# Patient Record
Sex: FEMALE | Race: WHITE | Employment: OTHER | URBAN - METROPOLITAN AREA
[De-identification: names, ages, dates, MRNs, and addresses within clinical notes are randomized per-mention and may not be internally consistent; named-entity substitution may affect disease eponyms.]

---

## 2017-04-25 PROBLEM — K43.6 INCARCERATED VENTRAL HERNIA: Status: ACTIVE | Noted: 2017-04-25

## 2018-01-06 PROBLEM — D63.1 ANEMIA OF CHRONIC RENAL FAILURE: Status: ACTIVE | Noted: 2018-01-06

## 2018-01-06 PROBLEM — N18.9 ANEMIA OF CHRONIC RENAL FAILURE: Status: ACTIVE | Noted: 2018-01-06

## 2018-01-06 PROBLEM — N18.30 CHRONIC KIDNEY DISEASE, STAGE III (MODERATE) (HCC): Status: ACTIVE | Noted: 2018-01-06

## 2018-03-13 ENCOUNTER — TELEPHONE (OUTPATIENT)
Dept: NON INVASIVE DIAGNOSTICS | Age: 73
End: 2018-03-13

## 2018-03-13 DIAGNOSIS — Z95.810 BIVENTRICULAR IMPLANTABLE CARDIOVERTER-DEFIBRILLATOR IN SITU: ICD-10-CM

## 2018-03-13 DIAGNOSIS — I44.7 BUNDLE BRANCH BLOCK, LEFT: ICD-10-CM

## 2018-03-13 DIAGNOSIS — I42.8 CARDIOMYOPATHY, NONISCHEMIC (HCC): ICD-10-CM

## 2018-03-13 NOTE — TELEPHONE ENCOUNTER
Spoke with pt on 03/09 and let her know we checked the cost of Xarelto and for a 30-day supply it would be $83.60 and for a 90-day supply it would be $250.42. Pt decided to switch to Xarelto, Rx was sent to pharmacy.

## 2018-03-14 ENCOUNTER — TELEPHONE (OUTPATIENT)
Dept: NON INVASIVE DIAGNOSTICS | Age: 73
End: 2018-03-14

## 2018-03-14 NOTE — TELEPHONE ENCOUNTER
Patient called in stating she wants to stay on Eliquis because there is no difference in the price of the [place]. Sending a new script for Eliquis to pharmacy.

## 2018-03-15 ENCOUNTER — TELEPHONE (OUTPATIENT)
Dept: NON INVASIVE DIAGNOSTICS | Age: 73
End: 2018-03-15

## 2018-03-19 ENCOUNTER — TELEPHONE (OUTPATIENT)
Dept: NON INVASIVE DIAGNOSTICS | Age: 73
End: 2018-03-19

## 2018-03-19 ENCOUNTER — HOSPITAL ENCOUNTER (OUTPATIENT)
Dept: CARDIOLOGY | Age: 73
Discharge: HOME | End: 2018-03-19

## 2018-04-13 LAB
BASOPHILS ABSOLUTE: NORMAL /ΜL
BASOPHILS RELATIVE PERCENT: NORMAL %
BUN BLDV-MCNC: NORMAL MG/DL
CALCIUM SERPL-MCNC: NORMAL MG/DL
CHLORIDE BLD-SCNC: NORMAL MMOL/L
CO2: NORMAL MMOL/L
CREAT SERPL-MCNC: NORMAL MG/DL
EOSINOPHILS ABSOLUTE: NORMAL /ΜL
EOSINOPHILS RELATIVE PERCENT: NORMAL %
GFR CALCULATED: NORMAL
GLUCOSE BLD-MCNC: NORMAL MG/DL
HCT VFR BLD CALC: NORMAL % (ref 36–46)
HEMOGLOBIN: NORMAL G/DL (ref 12–16)
LYMPHOCYTES ABSOLUTE: NORMAL /ΜL
LYMPHOCYTES RELATIVE PERCENT: NORMAL %
MCH RBC QN AUTO: NORMAL PG
MCHC RBC AUTO-ENTMCNC: NORMAL G/DL
MCV RBC AUTO: NORMAL FL
MONOCYTES ABSOLUTE: NORMAL /ΜL
MONOCYTES RELATIVE PERCENT: NORMAL %
NEUTROPHILS ABSOLUTE: NORMAL /ΜL
NEUTROPHILS RELATIVE PERCENT: NORMAL %
PDW BLD-RTO: NORMAL %
PLATELET # BLD: NORMAL K/ΜL
PMV BLD AUTO: NORMAL FL
POTASSIUM SERPL-SCNC: NORMAL MMOL/L
RBC # BLD: NORMAL 10^6/ΜL
SODIUM BLD-SCNC: NORMAL MMOL/L
WBC # BLD: NORMAL 10^3/ML

## 2018-04-17 ENCOUNTER — HOSPITAL ENCOUNTER (OUTPATIENT)
Dept: CARDIAC CATH/INVASIVE PROCEDURES | Age: 73
Discharge: HOME | End: 2018-04-17
Payer: MEDICARE

## 2018-04-17 ENCOUNTER — ANESTHESIA EVENT (OUTPATIENT)
Dept: CARDIAC CATH/INVASIVE PROCEDURES | Age: 73
End: 2018-04-17

## 2018-04-17 ENCOUNTER — ANESTHESIA (OUTPATIENT)
Dept: CARDIAC CATH/INVASIVE PROCEDURES | Age: 73
End: 2018-04-17

## 2018-04-17 VITALS
RESPIRATION RATE: 16 BRPM | SYSTOLIC BLOOD PRESSURE: 159 MMHG | DIASTOLIC BLOOD PRESSURE: 72 MMHG | HEART RATE: 70 BPM | HEIGHT: 62 IN | TEMPERATURE: 98.5 F | BODY MASS INDEX: 32.76 KG/M2 | WEIGHT: 178 LBS

## 2018-04-17 VITALS — DIASTOLIC BLOOD PRESSURE: 53 MMHG | OXYGEN SATURATION: 100 % | SYSTOLIC BLOOD PRESSURE: 112 MMHG

## 2018-04-17 DIAGNOSIS — I42.8 CARDIOMYOPATHY, NONISCHEMIC (HCC): ICD-10-CM

## 2018-04-17 DIAGNOSIS — Z95.810 BIVENTRICULAR IMPLANTABLE CARDIOVERTER-DEFIBRILLATOR IN SITU: ICD-10-CM

## 2018-04-17 DIAGNOSIS — Z01.818 PRE-OP TESTING: ICD-10-CM

## 2018-04-17 PROBLEM — Z45.02 IMPLANTABLE CARDIOVERTER-DEFIBRILLATOR (ICD) AT END OF LIFE: Status: ACTIVE | Noted: 2018-04-17

## 2018-04-17 LAB
EKG ATRIAL RATE: 68 BPM
EKG P AXIS: 63 DEGREES
EKG P-R INTERVAL: 146 MS
EKG Q-T INTERVAL: 430 MS
EKG QRS DURATION: 124 MS
EKG QTC CALCULATION (BAZETT): 457 MS
EKG R AXIS: -97 DEGREES
EKG T AXIS: 100 DEGREES
EKG VENTRICULAR RATE: 68 BPM

## 2018-04-17 PROCEDURE — C1882 AICD, OTHER THAN SING/DUAL: HCPCS

## 2018-04-17 PROCEDURE — C1781 MESH (IMPLANTABLE): HCPCS

## 2018-04-17 PROCEDURE — 33264 RMVL & RPLCMT DFB GEN MLT LD: CPT | Performed by: INTERNAL MEDICINE

## 2018-04-17 PROCEDURE — 2720000010 HC SURG SUPPLY STERILE

## 2018-04-17 PROCEDURE — 2580000003 HC RX 258: Performed by: NURSE ANESTHETIST, CERTIFIED REGISTERED

## 2018-04-17 PROCEDURE — 3700000000 HC ANESTHESIA ATTENDED CARE

## 2018-04-17 PROCEDURE — 93005 ELECTROCARDIOGRAM TRACING: CPT

## 2018-04-17 PROCEDURE — 6360000002 HC RX W HCPCS

## 2018-04-17 PROCEDURE — 6360000002 HC RX W HCPCS: Performed by: NURSE ANESTHETIST, CERTIFIED REGISTERED

## 2018-04-17 PROCEDURE — 2500000003 HC RX 250 WO HCPCS

## 2018-04-17 PROCEDURE — 2580000003 HC RX 258

## 2018-04-17 PROCEDURE — 3700000001 HC ADD 15 MINUTES (ANESTHESIA)

## 2018-04-17 RX ORDER — SODIUM CHLORIDE 9 MG/ML
INJECTION, SOLUTION INTRAVENOUS CONTINUOUS PRN
Status: DISCONTINUED | OUTPATIENT
Start: 2018-04-17 | End: 2018-04-17 | Stop reason: SDUPTHER

## 2018-04-17 RX ORDER — FENTANYL CITRATE 50 UG/ML
INJECTION, SOLUTION INTRAMUSCULAR; INTRAVENOUS PRN
Status: DISCONTINUED | OUTPATIENT
Start: 2018-04-17 | End: 2018-04-17 | Stop reason: SDUPTHER

## 2018-04-17 RX ORDER — MIDAZOLAM HYDROCHLORIDE 1 MG/ML
INJECTION INTRAMUSCULAR; INTRAVENOUS PRN
Status: DISCONTINUED | OUTPATIENT
Start: 2018-04-17 | End: 2018-04-17 | Stop reason: SDUPTHER

## 2018-04-17 RX ORDER — PROPOFOL 10 MG/ML
INJECTION, EMULSION INTRAVENOUS CONTINUOUS PRN
Status: DISCONTINUED | OUTPATIENT
Start: 2018-04-17 | End: 2018-04-17 | Stop reason: SDUPTHER

## 2018-04-17 RX ORDER — CEFAZOLIN SODIUM 1 G/3ML
INJECTION, POWDER, FOR SOLUTION INTRAMUSCULAR; INTRAVENOUS PRN
Status: DISCONTINUED | OUTPATIENT
Start: 2018-04-17 | End: 2018-04-17 | Stop reason: SDUPTHER

## 2018-04-17 RX ORDER — CEPHALEXIN 500 MG/1
500 CAPSULE ORAL 2 TIMES DAILY
Qty: 14 CAPSULE | Refills: 0 | Status: SHIPPED | OUTPATIENT
Start: 2018-04-17 | End: 2018-11-06 | Stop reason: ALTCHOICE

## 2018-04-17 RX ADMIN — MIDAZOLAM HYDROCHLORIDE 2 MG: 1 INJECTION, SOLUTION INTRAMUSCULAR; INTRAVENOUS at 09:48

## 2018-04-17 RX ADMIN — PROPOFOL 50 MCG/KG/MIN: 10 INJECTION, EMULSION INTRAVENOUS at 09:52

## 2018-04-17 RX ADMIN — FENTANYL CITRATE 50 MCG: 50 INJECTION, SOLUTION INTRAMUSCULAR; INTRAVENOUS at 09:52

## 2018-04-17 RX ADMIN — MIDAZOLAM HYDROCHLORIDE 1 MG: 1 INJECTION, SOLUTION INTRAMUSCULAR; INTRAVENOUS at 09:32

## 2018-04-17 RX ADMIN — CEFAZOLIN 2000 MG: 1 INJECTION, POWDER, FOR SOLUTION INTRAVENOUS at 09:58

## 2018-04-17 RX ADMIN — MIDAZOLAM HYDROCHLORIDE 1 MG: 1 INJECTION, SOLUTION INTRAMUSCULAR; INTRAVENOUS at 09:34

## 2018-04-17 RX ADMIN — SODIUM CHLORIDE: 9 INJECTION, SOLUTION INTRAVENOUS at 08:03

## 2018-04-17 RX ADMIN — FENTANYL CITRATE 50 MCG: 50 INJECTION, SOLUTION INTRAMUSCULAR; INTRAVENOUS at 10:25

## 2018-04-17 ASSESSMENT — LIFESTYLE VARIABLES: SMOKING_STATUS: 0

## 2018-04-17 ASSESSMENT — COPD QUESTIONNAIRES: CAT_SEVERITY: MODERATE

## 2018-05-01 ENCOUNTER — NURSE ONLY (OUTPATIENT)
Dept: NON INVASIVE DIAGNOSTICS | Age: 73
End: 2018-05-01
Payer: MEDICARE

## 2018-05-01 DIAGNOSIS — I42.8 CARDIOMYOPATHY, NONISCHEMIC (HCC): ICD-10-CM

## 2018-05-01 DIAGNOSIS — Z95.810 BIVENTRICULAR IMPLANTABLE CARDIOVERTER-DEFIBRILLATOR IN SITU: Primary | ICD-10-CM

## 2018-05-01 PROCEDURE — 93284 PRGRMG EVAL IMPLANTABLE DFB: CPT | Performed by: INTERNAL MEDICINE

## 2018-05-07 ENCOUNTER — TELEPHONE (OUTPATIENT)
Dept: NON INVASIVE DIAGNOSTICS | Age: 73
End: 2018-05-07

## 2018-07-31 ENCOUNTER — NURSE ONLY (OUTPATIENT)
Dept: NON INVASIVE DIAGNOSTICS | Age: 73
End: 2018-07-31
Payer: MEDICARE

## 2018-07-31 ENCOUNTER — TELEPHONE (OUTPATIENT)
Dept: NON INVASIVE DIAGNOSTICS | Age: 73
End: 2018-07-31

## 2018-07-31 DIAGNOSIS — I42.8 CARDIOMYOPATHY, NONISCHEMIC (HCC): ICD-10-CM

## 2018-07-31 DIAGNOSIS — Z95.810 BIVENTRICULAR IMPLANTABLE CARDIOVERTER-DEFIBRILLATOR IN SITU: Primary | ICD-10-CM

## 2018-07-31 PROCEDURE — 93296 REM INTERROG EVL PM/IDS: CPT | Performed by: INTERNAL MEDICINE

## 2018-07-31 PROCEDURE — 93295 DEV INTERROG REMOTE 1/2/MLT: CPT | Performed by: INTERNAL MEDICINE

## 2018-07-31 NOTE — PROGRESS NOTES
See [facility] [place] report. Remote monitoring reviewed over a 90 day period. End of 90 day monitoring period date of service 7.31.2018.

## 2018-10-30 NOTE — PROGRESS NOTES
serial # [id number]  D. Left ventricular lead, Guidant model # [id number], serial # [id number]  E. S/P RV lead explantation with implantation of a new Guidant RV ICD lead, as well as a [employer] Bi-V ICD 1/7/09      Hyperlipidemia 09/15/2011       Current Outpatient Prescriptions   Medication Sig Dispense Refill    apixaban (ELIQUIS) 5 MG TABS tablet Take 1 tablet by mouth 2 times daily 60 tablet 5    Biotin 10 MG CAPS Take 10 mg by mouth daily       carvedilol (COREG) 6.25 MG tablet Take 1 tablet by mouth 2 times daily (with meals) 60 tablet 3    SPIRIVA HANDIHALER 18 MCG inhalation capsule Inhale 18 mcg into the lungs daily       pravastatin (PRAVACHOL) 40 MG tablet Take 40 mg by mouth daily.  aspirin 81 MG EC tablet Take 81 mg by mouth daily       Calcium Carbonate-Vitamin D (CALCIUM 600 + D PO) Take by mouth 2 times daily        No current facility-administered medications for this visit. No Known Allergies     SUBJECTIVE: Ria Ramirez presents to the office today for the management of these Electrophysiology conditions: CRT-D in situ, NICMP, H/O NYHA III, PAF & LBBB. She underwent CRT-D generator change on 4/17/18. She is enrolled in [employer] remote monitoring. She was interested in switching from Eliquis to Xarelto but the cost was the same. From a devic and rhythm perspective she is feeling fine. She no longer is as active as she used to be, by her choice, which is bothersome to her. She need something to \"motivate\" her. She remains Bi-V paced 100%. There were no significant arrhythmias on her interrogation today. She denies angina, dyspnea, syncope, orthopnea or PND. She also denies ICD shock. Review of Systems   Respiratory: Negative. Cardiovascular: Negative. All other systems reviewed and are negative.          PHYSICAL EXAM:  Vitals:    11/06/18 1044   BP: 124/72   Pulse: 84   Weight: 168 lb (76.2 kg)   Height: 5' 2\" (1.575 m)     Constitutional: Oriented to person, device programmable settings were evaluated per above and in the scanned document, along with iterative adjustments (capture thresholds) to assess and select the most appropriate final programming to provide for consistent delivery of the appropriate therapy and to verify function of the device. .     Impression:     1. NICMP  A. H/O ejection fraction of approximately 30% diagnosed in December 2006. B. NYHA Class II congestive heart failure  C. Status post 2-D echocardiogram 3/16/07: LVEF 25-30% with a left ventricular end diastolic diameter of 6.0 cm.    D. S/P 12/26/06: Severe LVH with an ejection fraction of 20%. Normal coronary anatomy  E. TTE May 2016: LVEF 55-60%. F. on GDMT. No ACE/ARB/ARNI secondary to prior intolerance    2. cLBBB    3. CRT-D in situ  A. Original Guidant Contak Renewal 3 RF, implanted 5/23/07  B. S/P RV lead explantation with implantation of a new Guidant RV ICD lead, as well as a [employer] Bi-V ICD 1/7/09  C. ICD gen change 4/17/18: Terrell Dolphin CRT-D. DOI 4/17/18    4. HLD    5. Paroxysmal atrial fibrillation  - currently in SR  - no history of AAD or ablation  - [id number]: 3  - Eliquis OAC    Plan:     1. Ms Banks's CRT-D function is stable and programmed accordingly based on the above interrogation. She is Bi-V paced 100% and there were no significant arrhythmias. 2. No changes were made in her medications today. 3. She will send a remote transmission in 91 days followed by a 6 month office visit. 4. She was asked to call the office with concerns prior to her scheduled follow-up. Thank you for allowing me to participate in their care. I have spent a total of 20 minutes with the patient  reviewing the above stated recommendations.  And a total of >50% of that time involved face-to-face time providing counseling and or coordination of care with the other providers    Americo Alanis, MSN, APRN-CNP, AGACNP, [address] [place] [address] Heart & Vascular [facility]    CC: Dr Waldrop Friday          Dr Erich Lewis

## 2018-10-31 ENCOUNTER — TELEPHONE (OUTPATIENT)
Dept: NON INVASIVE DIAGNOSTICS | Age: 73
End: 2018-10-31

## 2018-10-31 ENCOUNTER — NURSE ONLY (OUTPATIENT)
Dept: NON INVASIVE DIAGNOSTICS | Age: 73
End: 2018-10-31
Payer: MEDICARE

## 2018-10-31 DIAGNOSIS — Z95.810 BIVENTRICULAR IMPLANTABLE CARDIOVERTER-DEFIBRILLATOR IN SITU: Primary | ICD-10-CM

## 2018-10-31 DIAGNOSIS — I42.8 CARDIOMYOPATHY, NONISCHEMIC (HCC): ICD-10-CM

## 2018-10-31 PROCEDURE — 93296 REM INTERROG EVL PM/IDS: CPT | Performed by: INTERNAL MEDICINE

## 2018-10-31 PROCEDURE — 93295 DEV INTERROG REMOTE 1/2/MLT: CPT | Performed by: INTERNAL MEDICINE

## 2018-11-06 ENCOUNTER — OFFICE VISIT (OUTPATIENT)
Dept: NON INVASIVE DIAGNOSTICS | Age: 73
End: 2018-11-06
Payer: MEDICARE

## 2018-11-06 VITALS
SYSTOLIC BLOOD PRESSURE: 124 MMHG | DIASTOLIC BLOOD PRESSURE: 72 MMHG | BODY MASS INDEX: 30.91 KG/M2 | HEART RATE: 84 BPM | HEIGHT: 62 IN | WEIGHT: 168 LBS

## 2018-11-06 DIAGNOSIS — Z95.810 BIVENTRICULAR IMPLANTABLE CARDIOVERTER-DEFIBRILLATOR IN SITU: Primary | ICD-10-CM

## 2018-11-06 PROCEDURE — 93284 PRGRMG EVAL IMPLANTABLE DFB: CPT | Performed by: NURSE PRACTITIONER

## 2018-11-06 PROCEDURE — 4040F PNEUMOC VAC/ADMIN/RCVD: CPT | Performed by: NURSE PRACTITIONER

## 2018-11-06 PROCEDURE — 1123F ACP DISCUSS/DSCN MKR DOCD: CPT | Performed by: NURSE PRACTITIONER

## 2018-11-06 PROCEDURE — G8400 PT W/DXA NO RESULTS DOC: HCPCS | Performed by: NURSE PRACTITIONER

## 2018-11-06 PROCEDURE — G8427 DOCREV CUR MEDS BY ELIG CLIN: HCPCS | Performed by: NURSE PRACTITIONER

## 2018-11-06 PROCEDURE — 1036F TOBACCO NON-USER: CPT | Performed by: NURSE PRACTITIONER

## 2018-11-06 PROCEDURE — 99213 OFFICE O/P EST LOW 20 MIN: CPT | Performed by: NURSE PRACTITIONER

## 2018-11-06 PROCEDURE — G8484 FLU IMMUNIZE NO ADMIN: HCPCS | Performed by: NURSE PRACTITIONER

## 2018-11-06 PROCEDURE — 1101F PT FALLS ASSESS-DOCD LE1/YR: CPT | Performed by: NURSE PRACTITIONER

## 2018-11-06 PROCEDURE — 3017F COLORECTAL CA SCREEN DOC REV: CPT | Performed by: NURSE PRACTITIONER

## 2018-11-06 PROCEDURE — 1090F PRES/ABSN URINE INCON ASSESS: CPT | Performed by: NURSE PRACTITIONER

## 2018-11-06 PROCEDURE — G8417 CALC BMI ABV UP PARAM F/U: HCPCS | Performed by: NURSE PRACTITIONER

## 2018-11-06 ASSESSMENT — ENCOUNTER SYMPTOMS: RESPIRATORY NEGATIVE: 1

## 2019-01-30 ENCOUNTER — NURSE ONLY (OUTPATIENT)
Dept: NON INVASIVE DIAGNOSTICS | Age: 74
End: 2019-01-30
Payer: MEDICARE

## 2019-01-30 DIAGNOSIS — I42.8 CARDIOMYOPATHY, NONISCHEMIC (HCC): ICD-10-CM

## 2019-01-30 DIAGNOSIS — Z95.810 BIVENTRICULAR IMPLANTABLE CARDIOVERTER-DEFIBRILLATOR IN SITU: Primary | ICD-10-CM

## 2019-01-30 PROCEDURE — 93295 DEV INTERROG REMOTE 1/2/MLT: CPT | Performed by: INTERNAL MEDICINE

## 2019-01-30 PROCEDURE — 93296 REM INTERROG EVL PM/IDS: CPT | Performed by: INTERNAL MEDICINE

## 2019-02-01 ENCOUNTER — TELEPHONE (OUTPATIENT)
Dept: NON INVASIVE DIAGNOSTICS | Age: 74
End: 2019-02-01

## 2019-05-03 ENCOUNTER — NURSE ONLY (OUTPATIENT)
Dept: NON INVASIVE DIAGNOSTICS | Age: 74
End: 2019-05-03
Payer: MEDICARE

## 2019-05-03 DIAGNOSIS — I42.8 CARDIOMYOPATHY, NONISCHEMIC (HCC): ICD-10-CM

## 2019-05-03 DIAGNOSIS — Z95.810 BIVENTRICULAR IMPLANTABLE CARDIOVERTER-DEFIBRILLATOR IN SITU: Primary | ICD-10-CM

## 2019-05-03 PROCEDURE — 93296 REM INTERROG EVL PM/IDS: CPT | Performed by: INTERNAL MEDICINE

## 2019-05-03 PROCEDURE — 93295 DEV INTERROG REMOTE 1/2/MLT: CPT | Performed by: INTERNAL MEDICINE

## 2019-05-14 ENCOUNTER — TELEPHONE (OUTPATIENT)
Dept: NON INVASIVE DIAGNOSTICS | Age: 74
End: 2019-05-14

## 2019-05-14 NOTE — TELEPHONE ENCOUNTER
----- Message from Luiza Moran RN sent at 5/14/2019  1:22 PM EDT -----  Successful transmission received. Please call patient and give next appointment.

## 2019-08-07 ENCOUNTER — NURSE ONLY (OUTPATIENT)
Dept: NON INVASIVE DIAGNOSTICS | Age: 74
End: 2019-08-07
Payer: MEDICARE

## 2019-08-07 DIAGNOSIS — I42.8 CARDIOMYOPATHY, NONISCHEMIC (HCC): ICD-10-CM

## 2019-08-07 DIAGNOSIS — Z95.810 BIVENTRICULAR IMPLANTABLE CARDIOVERTER-DEFIBRILLATOR IN SITU: Primary | ICD-10-CM

## 2019-08-07 PROCEDURE — 93296 REM INTERROG EVL PM/IDS: CPT | Performed by: INTERNAL MEDICINE

## 2019-08-07 PROCEDURE — 93295 DEV INTERROG REMOTE 1/2/MLT: CPT | Performed by: INTERNAL MEDICINE

## 2019-09-10 ASSESSMENT — ENCOUNTER SYMPTOMS: RESPIRATORY NEGATIVE: 1

## 2019-09-10 NOTE — PROGRESS NOTES
serial # [id number]  D. Left ventricular lead, Guidant model # [id number], serial # [id number]  E. S/P RV lead explantation with implantation of a new Guidant RV ICD lead, as well as a [employer] Bi-V ICD 1/7/09      Hyperlipidemia 09/15/2011       Current Outpatient Medications   Medication Sig Dispense Refill    fluticasone (FLONASE) 50 MCG/ACT nasal spray 1 spray by Each Nostril route as needed for Rhinitis      apixaban (ELIQUIS) 5 MG TABS tablet Take 1 tablet by mouth 2 times daily 180 tablet 3    Biotin 10 MG CAPS Take 10 mg by mouth daily       carvedilol (COREG) 6.25 MG tablet Take 1 tablet by mouth 2 times daily (with meals) 60 tablet 3    SPIRIVA HANDIHALER 18 MCG inhalation capsule Inhale 18 mcg into the lungs daily       pravastatin (PRAVACHOL) 40 MG tablet Take 40 mg by mouth daily.  Calcium Carbonate-Vitamin D (CALCIUM 600 + D PO) Take by mouth 2 times daily        No current facility-administered medications for this visit. No Known Allergies     SUBJECTIVE: Chata Jordan presents to the office today for the management of these Electrophysiology conditions: CRT-D in situ, NICMP, H/O NYHA III, PAF & LBBB. She underwent CRT-D generator change on 4/17/18. She is enrolled in [employer] remote monitoring. From a device and rhythm perspective she is feeling fine with her only complaint is fatigue which she relates to deconditioning. She remains BiV paced 96%. There were no significant arrhythmias on her interrogation today. She denies angina, dyspnea, syncope, orthopnea or PND. She also denies ICD shocks. Review of Systems   Respiratory: Negative. Cardiovascular: Negative. All other systems reviewed and are negative. PHYSICAL EXAM:  Vitals:    09/17/19 1532   BP: 132/70   Pulse: 74   Resp: 16   Weight: 166 lb (75.3 kg)   Height: 5' 2\" (1.575 m)     Constitutional: Oriented to person, place, and time. Well-developed and cooperative. Head: Normocephalic and atraumatic. volume/Index: 56 ml   1.3 cm                                           /30ml/m^2                                                    RA Area: 14.4 cm^2   LV Mass: 123.07   g     Doppler Measurements & Calculations      MV Peak E-Wave: 1 AV Peak Velocity: 1.39 m/s   LVOT Peak Velocity: 0.86   m/s               AV Peak Gradient: 7.67 mmHg  m/s   MV Peak A-Wave:   AV Mean Velocity: 0.95 m/s   LVOT Mean Velocity: 0.59   0.93 m/s          AV Mean Gradient: 4.1 mmHg   m/s   MV E/A Ratio:     AV VTI: 26 cm                LVOT Peak Gradient: 2.9   1.07                                           mmHgLVOT Mean Gradient: 1.6   MV Peak Gradient: LVOT VTI: 17.7 cm            mmHg   4.2 mmHg          IVRT: 60 msec                Estimated RVSP: 30.3 mmHg   MV Mean Gradient:                              Estimated RAP:8 mmHg   1.9 mmHg          Pulm. Vein A Reversal   MV Mean Velocity: Duration:96.9 msec   0.65 m/s          Pulm. Vein D Velocity:0.44   TR Velocity:2.36 m/s   MV Deceleration   m/sPulm. Vein A Reversal     TR Gradient:22.26 mmHg   Time: 218.9 msec  Velocity:0.26 m/s            PV Peak Velocity: 0.85 m/s   MV P1/2t: 74.3    Pulm.  Vein S Velocity: 0.59  PV Peak Gradient: 2.9 mmHg   msec              m/s                          PV Mean Velocity: 0.57 m/s   MVA by PHT:2.96                                PV Mean Gradient: 1.5 mmHg   cm^2      MV E' Septal   Velocity: 0.07   m/s   MV E' Lateral   Velocity: 0.09   m/s   MR Velocity: 4.21   m/s   MR VTI: 132.1 cm    Device Interrogation/Reprogramming 9/17/19   Make/Model [employer]SCI Dynagen CRT-D. DOI 4/17/18  Mode DDD 60/130 ppm  P wave: 6.2 mV  Impedance: 512 ohms   Threshold: 0.8 V @ 0.5 ms  RV R wave: 15.0 mV  Impedance: 846 ohms   Threshold: 0.6 V @ 0.5 ms  LV wave: 7.1 mV  Impedance: 753 ohms   Threshold: 2.1 V @ 1.0ms  Pacing: A: 1%  RV: 96%  LV: 96%  Battery Voltage/Longevity:  7.5 years  Charge time: 9.2 seconds    Arrhythmias: AF burden: <1%  - 10 atrial events <1 minute  Reprogramming included: see below  Overall device function is normal  All device programmable settings were evaluated per above and in the scanned document, along with iterative adjustments (capture thresholds) to assess and select the most appropriate final programming to provide for consistent delivery of the appropriate therapy and to verify function of the device. I have independently reviewed all of the ECGs and rhythm strips per above. I have personally reviewed the laboratory, cardiac diagnostic and radiographic testing as outlined above. I have reviewed previous records noted in [address]. Impression:     1. NICMP  A. H/O ejection fraction of approximately 30% diagnosed in December 2006. B. NYHA Class II congestive heart failure  C. Status post 2-D echocardiogram 3/16/07: LVEF 25-30% with a left ventricular end diastolic diameter of 6.0 cm.    D. S/P 12/26/06: Severe LVH with an ejection fraction of 20%. Normal coronary anatomy  E. TTE May 2016/2017: LVEF 55-60%. F. on GDMT. No ACE/ARB/ARNI secondary to prior intolerance    2. cLBBB    3. CRT-D in situ  A. Original Guidant Contak Renewal 3 RF, implanted 5/23/07  B. S/P RV lead explantation with implantation of a new Guidant RV ICD lead, as well as a [employer] Bi-V ICD 1/7/09  C. ICD gen change 4/17/18: Moises Chasten CRT-D. DOI 4/17/18    4. HLD    5. Paroxysmal atrial fibrillation  - currently in SR  - no history of AAD or ablation  - [id number]: 3  - Eliquis OAC    Plan:     1. Ms Banks's CRT-D function is stable and programmed accordingly based on the above interrogation. She is BiV paced 96% and there were no significant arrhythmias. 2. No changes were made in her medications today. 3. She will send a remote transmission in 91 days followed by a 6 month office visit. 4. She was asked to call the office with concerns prior to her scheduled follow-up.     I have spent a total of 25 minutes with the patient  reviewing the above stated recommendations. A total of >50% of that time involved face-to-face time providing counseling and or coordination of care with the other providers    Author DO Maggie  [facility] Cardiac Electrophysiology  [address] [facility]     NOTE: This report was transcribed using voice recognition software. Every effort was made to ensure accuracy; however, inadvertent computerized transcription errors may be present.

## 2019-09-17 ENCOUNTER — OFFICE VISIT (OUTPATIENT)
Dept: NON INVASIVE DIAGNOSTICS | Age: 74
End: 2019-09-17
Payer: MEDICARE

## 2019-09-17 VITALS
RESPIRATION RATE: 16 BRPM | HEIGHT: 62 IN | BODY MASS INDEX: 30.55 KG/M2 | WEIGHT: 166 LBS | DIASTOLIC BLOOD PRESSURE: 70 MMHG | SYSTOLIC BLOOD PRESSURE: 132 MMHG | HEART RATE: 74 BPM

## 2019-09-17 DIAGNOSIS — Z95.810 BIVENTRICULAR IMPLANTABLE CARDIOVERTER-DEFIBRILLATOR IN SITU: Primary | ICD-10-CM

## 2019-09-17 PROCEDURE — 4040F PNEUMOC VAC/ADMIN/RCVD: CPT | Performed by: INTERNAL MEDICINE

## 2019-09-17 PROCEDURE — G8417 CALC BMI ABV UP PARAM F/U: HCPCS | Performed by: INTERNAL MEDICINE

## 2019-09-17 PROCEDURE — 1036F TOBACCO NON-USER: CPT | Performed by: INTERNAL MEDICINE

## 2019-09-17 PROCEDURE — 3017F COLORECTAL CA SCREEN DOC REV: CPT | Performed by: INTERNAL MEDICINE

## 2019-09-17 PROCEDURE — 1123F ACP DISCUSS/DSCN MKR DOCD: CPT | Performed by: INTERNAL MEDICINE

## 2019-09-17 PROCEDURE — G8427 DOCREV CUR MEDS BY ELIG CLIN: HCPCS | Performed by: INTERNAL MEDICINE

## 2019-09-17 PROCEDURE — 93284 PRGRMG EVAL IMPLANTABLE DFB: CPT | Performed by: INTERNAL MEDICINE

## 2019-09-17 PROCEDURE — 1090F PRES/ABSN URINE INCON ASSESS: CPT | Performed by: INTERNAL MEDICINE

## 2019-09-17 PROCEDURE — 99214 OFFICE O/P EST MOD 30 MIN: CPT | Performed by: INTERNAL MEDICINE

## 2019-09-17 PROCEDURE — G8400 PT W/DXA NO RESULTS DOC: HCPCS | Performed by: INTERNAL MEDICINE

## 2019-09-17 RX ORDER — FLUTICASONE PROPIONATE 50 MCG
1 SPRAY, SUSPENSION (ML) NASAL PRN
COMMUNITY

## 2019-11-06 ENCOUNTER — NURSE ONLY (OUTPATIENT)
Dept: NON INVASIVE DIAGNOSTICS | Age: 74
End: 2019-11-06
Payer: MEDICARE

## 2019-11-06 DIAGNOSIS — I42.8 CARDIOMYOPATHY, NONISCHEMIC (HCC): ICD-10-CM

## 2019-11-06 DIAGNOSIS — Z95.810 BIVENTRICULAR IMPLANTABLE CARDIOVERTER-DEFIBRILLATOR IN SITU: Primary | ICD-10-CM

## 2019-11-06 PROCEDURE — 93296 REM INTERROG EVL PM/IDS: CPT | Performed by: INTERNAL MEDICINE

## 2019-11-06 PROCEDURE — 93295 DEV INTERROG REMOTE 1/2/MLT: CPT | Performed by: INTERNAL MEDICINE

## 2020-02-05 ENCOUNTER — NURSE ONLY (OUTPATIENT)
Dept: NON INVASIVE DIAGNOSTICS | Age: 75
End: 2020-02-05
Payer: MEDICARE

## 2020-02-05 PROCEDURE — 93296 REM INTERROG EVL PM/IDS: CPT | Performed by: INTERNAL MEDICINE

## 2020-02-05 PROCEDURE — 93295 DEV INTERROG REMOTE 1/2/MLT: CPT | Performed by: INTERNAL MEDICINE

## 2020-03-19 RX ORDER — APIXABAN 5 MG/1
TABLET, FILM COATED ORAL
Qty: 180 TABLET | Refills: 3 | Status: SHIPPED
Start: 2020-03-19 | End: 2021-05-14 | Stop reason: SDUPTHER

## 2020-05-06 ENCOUNTER — NURSE ONLY (OUTPATIENT)
Dept: NON INVASIVE DIAGNOSTICS | Age: 75
End: 2020-05-06
Payer: MEDICARE

## 2020-05-06 PROCEDURE — 93295 DEV INTERROG REMOTE 1/2/MLT: CPT | Performed by: INTERNAL MEDICINE

## 2020-05-06 PROCEDURE — 93296 REM INTERROG EVL PM/IDS: CPT | Performed by: INTERNAL MEDICINE

## 2020-08-05 ENCOUNTER — NURSE ONLY (OUTPATIENT)
Dept: NON INVASIVE DIAGNOSTICS | Age: 75
End: 2020-08-05
Payer: MEDICARE

## 2020-08-05 PROCEDURE — 93296 REM INTERROG EVL PM/IDS: CPT | Performed by: INTERNAL MEDICINE

## 2020-08-05 PROCEDURE — 93295 DEV INTERROG REMOTE 1/2/MLT: CPT | Performed by: INTERNAL MEDICINE

## 2020-08-06 NOTE — PROGRESS NOTES
See [facility] [place] report. Remote monitoring reviewed over a 90 day period.   End of 90 day monitoring period date of service 8-5-2020      Make/Model BSCI Dynagen CRT-D.   DOI 4/17/18  Mode DDD 60/130 ppm  Presenting rhythm: SR, As-BiVP, HR ~80 ppm  P wave: 2.3 mV  Impedance: 512 ohms   Threshold: NA  RV R wave: 18.9 mV  Impedance: 962 ohms   Threshold: NA  LV wave: 7.1 mV  Impedance: 753 ohms   Threshold: NA  Pacing: A: 1%  RV: 97%  LV: 97%  Battery Voltage/Longevity:  6.5 years  Charge time: 9.4 seconds    Arrhythmias: AF burden: <1%  - 2 atrial events 1 sec    Medications:  -Eliquis 5 mg BID  -Coreg 6.25mg BID    Plan:  -91 day remote transmission  -OV recall 9/17/2020 with Tomy GALLAGHER-ACNP      AILEEN Menjivar-CNP, [address] Electrophysiology  [address] [facility]

## 2020-11-04 ENCOUNTER — NURSE ONLY (OUTPATIENT)
Dept: NON INVASIVE DIAGNOSTICS | Age: 75
End: 2020-11-04
Payer: MEDICARE

## 2020-11-04 PROCEDURE — 93296 REM INTERROG EVL PM/IDS: CPT | Performed by: INTERNAL MEDICINE

## 2020-11-04 PROCEDURE — 93295 DEV INTERROG REMOTE 1/2/MLT: CPT | Performed by: INTERNAL MEDICINE

## 2020-11-19 NOTE — PROGRESS NOTES
See [facility] [place] report. Remote monitoring reviewed over a 90 day period. End of 90 day monitoring period date of service 11.4.2020.

## 2020-12-28 ENCOUNTER — HOSPITAL ENCOUNTER (OUTPATIENT)
Dept: ULTRASOUND IMAGING | Age: 75
Discharge: HOME | End: 2020-12-30
Payer: MEDICARE

## 2020-12-28 ENCOUNTER — OFFICE VISIT (OUTPATIENT)
Dept: FAMILY MEDICINE CLINIC | Age: 75
End: 2020-12-28
Payer: MEDICARE

## 2020-12-28 VITALS
WEIGHT: 168.6 LBS | SYSTOLIC BLOOD PRESSURE: 124 MMHG | TEMPERATURE: 97.6 F | DIASTOLIC BLOOD PRESSURE: 72 MMHG | RESPIRATION RATE: 16 BRPM | BODY MASS INDEX: 31.03 KG/M2 | HEART RATE: 89 BPM | OXYGEN SATURATION: 95 % | HEIGHT: 62 IN

## 2020-12-28 PROCEDURE — 1090F PRES/ABSN URINE INCON ASSESS: CPT | Performed by: PHYSICIAN ASSISTANT

## 2020-12-28 PROCEDURE — G8484 FLU IMMUNIZE NO ADMIN: HCPCS | Performed by: PHYSICIAN ASSISTANT

## 2020-12-28 PROCEDURE — 1036F TOBACCO NON-USER: CPT | Performed by: PHYSICIAN ASSISTANT

## 2020-12-28 PROCEDURE — 93971 EXTREMITY STUDY: CPT

## 2020-12-28 PROCEDURE — G8417 CALC BMI ABV UP PARAM F/U: HCPCS | Performed by: PHYSICIAN ASSISTANT

## 2020-12-28 PROCEDURE — 4040F PNEUMOC VAC/ADMIN/RCVD: CPT | Performed by: PHYSICIAN ASSISTANT

## 2020-12-28 PROCEDURE — G8400 PT W/DXA NO RESULTS DOC: HCPCS | Performed by: PHYSICIAN ASSISTANT

## 2020-12-28 PROCEDURE — 1123F ACP DISCUSS/DSCN MKR DOCD: CPT | Performed by: PHYSICIAN ASSISTANT

## 2020-12-28 PROCEDURE — G8427 DOCREV CUR MEDS BY ELIG CLIN: HCPCS | Performed by: PHYSICIAN ASSISTANT

## 2020-12-28 PROCEDURE — 99214 OFFICE O/P EST MOD 30 MIN: CPT | Performed by: PHYSICIAN ASSISTANT

## 2020-12-28 RX ORDER — METHYLPREDNISOLONE 4 MG/1
TABLET ORAL
Qty: 1 KIT | Refills: 0 | Status: SHIPPED
Start: 2020-12-28 | End: 2021-02-03 | Stop reason: ALTCHOICE

## 2020-12-28 NOTE — PROGRESS NOTES
 CARDIAC DEFIBRILLATOR PLACEMENT Left 2007    CARDIAC DEFIBRILLATOR PLACEMENT  2018    BIV ICD GENT CHANGE (BSCI)    BONDS    CARPAL TUNNEL RELEASE      DIAGNOSTIC CARDIAC CATH LAB PROCEDURE      ENDOSCOPY, COLON, DIAGNOSTIC  2018    upper endo    HERNIA REPAIR      HYSTERECTOMY      ILEOSTOMY OR JEJUNOSTOMY      INCISIONAL HERNIA REPAIR  2017    LAPAROSCOPIC; WITH MESH    OTHER SURGICAL HISTORY      ostomy placed having reversal done on 2016    OTHER SURGICAL HISTORY  2016    open reveral ileostomy    OTHER SURGICAL HISTORY  2009    BiV/ICD       Family History   Problem Relation Age of Onset    Alzheimer's Disease Mother     Heart Disease Father     Cancer Father     Alzheimer's Disease Father     Cancer Brother        Medications:     Current Outpatient Medications:     ELIQUIS 5 MG TABS tablet, TAKE 1 TABLET TWICE DAILY, Disp: 180 tablet, Rfl: 3    fluticasone (FLONASE) 50 MCG/ACT nasal spray, 1 spray by Each Nostril route as needed for Rhinitis, Disp: , Rfl:     Biotin 10 MG CAPS, Take 10 mg by mouth daily , Disp: , Rfl:     carvedilol (COREG) 6.25 MG tablet, Take 1 tablet by mouth 2 times daily (with meals), Disp: 60 tablet, Rfl: 3    SPIRIVA HANDIHALER 18 MCG inhalation capsule, Inhale 18 mcg into the lungs daily , Disp: , Rfl:     pravastatin (PRAVACHOL) 40 MG tablet, Take 40 mg by mouth daily. , Disp: , Rfl:     Calcium Carbonate-Vitamin D (CALCIUM 600 + D PO), Take by mouth 2 times daily , Disp: , Rfl:     Allergies:   No Known Allergies    Social History:     Social History     Tobacco Use    Smoking status: Former Smoker     Types: Cigarettes     Quit date: 2006     Years since quittin.0    Smokeless tobacco: Never Used   Substance Use Topics    Alcohol use: Yes     Comment: occasional    Drug use: No       Patient lives at home.     Physical Exam:     Vitals:    20 1535   BP: 124/72   Pulse: 89   Resp: 16 Temp: 97.6 °F (36.4 °C)   SpO2: 95%   Weight: 168 lb 9.6 oz (76.5 kg)   Height: 5' 2\" (1.575 m)       Exam:  Physical Exam  Nurses note and vital signs reviewed and patient is not hypoxic. General: The patient appears well and in no apparent distress. Patient is resting comfortably on cart. Skin: Warm, dry, no pallor noted. There is no rash noted. Head: Normocephalic, atraumatic. Eye: Normal conjunctiva  Ears, Nose, Mouth, and Throat: Oral mucosa is moist  Cardiovascular: Regular Rate and Rhythm  Respiratory: Patient is in no distress, no accessory muscle use, lungs are clear to auscultation, no wheezing, crackles or rhonchi  Back: Non-tender, no CVA tenderness bilaterally to percussion. Musculoskeletal: The patient has no obvious deformity noted to the right leg or to the right lower extremity. The patient does have some slight edema noted to the right lower extremity but no significant pitting edema or pretibial edema. The patient does have some tenderness to the medial aspect of the right thigh and into the anterior aspect of the right leg. The patient had negative Nataliya Poser' sign on the right. No significant tenderness or swelling on the left leg. No cyanosis or mottling. Normal capillary refill. Neurological: A&O x4, normal speech        Testing:     Xr Pelvis (1-2 Views)    Result Date: 12/28/2020  EXAMINATION: ONE XRAY VIEW OF THE PELVIS 12/28/2020 4:01 pm COMPARISON: None. HISTORY: ORDERING SYSTEM PROVIDED HISTORY: Right leg swelling TECHNOLOGIST PROVIDED HISTORY: Reason for exam:->right leg pain FINDINGS: No pelvic fracture. No diastasis involving SI joints or symphysis pubis. No hip fracture or dislocation. Mild to moderate subchondral sclerosis involving superior margin of right and left acetabulum with mild marginal osteophytosis. 1.  No evidence of pelvic fracture or hip fracture. 2.  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     Us Dup Lower Extremity Right [facility] Result Date: 12/28/2020  EXAMINATION: DUPLEX VENOUS ULTRASOUND OF THE RIGHT LOWER EXTREMITY, 12/28/2020 6:02 pm TECHNIQUE: Duplex ultrasound and Doppler images were obtained of the right lower extremity. COMPARISON: July 26, 2016 HISTORY: ORDERING SYSTEM PROVIDED HISTORY: Right leg swelling TECHNOLOGIST PROVIDED HISTORY: Reason for exam:->right leg swelling What reading provider will be dictating this exam?->CRC FINDINGS: The visualized veins of the right lower extremity are patent and free of echogenic thrombus. The veins are normally compressible and have normal phasic flow. No evidence of DVT in the right lower extremity. Medical Decision Making:     Vital signs reviewed    Past medical history reviewed. Allergies reviewed. Medications reviewed. Patient on arrival does not appear to be in any apparent distress or discomfort. The patient will have x-rays of the pelvis and the right femur. This seems to be the areas of pain for her. We will also obtain a stat keep and call ultrasound of the right lower extremity to rule out the possibility of DVT. I received a phone call this evening regarding the ultrasound of the right lower extremity and there was no evidence of a DVT. I was able to speak to the patient via phone. I personally reviewed the x-ray images of the pelvis and right femur. I did not see any evidence of acute process there is some osteo arthritic changes. The patient will be given a Medrol Dosepak. She cannot use any NSAIDs due to the fact that she is on Eliquis. The patient is to monitor signs symptoms. Follow-up with PCP. Patient will call with any questions or concerns. We will update her with the formal radiology reports of the pelvis and of the right femur. Clinical Impression:   Darryn Aguiar was seen today for leg swelling. Diagnoses and all orders for this visit:    Right leg swelling  -     US DUP LOWER EXTREMITY RIGHT EBEN;  Future -     XR PELVIS (1-2 VIEWS); Future  -     XR FEMUR RIGHT (MIN 2 VIEWS); Future    Right hip pain    Pain of right lower extremity    Current use of long term anticoagulation    Other orders  -     methylPREDNISolone (MEDROL DOSEPACK) 4 MG tablet; Take by mouth. The patient is to call for any concerns or return if any of the signs or symptoms worsen. The patient is to follow-up with PCP in the next 2-3 days for repeat evaluation repeat assessment or go directly to the emergency department.      SIGNATURE: Sera Moran III, PA-C

## 2021-02-01 NOTE — PROGRESS NOTES
Cardiac Electrophysiology Outpatient Progress Note    Dorothea Reach  1945  Date of Service: 2/3/21   Referring Provider/PCP: Jamaal Simpson MD   Cardiology: Estefani Narayanan MD  Electrophysiologist: Ramiro Mccormack DO    Patient Active Problem List    Diagnosis Date Noted    Implantable cardioverter-defibrillator (ICD) at end of device life 04/17/2018     Overview Note:     Replacing Deprecated Diagnoses      Chronic kidney disease, stage III (moderate) 01/06/2018     Overview Note:     Diagnosis added to problem list by Nelli Miles Pharm. D 1/6/2018 10:41 AM based on transcribed order from Dr. Isidro Cardoso Anemia of chronic renal failure 01/06/2018     Overview Note:     Diagnosis added to problem list by Nelli Miles Pharm. D 1/6/2018 10:41 AM based on transcribed order from Dr. Isidro Cardoso Incarcerated ventral hernia 04/25/2017    Acute delirium 10/05/2016    Hypomagnesemia 06/27/2016    Acute renal failure ([facility] [address] 06/26/2016    SIRS (systemic inflammatory response syndrome) ([facility] [address] 05/26/2016    Hypotension 05/26/2016    History of ischemic colitis 05/26/2016    Anemia 05/26/2016    Blood loss anemia 05/02/2016    Acute respiratory failure ([facility] [address] 05/02/2016    Cardiomyopathy, nonischemic ([facility] [address] 09/15/2011     Overview Note:     A. H/O ejection fraction of approximately 30% diagnosed in December 2006. B. NYHA Class III congestive heart failure  C. Status post 2-D echocardiogram 3/16/07: LVEF 25-30% with a left ventricular end diastolic diameter of 6.0 cm.    D. S/P 12/26/06: Severe LVH with an ejection fraction of 20%. Normal coronary anatomy      Bundle branch block, left 09/15/2011     Overview Note:     A. Chronic        Biventricular implantable cardioverter-defibrillator in situ 09/15/2011     Overview Note:     A. Original Guidant Contak Renewal 3 RF, implanted 5/23/07  B. Atrial lead, Guidant model [id number], serial # [id number]  C.  Right ventricular lead, [employer] model [id number], serial # [id number]  D. Left ventricular lead, Guidant model # [id number], serial # [id number]  E. S/P RV lead explantation with implantation of a new Guidant RV ICD lead, as well as a [employer] COGNIS Bi-V ICD 1/7/09      Hyperlipidemia 09/15/2011       Current Outpatient Medications   Medication Sig Dispense Refill    ELIQUIS 5 MG TABS tablet TAKE 1 TABLET TWICE DAILY 180 tablet 3    fluticasone (FLONASE) 50 MCG/ACT nasal spray 1 spray by Each Nostril route as needed for Rhinitis      Biotin 10 MG CAPS Take 10 mg by mouth daily       carvedilol (COREG) 6.25 MG tablet Take 1 tablet by mouth 2 times daily (with meals) 60 tablet 3    SPIRIVA HANDIHALER 18 MCG inhalation capsule Inhale 18 mcg into the lungs daily       pravastatin (PRAVACHOL) 40 MG tablet Take 40 mg by mouth daily.  Calcium Carbonate-Vitamin D (CALCIUM 600 + D PO) Take by mouth 2 times daily        No current facility-administered medications for this visit. No Known Allergies     9/17/2019 SUBJECTIVE: Humera Raza presents to the office today for the management of these Electrophysiology conditions: CRT-D in situ, NICMP, H/O NYHA III, PAF & LBBB. She underwent CRT-D generator change on 4/17/18. She is enrolled in [facility] remote monitoring. From a device and rhythm perspective she is feeling fine with her only complaint is fatigue which she relates to deconditioning. She remains BiV paced 96%. There were no significant arrhythmias on her interrogation today. She denies angina, dyspnea, syncope, orthopnea or PND. She also denies ICD shocks. 02/03/2021 Humera Raza presents for office follow-up and device check. Her device is followed remotely through our office with stable transmissions. She remains on Eliquis with no reported bleeding issues. She denies HF symptoms and appears euvolemic today. She is BiV paced 98%. There have been no significant arrhythmias or ICD therapies delivered.  She follows with Dr Soto Bustamante from a cardiology perspective. Ms Ahmet Minus denies angina, dyspnea, syncope, orthopnea and PND as well as ICD shock. Review of Systems   Respiratory: Negative. Cardiovascular: Negative. Neurological: Negative. All other systems reviewed and are negative. PHYSICAL EXAM:  Vitals:    02/03/21 1309   BP: 126/74   Resp: 18   Weight: 165 lb (74.8 kg)   Height: 4' 11\" (1.499 m)     Constitutional: Oriented to person, place, and time. Well-developed and cooperative. Head: Normocephalic and atraumatic. Eyes: Conjunctivae are normal.  Cardiovascular: S1 normal, S2 normal and intact distal pulses. A regular rhythm present. PMI is not displaced. Pulmonary/Chest: Effort normal and breath sounds normal. No respiratory distress. Abdominal: Soft. No tenderness. Musculoskeletal: Normal range of motion of all extremities, no muscle weakness. Neurological: Alert and oriented to person, place, and time. Gait normal.   Skin: Skin is warm and dry. No bruising, no ecchymosis and no rash noted. Extremity: No clubbing or cyanosis. No edema. Psychiatric: Normal mood and affect. Thought content normal.  CRT-D site: stable, well healed, no evidence of erosion    TTE 12/27/17--[facility]: EF 55-60%. TTE(May 2016):   Transthoracic Echocardiography Report (TTE)     Demographics      Patient Name        CHINO TRUJILLO   Gender                Female      Medical Record      [id number]    Room Number           0215   Number      Account #           [de-identified]  Procedure Date        05/02/2016      Corporate ID                    Ordering Physician   Carmelita Lofton MD      Accession Number    [id number]   Referring Physician   Don Mott      Date of Birth       1945  Sonographer           [facility]      Age                 71 year(s)  Interpreting         Shakir [address]                                   Physician             Mary Lay MD                                      Any Other     Procedure    Type of Study      TTE procedure:Echo Complete W/Doppler & Color Flow.     Procedure Date  Date: 05/02/2016 Start: 09:29 AM    Study Location: Portable  Technical Quality: Adequate visualization    Indications:Cardiomyopathy. Patient Status: Routine    Height: 62 inches Weight: 191 pounds BSA: 1.87 m^2 BMI: 34.93 kg/m^2    HR: 83 bpm BP: 113/60 mmHg     Findings      Left Ventricle   Borderline asymmetric septal hypertrophy.   Ejection fraction is visually estimated at 50-60%.     Right Ventricle   Right ventricular segmental wall motion is normal.   Defibrillator wire visualized in right ventricle .      Left Atrium   Normal sized left atrium.      Right Atrium   Normal right atrium.      Mitral Valve   Mild mitral regurgitation is present.      Tricuspid Valve   Mild tricuspid regurgitation.  RVSP is 30 mmHg.      Aortic Valve   Normal aortic valve structure and function.      Pulmonic Valve   The pulmonic valve was not well visualized.      Pericardial Effusion   No evidence for hemodynamically significant pericardial effusion.      Aorta   Normal aortic root and ascending aorta.      Conclusions      Summary   Borderline asymmetric septal hypertrophy.   Ejection fraction is visually estimated at 50-60%.  Right ventricular segmental wall motion is normal.   Defibrillator wire visualized in right ventricle .   Mild tricuspid regurgitation.    RVSP is 30 mmHg.   Mild mitral regurgitation is present.      Signature      ----------------------------------------------------------------   Electronically signed by Haley Earl MD(Interpreting   physician) on 05/02/2016 04:07 PM   ----------------------------------------------------------------     M-Mode/2D Measurements & Calculations      LV Diastolic     LV Systolic Dimension: 3.1 cm   AV Cusp Separation: 1.3   Dimension: 4.5   LV Volume Diastolic: 83.6 ml    cmAO Root Dimension: 2.3   cm               LV Volume Systolic: 67.1 ml     cm   LV FS:31.1 %     LV EDV/LV EDV Index: 53.6 GO/02   LV PW Diastolic: VS/C^7YU ESV/LV ESV Index: 26.7   0.9 cm           ml/14ml/ m^2   LV PW Systolic:  EF Calculated: 50.2 %           RV Diastolic Dimension:   7.8 cm           LV Mass Index: 66 l/min*m^2     2.7 cm   Septum   Diastolic: 0.8                                   LA/Aorta: 1.43   cm   Septum Systolic:                                 LA volume/Index: 56 ml   1.3 cm                                           /30ml/m^2                                                    RA Area: 14.4 cm^2   LV Mass: 123.07   g     Doppler Measurements & Calculations      MV Peak E-Wave: 1 AV Peak Velocity: 1.39 m/s   LVOT Peak Velocity: 0.86   m/s               AV Peak Gradient: 7.67 mmHg  m/s   MV Peak A-Wave:   AV Mean Velocity: 0.95 m/s   LVOT Mean Velocity: 0.59   0.93 m/s          AV Mean Gradient: 4.1 mmHg   m/s   MV E/A Ratio:     AV VTI: 26 cm                LVOT Peak Gradient: 2.9   1.07                                           mmHgLVOT Mean Gradient: 1.6   MV Peak Gradient: LVOT VTI: 17.7 cm            mmHg   4.2 mmHg          IVRT: 60 msec                Estimated RVSP: 30.3 mmHg   MV Mean Gradient:                              Estimated RAP:8 mmHg   1.9 mmHg          Pulm. Vein A Reversal   MV Mean Velocity: Duration:96.9 msec   0.65 m/s          Pulm. Vein D Velocity:0.44   TR Velocity:2.36 m/s   MV Deceleration   m/sPulm. Vein A Reversal     TR Gradient:22.26 mmHg   Time: 218.9 msec  Velocity:0.26 m/s            PV Peak Velocity: 0.85 m/s   MV P1/2t: 74.3    Pulm.  Vein S Velocity: 0.59  PV Peak Gradient: 2.9 mmHg   msec              m/s                          PV Mean Velocity: 0.57 m/s   MVA by PHT:2.96                                PV Mean Gradient: 1.5 mmHg   cm^2      MV E' Septal   Velocity: 0.07   m/s   MV E' Lateral   Velocity: 0.09   m/s   MR Velocity: 4.21   m/s   MR VTI: 132.1 cm    Device Interrogation/Reprogramming 2/3/21   Make/Model [employer] Yenifer CRT-D. DOI 4/17/18  Mode DDD 60/130 ppm  P wave: 6  mV  Impedance: 517 ohms   Threshold: 0.8 V @ 0.5 ms  RV R wave: 14.0 mV  Impedance: 805 ohms   Threshold: 0.7 V @ 0.5 ms  LV wave: 11.1 mV  Impedance: 706 ohms   Threshold: 2.4 V @ 1.0ms  Pacing: A: 1%  RV: 98%  LV: 98%  Battery Voltage/Longevity:  5.5 years  Charge time: 9.5 seconds    Arrhythmias: AF burden: <1%  - 1 atrial events<1 minute    Overall device function is normal  All device programmable settings were evaluated per above and in the scanned document, along with iterative adjustments (capture thresholds) to assess and select the most appropriate final programming to provide for consistent delivery of the appropriate therapy and to verify function of the device. I have independently reviewed all of the ECGs and rhythm strips per above. I have personally reviewed the laboratory, cardiac diagnostic and radiographic testing as outlined above. I have reviewed previous records noted in [address]. Impression:     1. NICMP  A. H/O ejection fraction of approximately 30% diagnosed in December 2006. B. NYHA Class II congestive heart failure  C. Status post 2-D echocardiogram 3/16/07: LVEF 25-30% with a left ventricular end diastolic diameter of 6.0 cm.    D. S/P 12/26/06: Severe LVH with an ejection fraction of 20%. Normal coronary anatomy  E. TTE May 2016/2017: LVEF 55-60%. Lakeisha Herrera. No ACE/ARB/ARNI secondary to prior intolerance  G. Follows with Dr Erwin Disla    2. cLBBB    3. CRT-D in situ  A. Original Guidant Contak Renewal 3 RF, implanted 5/23/07  B. S/P RV lead explantation with implantation of a new Guidant RV ICD lead, as well as a [employer] Bi-V ICD 1/7/09  C. ICD gen change 4/17/18: Joseph Middleton CRT-D. DOI 4/17/18    4. HLD    5. Paroxysmal atrial fibrillation  - no history of AAD or ablation  - [id number]: 3  - Eliquis OAC    Plan:     1.  Ms Banks's CRT-D function remains stable and programmed accordingly based on the above interrogation. She denies HF symptoms and appears euvolemic. She is BiV paced 98%. Ms Ilan Hampton remains on Eliquis with no reported bleeding issues. 2. She will send a remote transmission in 91 days followed by a 6 month office visit. 3. No changes were made in her medications today. 4. She was asked to call the office with concerns prior to follow-up. Thank you for allowing me to participate in their care. I have spent a total of 25 minutes with the patient reviewing the above stated recommendations.  And a total of >50% of that time involved face-to-face time providing counseling and or coordination of care with the other providers    AILEEN Mae Ace-OLE, [address] [facility]        CC: Dr Cassy Perez

## 2021-02-03 ENCOUNTER — OFFICE VISIT (OUTPATIENT)
Dept: NON INVASIVE DIAGNOSTICS | Age: 76
End: 2021-02-03
Payer: MEDICARE

## 2021-02-03 VITALS
HEIGHT: 59 IN | RESPIRATION RATE: 18 BRPM | BODY MASS INDEX: 33.26 KG/M2 | SYSTOLIC BLOOD PRESSURE: 126 MMHG | WEIGHT: 165 LBS | DIASTOLIC BLOOD PRESSURE: 74 MMHG

## 2021-02-03 DIAGNOSIS — Z95.810 BIVENTRICULAR IMPLANTABLE CARDIOVERTER-DEFIBRILLATOR IN SITU: Primary | ICD-10-CM

## 2021-02-03 DIAGNOSIS — I42.8 CARDIOMYOPATHY, NONISCHEMIC (HCC): ICD-10-CM

## 2021-02-03 PROCEDURE — G8400 PT W/DXA NO RESULTS DOC: HCPCS | Performed by: NURSE PRACTITIONER

## 2021-02-03 PROCEDURE — 1036F TOBACCO NON-USER: CPT | Performed by: NURSE PRACTITIONER

## 2021-02-03 PROCEDURE — 93284 PRGRMG EVAL IMPLANTABLE DFB: CPT | Performed by: NURSE PRACTITIONER

## 2021-02-03 PROCEDURE — 99213 OFFICE O/P EST LOW 20 MIN: CPT | Performed by: NURSE PRACTITIONER

## 2021-02-03 PROCEDURE — 4040F PNEUMOC VAC/ADMIN/RCVD: CPT | Performed by: NURSE PRACTITIONER

## 2021-02-03 PROCEDURE — 1090F PRES/ABSN URINE INCON ASSESS: CPT | Performed by: NURSE PRACTITIONER

## 2021-02-03 PROCEDURE — G8427 DOCREV CUR MEDS BY ELIG CLIN: HCPCS | Performed by: NURSE PRACTITIONER

## 2021-02-03 PROCEDURE — G8484 FLU IMMUNIZE NO ADMIN: HCPCS | Performed by: NURSE PRACTITIONER

## 2021-02-03 PROCEDURE — G8417 CALC BMI ABV UP PARAM F/U: HCPCS | Performed by: NURSE PRACTITIONER

## 2021-02-03 PROCEDURE — 1123F ACP DISCUSS/DSCN MKR DOCD: CPT | Performed by: NURSE PRACTITIONER

## 2021-02-03 ASSESSMENT — ENCOUNTER SYMPTOMS: RESPIRATORY NEGATIVE: 1

## 2021-08-10 NOTE — PROGRESS NOTES
Cardiac Electrophysiology Outpatient Progress Note    Román Orr  1945  Date of Service: 8/11/21   Referring Provider/PCP: César Martin MD   Cardiology: Steven Mays MD  Electrophysiologist: Jakob Lancaster MD    SUBJECTIVE: Román Orr presents to cardiac electrophysiology clinic  today for follow up and management of CRT-D in situ and paroxysmal atrial fibrillation, The patient currently feels well and offers no complaints from a device POV. The device site looks well healed and free from infection or erosion. The patient denies any chest pain, dyspnea, palpitations, dizziness, syncope, orthopnea or paroxysmal nocturnal dyspnea. The patient continues to be followed remotely. She denies any recent admissions to the hospital for CHF symptoms. Patient Active Problem List    Diagnosis Date Noted    Implantable cardioverter-defibrillator (ICD) at end of device life 04/17/2018     Overview Note:     Replacing Deprecated Diagnoses      Chronic kidney disease, stage III (moderate) ([address] 01/06/2018     Overview Note:     Diagnosis added to problem list by Claudio Bahena, Pharm. D 1/6/2018 10:41 AM based on transcribed order from Dr. Refugio De Luna Anemia of chronic renal failure 01/06/2018     Overview Note:     Diagnosis added to problem list by Claudio Bahena, Pharm. D 1/6/2018 10:41 AM based on transcribed order from Dr. Refugio De Luna Incarcerated ventral hernia 04/25/2017    Acute delirium 10/05/2016    Hypomagnesemia 06/27/2016    Acute renal failure ([address] 06/26/2016    SIRS (systemic inflammatory response syndrome) ([address] 05/26/2016    Hypotension 05/26/2016    History of ischemic colitis 05/26/2016    Anemia 05/26/2016    Blood loss anemia 05/02/2016    Acute respiratory failure ([address] 05/02/2016    Cardiomyopathy, nonischemic ([address] 09/15/2011     Overview Note:     A. H/O ejection fraction of approximately 30% diagnosed in December 2006.   B. NYHA Class III congestive heart failure  C. Status post 2-D echocardiogram 3/16/07: LVEF 25-30% with a left ventricular end diastolic diameter of 6.0 cm.    D. S/P 12/26/06: Severe LVH with an ejection fraction of 20%. Normal coronary anatomy      Bundle branch block, left 09/15/2011     Overview Note:     A. Chronic        Biventricular implantable cardioverter-defibrillator in situ 09/15/2011     Overview Note:     A. Original Guidant Contak Renewal 3 RF, implanted 5/23/07  B. Atrial lead, Guidant model [id number], serial # [id number]  C. Right ventricular lead, Medtronic model [id number], serial # [id number]  D. Left ventricular lead, Guidant model # [id number], serial # [id number]  E. S/P RV lead explantation with implantation of a new Guidant RV ICD lead, as well as a [employer] Bi-V ICD 1/7/09      Hyperlipidemia 09/15/2011       Current Outpatient Medications   Medication Sig Dispense Refill    apixaban (ELIQUIS) 5 MG TABS tablet TAKE 1 TABLET TWICE DAILY 180 tablet 3    fluticasone (FLONASE) 50 MCG/ACT nasal spray 1 spray by Each Nostril route as needed for Rhinitis      Biotin 10 MG CAPS Take 10 mg by mouth daily       carvedilol (COREG) 6.25 MG tablet Take 1 tablet by mouth 2 times daily (with meals) 60 tablet 3    SPIRIVA HANDIHALER 18 MCG inhalation capsule Inhale 18 mcg into the lungs daily       pravastatin (PRAVACHOL) 40 MG tablet Take 40 mg by mouth daily.  Calcium Carbonate-Vitamin D (CALCIUM 600 + D PO) Take by mouth 2 times daily        No current facility-administered medications for this visit. No Known Allergies    . ROS:   Constitutional: Negative for fever, activity change and appetite change. HENT: Negative for epistaxis. Eyes: Negative for diploplia, blurred vision. Respiratory: Negative for cough, chest tightness, shortness of breath and wheezing. Cardiovascular: pertinent positives in HPI  Gastrointestinal: Negative for abdominal pain and blood in stool.    All other review of systems are negative PHYSICAL EXAM:  Vitals:    08/11/21 0857   BP: 112/72   Pulse: 79   Resp: 18   Weight: 155 lb (70.3 kg)   Height: 5' 2\" (1.575 m)     Constitutional: Oriented to person, place, and time. Well-developed and cooperative. Head: Normocephalic and atraumatic. Eyes: Conjunctivae are normal.  Cardiovascular: S1 normal, S2 normal and intact distal pulses. A regular rhythm present. PMI is not displaced. Pulmonary/Chest: Effort normal and breath sounds normal. No respiratory distress. Abdominal: Soft. No tenderness. Musculoskeletal: Normal range of motion of all extremities, no muscle weakness. Neurological: Alert and oriented to person, place, and time. Gait normal.   Skin: Skin is warm and dry. No bruising, no ecchymosis and no rash noted. Extremity: No clubbing or cyanosis. No edema. Psychiatric: Normal mood and affect. Thought content normal.  CRT-D site: stable, well healed, no evidence of erosion, infection or migration    TTE 12/27/17--[facility]:     TTE(May 2016):   Transthoracic Echocardiography Report (TTE)     Demographics      Patient Name        CHINO TRUJILLO   Gender                Female      Medical Record      [id number]    Room Number           0215   Number      Account #           [de-identified]  Procedure Date        05/02/2016      Corporate ID                    Ordering Physician   Arlen Vidal MD      Accession Number    [id number]   Referring Physician   Woody Richard      Date of Birth       1945  Sonographer           Robert Lester [facility]      Age                 71 year(s)  Interpreting         Shakir [address]                                   Physician             Manolo Davalos MD                                      Any Other     Procedure    Type of Study      TTE procedure:Echo Complete W/Doppler & Color Flow.     Procedure Date  Date: 05/02/2016 Start: 09:29 AM    Study Location: Portable  Technical Quality: Adequate visualization    Indications:Cardiomyopathy. Patient Status: Routine    Height: 62 inches Weight: 191 pounds BSA: 1.87 m^2 BMI: 34.93 kg/m^2    HR: 83 bpm BP: 113/60 mmHg     Findings      Left Ventricle   Borderline asymmetric septal hypertrophy.   Ejection fraction is visually estimated at 50-60%.     Right Ventricle   Right ventricular segmental wall motion is normal.   Defibrillator wire visualized in right ventricle .      Left Atrium   Normal sized left atrium.      Right Atrium   Normal right atrium.      Mitral Valve   Mild mitral regurgitation is present.      Tricuspid Valve   Mild tricuspid regurgitation.  RVSP is 30 mmHg.      Aortic Valve   Normal aortic valve structure and function.      Pulmonic Valve   The pulmonic valve was not well visualized.      Pericardial Effusion   No evidence for hemodynamically significant pericardial effusion.      Aorta   Normal aortic root and ascending aorta.      Conclusions      Summary   Borderline asymmetric septal hypertrophy.   Ejection fraction is visually estimated at 50-60%.  Right ventricular segmental wall motion is normal.   Defibrillator wire visualized in right ventricle .   Mild tricuspid regurgitation.    RVSP is 30 mmHg.   Mild mitral regurgitation is present.      Signature      ----------------------------------------------------------------   Electronically signed by Deborah Doe MD(Interpreting   physician) on 05/02/2016 04:07 PM   ----------------------------------------------------------------     M-Mode/2D Measurements & Calculations      LV Diastolic     LV Systolic Dimension: 3.1 cm   AV Cusp Separation: 1.3   Dimension: 4.5   LV Volume Diastolic: 60.9 ml    cmAO Root Dimension: 2.3   cm               LV Volume Systolic: 51.1 ml     cm   LV FS:31.1 %     LV EDV/LV EDV Index: 53.6 FU/08   LV PW Diastolic: WP/B^8PU ESV/LV ESV Index: 26.7   0.9 cm           ml/14ml/ m^2   LV PW Systolic:  EF Calculated: 50.2 %           RV Diastolic Dimension:   1.1 cm           LV Mass Index: 66 l/min*m^2     2.7 cm   Septum   Diastolic: 0.8                                   LA/Aorta: 1.43   cm   Septum Systolic:                                 LA volume/Index: 56 ml   1.3 cm                                           /30ml/m^2                                                    RA Area: 14.4 cm^2   LV Mass: 123.07   g     Doppler Measurements & Calculations      MV Peak E-Wave: 1 AV Peak Velocity: 1.39 m/s   LVOT Peak Velocity: 0.86   m/s               AV Peak Gradient: 7.67 mmHg  m/s   MV Peak A-Wave:   AV Mean Velocity: 0.95 m/s   LVOT Mean Velocity: 0.59   0.93 m/s          AV Mean Gradient: 4.1 mmHg   m/s   MV E/A Ratio:     AV VTI: 26 cm                LVOT Peak Gradient: 2.9   1.07                                           mmHgLVOT Mean Gradient: 1.6   MV Peak Gradient: LVOT VTI: 17.7 cm            mmHg   4.2 mmHg          IVRT: 60 msec                Estimated RVSP: 30.3 mmHg   MV Mean Gradient:                              Estimated RAP:8 mmHg   1.9 mmHg          Pulm. Vein A Reversal   MV Mean Velocity: Duration:96.9 msec   0.65 m/s          Pulm. Vein D Velocity:0.44   TR Velocity:2.36 m/s   MV Deceleration   m/sPulm. Vein A Reversal     TR Gradient:22.26 mmHg   Time: 218.9 msec  Velocity:0.26 m/s            PV Peak Velocity: 0.85 m/s   MV P1/2t: 74.3    Pulm.  Vein S Velocity: 0.59  PV Peak Gradient: 2.9 mmHg   msec              m/s                          PV Mean Velocity: 0.57 m/s   MVA by PHT:2.96                                PV Mean Gradient: 1.5 mmHg   cm^2      MV E' Septal   Velocity: 0.07   m/s   MV E' Lateral   Velocity: 0.09   m/s   MR Velocity: 4.21   m/s   MR VTI: 132.1 cm    Device Interrogation/Reprogramming 8/11/21   Make/Model [employer]SCI Dynagen CRT-D. DOI 4/17/18  Mode DDD 60/130 ppm  P wave: 5.3 mV  Impedance: 530 ohms   Threshold: 0.8 V @ 0.5 ms  RV R wave: 14.2 mV  Impedance: 755 ohms   Threshold: 0.7 V @ 0.5 ms  LV wave: 11.8 mV Impedance: 679 ohms   Threshold: 2.3 V @ 1.0ms  Pacing: A: 1%  RV:100%  LV: 100%  Battery Voltage/Longevity: 5 years  Charge time: 9.6 seconds    Arrhythmias: AF burden: <1%  - 4 ATR lasting 5-11 seconds  - 1 NSVT- atrial driven, rate: 980 bpm, lasting 13 seconds, 1:1  Overall device function is normal    All device programmable settings were evaluated per above and in the scanned document, along with iterative adjustments (capture thresholds) to assess and select the most appropriate final programming to provide for consistent delivery of the appropriate therapy and to verify function of the device. EKG 8/11/21: NSR with BiV paced 78 bpm, HMf245 msec. Impression:     1. CRT-D in situ  - Original implanted 5/23/07  - S/p RV lead explantation with implantation of a new Guidant RV ICD lead, as well as a Bi-V ICD upgrade on 1/7/09  - ICD generator change 4/17/18. - Normal device function. - BiV pacing 100%    2. Paroxysmal atrial fibrillation  - [id number]: 3  - No history of DC-cardioversion or AAD therapy or ablation.  - On Coreg for rate control.  - On Eliquis for stroke risk reduction.  - AF burden <1%. - Presents in NSR.  - Re-education on importance of well controlled HTN (goal BP < 130/80), adequate weight control (goal BMI of < 27), physical activity consisting of moderate cardiopulmonary exercise up to a goal of 250 min/wk, daily compliance with CPAP in treating sleep apnea, smoking cessation and limited ETOH intake. 3. Nonischemic cardiomyopathy  - History of LV EF of approximately 30% diagnosed in December 2006.  - NYHA Class II, ACC stage C  - Euvolemic and compensated. - Echo 3/16/07: LVEF 25-30%. .    - 12/26/06: Severe LVH with an ejection fraction of 20%. Normal coronary anatomy  - TTE May 2016/2017: LVEF 55-60%. - On GDM including Coreg. No ACE/ARB/ARNI secondary to prior intolerance  - Follows with Dr. Perlita Sylvester    4 LBBB    5. Hyperlipidemia  - On Pravachol. Plan:     1.  Normal CRT-D function. 2. Continue Coreg 6.25 mg bid. 3. Continue Eliquis 5 mg bid. 4. Continue follow up with Cardiology for GDMT adjustment. 5. Remote transmission every 91 days. 6. Follow up in 1 year or sooner PRN. Encouraged the patient to call the office for any questions or concerns. Thank you very much to allowing me to participate in the patient's care. I have spent a total of 40 minutes with the patient and the family reviewing the above stated recommendations. And a total of >50% of that time involved face-to-face time providing counseling and/or coordination of care with the other providers, preparation for the clinic visit, reviewing records/tests, counseling/education of the patient, ordering medications/tests/procedures, coordinating care, and documenting clinical information in the EHR.      Irineo Valencia MD  Cardiac Electrophysiology  [facility]  [facility] [place]: [facility]  9:20 AM  8/11/2021

## 2021-08-11 ENCOUNTER — OFFICE VISIT (OUTPATIENT)
Dept: NON INVASIVE DIAGNOSTICS | Age: 76
End: 2021-08-11
Payer: MEDICARE

## 2021-08-11 VITALS
WEIGHT: 155 LBS | SYSTOLIC BLOOD PRESSURE: 112 MMHG | HEIGHT: 62 IN | DIASTOLIC BLOOD PRESSURE: 72 MMHG | HEART RATE: 79 BPM | RESPIRATION RATE: 18 BRPM | BODY MASS INDEX: 28.52 KG/M2

## 2021-08-11 DIAGNOSIS — Z95.810 BIVENTRICULAR IMPLANTABLE CARDIOVERTER-DEFIBRILLATOR IN SITU: Primary | ICD-10-CM

## 2021-08-11 PROCEDURE — 1036F TOBACCO NON-USER: CPT | Performed by: INTERNAL MEDICINE

## 2021-08-11 PROCEDURE — G8417 CALC BMI ABV UP PARAM F/U: HCPCS | Performed by: INTERNAL MEDICINE

## 2021-08-11 PROCEDURE — 1123F ACP DISCUSS/DSCN MKR DOCD: CPT | Performed by: INTERNAL MEDICINE

## 2021-08-11 PROCEDURE — 1090F PRES/ABSN URINE INCON ASSESS: CPT | Performed by: INTERNAL MEDICINE

## 2021-08-11 PROCEDURE — 4040F PNEUMOC VAC/ADMIN/RCVD: CPT | Performed by: INTERNAL MEDICINE

## 2021-08-11 PROCEDURE — 93284 PRGRMG EVAL IMPLANTABLE DFB: CPT | Performed by: INTERNAL MEDICINE

## 2021-08-11 PROCEDURE — 93290 INTERROG DEV EVAL ICPMS IP: CPT | Performed by: INTERNAL MEDICINE

## 2021-08-11 PROCEDURE — G8427 DOCREV CUR MEDS BY ELIG CLIN: HCPCS | Performed by: INTERNAL MEDICINE

## 2021-08-11 PROCEDURE — G8400 PT W/DXA NO RESULTS DOC: HCPCS | Performed by: INTERNAL MEDICINE

## 2021-08-11 PROCEDURE — 99215 OFFICE O/P EST HI 40 MIN: CPT | Performed by: INTERNAL MEDICINE

## 2022-10-26 ENCOUNTER — HOSPITAL ENCOUNTER (OUTPATIENT)
Dept: WOUND CARE | Age: 77
Discharge: HOME | End: 2022-10-26
Payer: MEDICARE

## 2022-10-26 VITALS
HEART RATE: 68 BPM | BODY MASS INDEX: 28.35 KG/M2 | HEIGHT: 62 IN | DIASTOLIC BLOOD PRESSURE: 64 MMHG | SYSTOLIC BLOOD PRESSURE: 112 MMHG | TEMPERATURE: 96.8 F | RESPIRATION RATE: 17 BRPM

## 2022-10-26 DIAGNOSIS — L97.925 NON-PRESSURE CHRONIC ULCER OF LEFT LOWER LEG WITH MUSCLE INVOLVEMENT WITHOUT EVIDENCE OF NECROSIS (HCC): ICD-10-CM

## 2022-10-26 DIAGNOSIS — M79.605 PAIN IN LEFT LEG: ICD-10-CM

## 2022-10-26 DIAGNOSIS — I73.9 PVD (PERIPHERAL VASCULAR DISEASE) (HCC): Primary | ICD-10-CM

## 2022-10-26 PROCEDURE — 87075 CULTR BACTERIA EXCEPT BLOOD: CPT

## 2022-10-26 PROCEDURE — 87186 SC STD MICRODIL/AGAR DIL: CPT

## 2022-10-26 PROCEDURE — 11046 DBRDMT MUSC&/FSCA EA ADDL: CPT

## 2022-10-26 PROCEDURE — 87070 CULTURE OTHR SPECIMN AEROBIC: CPT

## 2022-10-26 PROCEDURE — 99214 OFFICE O/P EST MOD 30 MIN: CPT

## 2022-10-26 PROCEDURE — 87077 CULTURE AEROBIC IDENTIFY: CPT

## 2022-10-26 PROCEDURE — 11043 DBRDMT MUSC&/FSCA 1ST 20/<: CPT

## 2022-10-26 RX ORDER — BETAMETHASONE DIPROPIONATE 0.05 %
OINTMENT (GRAM) TOPICAL ONCE
Status: CANCELLED | OUTPATIENT
Start: 2022-10-26 | End: 2022-10-26

## 2022-10-26 RX ORDER — LIDOCAINE 50 MG/G
OINTMENT TOPICAL ONCE
Status: CANCELLED | OUTPATIENT
Start: 2022-10-26 | End: 2022-10-26

## 2022-10-26 RX ORDER — CLOBETASOL PROPIONATE 0.5 MG/G
OINTMENT TOPICAL ONCE
Status: CANCELLED | OUTPATIENT
Start: 2022-10-26 | End: 2022-10-26

## 2022-10-26 RX ORDER — GINSENG 100 MG
CAPSULE ORAL ONCE
Status: CANCELLED | OUTPATIENT
Start: 2022-10-26 | End: 2022-10-26

## 2022-10-26 RX ORDER — LIDOCAINE HYDROCHLORIDE 20 MG/ML
JELLY TOPICAL ONCE
Status: CANCELLED | OUTPATIENT
Start: 2022-10-26 | End: 2022-10-26

## 2022-10-26 RX ORDER — GENTAMICIN SULFATE 1 MG/G
OINTMENT TOPICAL ONCE
Status: CANCELLED | OUTPATIENT
Start: 2022-10-26 | End: 2022-10-26

## 2022-10-26 RX ORDER — LIDOCAINE HYDROCHLORIDE 40 MG/ML
SOLUTION TOPICAL ONCE
Status: CANCELLED | OUTPATIENT
Start: 2022-10-26 | End: 2022-10-26

## 2022-10-26 RX ORDER — MAGNESIUM OXIDE 400 MG/1
400 TABLET ORAL DAILY
COMMUNITY

## 2022-10-26 RX ORDER — BACITRACIN ZINC AND POLYMYXIN B SULFATE 500; 1000 [USP'U]/G; [USP'U]/G
OINTMENT TOPICAL ONCE
Status: CANCELLED | OUTPATIENT
Start: 2022-10-26 | End: 2022-10-26

## 2022-10-26 RX ORDER — GABAPENTIN 100 MG/1
100 CAPSULE ORAL EVERY 8 HOURS
COMMUNITY

## 2022-10-26 RX ORDER — LIDOCAINE 40 MG/G
CREAM TOPICAL ONCE
Status: CANCELLED | OUTPATIENT
Start: 2022-10-26 | End: 2022-10-26

## 2022-10-26 RX ORDER — BACITRACIN, NEOMYCIN, POLYMYXIN B 400; 3.5; 5 [USP'U]/G; MG/G; [USP'U]/G
OINTMENT TOPICAL ONCE
Status: CANCELLED | OUTPATIENT
Start: 2022-10-26 | End: 2022-10-26

## 2022-10-26 ASSESSMENT — PAIN SCALES - GENERAL: PAINLEVEL_OUTOF10: 4

## 2022-10-26 ASSESSMENT — PAIN DESCRIPTION - ORIENTATION: ORIENTATION: LEFT

## 2022-10-26 ASSESSMENT — PAIN DESCRIPTION - LOCATION: LOCATION: LEG

## 2022-10-26 ASSESSMENT — PAIN DESCRIPTION - DESCRIPTORS: DESCRIPTORS: ACHING

## 2022-10-26 NOTE — DISCHARGE INSTRUCTIONS
Visit Discharge/Physician Orders    Discharge condition: Stable    Assessment of pain at discharge:minimal    Anesthetic used: 4% lidocaine    Discharge to: Home    Left via:Private automobile    Accompanied by: accompanied by     ECF/HHA: [facility]    Dressing Orders:left leg ulcer cleanse with normal saline apply alginate ag and dry dressing daily. spandigrip    Culture obtained    Treatment Orders:  Eat foods high in protein and vitamin c    Multivitamin daily      [facility] followup visit _______1 week______________________  (Please note your next appointment above and if you are unable to keep, kindly give a 24 hour notice. Thank you.)    Physician signature:__________________________      If you experience any of the following, please call the [facility] during business hours:    * Increase in Pain  * Temperature over 101  * Increase in drainage from your wound  * Drainage with a foul odor  * Bleeding  * Increase in swelling  * Need for compression bandage changes due to slippage, breakthrough drainage. If you need medical attention outside of the business hours of the [facility] please contact your PCP or go to the nearest emergency room.

## 2022-10-26 NOTE — PROGRESS NOTES
[facility]  History and Physical/Consultation  Podiatry    Referring Physician : Hugo Rodriguez MD  [address] [place] [address] RECORD NUMBER:  [id number]  AGE: 65 y.o. GENDER: female  : 1945  EPISODE DATE:  10/26/2022  Subjective:     Chief Complaint   Patient presents with    Wound Check     Left leg         HISTORY of PRESENT ILLNESS HPI     Lise Philip is a 65 y.o. female who presents today for wound/ulcer evaluation. History of Wound Context:  The patient has had a wound of left leg surgical trauma which was first noted approximately 2 months ago. This has been treated medihoney. On their initial visit to the wound healing center, 10/26/22,  the patient has noted that the wound has not been improving. The patient has had similar previous wounds in the past.      Pt is not on abx at time of initial visit.       Wound/Ulcer Pain Timing/Severity: constant  Quality of pain: aching  Severity:  3 / 10   Modifying Factors: Pain worsens with walking  Associated Signs/Symptoms: edema, erythema, drainage, and numbness    Ulcer Identification:  Ulcer Type: venous  Contributing Factors: edema, venous stasis, and arterial insufficiency    Diabetic/Pressure/Non Pressure Ulcers onl y:  Ulcer: Non-Pressure ulcer, muscle necrosis    If patient has diabetic lower extremity wounds  Bronson Classification of diabetic lower extremity wounds:    Grade Description   []  0 No open wound   []  1 Superficial ulcer involving the full skin thickness   [x]  2 Deep ulcer involves ligament, tendon, joint capsule, or fascia  No bone involvement or abscess presence   []  3 Deep Ulcer with abcess formation and/or osteomyelitis   []  4 Localized gangrene   []  5 Extensive gangrene of the foot     Wound: Nonhealing surgical due to infection        PAST MEDICAL HISTORY      Diagnosis Date    Anemia     Arthritis     Bowel obstruction (HCC) 2016    CHF (congestive heart failure) ([facility])     COPD (chronic obstructive pulmonary disease) ([facility] [address]     Hyperlipidemia     Hypertension     LBBB (left bundle branch block)     Nonischemic cardiomyopathy (HCC)     Paroxysmal A-fib (HCC)      Past Surgical History:   Procedure Laterality Date    ABDOMEN SURGERY  10/05/2016    exporation of abdominal fascial wound dehiscence close, insertion TLC right IJ    CARDIAC DEFIBRILLATOR PLACEMENT Left 05/23/2007    CARDIAC DEFIBRILLATOR PLACEMENT  04/17/2018    BIV ICD GENT CHANGE ([facility])    BONDS    CARPAL TUNNEL RELEASE      DIAGNOSTIC CARDIAC CATH LAB PROCEDURE      ENDOSCOPY, COLON, DIAGNOSTIC  01/26/2018    upper endo    HERNIA REPAIR      HYSTERECTOMY (CERVIX STATUS UNKNOWN)      ILEOSTOMY OR JEJUNOSTOMY      INCISIONAL HERNIA REPAIR  04/21/2017    LAPAROSCOPIC; WITH MESH    OTHER SURGICAL HISTORY      ostomy placed having reversal done on 9/30/2016    OTHER SURGICAL HISTORY  09/30/2016    open reveral ileostomy    OTHER SURGICAL HISTORY  01/07/2009    BiV/ICD     Family History   Problem Relation Age of Onset    Alzheimer's Disease Mother     Heart Disease Father     Cancer Father     Alzheimer's Disease Father     Cancer Brother      Social History     Tobacco Use    Smoking status: Former     Types: Cigarettes     Quit date: 12/19/2006     Years since quitting: 15.8    Smokeless tobacco: Never   Vaping Use    Vaping Use: Never used   Substance Use Topics    Alcohol use: Not Currently     Comment: occasional    Drug use: No     No Known Allergies  Current Outpatient Medications on File Prior to Encounter   Medication Sig Dispense Refill    gabapentin (NEURONTIN) 100 MG capsule Take 100 mg by mouth every 8 (eight) hours.       magnesium oxide (MAG-OX) 400 MG tablet Take 400 mg by mouth daily      apixaban (ELIQUIS) 5 MG TABS tablet TAKE 1 TABLET TWICE DAILY 180 tablet 3    fluticasone (FLONASE) 50 MCG/ACT nasal spray 1 spray by Each Nostril route as needed for Rhinitis      carvedilol (COREG) 6.25 MG tablet Take 1 tablet by mouth 2 times daily (with meals) 60 tablet 3    SPIRIVA HANDIHALER 18 MCG inhalation capsule Inhale 18 mcg into the lungs daily        No current facility-administered medications on file prior to encounter.        REVIEW OF SYSTEMS   ROS : All others Negative if blank [], Positive if [x]  General Vascular   [] Fevers [] Claudication   [] Chills [] Rest Pain   Skin Neurologic   [x] Tissue Loss [x] Lower extremity neuropathy     Objective:    /64   Pulse 68   Temp 96.8 °F (36 °C) (Temporal)   Resp 17   Ht 5' 2\" (1.575 m)   LMP  (LMP Unknown)   BMI 28.35 kg/m²   Wt Readings from Last 3 Encounters:   08/11/21 155 lb (70.3 kg)   02/03/21 165 lb (74.8 kg)   12/28/20 168 lb 9.6 oz (76.5 kg)       PHYSICAL EXAM   CONSTITUTIONAL:   Awake, alert, cooperative   PSYCHIATRIC :  Oriented to time, place and person      normal insight to disease process  EXTREMITIES:   R LE Open wounds are not noted   Skin color is normal   Edema is  noted   Sensation intact to light touch   Palpation of the foot does cause pain   5/5 strength DF/PF  L LE Open wounds are noted   Skin color is abnormal with ulcer   Edema is  noted   Sensation intact to light touch   Palpation of the foot does cause pain   5/5 strength DF/PF  R dorsalis pedis 1 L dorsalis pedis 1   R posterior tibial 1 L posterior tibial 1     Assessment:     Problem List Items Addressed This Visit       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 involvement without evidence of necrosis (HCC)    Relevant Orders    VL ANNE BILATERAL LIMITED 1-2 LEVELS     Other Visit Diagnoses       PVD (peripheral vascular disease) ([facility] [address]    -  Primary    Relevant Orders    VL ANNE BILATERAL LIMITED 1-2 LEVELS    Pain in left leg        Relevant Orders    VL ANNE BILATERAL LIMITED 1-2 LEVELS            Pre Debridement Measurements:  Are located in the [place]  Documentation Flow Sheet  Post Debridement Measurements:  Wound/Ulcer Descriptions are Pre Debridement except measurements:     Incision 10/05/16 Abdomen Mid (Active)   Number of days: 9976       Incision 04/21/17 Abdomen Right;Left;Mid (Active)   Number of days: 2013       Wound 10/26/22 Pretibial Left;Lateral #1 (Active)   Wound Image   10/26/22 0814   Wound Etiology Non-Healing Surgical 10/26/22 0814   Dressing Status New dressing applied 10/26/22 0902   Wound Cleansed Cleansed with saline 10/26/22 0902   Dressing/Treatment Alginate with Ag;ABD;Roll gauze 10/26/22 0902   Offloading for Diabetic Foot Ulcers Offloading not required 10/26/22 0902   Wound Length (cm) 22 cm 10/26/22 0814   Wound Width (cm) 5.6 cm 10/26/22 0814   Wound Depth (cm) 0.5 cm 10/26/22 0814   Wound Surface Area (cm^2) 123.2 cm^2 10/26/22 0814   Wound Volume (cm^3) 61.6 cm^3 10/26/22 0814   Post-Procedure Length (cm) 22.5 cm 10/26/22 0848   Post-Procedure Width (cm) 6 cm 10/26/22 0848   Post-Procedure Depth (cm) 1.2 cm 10/26/22 0848   Post-Procedure Surface Area (cm^2) 135 cm^2 10/26/22 0848   Post-Procedure Volume (cm^3) 162 cm^3 10/26/22 0848   Wound Assessment Eschar moist;Exposed structure tendon;Fibrin;Slough 10/26/22 0814   Drainage Amount Large 10/26/22 0814   Drainage Description Serosanguinous; Yellow; Thick 10/26/22 0814   Odor None 10/26/22 0814   Cherise-wound Assessment Intact 10/26/22 0814   Wound Thickness Description not for Pressure Injury Full thickness 10/26/22 0814   Number of days: 0          Procedure Note  Indications:  Based on my examination of this patient's wound(s)/ulcer(s) today, debridement is required to promote healing and evaluate the wound base. Performed by: Holly Sevilla DPM    Consent obtained:  Yes    Time out taken:  Yes    Pain Control: Anesthetic  Anesthetic: 4% Lidocaine Liquid Topical     Debridement:Excisional Debridement    Using #15 blade scalpel the wound(s)/ulcer(s) was/were sharply debrided down through and including the removal of subcutaneous tissue.         Devitalized Tissue Debrided:  fibrin, biofilm, and slough to stimulate bleeding to promote healing, post debridement good bleeding base and wound edges noted    Wound/Ulcer #: 1    Percent of Wound/Ulcer Debrided: 100%    Total Surface Area Debrided:  123 sq cm     Estimated Blood Loss:  Minimal  Hemostasis Achieved:  by pressure    Procedural Pain:  3  / 10   Post Procedural Pain:  5 / 10     Response to treatment:  Well tolerated by patient. A culture was done. Plan:     Pt is not a smoker   - Discussed relationship of smoking and negative affects on wound healing   - Emphasized importance of tobacco avoidace/cessation   - Script for nicotine patch given to patient   - Information regarding support groups for smoking cessation given    In my professional opinion and based off the information that is available at this time this patient has appropriate indication for HBO Therapy: Unknown    Treatment Note please see attached Discharge Instructions    Written patient dismissal instructions given to patient and signed by patient or POA. Discharge Instructions         Visit Discharge/Physician Orders    Discharge condition: Stable    Assessment of pain at discharge:minimal    Anesthetic used: 4% lidocaine    Discharge to: Home    Left via:Private automobile    Accompanied by: accompanied by     ECF/HHA: [facility]    Dressing Orders:left leg ulcer cleanse with normal saline apply alginate ag and dry dressing daily. spandigrip    Culture obtained    Treatment Orders:  Eat foods high in protein and vitamin c    Multivitamin daily      [facility] followup visit _______1 week______________________  (Please note your next appointment above and if you are unable to keep, kindly give a 24 hour notice.  Thank you.)    Physician signature:__________________________      If you experience any of the following, please call the [facility] during business hours:    * Increase in Pain  * Temperature over 101  * Increase in drainage from your wound  * Drainage with a foul odor  * Bleeding  * Increase in swelling  * Need for compression bandage changes due to slippage, breakthrough drainage. If you need medical attention outside of the business hours of the [address] [place] [address] Road please contact your PCP or go to the nearest emergency room.          Electronically signed by Saintclair Dada, DPM on 10/26/2022 at 9:19 AM

## 2022-10-29 LAB — ANAEROBIC CULTURE: NORMAL

## 2022-10-30 LAB
ORGANISM: ABNORMAL
ORGANISM: ABNORMAL
WOUND/ABSCESS: ABNORMAL
WOUND/ABSCESS: ABNORMAL

## 2022-10-31 NOTE — DISCHARGE INSTRUCTIONS
Visit Discharge/Physician Orders     Discharge condition: Stable     Assessment of pain at discharge:minimal     Anesthetic used: 4% lidocaine     Discharge to: Home     Left via:Private automobile     Accompanied by: accompanied by      ECF/HHA: [facility]     Dressing Orders:left leg ulcer cleanse with normal saline apply alginate ag and dry dressing daily. spandigrip     Culture obtained     Treatment Orders:  Eat foods high in protein and vitamin c     Multivitamin daily        [address] [place] [address] followup visit _______1 week______________________  (Please note your next appointment above and if you are unable to keep, kindly give a 24 hour notice. Thank you.)     Physician signature:__________________________        If you experience any of the following, please call the [facility] during business hours:     * Increase in Pain  * Temperature over 101  * Increase in drainage from your wound  * Drainage with a foul odor  * Bleeding  * Increase in swelling  * Need for compression bandage changes due to slippage, breakthrough drainage. If you need medical attention outside of the business hours of the [facility] please contact your PCP or go to the nearest emergency room.

## 2022-11-02 ENCOUNTER — HOSPITAL ENCOUNTER (OUTPATIENT)
Dept: WOUND CARE | Age: 77
Discharge: HOME | End: 2022-11-02

## 2022-11-02 ENCOUNTER — HOSPITAL ENCOUNTER (OUTPATIENT)
Dept: WOUND CARE | Age: 77
Discharge: HOME | DRG: 981 | End: 2022-11-02
Payer: MEDICARE

## 2022-11-02 ENCOUNTER — HOSPITAL ENCOUNTER (OUTPATIENT)
Age: 77
Discharge: HOME | DRG: 981 | End: 2022-11-02
Payer: MEDICARE

## 2022-11-02 VITALS
RESPIRATION RATE: 16 BRPM | SYSTOLIC BLOOD PRESSURE: 117 MMHG | HEART RATE: 88 BPM | TEMPERATURE: 97.4 F | DIASTOLIC BLOOD PRESSURE: 56 MMHG

## 2022-11-02 DIAGNOSIS — L97.923 NON-PRESSURE CHRONIC ULCER OF LEFT LOWER LEG WITH NECROSIS OF MUSCLE (HCC): ICD-10-CM

## 2022-11-02 DIAGNOSIS — L97.925 NON-PRESSURE CHRONIC ULCER OF LEFT LOWER LEG WITH MUSCLE INVOLVEMENT WITHOUT EVIDENCE OF NECROSIS (HCC): Primary | ICD-10-CM

## 2022-11-02 LAB
ALBUMIN SERPL-MCNC: 3.5 G/DL (ref 3.5–5.2)
ALP BLD-CCNC: 129 U/L (ref 35–104)
ALT SERPL-CCNC: 19 U/L (ref 0–32)
ANION GAP SERPL CALCULATED.3IONS-SCNC: 15 MMOL/L (ref 7–16)
AST SERPL-CCNC: 16 U/L (ref 0–31)
BILIRUB SERPL-MCNC: <0.2 MG/DL (ref 0–1.2)
BUN BLDV-MCNC: 38 MG/DL (ref 6–23)
CALCIUM SERPL-MCNC: 10.2 MG/DL (ref 8.6–10.2)
CHLORIDE BLD-SCNC: 101 MMOL/L (ref 98–107)
CO2: 23 MMOL/L (ref 22–29)
CREAT SERPL-MCNC: 1.6 MG/DL (ref 0.5–1)
GFR SERPL CREATININE-BSD FRML MDRD: 33 ML/MIN/1.73
GLUCOSE BLD-MCNC: 134 MG/DL (ref 74–99)
HBA1C MFR BLD: 5 % (ref 4–5.6)
HCT VFR BLD CALC: 39.4 % (ref 34–48)
HEMOGLOBIN: 12.2 G/DL (ref 11.5–15.5)
MCH RBC QN AUTO: 30.3 PG (ref 26–35)
MCHC RBC AUTO-ENTMCNC: 31 % (ref 32–34.5)
MCV RBC AUTO: 97.8 FL (ref 80–99.9)
PDW BLD-RTO: 15.2 FL (ref 11.5–15)
PLATELET # BLD: 327 E9/L (ref 130–450)
PMV BLD AUTO: 11.2 FL (ref 7–12)
POTASSIUM SERPL-SCNC: 5.3 MMOL/L (ref 3.5–5)
PREALBUMIN: 21 MG/DL (ref 20–40)
RBC # BLD: 4.03 E12/L (ref 3.5–5.5)
SEDIMENTATION RATE, ERYTHROCYTE: 90 MM/HR (ref 0–20)
SODIUM BLD-SCNC: 139 MMOL/L (ref 132–146)
TOTAL PROTEIN: 6.8 G/DL (ref 6.4–8.3)
WBC # BLD: 11.7 E9/L (ref 4.5–11.5)

## 2022-11-02 PROCEDURE — 83036 HEMOGLOBIN GLYCOSYLATED A1C: CPT

## 2022-11-02 PROCEDURE — 85027 COMPLETE CBC AUTOMATED: CPT

## 2022-11-02 PROCEDURE — 11046 DBRDMT MUSC&/FSCA EA ADDL: CPT | Performed by: SURGERY

## 2022-11-02 PROCEDURE — 0KBT0ZZ EXCISION OF LEFT LOWER LEG MUSCLE, OPEN APPROACH: ICD-10-PCS | Performed by: SURGERY

## 2022-11-02 PROCEDURE — 36415 COLL VENOUS BLD VENIPUNCTURE: CPT

## 2022-11-02 PROCEDURE — 99204 OFFICE O/P NEW MOD 45 MIN: CPT | Performed by: SURGERY

## 2022-11-02 PROCEDURE — 99214 OFFICE O/P EST MOD 30 MIN: CPT

## 2022-11-02 PROCEDURE — 84134 ASSAY OF PREALBUMIN: CPT

## 2022-11-02 PROCEDURE — 80053 COMPREHEN METABOLIC PANEL: CPT

## 2022-11-02 PROCEDURE — 11046 DBRDMT MUSC&/FSCA EA ADDL: CPT

## 2022-11-02 PROCEDURE — 11043 DBRDMT MUSC&/FSCA 1ST 20/<: CPT | Performed by: SURGERY

## 2022-11-02 PROCEDURE — 11043 DBRDMT MUSC&/FSCA 1ST 20/<: CPT

## 2022-11-02 PROCEDURE — 86140 C-REACTIVE PROTEIN: CPT

## 2022-11-02 PROCEDURE — 85651 RBC SED RATE NONAUTOMATED: CPT

## 2022-11-02 RX ORDER — GINSENG 100 MG
CAPSULE ORAL ONCE
Status: CANCELLED | OUTPATIENT
Start: 2022-11-02 | End: 2022-11-02

## 2022-11-02 RX ORDER — LEVOFLOXACIN 750 MG/1
750 TABLET ORAL DAILY
COMMUNITY

## 2022-11-02 RX ORDER — BACITRACIN ZINC AND POLYMYXIN B SULFATE 500; 1000 [USP'U]/G; [USP'U]/G
OINTMENT TOPICAL ONCE
Status: CANCELLED | OUTPATIENT
Start: 2022-11-02 | End: 2022-11-02

## 2022-11-02 RX ORDER — LIDOCAINE HYDROCHLORIDE 20 MG/ML
JELLY TOPICAL ONCE
Status: CANCELLED | OUTPATIENT
Start: 2022-11-02 | End: 2022-11-02

## 2022-11-02 RX ORDER — GENTAMICIN SULFATE 1 MG/G
OINTMENT TOPICAL ONCE
Status: CANCELLED | OUTPATIENT
Start: 2022-11-02 | End: 2022-11-02

## 2022-11-02 RX ORDER — LIDOCAINE 50 MG/G
OINTMENT TOPICAL ONCE
Status: CANCELLED | OUTPATIENT
Start: 2022-11-02 | End: 2022-11-02

## 2022-11-02 RX ORDER — LIDOCAINE HYDROCHLORIDE 40 MG/ML
SOLUTION TOPICAL ONCE
Status: COMPLETED | OUTPATIENT
Start: 2022-11-02 | End: 2022-11-02

## 2022-11-02 RX ORDER — LIDOCAINE 40 MG/G
CREAM TOPICAL ONCE
Status: CANCELLED | OUTPATIENT
Start: 2022-11-02 | End: 2022-11-02

## 2022-11-02 RX ORDER — CLOBETASOL PROPIONATE 0.5 MG/G
OINTMENT TOPICAL ONCE
Status: CANCELLED | OUTPATIENT
Start: 2022-11-02 | End: 2022-11-02

## 2022-11-02 RX ORDER — BACITRACIN, NEOMYCIN, POLYMYXIN B 400; 3.5; 5 [USP'U]/G; MG/G; [USP'U]/G
OINTMENT TOPICAL ONCE
Status: CANCELLED | OUTPATIENT
Start: 2022-11-02 | End: 2022-11-02

## 2022-11-02 RX ORDER — BETAMETHASONE DIPROPIONATE 0.05 %
OINTMENT (GRAM) TOPICAL ONCE
Status: CANCELLED | OUTPATIENT
Start: 2022-11-02 | End: 2022-11-02

## 2022-11-02 RX ORDER — LIDOCAINE HYDROCHLORIDE 40 MG/ML
SOLUTION TOPICAL ONCE
Status: CANCELLED | OUTPATIENT
Start: 2022-11-02 | End: 2022-11-02

## 2022-11-02 RX ADMIN — LIDOCAINE HYDROCHLORIDE 20 ML: 40 SOLUTION TOPICAL at 15:03

## 2022-11-02 ASSESSMENT — PAIN DESCRIPTION - FREQUENCY: FREQUENCY: CONTINUOUS

## 2022-11-02 ASSESSMENT — PAIN DESCRIPTION - LOCATION: LOCATION: LEG

## 2022-11-02 ASSESSMENT — PAIN DESCRIPTION - ORIENTATION: ORIENTATION: LEFT

## 2022-11-02 ASSESSMENT — PAIN DESCRIPTION - DESCRIPTORS: DESCRIPTORS: SORE;ACHING

## 2022-11-02 ASSESSMENT — PAIN SCALES - GENERAL: PAINLEVEL_OUTOF10: 7

## 2022-11-02 NOTE — DISCHARGE INSTRUCTIONS
Visit Discharge/Physician Orders     Discharge condition: Stable     Assessment of pain at discharge:minimal     Anesthetic used: 4% lidocaine solution     Discharge to: Home     Left via:Private automobile     Accompanied by: family      [facility]: [facility](note order change)     Dressing Orders:LEFT LATERAL LOWER LEG- cleanse with normal saline, pack loosely with calcium alginate ag , ABD pads and coban 2. Change Monday- Wednesday (at [facility])- Friday. Treatment Orders:Eat a diet high in protein and vitamin C. Take a multiple vitamin daily unless contraindicated. Continue levaquin as ordered until finished    Elevate leg above the level of the heart as much as possible throughout the day. Obtain lab work as ordered    [facility] followup visit:1 week_____________________________  (Please note your next appointment above and if you are unable to keep, kindly give a 24 hour notice. Thank you.)     Physician signature:__________________________      If you experience any of the following, please call the [address] [place] [address] [facility] on [facility] during business hours:     * Increase in Pain  * Temperature over 101  * Increase in drainage from your wound  * Drainage with a foul odor  * Bleeding  * Increase in swelling  * Need for compression bandage changes due to slippage, breakthrough drainage. If you need medical attention outside of the business hours of the [address] [place] [address] [facility] please contact your PCP or go to the nearest emergency room.

## 2022-11-02 NOTE — PROGRESS NOTES
Wound Healing Center /Hyperbarics   History and Physical/Consultation  Vascular    Referring Physician : Amaury Valentine MD  [address] [facility] RECORD NUMBER:  [id number]  AGE: 65 y.o. GENDER: female  : 1945  EPISODE DATE:  2022  Subjective:     Chief Complaint   Patient presents with    Wound Check     Left leg      HISTORY of PRESENT ILLNESS HPI     Janet Lazo is a 65 y.o. female who presents today for wound/ulcer evaluation. History of Wound Context:  The patient has had a wound of left calf which was first noted approximately 2022. This has been treated local wound care. On their initial visit to the wound healing center, 22,  the patient has noted that the wound has been improving after seeing Dr Yuko Harmon last week. The patient has not had similar previous wounds in the past.      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 She underwent right segmental mandibulectomy, bilateral selective neck dissection of levels 1 through 4, open tracheostomy on  with L fibula resection and implant of fibular free flap in her jaw. Per pt report wound in left leg was never closed. She receive preop chemotherapy, immunotherapy. She is no longer receiving chemotherapy. Her lymph node resection was negative. Her left calf postop wound from where she had bone/free flap taken has been non healing and they have had issues with it since immediately postop per pt and family. They said [facility] surgeon is aware, recommended local wound care and was going to see them in 2023. Her daughter had asked to follow with me going forward. She has peg in place and receives nutrition via. She follows with Dr. Lisa Salazar in regards to CKD. Pt is on abx at time of initial visit.  She was started on levaquin per [facility] who she had culture sent to.      22  She does have an area of muscle that doesn't appear to be viable  She may need surgical debridement in the future  Coban 2angie ag  She is already on levaquin from recent culture  No evidence of arterial occlusive disease  Labs ordered  Ok to cancel abis and pvrs      Wound/Ulcer Pain Timing/Severity: mild  Quality of pain: aching  Severity:  1 / 10   Modifying Factors: Pain worsens with debridement, dressing changes  Associated Signs/Symptoms: drainage edema, pain    Ulcer Identification:  Ulcer Type: non-healing surgical  Contributing Factors: edema, immunosuppression, anticoagulation therapy, and malnutrition    Diabetic/Pressure/Non Pressure Ulcers only:  Ulcer: Non-Pressure ulcer, muscle necrosis  Wound: Wound dehiscence        PAST MEDICAL HISTORY      Diagnosis Date    Anemia     Arthritis     Bowel obstruction (HCC) 04/28/2016    Cancer (HCC)     oral/mandible    CHF (congestive heart failure) (HCC)     COPD (chronic obstructive pulmonary disease) (HCC)     Hyperlipidemia     Hypertension     LBBB (left bundle branch block)     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 ([address] 10/26/2022    Nonischemic cardiomyopathy ([address]     Paroxysmal A-fib ([address]      Past Surgical History:   Procedure Laterality Date    ABDOMEN SURGERY  10/05/2016    exporation of abdominal fascial wound dehiscence close, insertion TLC right IJ    CARDIAC DEFIBRILLATOR PLACEMENT Left 05/23/2007    CARDIAC DEFIBRILLATOR PLACEMENT  04/17/2018    BIV ICD GENT CHANGE (BSCI)    VAMSHI    CARPAL TUNNEL RELEASE      DIAGNOSTIC CARDIAC CATH LAB PROCEDURE      ENDOSCOPY, COLON, DIAGNOSTIC  01/26/2018    upper endo    HERNIA REPAIR      HYSTERECTOMY (CERVIX STATUS UNKNOWN)      ILEOSTOMY OR JEJUNOSTOMY      INCISIONAL HERNIA REPAIR  04/21/2017    LAPAROSCOPIC; WITH MESH    MANDIBLE SURGERY      OTHER SURGICAL HISTORY      ostomy placed having reversal done on 9/30/2016    OTHER SURGICAL HISTORY  09/30/2016    open reveral ileostomy    OTHER SURGICAL HISTORY  01/07/2009    BiV/ICD    OTHER SURGICAL HISTORY Left     vascularized fibular graft harvest [facility] 9/22     Family History   Problem Relation Age of Onset    Alzheimer's Disease Mother     Heart Disease Father     Cancer Father     Alzheimer's Disease Father     Cancer Brother      Social History     Tobacco Use    Smoking status: Former     Types: Cigarettes     Quit date: 12/19/2006     Years since quitting: 15.8    Smokeless tobacco: Never   Vaping Use    Vaping Use: Never used   Substance Use Topics    Alcohol use: Not Currently     Comment: occasional    Drug use: No     No Known Allergies  Current Outpatient Medications on File Prior to Encounter   Medication Sig Dispense Refill    levoFLOXacin (LEVAQUIN) 750 MG tablet Take 750 mg by mouth daily      gabapentin (NEURONTIN) 100 MG capsule Take 100 mg by mouth every 8 (eight) hours. magnesium oxide (MAG-OX) 400 MG tablet Take 400 mg by mouth daily      apixaban (ELIQUIS) 5 MG TABS tablet TAKE 1 TABLET TWICE DAILY 180 tablet 3    fluticasone (FLONASE) 50 MCG/ACT nasal spray 1 spray by Each Nostril route as needed for Rhinitis      carvedilol (COREG) 6.25 MG tablet Take 1 tablet by mouth 2 times daily (with meals) 60 tablet 3    SPIRIVA HANDIHALER 18 MCG inhalation capsule Inhale 18 mcg into the lungs daily        No current facility-administered medications on file prior to encounter.      REVIEW OF SYSTEMS   ROS : All others Negative if blank [], Positive if [x]  General Urinary   [] Fevers [] Hematuria   [] Chills [] Dysuria   [x] Weight Loss Vascular   Skin [] Claudication   [x] Tissue Loss [] Rest Pain   Eyes Neurologic   [x] Wears Glasses/Contacts [] Stroke/TIA   [] Vision Changes [] Focal weakness   Respiratory [] Slurred Speech    [] Shortness of breath ENT   Cardiovascular [x] Difficulty swallowing   [] Chest Pain Gastrointestinal   [] Shortness of breath with exertion [x] Abdominal Pain    [] Melena       [] Hematochezia               Objective:    BP (!) 117/56   Pulse 88   Temp 97.4 °F (36.3 °C) (Temporal)   Resp 16   LMP  (LMP Unknown)   Wt Readings from Last 3 Encounters:   08/11/21 155 lb (70.3 kg)   02/03/21 165 lb (74.8 kg)   12/28/20 168 lb 9.6 oz (76.5 kg)     PHYSICAL EXAM  CONSTITUTIONAL:   Awake, alert, cooperative   PSYCHIATRIC :  Oriented to time, place and person      normal insight to disease process  ENT:  External ears and nose without lesions    Hearing deficits is  noted  NECK: trachea midline    Thyroid goiter not appreciated   LUNGS:  No increased work of breathing                  Clear to auscultation bilaterally   CARDIOVASCULAR:  regular rate and rhythm   ABDOMEN:  soft, non-distended, non-tender , peg in place   Aorta is not palpable   SKIN:   Skin color is abnormal with open wound of left calf   Texture and turgor is not normal   Induration is not noted  EXTREMITIES:   R UE Edema is not noted  L UE Edema is not noted  R LE Edema is not noted  L LE Edema is  noted  R femoral 2+ L femoral 2+   R dorsalis pedis 1+ L dorsalis pedis 1+   R posterior tibial 1+ L posterior tibial 1=     Assessment:     Problem List Items Addressed This Visit       * (Principal) 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 (HCC) - Primary (Chronic)    Relevant Orders    Prealbumin    Comprehensive Metabolic Panel    CBC    Sedimentation rate, manual    C-Reactive Protein    Hemoglobin A1C       Pre Debridement Measurements:  Are located in the [facility]  Documentation Flow Sheet  Post Debridement Measurements:  Wound/Ulcer Descriptions are Pre Debridement except measurements:     Incision 10/05/16 Abdomen Mid (Active)   Number of days: 2218       Incision 04/21/17 Abdomen Right;Left;Mid (Active)   Number of days: 2021       Wound 10/26/22 Leg Left;Lateral #1 (Active)   Wound Image   10/26/22 0814   Wound Etiology Non-Healing Surgical 10/26/22 0814   Dressing Status New dressing applied 10/26/22 0902   Wound Cleansed Cleansed with saline 10/26/22 0902   Dressing/Treatment Alginate with Ag;ABD;Roll gauze 10/26/22 0902   Offloading for Diabetic Foot Ulcers Offloading not required 10/26/22 0902   Wound Length (cm) 21 cm 11/02/22 1412   Wound Width (cm) 6.9 cm 11/02/22 1412   Wound Depth (cm) 2.5 cm 11/02/22 1412   Wound Surface Area (cm^2) 144.9 cm^2 11/02/22 1412   Change in Wound Size % (l*w) -17.61 11/02/22 1412   Wound Volume (cm^3) 362.25 cm^3 11/02/22 1412   Wound Healing % -488 11/02/22 1412   Post-Procedure Length (cm) 21.4 cm 11/02/22 1508   Post-Procedure Width (cm) 7.1 cm 11/02/22 1508   Post-Procedure Depth (cm) 2.7 cm 11/02/22 1508   Post-Procedure Surface Area (cm^2) 151.94 cm^2 11/02/22 1508   Post-Procedure Volume (cm^3) 410.238 cm^3 11/02/22 1508   Distance Tunneling (cm) 0.9 cm 11/02/22 1412   Tunneling Position ___ O'Clock 12 11/02/22 1412   Wound Assessment Exposed structure tendon;Fibrin;Slough;Pink/red 11/02/22 1412   Drainage Amount Large 11/02/22 1412   Drainage Description Yellow; Thick;Purulent 11/02/22 1412   Odor None 11/02/22 1412   Cherise-wound Assessment Intact 11/02/22 1412   Wound Thickness Description not for Pressure Injury Full thickness 10/26/22 0814   Number of days: 7      Procedure Note  Indications:  Based on my examination of this patient's wound(s)/ulcer(s) today, debridement is required to promote healing and evaluate the wound base. Performed by: Amrita Adam MD    Consent obtained:  Yes    Time out taken:  Yes    Pain Control: Anesthetic  Anesthetic: 4% Lidocaine Liquid Topical     Debridement:Excisional Debridement    Using curette the wound(s)/ulcer(s) was/were sharply debrided down through and including the removal of epidermis, dermis, subcutaneous tissue, and muscle/fascia.         Devitalized Tissue Debrided:  fibrin, biofilm, slough, necrotic/eschar, and exudate to stimulate bleeding to promote healing, post debridement good bleeding base and wound edges noted    Wound/Ulcer #: 1    Percent of Wound/Ulcer Debrided: 30%    Total Surface Area Debrided:  40 sq cm     Estimated Blood Loss:  Estimated amount of blood loss is 50 ml. Hemostasis Achieved:  by pressure    Procedural Pain:  6  / 10   Post Procedural Pain:  5 / 10     Response to treatment:  Well tolerated by patient. A culture was not done. Plan:     Pt is not currently a smoker   - Discussed relationship of smoking and negative affects on wound healing   - Emphasized importance of tobacco avoidace/cessation    In my professional opinion and based off the information that is available at this time this patient has appropriate indication for HBO Therapy: Maybe    Treatment Note please see attached Discharge Instructions    Written patient dismissal instructions given to patient and signed by patient or POA. Discharge Instructions         Visit Discharge/Physician Orders     Discharge condition: Stable     Assessment of pain at discharge:minimal     Anesthetic used: 4% lidocaine solution     Discharge to: Home     Left via:Private automobile     Accompanied by: family      ECF/HHA: [facility](note order change)     Dressing Orders:LEFT LATERAL LOWER LEG- cleanse with normal saline, pack loosely with calcium alginate ag , ABD pads and coban 2. Change Monday- Wednesday (at [address] [place] [address],[address])- Friday. Treatment Orders:Eat a diet high in protein and vitamin C. Take a multiple vitamin daily unless contraindicated. Continue levaquin as ordered until finished    Elevate leg above the level of the heart as much as possible throughout the day. [address] [place] [address],[address] followup visit:1 week_____________________________  (Please note your next appointment above and if you are unable to keep, kindly give a 24 hour notice.  Thank you.)     Physician signature:__________________________      If you experience any of the following, please call the [address] [place] [address] Road during business hours:     * Increase in Pain  * Temperature over 101  * Increase in drainage from your wound  * Drainage with a foul odor  * Bleeding  * Increase in swelling  * Need for compression bandage changes due to slippage, breakthrough drainage. If you need medical attention outside of the business hours of the [address] [place] [address] Road please contact your PCP or go to the nearest emergency room.          Electronically signed by Sheryl Covington MD on 11/2/2022 at 3:20 PM

## 2022-11-03 ENCOUNTER — APPOINTMENT (OUTPATIENT)
Dept: GENERAL RADIOLOGY | Age: 77
DRG: 981 | End: 2022-11-03
Payer: MEDICARE

## 2022-11-03 ENCOUNTER — APPOINTMENT (OUTPATIENT)
Dept: CT IMAGING | Age: 77
DRG: 981 | End: 2022-11-03
Payer: MEDICARE

## 2022-11-03 ENCOUNTER — HOSPITAL ENCOUNTER (INPATIENT)
Age: 77
LOS: 3 days | Discharge: HOME | DRG: 981 | End: 2022-11-07
Attending: EMERGENCY MEDICINE
Payer: MEDICARE

## 2022-11-03 DIAGNOSIS — S09.90XA INJURY OF HEAD, INITIAL ENCOUNTER: Primary | ICD-10-CM

## 2022-11-03 DIAGNOSIS — R53.1 GENERAL WEAKNESS: ICD-10-CM

## 2022-11-03 LAB
ALBUMIN SERPL-MCNC: 3.2 G/DL (ref 3.5–5.2)
ALP BLD-CCNC: 108 U/L (ref 35–104)
ALT SERPL-CCNC: 17 U/L (ref 0–32)
ANION GAP SERPL CALCULATED.3IONS-SCNC: 10 MMOL/L (ref 7–16)
AST SERPL-CCNC: 29 U/L (ref 0–31)
BASOPHILS ABSOLUTE: 0.02 E9/L (ref 0–0.2)
BASOPHILS RELATIVE PERCENT: 0.2 % (ref 0–2)
BILIRUB SERPL-MCNC: <0.2 MG/DL (ref 0–1.2)
BILIRUBIN URINE: NEGATIVE
BLOOD, URINE: NEGATIVE
BUN BLDV-MCNC: 35 MG/DL (ref 6–23)
C-REACTIVE PROTEIN: 4.5 MG/DL (ref 0–0.4)
CALCIUM SERPL-MCNC: 9.8 MG/DL (ref 8.6–10.2)
CHLORIDE BLD-SCNC: 103 MMOL/L (ref 98–107)
CLARITY: CLEAR
CO2: 26 MMOL/L (ref 22–29)
COLOR: YELLOW
CREAT SERPL-MCNC: 1.4 MG/DL (ref 0.5–1)
EOSINOPHILS ABSOLUTE: 0.1 E9/L (ref 0.05–0.5)
EOSINOPHILS RELATIVE PERCENT: 1 % (ref 0–6)
GFR SERPL CREATININE-BSD FRML MDRD: 38 ML/MIN/1.73
GLUCOSE BLD-MCNC: 169 MG/DL (ref 74–99)
GLUCOSE URINE: NEGATIVE MG/DL
HCT VFR BLD CALC: 36.9 % (ref 34–48)
HEMOGLOBIN: 11.7 G/DL (ref 11.5–15.5)
IMMATURE GRANULOCYTES #: 0.05 E9/L
IMMATURE GRANULOCYTES %: 0.5 % (ref 0–5)
KETONES, URINE: NEGATIVE MG/DL
LEUKOCYTE ESTERASE, URINE: NEGATIVE
LYMPHOCYTES ABSOLUTE: 0.92 E9/L (ref 1.5–4)
LYMPHOCYTES RELATIVE PERCENT: 8.8 % (ref 20–42)
MCH RBC QN AUTO: 30.5 PG (ref 26–35)
MCHC RBC AUTO-ENTMCNC: 31.7 % (ref 32–34.5)
MCV RBC AUTO: 96.3 FL (ref 80–99.9)
MONOCYTES ABSOLUTE: 0.85 E9/L (ref 0.1–0.95)
MONOCYTES RELATIVE PERCENT: 8.2 % (ref 2–12)
NEUTROPHILS ABSOLUTE: 8.47 E9/L (ref 1.8–7.3)
NEUTROPHILS RELATIVE PERCENT: 81.3 % (ref 43–80)
NITRITE, URINE: NEGATIVE
PDW BLD-RTO: 15.2 FL (ref 11.5–15)
PH UA: 6 (ref 5–9)
PLATELET # BLD: 271 E9/L (ref 130–450)
PMV BLD AUTO: 11.2 FL (ref 7–12)
POTASSIUM SERPL-SCNC: 5.5 MMOL/L (ref 3.5–5)
PROTEIN UA: NEGATIVE MG/DL
RBC # BLD: 3.83 E12/L (ref 3.5–5.5)
REASON FOR REJECTION: NORMAL
REJECTED TEST: NORMAL
SODIUM BLD-SCNC: 139 MMOL/L (ref 132–146)
SPECIFIC GRAVITY UA: >=1.03 (ref 1–1.03)
TOTAL PROTEIN: 6.6 G/DL (ref 6.4–8.3)
UROBILINOGEN, URINE: 0.2 E.U./DL
WBC # BLD: 10.4 E9/L (ref 4.5–11.5)

## 2022-11-03 PROCEDURE — 80053 COMPREHEN METABOLIC PANEL: CPT

## 2022-11-03 PROCEDURE — 72125 CT NECK SPINE W/O DYE: CPT

## 2022-11-03 PROCEDURE — 73590 X-RAY EXAM OF LOWER LEG: CPT

## 2022-11-03 PROCEDURE — 6360000002 HC RX W HCPCS

## 2022-11-03 PROCEDURE — 2580000003 HC RX 258: Performed by: EMERGENCY MEDICINE

## 2022-11-03 PROCEDURE — 70450 CT HEAD/BRAIN W/O DYE: CPT

## 2022-11-03 PROCEDURE — 81003 URINALYSIS AUTO W/O SCOPE: CPT

## 2022-11-03 PROCEDURE — 93005 ELECTROCARDIOGRAM TRACING: CPT | Performed by: EMERGENCY MEDICINE

## 2022-11-03 PROCEDURE — 85025 COMPLETE CBC W/AUTO DIFF WBC: CPT

## 2022-11-03 PROCEDURE — 71045 X-RAY EXAM CHEST 1 VIEW: CPT

## 2022-11-03 PROCEDURE — 96374 THER/PROPH/DIAG INJ IV PUSH: CPT

## 2022-11-03 PROCEDURE — 99285 EMERGENCY DEPT VISIT HI MDM: CPT

## 2022-11-03 RX ORDER — MORPHINE SULFATE 2 MG/ML
2 INJECTION, SOLUTION INTRAMUSCULAR; INTRAVENOUS ONCE
Status: COMPLETED | OUTPATIENT
Start: 2022-11-03 | End: 2022-11-03

## 2022-11-03 RX ORDER — SODIUM CHLORIDE 9 MG/ML
INJECTION, SOLUTION INTRAVENOUS CONTINUOUS
Status: DISCONTINUED | OUTPATIENT
Start: 2022-11-03 | End: 2022-11-07 | Stop reason: HOSPADM

## 2022-11-03 RX ADMIN — SODIUM CHLORIDE: 9 INJECTION, SOLUTION INTRAVENOUS at 22:42

## 2022-11-03 RX ADMIN — MORPHINE SULFATE 2 MG: 2 INJECTION, SOLUTION INTRAMUSCULAR; INTRAVENOUS at 22:41

## 2022-11-03 ASSESSMENT — PAIN - FUNCTIONAL ASSESSMENT: PAIN_FUNCTIONAL_ASSESSMENT: NONE - DENIES PAIN

## 2022-11-03 ASSESSMENT — ENCOUNTER SYMPTOMS
COUGH: 0
DIARRHEA: 0
EYE PAIN: 0
CONSTIPATION: 0
ABDOMINAL PAIN: 0
SHORTNESS OF BREATH: 0
VOMITING: 0
BACK PAIN: 0
NAUSEA: 0
SINUS PAIN: 0

## 2022-11-03 ASSESSMENT — PAIN SCALES - GENERAL: PAINLEVEL_OUTOF10: 7

## 2022-11-03 NOTE — DISCHARGE INSTRUCTIONS
Discharge condition: Stable     Assessment of pain at discharge:minimal     Anesthetic used: 4% lidocaine solution     Discharge to: Home     Left via:Private automobile     Accompanied by: family      ECF/HHA: [place]- cleanse with normal saline, pack loosely with calcium alginate ag , ABD pads and coban 2. Change Monday- Wednesday (at [address] [place] [address],[address])- Friday. Treatment Orders:Eat a diet high in protein and vitamin C. Take a multiple vitamin daily unless contraindicated. Elevate leg above the level of the heart as much as possible throughout the day. [address] [place] [address],[address] followup visit:1 week_____________________________  (Please note your next appointment above and if you are unable to keep, kindly give a 24 hour notice. Thank you.)     Physician signature:__________________________      If you experience any of the following, please call the [facility] during business hours:     * Increase in Pain  * Temperature over 101  * Increase in drainage from your wound  * Drainage with a foul odor  * Bleeding  * Increase in swelling  * Need for compression bandage changes due to slippage, breakthrough drainage. If you need medical attention outside of the business hours of the [facility] [address] please contact your PCP or go to the nearest emergency room. 3

## 2022-11-04 PROBLEM — R77.8 ELEVATED TROPONIN: Status: ACTIVE | Noted: 2022-11-04
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
## 2022-11-04 PROBLEM — R79.89 ELEVATED TROPONIN: Status: ACTIVE | Noted: 2022-11-04

## 2022-11-04 LAB
ANION GAP SERPL CALCULATED.3IONS-SCNC: 10 MMOL/L (ref 7–16)
ANION GAP SERPL CALCULATED.3IONS-SCNC: 6 MMOL/L (ref 7–16)
BASOPHILS ABSOLUTE: 0.01 E9/L (ref 0–0.2)
BASOPHILS RELATIVE PERCENT: 0.1 % (ref 0–2)
BUN BLDV-MCNC: 24 MG/DL (ref 6–23)
BUN BLDV-MCNC: 28 MG/DL (ref 6–23)
CALCIUM SERPL-MCNC: 6.8 MG/DL (ref 8.6–10.2)
CALCIUM SERPL-MCNC: 9.7 MG/DL (ref 8.6–10.2)
CHLORIDE BLD-SCNC: 108 MMOL/L (ref 98–107)
CHLORIDE BLD-SCNC: 117 MMOL/L (ref 98–107)
CO2: 22 MMOL/L (ref 22–29)
CO2: 25 MMOL/L (ref 22–29)
CREAT SERPL-MCNC: 0.9 MG/DL (ref 0.5–1)
CREAT SERPL-MCNC: 1.3 MG/DL (ref 0.5–1)
EKG ATRIAL RATE: 91 BPM
EKG P AXIS: 74 DEGREES
EKG P-R INTERVAL: 138 MS
EKG Q-T INTERVAL: 386 MS
EKG QRS DURATION: 110 MS
EKG QTC CALCULATION (BAZETT): 474 MS
EKG R AXIS: -99 DEGREES
EKG T AXIS: 61 DEGREES
EKG VENTRICULAR RATE: 91 BPM
EOSINOPHILS ABSOLUTE: 0.14 E9/L (ref 0.05–0.5)
EOSINOPHILS RELATIVE PERCENT: 1.9 % (ref 0–6)
GFR SERPL CREATININE-BSD FRML MDRD: 42 ML/MIN/1.73
GFR SERPL CREATININE-BSD FRML MDRD: >60 ML/MIN/1.73
GLUCOSE BLD-MCNC: 71 MG/DL (ref 74–99)
GLUCOSE BLD-MCNC: 79 MG/DL (ref 74–99)
HCT VFR BLD CALC: 27.8 % (ref 34–48)
HEMOGLOBIN: 8.7 G/DL (ref 11.5–15.5)
IMMATURE GRANULOCYTES #: 0.03 E9/L
IMMATURE GRANULOCYTES %: 0.4 % (ref 0–5)
LYMPHOCYTES ABSOLUTE: 0.93 E9/L (ref 1.5–4)
LYMPHOCYTES RELATIVE PERCENT: 12.6 % (ref 20–42)
MAGNESIUM: 1.2 MG/DL (ref 1.6–2.6)
MCH RBC QN AUTO: 31 PG (ref 26–35)
MCHC RBC AUTO-ENTMCNC: 31.3 % (ref 32–34.5)
MCV RBC AUTO: 98.9 FL (ref 80–99.9)
MONOCYTES ABSOLUTE: 0.81 E9/L (ref 0.1–0.95)
MONOCYTES RELATIVE PERCENT: 11 % (ref 2–12)
NEUTROPHILS ABSOLUTE: 5.46 E9/L (ref 1.8–7.3)
NEUTROPHILS RELATIVE PERCENT: 74 % (ref 43–80)
PDW BLD-RTO: 15.2 FL (ref 11.5–15)
PH VENOUS: 7.35 (ref 7.35–7.45)
PLATELET # BLD: 206 E9/L (ref 130–450)
PMV BLD AUTO: 11.1 FL (ref 7–12)
POTASSIUM REFLEX MAGNESIUM: 3.4 MMOL/L (ref 3.5–5)
POTASSIUM REFLEX MAGNESIUM: 4.5 MMOL/L (ref 3.5–5)
POTASSIUM SERPL-SCNC: 4.5 MMOL/L (ref 3.5–5)
RBC # BLD: 2.81 E12/L (ref 3.5–5.5)
SODIUM BLD-SCNC: 143 MMOL/L (ref 132–146)
SODIUM BLD-SCNC: 145 MMOL/L (ref 132–146)
TROPONIN, HIGH SENSITIVITY: 54 NG/L (ref 0–9)
TROPONIN, HIGH SENSITIVITY: 60 NG/L (ref 0–9)
WBC # BLD: 7.4 E9/L (ref 4.5–11.5)

## 2022-11-04 PROCEDURE — 6370000000 HC RX 637 (ALT 250 FOR IP): Performed by: PHYSICIAN ASSISTANT

## 2022-11-04 PROCEDURE — 6360000002 HC RX W HCPCS: Performed by: PHYSICIAN ASSISTANT

## 2022-11-04 PROCEDURE — 6370000000 HC RX 637 (ALT 250 FOR IP): Performed by: INTERNAL MEDICINE

## 2022-11-04 PROCEDURE — 80048 BASIC METABOLIC PNL TOTAL CA: CPT

## 2022-11-04 PROCEDURE — 93010 ELECTROCARDIOGRAM REPORT: CPT | Performed by: INTERNAL MEDICINE

## 2022-11-04 PROCEDURE — 99221 1ST HOSP IP/OBS SF/LOW 40: CPT

## 2022-11-04 PROCEDURE — 97165 OT EVAL LOW COMPLEX 30 MIN: CPT

## 2022-11-04 PROCEDURE — 85025 COMPLETE CBC W/AUTO DIFF WBC: CPT

## 2022-11-04 PROCEDURE — 82800 BLOOD PH: CPT

## 2022-11-04 PROCEDURE — 2700000000 HC OXYGEN THERAPY PER DAY

## 2022-11-04 PROCEDURE — 2060000000 HC ICU INTERMEDIATE R&B

## 2022-11-04 PROCEDURE — 36415 COLL VENOUS BLD VENIPUNCTURE: CPT

## 2022-11-04 PROCEDURE — 94640 AIRWAY INHALATION TREATMENT: CPT

## 2022-11-04 PROCEDURE — 84132 ASSAY OF SERUM POTASSIUM: CPT

## 2022-11-04 PROCEDURE — 83735 ASSAY OF MAGNESIUM: CPT

## 2022-11-04 PROCEDURE — 84484 ASSAY OF TROPONIN QUANT: CPT

## 2022-11-04 PROCEDURE — 94664 DEMO&/EVAL PT USE INHALER: CPT

## 2022-11-04 PROCEDURE — 97535 SELF CARE MNGMENT TRAINING: CPT

## 2022-11-04 PROCEDURE — 6360000002 HC RX W HCPCS: Performed by: INTERNAL MEDICINE

## 2022-11-04 RX ORDER — LEVOFLOXACIN 500 MG/1
750 TABLET, FILM COATED ORAL DAILY
Status: DISCONTINUED | OUTPATIENT
Start: 2022-11-04 | End: 2022-11-04 | Stop reason: DRUGHIGH

## 2022-11-04 RX ORDER — SODIUM CHLORIDE 9 MG/ML
INJECTION, SOLUTION INTRAVENOUS PRN
Status: DISCONTINUED | OUTPATIENT
Start: 2022-11-04 | End: 2022-11-07 | Stop reason: HOSPADM

## 2022-11-04 RX ORDER — FLUTICASONE PROPIONATE 50 MCG
1 SPRAY, SUSPENSION (ML) NASAL DAILY PRN
Status: DISCONTINUED | OUTPATIENT
Start: 2022-11-04 | End: 2022-11-07 | Stop reason: HOSPADM

## 2022-11-04 RX ORDER — MAGNESIUM SULFATE IN WATER 40 MG/ML
4000 INJECTION, SOLUTION INTRAVENOUS ONCE
Status: COMPLETED | OUTPATIENT
Start: 2022-11-04 | End: 2022-11-04

## 2022-11-04 RX ORDER — LEVOFLOXACIN 750 MG/1
750 TABLET ORAL
Status: DISCONTINUED | OUTPATIENT
Start: 2022-11-05 | End: 2022-11-07 | Stop reason: HOSPADM

## 2022-11-04 RX ORDER — CLOTRIMAZOLE 10 MG/1
10 LOZENGE ORAL; TOPICAL
Status: DISCONTINUED | OUTPATIENT
Start: 2022-11-04 | End: 2022-11-07 | Stop reason: HOSPADM

## 2022-11-04 RX ORDER — MORPHINE SULFATE 2 MG/ML
1 INJECTION, SOLUTION INTRAMUSCULAR; INTRAVENOUS EVERY 4 HOURS PRN
Status: DISCONTINUED | OUTPATIENT
Start: 2022-11-04 | End: 2022-11-04

## 2022-11-04 RX ORDER — FAMOTIDINE 20 MG/1
1 TABLET, FILM COATED ORAL 2 TIMES DAILY
COMMUNITY
Start: 2022-10-11

## 2022-11-04 RX ORDER — ACETAMINOPHEN 325 MG/1
650 TABLET ORAL EVERY 6 HOURS PRN
Status: DISCONTINUED | OUTPATIENT
Start: 2022-11-04 | End: 2022-11-07 | Stop reason: HOSPADM

## 2022-11-04 RX ORDER — SODIUM CHLORIDE 0.9 % (FLUSH) 0.9 %
10 SYRINGE (ML) INJECTION PRN
Status: DISCONTINUED | OUTPATIENT
Start: 2022-11-04 | End: 2022-11-07 | Stop reason: HOSPADM

## 2022-11-04 RX ORDER — CARVEDILOL 6.25 MG/1
6.25 TABLET ORAL 2 TIMES DAILY WITH MEALS
Status: DISCONTINUED | OUTPATIENT
Start: 2022-11-04 | End: 2022-11-04

## 2022-11-04 RX ORDER — TRAMADOL HYDROCHLORIDE 50 MG/1
1 TABLET ORAL EVERY 8 HOURS PRN
COMMUNITY
Start: 2022-10-26

## 2022-11-04 RX ORDER — PANTOPRAZOLE SODIUM 40 MG/1
40 TABLET, DELAYED RELEASE ORAL
Status: DISCONTINUED | OUTPATIENT
Start: 2022-11-04 | End: 2022-11-07 | Stop reason: HOSPADM

## 2022-11-04 RX ORDER — POTASSIUM CHLORIDE 20 MEQ/1
40 TABLET, EXTENDED RELEASE ORAL PRN
Status: DISCONTINUED | OUTPATIENT
Start: 2022-11-04 | End: 2022-11-07 | Stop reason: HOSPADM

## 2022-11-04 RX ORDER — ESCITALOPRAM OXALATE 10 MG/1
1 TABLET ORAL DAILY
COMMUNITY
Start: 2022-10-11

## 2022-11-04 RX ORDER — OXYCODONE HYDROCHLORIDE 5 MG/1
1 TABLET ORAL EVERY 8 HOURS PRN
COMMUNITY
Start: 2022-11-01

## 2022-11-04 RX ORDER — ONDANSETRON 4 MG/1
4 TABLET, ORALLY DISINTEGRATING ORAL EVERY 8 HOURS PRN
Status: DISCONTINUED | OUTPATIENT
Start: 2022-11-04 | End: 2022-11-07 | Stop reason: HOSPADM

## 2022-11-04 RX ORDER — CARVEDILOL 3.12 MG/1
3.12 TABLET ORAL 2 TIMES DAILY WITH MEALS
Status: DISCONTINUED | OUTPATIENT
Start: 2022-11-04 | End: 2022-11-07 | Stop reason: HOSPADM

## 2022-11-04 RX ORDER — POTASSIUM CHLORIDE 7.45 MG/ML
10 INJECTION INTRAVENOUS PRN
Status: DISCONTINUED | OUTPATIENT
Start: 2022-11-04 | End: 2022-11-07 | Stop reason: HOSPADM

## 2022-11-04 RX ORDER — DOCUSATE SODIUM 100 MG/1
100 CAPSULE, LIQUID FILLED ORAL NIGHTLY
Status: DISCONTINUED | OUTPATIENT
Start: 2022-11-04 | End: 2022-11-07 | Stop reason: HOSPADM

## 2022-11-04 RX ORDER — ONDANSETRON 2 MG/ML
4 INJECTION INTRAMUSCULAR; INTRAVENOUS EVERY 6 HOURS PRN
Status: DISCONTINUED | OUTPATIENT
Start: 2022-11-04 | End: 2022-11-07 | Stop reason: HOSPADM

## 2022-11-04 RX ORDER — TRAMADOL HYDROCHLORIDE 50 MG/1
50 TABLET ORAL EVERY 8 HOURS PRN
Status: DISCONTINUED | OUTPATIENT
Start: 2022-11-04 | End: 2022-11-07 | Stop reason: HOSPADM

## 2022-11-04 RX ORDER — OXYCODONE HYDROCHLORIDE 5 MG/1
5 TABLET ORAL EVERY 8 HOURS PRN
Status: DISCONTINUED | OUTPATIENT
Start: 2022-11-04 | End: 2022-11-07 | Stop reason: HOSPADM

## 2022-11-04 RX ORDER — SODIUM CHLORIDE 0.9 % (FLUSH) 0.9 %
10 SYRINGE (ML) INJECTION EVERY 12 HOURS SCHEDULED
Status: DISCONTINUED | OUTPATIENT
Start: 2022-11-04 | End: 2022-11-07 | Stop reason: HOSPADM

## 2022-11-04 RX ORDER — ACETAMINOPHEN 650 MG/1
650 SUPPOSITORY RECTAL EVERY 6 HOURS PRN
Status: DISCONTINUED | OUTPATIENT
Start: 2022-11-04 | End: 2022-11-07 | Stop reason: HOSPADM

## 2022-11-04 RX ORDER — GABAPENTIN 100 MG/1
100 CAPSULE ORAL EVERY 8 HOURS
Status: DISCONTINUED | OUTPATIENT
Start: 2022-11-04 | End: 2022-11-07 | Stop reason: HOSPADM

## 2022-11-04 RX ADMIN — APIXABAN 5 MG: 5 TABLET, FILM COATED ORAL at 11:12

## 2022-11-04 RX ADMIN — GABAPENTIN 100 MG: 100 CAPSULE ORAL at 14:05

## 2022-11-04 RX ADMIN — TRAMADOL HYDROCHLORIDE 50 MG: 50 TABLET, COATED ORAL at 11:11

## 2022-11-04 RX ADMIN — OXYCODONE 5 MG: 5 TABLET ORAL at 03:11

## 2022-11-04 RX ADMIN — DOCUSATE SODIUM 100 MG: 100 CAPSULE, LIQUID FILLED ORAL at 21:37

## 2022-11-04 RX ADMIN — CLOTRIMAZOLE 10 MG: 10 LOZENGE ORAL at 23:35

## 2022-11-04 RX ADMIN — CLOTRIMAZOLE 10 MG: 10 LOZENGE ORAL at 16:21

## 2022-11-04 RX ADMIN — CLOTRIMAZOLE 10 MG: 10 LOZENGE ORAL at 12:27

## 2022-11-04 RX ADMIN — IPRATROPIUM BROMIDE 0.5 MG: 0.5 SOLUTION RESPIRATORY (INHALATION) at 13:14

## 2022-11-04 RX ADMIN — PANTOPRAZOLE SODIUM 40 MG: 40 TABLET, DELAYED RELEASE ORAL at 11:12

## 2022-11-04 RX ADMIN — OXYCODONE 5 MG: 5 TABLET ORAL at 15:45

## 2022-11-04 RX ADMIN — TRAMADOL HYDROCHLORIDE 50 MG: 50 TABLET, COATED ORAL at 06:48

## 2022-11-04 RX ADMIN — GABAPENTIN 100 MG: 100 CAPSULE ORAL at 06:45

## 2022-11-04 RX ADMIN — CARVEDILOL 3.12 MG: 3.12 TABLET, FILM COATED ORAL at 16:24

## 2022-11-04 RX ADMIN — CLOTRIMAZOLE 10 MG: 10 LOZENGE ORAL at 18:10

## 2022-11-04 RX ADMIN — IPRATROPIUM BROMIDE 0.5 MG: 0.5 SOLUTION RESPIRATORY (INHALATION) at 07:57

## 2022-11-04 RX ADMIN — MAGNESIUM SULFATE HEPTAHYDRATE 4000 MG: 40 INJECTION, SOLUTION INTRAVENOUS at 12:13

## 2022-11-04 RX ADMIN — GABAPENTIN 100 MG: 100 CAPSULE ORAL at 21:37

## 2022-11-04 ASSESSMENT — PAIN DESCRIPTION - LOCATION
LOCATION: LEG
LOCATION: LEG

## 2022-11-04 ASSESSMENT — PAIN SCALES - GENERAL
PAINLEVEL_OUTOF10: 7
PAINLEVEL_OUTOF10: 7
PAINLEVEL_OUTOF10: 8
PAINLEVEL_OUTOF10: 8
PAINLEVEL_OUTOF10: 4
PAINLEVEL_OUTOF10: 7

## 2022-11-04 ASSESSMENT — PAIN DESCRIPTION - DESCRIPTORS: DESCRIPTORS: ACHING

## 2022-11-04 ASSESSMENT — PAIN DESCRIPTION - ORIENTATION
ORIENTATION: LEFT
ORIENTATION: LEFT

## 2022-11-04 NOTE — CONSULTS
[id number] CONSULT    Name: Ki Murillo    Age: 65 y.o. Date of Admission: 11/3/2022  9:08 PM    Date of Service: 11/4/2022    Reason for Consultation: Elevated troponin    Chief Complaint: Nonsyncopal fall    Referring Physician: Dr. Dante Rodriguez    History of Present Illness: The patient is a 65y.o. year old female who sustained a nonsyncopal fall yesterday at 2 PM with a previous event 2 days earlier. She developed lightheadedness without chest pain or palpitations and no ICD discharges. No loss of consciousness. Relative hypotension on presentation and currently resting comfortably. Well-known to me with history of catheterization confirmed nonischemic cardiomyopathy with complete recovery of left ventricular function, chronic left bundle branch block, CRT-D. 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 She denies chest pain and no PND/orthopnea. Chest x-ray, EKG and telemetry independently reviewed. Chest x-ray: No vascular congestion, infiltrates or effusions.   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 [address] 10/26/2022    Nonischemic cardiomyopathy (HCC)     Paroxysmal A-fib (HCC)        Past Surgical History:  Past Surgical History:   Procedure Laterality Date    ABDOMEN SURGERY  10/05/2016    exporation of abdominal fascial wound dehiscence close, insertion TLC right IJ    CARDIAC DEFIBRILLATOR PLACEMENT Left 05/23/2007    CARDIAC DEFIBRILLATOR PLACEMENT  04/17/2018    BIV ICD GENT CHANGE (BSCI) VAMSHI    CARPAL TUNNEL RELEASE      DIAGNOSTIC CARDIAC CATH LAB PROCEDURE      ENDOSCOPY, COLON, DIAGNOSTIC  01/26/2018    upper endo    HERNIA REPAIR      HYSTERECTOMY (CERVIX STATUS UNKNOWN)      ILEOSTOMY OR JEJUNOSTOMY      INCISIONAL HERNIA REPAIR  04/21/2017    LAPAROSCOPIC; WITH MESH    MANDIBLE SURGERY      OTHER SURGICAL HISTORY      ostomy placed having reversal done on 9/30/2016    OTHER SURGICAL HISTORY  09/30/2016    open reveral ileostomy    OTHER SURGICAL HISTORY  01/07/2009    BiV/ICD    OTHER SURGICAL HISTORY Left     vascularized fibular graft harvest [facility] 9/22       Family History:  Family History   Problem Relation Age of Onset    Alzheimer's Disease Mother     Heart Disease Father     Cancer Father     Alzheimer's Disease Father     Cancer Brother        Social History:  Social History     Socioeconomic History    Marital status:      Spouse name: Not on file    Number of children: Not on file    Years of education: Not on file    Highest education level: Not on file   Occupational History    Not on file   Tobacco Use    Smoking status: Former     Types: Cigarettes     Quit date: 12/19/2006     Years since quitting: 15.8    Smokeless tobacco: Never   Vaping Use    Vaping Use: Never used   Substance and Sexual Activity    Alcohol use: Not Currently     Comment: occasional    Drug use: No    Sexual activity: Not on file   Other Topics Concern    Not on file   Social History Narrative    Not on file     Social Determinants of Health     Financial Resource Strain: Not on file   Food Insecurity: Not on file   Transportation Needs: Not on file   Physical Activity: Not on file   Stress: Not on file   Social Connections: Not on file   Intimate Partner Violence: Not on file   Housing Stability: Not on file       Allergies:  No Known Allergies    Home Meds:  Prior to Admission medications    Medication Sig Start Date End Date Taking?  Authorizing Provider   escitalopram (LEXAPRO) 10 MG tablet Take 1 tablet by mouth daily 10/11/22   Historical Provider, MD   famotidine (PEPCID) 20 MG tablet Take 1 tablet by mouth 2 times daily 10/11/22   Historical Provider, MD   traMADol (ULTRAM) 50 MG tablet Take 1 tablet by mouth every 8 hours as needed. 10/26/22   Historical Provider, MD   oxyCODONE (ROXICODONE) 5 MG immediate release tablet Take 1 tablet by mouth every 8 hours as needed. 11/1/22   Historical Provider, MD   levoFLOXacin (LEVAQUIN) 750 MG tablet Take 750 mg by mouth daily Last dose to be Sunday 11/6/22    Historical Provider, MD   gabapentin (NEURONTIN) 100 MG capsule Take 100 mg by mouth every 8 (eight) hours.     Historical Provider, MD   magnesium oxide (MAG-OX) 400 MG tablet Take 400 mg by mouth daily    Historical Provider, MD   apixaban (ELIQUIS) 5 MG TABS tablet TAKE 1 TABLET TWICE DAILY 5/14/21   Tang Perez MD   fluticasone (FLONASE) 50 MCG/ACT nasal spray 1 spray by Each Nostril route as needed for Rhinitis    Historical Provider, MD   carvedilol (COREG) 6.25 MG tablet Take 1 tablet by mouth 2 times daily (with meals) 6/1/16   Susana Hairston MD   [facility] 18 MCG inhalation capsule Inhale 18 mcg into the lungs daily  8/23/15   Historical Provider, MD       Current Medications:  Current Facility-Administered Medications   Medication Dose Route Frequency Provider Last Rate Last Admin    apixaban (ELIQUIS) tablet 5 mg  5 mg Oral BID BERONICA Simmons   5 mg at 11/04/22 1112    carvedilol (COREG) tablet 6.25 mg  6.25 mg Oral BID  BERONICA Barber        fluticasone (FLONASE) 50 MCG/ACT nasal spray 1 spray  1 spray Each Nostril Daily PRN BERONICA Simmons        gabapentin (NEURONTIN) capsule 100 mg  100 mg Oral q8h BERONICA Simmons   100 mg at 11/04/22 0645    ipratropium (ATROVENT) 0.02 % nebulizer solution 0.5 mg  500 mcg Nebulization 4x daily BERONICA Simmons   0.5 mg at 11/04/22 0757    sodium chloride flush 0.9 % injection 10 mL  10 mL IntraVENous 2 times per day BERONICA Etienne        sodium chloride flush 0.9 % injection 10 mL  10 mL IntraVENous PRN BERONICA Barber        0.9 % sodium chloride infusion   IntraVENous PRN BERONICA Etienne        potassium chloride (KLOR-CON M) extended release tablet 40 mEq  40 mEq Oral PRN BERONICA Etienne        Or    potassium bicarb-citric acid (EFFER-K) effervescent tablet 40 mEq  40 mEq Oral PRN BERONICA Etienne        Or    potassium chloride 10 mEq/100 mL IVPB (Peripheral Line)  10 mEq IntraVENous PRN BERONICA Etienne        ondansetron (ZOFRAN-ODT) disintegrating tablet 4 mg  4 mg Oral Q8H PRN BERONICA Etienne        Or    ondansetron (ZOFRAN) injection 4 mg  4 mg IntraVENous Q6H PRN BERONICA Etienne        magnesium hydroxide (MILK OF MAGNESIA) 400 MG/5ML suspension 30 mL  30 mL Oral Daily PRN BERONICA Etienne        acetaminophen (TYLENOL) tablet 650 mg  650 mg Oral Q6H PRN BERONICA Etienne        Or    acetaminophen (TYLENOL) suppository 650 mg  650 mg Rectal Q6H PRN BERONICA Etienne        traMADol (ULTRAM) tablet 50 mg  50 mg Oral Q8H PRN BERONICA Etienne   50 mg at 11/04/22 1111    oxyCODONE (ROXICODONE) immediate release tablet 5 mg  5 mg Oral Q8H PRN BERONICA Etienne   5 mg at 11/04/22 0311    [START ON 11/5/2022] levoFLOXacin (LEVAQUIN) tablet 750 mg  750 mg Oral Q48H BERONICA Barber        magnesium sulfate 4000 mg in 100 mL IVPB premix  4,000 mg IntraVENous Once Kadeem Lips, DO        docusate sodium (COLACE) capsule 100 mg  100 mg Oral Nightly David Garzon DO        pantoprazole (PROTONIX) tablet 40 mg  40 mg Oral QAM AC David Garzon DO   40 mg at 11/04/22 1112    0.9 % sodium chloride infusion   IntraVENous Continuous Jaqueline Pratt  mL/hr at 11/03/22 2242 New Bag at 11/03/22 2242      sodium chloride      sodium chloride 100 mL/hr at 11/03/22 2242       Review of Systems:   Constitutional: No fever, chills, sweats  Cardiac: As per HPI  Pulmonary: No cough, wheeze, hemoptysis  HEENT: No visual disturbances or difficult swallowing  GI: No nausea, vomiting, diarrhea, abdominal pain, rectal bleeding  : No dysuria or hematuria  Endocrine: No excessive thirst, heat or cold intolerance. Musculoskeletal: No joint pain or muscle aches. No claudication  Skin: No skin breakdown or rashes  Neuro: No headache, confusion, or seizures  Psych: No depression, anxiety    Physical Exam:  BP (!) 96/41   Pulse 91   Temp 97.1 °F (36.2 °C) (Temporal)   Resp 20   Ht 5' 2\" (1.575 m)   Wt 119 lb (54 kg)   LMP  (LMP Unknown)   SpO2 99%   BMI 21.77 kg/m²   Weight change: Wt Readings from Last 3 Encounters:   11/03/22 119 lb (54 kg)   08/11/21 155 lb (70.3 kg)   02/03/21 165 lb (74.8 kg)       General: Awake, alert, oriented x3, no acute distress    HEENT: Pupils equal and round. Neck: No JVD or bruits. No thyroid enlargement or adenopathy    Cardiac: Regular rate and rhythm, normal S1 and S2, no S3. Apical impulse is normal.  No murmurs, rubs or clicks. No carotid bruits and no abdominal bruits. No peripheral edema, cyanosis or clubbing. Normal pulses in the upper and lower extremities bilaterally. Resp: Unlabored respirations at rest.  No wheezes, rales or rhonchi. Abdomen: soft, nontender, nondistended, no gross organomegaly or mass    Skin: Warm and dry, no cyanosis. Skin graft on left leg    Musculoskeletal: normal tone and strength in the upper and lower extremities bilaterally    Neuro: Moves all extremities appropriately to command. Normal sensation to light touch in the upper and lower extremities bilaterally    Psych: Cooperative, and normal affect    Intake/Output:  No intake or output data in the 24 hours ending 11/04/22 1147  No intake/output data recorded.     Laboratory Tests:  Lab Results   Component Value Date    CREATININE 0.9 11/04/2022    BUN 24 (H) 11/04/2022     11/04/2022    K 3.4 (L) 11/04/2022     (H) 11/04/2022    CO2 22 11/04/2022     No results for input(s): CKTOTAL, CKMB, TROPONINI in the last 72 hours. Lab Results   Component Value Date    PROBNP 609 (H) 05/25/2016     Lab Results   Component Value Date/Time    WBC 7.4 11/04/2022 06:23 AM    RBC 2.81 11/04/2022 06:23 AM    HGB 8.7 11/04/2022 06:23 AM    HCT 27.8 11/04/2022 06:23 AM    MCV 98.9 11/04/2022 06:23 AM    MCH 31.0 11/04/2022 06:23 AM    MCHC 31.3 11/04/2022 06:23 AM    RDW 15.2 11/04/2022 06:23 AM     11/04/2022 06:23 AM    MPV 11.1 11/04/2022 06:23 AM     Recent Labs     11/02/22  1649 11/03/22  2151   ALKPHOS 129* 108*   ALT 19 17   AST 16 29   PROT 6.8 6.6   BILITOT <0.2 <0.2   LABALBU 3.5 3.2*     Lab Results   Component Value Date/Time    MG 1.2 11/04/2022 06:23 AM     Lab Results   Component Value Date/Time    PROTIME 13.9 04/27/2017 12:35 PM    INR 1.2 04/27/2017 12:35 PM     Lab Results   Component Value Date/Time    TSH 2.110 05/25/2016 11:00 PM     No components found for: CHLPL  Lab Results   Component Value Date    TRIG 139 10/08/2016    TRIG 209 (H) 10/06/2016    TRIG 335 (H) 05/03/2016     No results found for: HDL  No results found for: [facility] Problems    Diagnosis     Fall [W19. 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 Decrease carvedilol to 3.125 mg twice daily with hold parameters. Continue IV fluid    Elevated troponin with no history of coronary artery disease and no anginal symptoms.   No further work-up planned    History of reversible nonischemic cardiomyopathy with normalization of EF. CRT-D functioning normally with nothing to suggest arrhythmias or ICD discharge. Decrease carvedilol as above. Chronic left lower extremity ulcer with management as per vascular surgery    We will follow peripherally.   Please call if any questions        Electronically signed by Hema Mcmahan MD on 11/4/2022 at 11:47 AM

## 2022-11-04 NOTE — ED NOTES
Unable to interrogate Victor Manuel pacemaker, device is broken at this time.       Shawn Ansari RN  11/03/22 [id number]

## 2022-11-04 NOTE — ED NOTES
Nurse to nurse called to [facility] patient placed in transport.      Ester Reina RN  11/04/22 1997

## 2022-11-04 NOTE — ED NOTES
Received report from [facility]; pt resting in bed; son at bedside; vitals stable; will monitor     Antonina Garrett RN  11/04/22 [id number]

## 2022-11-04 NOTE — PROGRESS NOTES
Renal Dose Adjustment Policy (Generic)     This patient is on medication that requires renal, weight, and/or indication dose adjustment. Date Body Weight IBW  Adjusted BW SCr  CrCl Dialysis status   11/4/2022 119 lb (54 kg) Ideal body weight: 50.1 kg (110 lb 7.2 oz)  Adjusted ideal body weight: 51.7 kg (113 lb 13.9 oz) Serum creatinine: 1.4 mg/dL (H) 11/03/22 2151  Estimated creatinine clearance: 26 mL/min (A) N/a       Pharmacy has dose-adjusted the following medication(s):    Date Previous Order Adjusted Order   11/4/2022 Levofloxacin 750mg QD Levofloxacin 750mg q48h       These changes were made per protocol according to the [facility]   Automatic Renal Dose Adjustment Policy. *Please note this dose may need readjusted if patient's condition changes. Please contact pharmacy with any questions regarding these changes.     Debbie Claros, [facility]  11/4/2022  6:43 AM

## 2022-11-04 NOTE — ED PROVIDER NOTES
HPI     Patient is a 65 y.o. female presents with a chief complaint of fall  This has been occurring for 1 day. Patient states that it gets better with nothing. Patient states that it gets worse with nothing. Patient states that it is severe in severity. Patient states it was acute in onset. Pt with SCC of the mouth, AICD, reportedly had a fall today at 2pm and another 2 days ago. Today she hit her head on a toilet paper dispenser. She isn't quite sure why she fell. She has however felt light headed the last few days. She has no injuries on exam. She had a skin graft on her left leg that was dressed yesterday. She is not complaining of worsening leg pain. Dr. John Booth vascular sees the patient and called us prior to her arrival.     Review of Systems   Constitutional:  Negative for chills and fever. HENT:  Negative for ear pain and sinus pain. Eyes:  Negative for pain. Respiratory:  Negative for cough and shortness of breath. Cardiovascular:  Negative for chest pain. Gastrointestinal:  Negative for abdominal pain, constipation, diarrhea, nausea and vomiting. Genitourinary:  Negative for difficulty urinating, dysuria and flank pain. Musculoskeletal:  Negative for back pain. Skin:  Negative for rash. Neurological:  Positive for syncope and light-headedness. Negative for dizziness, weakness and headaches. Psychiatric/Behavioral:  Negative for confusion. Physical Exam  Constitutional:       General: She is not in acute distress. Appearance: Normal appearance. She is not ill-appearing. HENT:      Head: Normocephalic. Right Ear: External ear normal.      Left Ear: External ear normal.      Nose: Nose normal. No congestion or rhinorrhea. Mouth/Throat:      Mouth: Mucous membranes are moist.   Eyes:      Conjunctiva/sclera: Conjunctivae normal.      Pupils: Pupils are equal, round, and reactive to light. Cardiovascular:      Rate and Rhythm: Normal rate and regular rhythm. Pulses: Normal pulses. Pulmonary:      Effort: Pulmonary effort is normal. No respiratory distress. Breath sounds: Normal breath sounds. No stridor. No wheezing or rales. Abdominal:      General: Abdomen is flat. Bowel sounds are normal. There is no distension. Palpations: Abdomen is soft. Tenderness: There is no abdominal tenderness. There is no guarding. Musculoskeletal:         General: No tenderness. Normal range of motion. Cervical back: Normal range of motion and neck supple. Skin:     General: Skin is warm. Findings: No erythema, lesion or rash. Comments: Large 15 cm wound from recent skin graft on left lateral lower leg. There is yellow exudate throughout the wound, with some granulation tissue on the edges. Pt says it looks no different. Pulses 2+ b/l LE, cap refill < 2 sec. ROM intact   Neurological:      General: No focal deficit present. Mental Status: She is oriented to person, place, and time. Sensory: No sensory deficit. Motor: No weakness. Psychiatric:         Behavior: Behavior normal.          Procedures     EKG: This EKG is signed by emergency department physician. Rate: 91  Rhythm: atrial paced rhythm   Interpretation: no acute changes  Comparison: no previous EKG       MDM     Amount and/or Complexity of Data Reviewed  Decide to obtain previous medical records or to obtain history from someone other than the patient: yes             Patient is a 65 y.o. female presenting with a fall today. She hit her head but denies LOC. She is unsure how exactly she fell. She has a  large open wound on her left LE from a recent skin graft for her SCC of the mouth. On Exam she had no injuries, lacerations, or hematoma on her head. Her wound was examined and appeared how it always looks according to the patient. She sees Dr. Sangeetha Aguilar with vascular. Labs and imaging were ordered showing a trop of 60, and a potassium of 5.5. Negative imaging.  EKG was negative for ST or T wave changes. Due to her fall history, cancer history, and healing wound, she was admitted for further workup.     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 Past Surgical History:  has a past surgical history that includes Diagnostic Cardiac Cath Lab Procedure; Hysterectomy; Carpal tunnel release; Cardiac defibrillator placement (Left, 05/23/2007); Jejunostomy; other surgical history; other surgical history (09/30/2016); other surgical history (01/07/2009); Abdomen surgery (10/05/2016); Incisional hernia repair (04/21/2017); Endoscopy, colon, diagnostic (01/26/2018); hernia repair; Cardiac defibrillator placement (04/17/2018); other surgical history (Left); and Mandible surgery. Social History:  reports that she quit smoking about 15 years ago. Her smoking use included cigarettes. She has never used smokeless tobacco. She reports that she does not currently use alcohol. She reports that she does not use drugs. Family History: family history includes Alzheimer's Disease in her father and mother; Cancer in her brother and father; Heart Disease in her father. The patients home medications have been reviewed. Allergies: Patient has no known allergies.     -------------------------------------------------- RESULTS -------------------------------------------------    LABS:  Results for orders placed or performed during the hospital encounter of 11/03/22   CBC with Auto Differential   Result Value Ref Range    WBC 10.4 4.5 - 11.5 E9/L    RBC 3.83 3.50 - 5.50 E12/L    Hemoglobin 11.7 11.5 - 15.5 g/dL    Hematocrit 36.9 34.0 - 48.0 %    MCV 96.3 80.0 - 99.9 fL    MCH 30.5 26.0 - 35.0 pg    MCHC 31.7 (L) 32.0 - 34.5 %    RDW 15.2 (H) 11.5 - 15.0 fL    Platelets 210 427 - 702 E9/L    MPV 11.2 7.0 - 12.0 fL    Neutrophils % 81.3 (H) 43.0 - 80.0 %    Immature Granulocytes % 0.5 0.0 - 5.0 %    Lymphocytes % 8.8 (L) 20.0 - 42.0 %    Monocytes % 8.2 2.0 - 12.0 %    Eosinophils % 1.0 0.0 - 6.0 %    Basophils % 0.2 0.0 - 2.0 %    Neutrophils Absolute 8.47 (H) 1.80 - 7.30 E9/L    Immature Granulocytes # 0.05 E9/L    Lymphocytes Absolute 0.92 (L) 1.50 - 4.00 E9/L    Monocytes Absolute 0.85 0.10 - 0.95 E9/L    Eosinophils Absolute 0.10 0.05 - 0.50 E9/L    Basophils Absolute 0.02 0.00 - 0.20 E9/L   Comprehensive Metabolic Panel   Result Value Ref Range    Sodium 139 132 - 146 mmol/L    Potassium 5.5 (H) 3.5 - 5.0 mmol/L    Chloride 103 98 - 107 mmol/L    CO2 26 22 - 29 mmol/L    Anion Gap 10 7 - 16 mmol/L    Glucose 169 (H) 74 - 99 mg/dL    BUN 35 (H) 6 - 23 mg/dL    Creatinine 1.4 (H) 0.5 - 1.0 mg/dL    Est, Glom Filt Rate 38 >=60 mL/min/1.73    Calcium 9.8 8.6 - 10.2 mg/dL    Total Protein 6.6 6.4 - 8.3 g/dL    Albumin 3.2 (L) 3.5 - 5.2 g/dL    Total Bilirubin <0.2 0.0 - 1.2 mg/dL    Alkaline Phosphatase 108 (H) 35 - 104 U/L    ALT 17 0 - 32 U/L    AST 29 0 - 31 U/L   Urinalysis   Result Value Ref Range    Color, UA Yellow Straw/Yellow    Clarity, UA Clear Clear    Glucose, Ur Negative Negative mg/dL    Bilirubin Urine Negative Negative    Ketones, Urine Negative Negative mg/dL    Specific Gravity, UA >=1.030 1.005 - 1.030    Blood, Urine Negative Negative    pH, UA 6.0 5.0 - 9.0    Protein, UA Negative Negative mg/dL    Urobilinogen, Urine 0.2 <2.0 E.U./dL    Nitrite, Urine Negative Negative    Leukocyte Esterase, Urine Negative Negative   SPECIMEN REJECTION   Result Value Ref Range    Rejected Test TRP5     Reason for Rejection see below    PH, VENOUS Result Value Ref Range    pH, Bam 7.35 7.35 - 7.45   Troponin   Result Value Ref Range    Troponin, High Sensitivity 60 (H) 0 - 9 ng/L   Potassium   Result Value Ref Range    Potassium 4.5 3.5 - 5.0 mmol/L   EKG 12 Lead   Result Value Ref Range    Ventricular Rate 91 BPM    Atrial Rate 91 BPM    P-R Interval 138 ms    QRS Duration 110 ms    Q-T Interval 386 ms    QTc Calculation (Bazett) 474 ms    P Axis 74 degrees    R Axis -99 degrees    T Axis 61 degrees       RADIOLOGY:  CT HEAD WO CONTRAST   Final Result   No acute intracranial abnormality. CT CERVICAL SPINE WO CONTRAST   Final Result   No acute abnormality of the cervical spine. Multilevel degenerative changes. XR TIBIA FIBULA LEFT (2 VIEWS)   Final Result   Soft tissue wound involving the lateral left lower leg. Previous partial resection of the left fibula. No radiographic evidence for acute osteomyelitis. XR CHEST PORTABLE   Final Result   No acute process. ------------------------- NURSING NOTES AND VITALS REVIEWED ---------------------------  Date / Time Roomed:  11/3/2022  9:08 PM  ED Bed Assignment:  11/11    The nursing notes within the ED encounter and vital signs as below have been reviewed.      Patient Vitals for the past 24 hrs:   BP Temp Temp src Pulse Resp SpO2 Height Weight   11/04/22 0300 (!) 108/53 -- -- 87 16 -- -- --   11/04/22 0230 101/71 -- -- 87 16 -- -- --   11/04/22 0200 (!) 106/51 -- -- 87 16 -- -- --   11/04/22 0130 (!) 97/56 -- -- 88 16 -- -- --   11/04/22 0100 106/71 -- -- 87 16 -- -- --   11/04/22 0030 108/78 -- -- 89 -- -- -- --   11/03/22 2115 (!) 123/56 98.1 °F (36.7 °C) Oral 98 18 95 % 5' 2\" (1.575 m) 119 lb (54 kg)       Oxygen Saturation Interpretation: Normal    ------------------------------------------ PROGRESS NOTES ------------------------------------------  Re-evaluation(s):  Time: 0230  Patients symptoms are improving  Repeat physical examination is not changed    Counseling:  I have spoken with the patient and discussed todays results, in addition to providing specific details for the plan of care and counseling regarding the diagnosis and prognosis. Their questions are answered at this time and they are agreeable with the plan of admission.    --------------------------------- ADDITIONAL PROVIDER NOTES ---------------------------------  Consultations:  Time: 0300. Spoke with hospitalist, Discussed case. They will admit the patient. This patient's ED course included: a personal history and physicial examination, re-evaluation prior to disposition, multiple bedside re-evaluations, IV medications, cardiac monitoring, continuous pulse oximetry, and complex medical decision making and emergency management    This patient has remained hemodynamically stable during their ED course. Diagnosis:  1. Injury of head, initial encounter    2. General weakness        Disposition:  Patient's disposition: Admit to telemetry  Patient's condition is stable. Patient was given return precautions. Labs were interpreted by me. Patient will follow up with their primary care provider. Patient is agreeable to this plan. Patient has remained stable throughout their stay in the ED. Patient was seen and evaluated by myself and my attending Giovanni Peralta MD. Assessment and Plan discussed with attending provider, please see attestation for final plan of care. This note was done using dictation software and there may be some grammatical errors associated with this. DO Yani Turner DO  Resident  11/04/22 [id number]  ATTENDING PROVIDER ATTESTATION:     I have personally performed and/or participated in the history, exam, medical decision making, and procedures and agree with all pertinent clinical information. I have also reviewed and agree with the past medical, family and social history unless otherwise noted.     I have discussed this patient in detail with the resident, and provided the instruction and education regarding fall weak. My findings/Plan: I was the primary provider for patient. Patient presenting here because of several falls over the last several days. Patient does report that she hit her head today. Patient does have history of oral cancer she reportedly had surgery in her mandible as well. Patient currently on no chemo or radiation. She reportedly had a bone graft taken from her left leg for her mandible. Patient reportedly also being treated at wound care. Patient reporting no chest pain no difficulty breathing no abdominal pain. Patient family also at bedside discussed history with them as well. Patient here is awake alert. Heart and lung exam normal abdomen soft nontender she is able to move all extremities pulses are intact. Noted wound to her left lateral leg. There is no obvious cellulitis. Patient has photo up in chart please see chart. Patient labs noted reviewed. Patient EKG is paced rhythm no acute ST elevation I do agree with resident evaluation of EKG. Patient did undergo CT head and neck showed no acute findings and imaging of her chest as well as leg. Patient vital signs remained stable here. With patient falling several times over last several days and weak. Patient will be admitted for further evaluation treatment I did speak to on-call internal medicine. They agreed with admission.        Shawn Herrera MD  11/04/22 MD Trudy  11/04/22 0288

## 2022-11-04 NOTE — CONSULTS
Vascular Surgery Consultation Note    Reason for Consult:  L LE wound    HPI : This is a 65 y.o. female admitted on 11/3/2022  9:08 PM with a nonsyncopal fall 11/3/22. She was standing by the commode when she turned to close the lid and she developed lightheadedness causing her to fall and hit her head on the toilet paper eaton. No loss of consciousness. Relative hypotension on presentation to the ED. The patient has had a wound of left calf which was first noted approximately 9/2022. This has been treated local wound care. On their initial visit to the wound healing center, 11/2/22,  the patient has noted that the wound has been improving after seeing Dr. Tinajero Daily last week. The patient has not had similar previous wounds in the past.       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 She underwent jaw resection, lymp node dissection and L fibula resection and implant in her jaw. She received preop chemotherapy, immunotherapy. She is no longer receiving chemotherapy. Her lymph node resection was negative. Her left calf postop wound from where she had bone taken has been non healing and they have had issues with it since immediately postop per pt and family. Upon examination pt resting comfortably in bed. Daughter at the bedside. Pt denies pain at this time. She has not been out of bed since coming to the hospital.  Dressing changed at the bedside. Vascular surgery is consulted in regards to L LE wound management.       ROS : All others Negative if blank [], Positive if [x]  General Urinary   [] Fevers [] Hematuria   [] Chills [] Dysuria   [] Weight Loss Vascular   Skin [] Claudication   [x] Tissue Loss [] Rest Pain   Eyes Neurologic   [] Wears Glasses/Contacts [] Stroke/TIA   [] Vision Changes [] Focal weakness   Respiratory [] Slurred Speech    [] Shortness of breath ENT   Cardiovascular [] Difficulty swallowing   [] Chest Pain Endocrine    [] Shortness of breath with exertion [] Increased Thirst Gastrointestinal    [] Abdominal Pain    [] Melena   [] Hematochezia         Past Medical History:   Diagnosis Date    Anemia     Arthritis     Bowel obstruction ([facility] [address] 04/28/2016    Cancer (HCC)     oral/mandible    CHF (congestive heart failure) (HCC)     COPD (chronic obstructive pulmonary disease) (HCC)     Hyperlipidemia     Hypertension     LBBB (left bundle branch block)     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 ([facility] [address] 10/26/2022    Nonischemic cardiomyopathy ([facility] [address]     Paroxysmal A-fib (HCC)         Past Surgical History:   Procedure Laterality Date    ABDOMEN SURGERY  10/05/2016    exporation of abdominal fascial wound dehiscence close, insertion TLC right IJ    CARDIAC DEFIBRILLATOR PLACEMENT Left 05/23/2007    CARDIAC DEFIBRILLATOR PLACEMENT  04/17/2018    BIV ICD GENT CHANGE (BSCI)    VAMSHI    CARPAL TUNNEL RELEASE      DIAGNOSTIC CARDIAC CATH LAB PROCEDURE      ENDOSCOPY, COLON, DIAGNOSTIC  01/26/2018    upper endo    HERNIA REPAIR      HYSTERECTOMY (CERVIX STATUS UNKNOWN)      ILEOSTOMY OR JEJUNOSTOMY      INCISIONAL HERNIA REPAIR  04/21/2017    LAPAROSCOPIC; WITH MESH    MANDIBLE SURGERY      OTHER SURGICAL HISTORY      ostomy placed having reversal done on 9/30/2016    OTHER SURGICAL HISTORY  09/30/2016    open reveral ileostomy    OTHER SURGICAL HISTORY  01/07/2009    BiV/ICD    OTHER SURGICAL HISTORY Left     vascularized fibular graft harvest [facility] 9/22     Current Medications:    sodium chloride      sodium chloride 100 mL/hr at 11/03/22 2242      fluticasone, sodium chloride flush, sodium chloride, potassium chloride **OR** potassium alternative oral replacement **OR** potassium chloride, ondansetron **OR** ondansetron, magnesium hydroxide, acetaminophen **OR** acetaminophen, traMADol, oxyCODONE    [Held by provider] apixaban  5 mg Oral BID    gabapentin  100 mg Oral q8h    ipratropium  500 mcg Nebulization 4x daily    sodium chloride flush  10 mL IntraVENous 2 times per day    [START ON 11/5/2022] levoFLOXacin  750 mg Oral Q48H    magnesium sulfate  4,000 mg IntraVENous Once    docusate sodium  100 mg Oral Nightly    pantoprazole  40 mg Oral QAM AC    carvedilol  3.125 mg Oral BID WC    clotrimazole  10 mg Oral 5x Daily      Allergies:  Patient has no known allergies.   Social History     Socioeconomic History    Marital status:      Spouse name: Not on file    Number of children: Not on file    Years of education: Not on file    Highest education level: Not on file   Occupational History    Not on file   Tobacco Use    Smoking status: Former     Types: Cigarettes     Quit date: 12/19/2006     Years since quitting: 15.8    Smokeless tobacco: Never   Vaping Use    Vaping Use: Never used   Substance and Sexual Activity    Alcohol use: Not Currently     Comment: occasional    Drug use: No    Sexual activity: Not on file   Other Topics Concern    Not on file   Social History Narrative    Not on file     Social Determinants of Health     Financial Resource Strain: Not on file   Food Insecurity: Not on file   Transportation Needs: Not on file   Physical Activity: Not on file   Stress: Not on file   Social Connections: Not on file   Intimate Partner Violence: Not on file   Housing Stability: Not on file     Family History   Problem Relation Age of Onset    Alzheimer's Disease Mother     Heart Disease Father     Cancer Father     Alzheimer's Disease Father     Cancer Brother      PHYSICAL EXAM:    BP (!) 95/46   Pulse 87   Temp 98.2 °F (36.8 °C) (Temporal)   Resp 15   Ht 5' 2\" (1.575 m)   Wt 119 lb (54 kg)   LMP  (LMP Unknown)   SpO2 (!) 88%   BMI 21.77 kg/m²   CONSTITUTIONAL:   Awake, alert, cooperative  PSYCHIATRIC :  Oriented to time, place and person      Appropriate insight to disease process  EYES: Lids and lashes normal  ENT:  External ears and nose without lesions   Hearing deficits not noted  NECK: Supple, symmetrical, trachea midline   Thyroid goiter not appreciated  LUNGS:  No increased work of breathing                 Good respiratory excursion  CARDIOVASCULAR:  regular rate and rhythm   ABDOMEN:  soft, non-distended, non-tender   Hernias not noted   Aorta is not palpable  SKIN:   Normal skin color   Texture and turgor normal, no induration  EXTREMITIES:   R UE 5/5 strength   No cyanosis noted in nail beds  L UE 5/5 strength   No cyanosis noted in nail beds  R LE Edema absent   Open wounds absent   L LE Edema absent   Open wound to lateral calf    LABS:    Lab Results   Component Value Date    WBC 7.4 11/04/2022    HGB 8.7 (L) 11/04/2022    HCT 27.8 (L) 11/04/2022     11/04/2022    PROTIME 13.9 (H) 04/27/2017    INR 1.2 04/27/2017    K 4.5 11/04/2022    BUN 28 (H) 11/04/2022    CREATININE 1.3 (H) 11/04/2022     Lab Results   Component Value Date    LABA1C 5.0 11/02/2022     No results found for: EAG  Lab Results   Component Value Date    LABPROT 0.6 (H) 10/03/2016    LABALBU 3.2 (L) 11/03/2022        Radiology pertinent to vascular surgery evaluation reviewed    Assesment/Plan  L lateral calf wound  HgbA1C 5.0    optimization per PCP  Prelabumin 21  Nutrition will need optimized, consult nutrition  Discussed with pt tobacco use and relationship to PVD  pt currently is not a smoker  Emphasized importance of foot care and to call if develops any wounds, ulcerations, changes in appearance, claudication and/or symptoms  local wound care  Dressing changes: MWF aquacell, abd pads, kerlix, coban  Offloading  Continue to follow up in the wound care center on Wednesdays    Plan discussed with Dr. Hayden Perrin, APRN - CNP     Pt seen and examined  Discussed with patient and daughter at bedside  Continue dressing changes as above  [id number] [address] for dc from vascular pov    Giuseppe Arceo MD

## 2022-11-04 NOTE — ED NOTES
Notified Bharath Pelayo of patient home pain medications and diet; verbal order given for tramadol 50mg po Q8Hr prn pain and roxicodone 5mg po Q8Hr prn pain also d/c morphine iv orders; tube feed diet of TwCal 2.0 HN 300cc every 12 hours bolus feeding; orders repeated back and verified     Juancarlos Lee RN  11/04/22 0134

## 2022-11-04 NOTE — PLAN OF CARE
Problem: Discharge Planning  Goal: Discharge to home or other facility with appropriate resources  Outcome: Not Progressing     Problem: Pain  Goal: Verbalizes/displays adequate comfort level or baseline comfort level  Outcome: Not Progressing     Problem: Skin/Tissue Integrity  Goal: Absence of new skin breakdown  Description: 1. Monitor for areas of redness and/or skin breakdown  2. Assess vascular access sites hourly  3. Every 4-6 hours minimum:  Change oxygen saturation probe site  4. Every 4-6 hours:  If on nasal continuous positive airway pressure, respiratory therapy assess nares and determine need for appliance change or resting period. Outcome: Not Progressing     Problem: Discharge Planning  Goal: Discharge to home or other facility with appropriate resources  Outcome: Not Progressing     Problem: Pain  Goal: Verbalizes/displays adequate comfort level or baseline comfort level  Outcome: Not Progressing     Problem: Skin/Tissue Integrity  Goal: Absence of new skin breakdown  Description: 1. Monitor for areas of redness and/or skin breakdown  2. Assess vascular access sites hourly  3. Every 4-6 hours minimum:  Change oxygen saturation probe site  4. Every 4-6 hours:  If on nasal continuous positive airway pressure, respiratory therapy assess nares and determine need for appliance change or resting period.   Outcome: Not Progressing

## 2022-11-04 NOTE — PROGRESS NOTES
[address]          ZKMA:                                                   Patient Name: Alistair Mendoza     MRN: [id number]     : 1945     Room: [address] [place] [address]       Evaluating OT: Carlos Gray OTD, OTR/L, [id number]      Referring Provider: BERONICA Blair    Specific Provider Orders/Date: OT eval and treat (22)    Diagnosis: Weakness [R53.1]  General weakness [R53.1]  Injury of head, initial encounter [S09.90XA]    Surgeries/Procedures: None this admission       Pt admitted with weakness and multiple falls. 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 Precautions:  Fall Risk, full liquid diet, chronic PEG, purewick, LLE skin graft.      Assessment of current deficits    [x] Functional mobility  [x]ADLs  [x] Strength               []Cognition    [x] Functional transfers   [x] IADLs         [x] Safety Awareness   [x]Endurance    [] Fine Coordination              [x] Balance      [] Vision/perception   []Sensation     []Gross Motor Coordination  [] ROM  [] Delirium                   [] Motor Control     OT PLAN OF CARE   OT POC based on physician orders, patient diagnosis and results of clinical assessment    Frequency/Duration 1-3 days/wk for 2 weeks PRN   Specific OT Treatment Interventions to include:   * Instruction/training on adapted ADL techniques and AE recommendations to increase functional independence within precautions       * Training on energy conservation strategies, correct breathing pattern and techniques to improve independence/tolerance for self-care routine  * Functional transfer/mobility training/DME recommendations for increased independence, safety, and fall prevention  * Patient/Family education to increase follow through with safety techniques and functional independence  * Recommendation of environmental modifications for increased safety with functional transfers/mobility and ADLs  * Cognitive retraining/development of therapeutic activities to improve problem solving, judgement, memory, and attention for increased safety/participation in ADL/IADL tasks  * Therapeutic exercise to improve motor endurance, ROM, and functional strength for ADLs/functional transfers  * Therapeutic activities to facilitate/challenge dynamic balance, stand tolerance for increased safety and independence with ADLs  * Therapeutic activities to facilitate gross/fine motor skills for increased independence with ADLs  * Positioning to improve skin integrity, interaction with environment and functional independence  * Delirium prevention/treatment      Recommended Adaptive Equipment/DME: BSC for hospital use, TBD     Home Living: Pt lives alone in [facility] with 1 JEWELL and 2 HR and bed and bath on main level. Bathroom setup:walk-in shower, standard commode   Equipment owned: FWW, grab bars, shower chair    Prior Level of Function: Ind with ADLs , Assist from daughter with IADLs; Used FWW for functional mobility.   Driving: No  Occupation: None stated    Pain Level: 0/10 pain at rest  Cognition: A&O: 4/4; Follows 2 step directions   Memory: G-   Sequencing: G-   Problem solving: G-   Judgement/safety: G-    Vitals Assessed:   SpO2: 96% with standing, 86 HR      BP: NT     Functional Assessment:  AM-PAC Daily Activity Raw Score: 16/24   Initial Eval Status  Date: 11/4/22 Treatment Status  Date: STGs = LTGs  Time frame: 10-14 days   Feeding Independent         Grooming Minimal Assist     Independent    UB Dressing Minimal Assist     Independent    LB Dressing Moderate Assist     Independent    Bathing Moderate Assist    Independent    Toileting Moderate Assist   BSC requested from RN for patient. Independent    Bed Mobility  Supine to sit: Stand by Assist   Sit to supine: Stand by Assist     Supine to sit: Independent   Sit to supine: Independent    Functional Transfers Sit to stand:Minimal Assist  Stand to sit: Minimal Assist       Independent    Functional Mobility Minimal Assist with FWW  For short distance from EOB. Pt reporting mild dizziness, alleviated with increased time. Independent    Balance Sitting:     Static: S    Dynamic:SBA  Standing: Min A    during functional activity  Sitting:     Static:  Ind    Dynamic:Ind  Standing: ind   Activity Tolerance Fair with light activity  Pt limited by dizziness at EOB. Per nursing +orthostatic    [facility]  For full ADL/IADL tasks   Visual/  Perceptual Glasses: No        Safety G-  G     Hand Dominance L   AROM (PROM) Strength Additional Info:    RUE  WFL Grossly 4/5 Good  and wfl FMC/dexterity noted during ADL tasks       LUE WFL Grossly 4/5 Good  and wfl FMC/dexterity noted during ADL tasks       Hearing: WFL   Sensation:  No c/o numbness or tingling  Tone: WFL  Edema: Unremarkable    Comments: Patient cleared by nursing. Upon arrival patient semi supine in bed, educated on the role of OT, and agreeable to OT session. Daughter present for session today. OT facilitated ADLs, bed mobility, functional transfers with focus on safety, body mechanics, and precautions. At end of session, patient properly positioned in upright position in bed, oriented to call light, with call light to L side and phone within reach, all lines and tubes intact. Nursing notified. Pt would benefit from [facility] for in room use, nursing notified.   Overall patient demonstrated good reception to education and decreased independence and safety during completion of ADL/functional transfer/mobility tasks. Pt would benefit from continued skilled OT to increase safety and independence with completion of ADL/IADL tasks for functional independence and quality of life. Treatment: OT treatment provided this date includes:   Instruction/training on safety and adapted techniques for completion of ADLs: Pt educated on LB adaptive technique to increase independence in self-care. Instruction/training on safe functional mobility/transfer techniques: with focus on safety, body mechanics, and precautions  Skilled Monitoring of Vitals: to include spO2, and HR throughout session to maximize safety. Sitting/Standing Balance/Tolerance: Pt seated at EOB for ~8 min and stood for ~ 5 min to increase balance and activity tolerance during ADLs and facilitate proper posture and positioning. Therapeutic activity: to challenge dynamic sitting/standing balance and endurance to promote safety during ADL tasks and functional transfers and mobility. Rehab Potential: Good for established goals     Patient / Family Goal: to return to PLOF      Patient and/or family were instructed on functional diagnosis, prognosis/goals and OT plan of care. Demonstrated good understanding. Eval Complexity: Low    Time In: 1503  Time Out: 1527  Total Treatment Time: 9 min    Min Units   OT Eval Low 97165 x  1    OT Eval Medium 28505      OT Eval High 92154      OT Re-Eval [id number]       Therapeutic Ex 20304       Therapeutic Activities 07366       ADL/Self Care 31002 9   1    Orthotic Management 25617       Manual [id number]     Neuro Re-Ed 25037       Non-Billable Time          Evaluation Time additionally includes thorough review of current medical information, gathering information on past medical history/social history and prior level of function, interpretation of standardized testing/informal observation of tasks, assessment of data and development of plan of care and goals.           Shana Sapp, JEREMIE,  OTR/L; [id number]

## 2022-11-04 NOTE — ED NOTES
Patient set up with john in room.  Resting comfortably with family at bedside     Anisha Ogden, [address] [facility]  11/04/22 7627

## 2022-11-04 NOTE — PROGRESS NOTES
Cardiology consult called to [address] per office personnel. PT add to census.     [address] [facility]  11/4/22    10:00 am

## 2022-11-05 LAB
HCT VFR BLD CALC: 36.9 % (ref 34–48)
HEMOGLOBIN: 11.2 G/DL (ref 11.5–15.5)

## 2022-11-05 PROCEDURE — 36415 COLL VENOUS BLD VENIPUNCTURE: CPT

## 2022-11-05 PROCEDURE — 85014 HEMATOCRIT: CPT

## 2022-11-05 PROCEDURE — 6360000002 HC RX W HCPCS: Performed by: PHYSICIAN ASSISTANT

## 2022-11-05 PROCEDURE — 94640 AIRWAY INHALATION TREATMENT: CPT

## 2022-11-05 PROCEDURE — 2700000000 HC OXYGEN THERAPY PER DAY

## 2022-11-05 PROCEDURE — 85018 HEMOGLOBIN: CPT

## 2022-11-05 PROCEDURE — 6370000000 HC RX 637 (ALT 250 FOR IP): Performed by: PHYSICIAN ASSISTANT

## 2022-11-05 PROCEDURE — 6370000000 HC RX 637 (ALT 250 FOR IP): Performed by: INTERNAL MEDICINE

## 2022-11-05 PROCEDURE — 2580000003 HC RX 258: Performed by: EMERGENCY MEDICINE

## 2022-11-05 PROCEDURE — 2060000000 HC ICU INTERMEDIATE R&B

## 2022-11-05 RX ORDER — CLOTRIMAZOLE 10 MG/1
10 LOZENGE ORAL; TOPICAL
Qty: 44 TABLET | Refills: 0 | Status: SHIPPED | OUTPATIENT
Start: 2022-11-05 | End: 2022-11-14

## 2022-11-05 RX ORDER — CARVEDILOL 3.12 MG/1
3.12 TABLET ORAL 2 TIMES DAILY WITH MEALS
Qty: 60 TABLET | Refills: 3 | Status: SHIPPED | OUTPATIENT
Start: 2022-11-05

## 2022-11-05 RX ADMIN — IPRATROPIUM BROMIDE 0.5 MG: 0.5 SOLUTION RESPIRATORY (INHALATION) at 12:26

## 2022-11-05 RX ADMIN — GABAPENTIN 100 MG: 100 CAPSULE ORAL at 05:44

## 2022-11-05 RX ADMIN — SODIUM CHLORIDE: 9 INJECTION, SOLUTION INTRAVENOUS at 12:32

## 2022-11-05 RX ADMIN — OXYCODONE 5 MG: 5 TABLET ORAL at 17:10

## 2022-11-05 RX ADMIN — GABAPENTIN 100 MG: 100 CAPSULE ORAL at 19:52

## 2022-11-05 RX ADMIN — CLOTRIMAZOLE 10 MG: 10 LOZENGE ORAL at 05:44

## 2022-11-05 RX ADMIN — CARVEDILOL 3.12 MG: 3.12 TABLET, FILM COATED ORAL at 16:00

## 2022-11-05 RX ADMIN — CLOTRIMAZOLE 10 MG: 10 LOZENGE ORAL at 23:45

## 2022-11-05 RX ADMIN — CLOTRIMAZOLE 10 MG: 10 LOZENGE ORAL at 15:00

## 2022-11-05 RX ADMIN — IPRATROPIUM BROMIDE 0.5 MG: 0.5 SOLUTION RESPIRATORY (INHALATION) at 08:28

## 2022-11-05 RX ADMIN — LEVOFLOXACIN 750 MG: 750 TABLET, FILM COATED ORAL at 07:46

## 2022-11-05 RX ADMIN — GABAPENTIN 100 MG: 100 CAPSULE ORAL at 13:56

## 2022-11-05 RX ADMIN — DOCUSATE SODIUM 100 MG: 100 CAPSULE, LIQUID FILLED ORAL at 19:52

## 2022-11-05 RX ADMIN — CLOTRIMAZOLE 10 MG: 10 LOZENGE ORAL at 17:55

## 2022-11-05 RX ADMIN — IPRATROPIUM BROMIDE 0.5 MG: 0.5 SOLUTION RESPIRATORY (INHALATION) at 21:56

## 2022-11-05 RX ADMIN — CLOTRIMAZOLE 10 MG: 10 LOZENGE ORAL at 11:14

## 2022-11-05 RX ADMIN — SODIUM CHLORIDE: 9 INJECTION, SOLUTION INTRAVENOUS at 23:45

## 2022-11-05 RX ADMIN — CARVEDILOL 3.12 MG: 3.12 TABLET, FILM COATED ORAL at 07:45

## 2022-11-05 RX ADMIN — APIXABAN 5 MG: 5 TABLET, FILM COATED ORAL at 19:51

## 2022-11-05 RX ADMIN — OXYCODONE 5 MG: 5 TABLET ORAL at 05:46

## 2022-11-05 RX ADMIN — IPRATROPIUM BROMIDE 0.5 MG: 0.5 SOLUTION RESPIRATORY (INHALATION) at 16:50

## 2022-11-05 RX ADMIN — PANTOPRAZOLE SODIUM 40 MG: 40 TABLET, DELAYED RELEASE ORAL at 05:44

## 2022-11-05 ASSESSMENT — PAIN SCALES - GENERAL
PAINLEVEL_OUTOF10: 0
PAINLEVEL_OUTOF10: 8
PAINLEVEL_OUTOF10: 7
PAINLEVEL_OUTOF10: 0

## 2022-11-05 ASSESSMENT — PAIN DESCRIPTION - LOCATION: LOCATION: LEG

## 2022-11-05 ASSESSMENT — PAIN DESCRIPTION - ORIENTATION: ORIENTATION: LEFT

## 2022-11-05 NOTE — CARE COORDINATION
Discharge order noted. Received a call from nurse stating attending is requesting patient be seen stat by PT today. Explained SW will make the call but therapy will not likely see patient over the weekend. Call made to therapy department, no answer; left detailed message for patient to be seen stat by PT today.     [facility], MSW, LSW [phone]

## 2022-11-05 NOTE — PROGRESS NOTES
I left message at [address] and [id number], and Case Management Nishi at [phone] for a STAT Eval for discharge.

## 2022-11-05 NOTE — H&P
Hospital Medicine History & Physical      PCP: Saman Nicholson MD    Date of Admission: 11/3/2022    Date of Service: . NOV 4, 2022    Chief Complaint:  FALL      History Of Present Illness:     65 y.o. female presented with FALL,  HAS A KNOWN HISTORY OF SQ. CELL CARCINOMA  OF THE MOUTH. HAD A FIBULA BONE GRAFT . 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 ([facility] 10/26/2022    Nonischemic cardiomyopathy ([address]     Paroxysmal A-fib (HCC)        Past Surgical History:          Procedure Laterality Date    ABDOMEN SURGERY  10/05/2016    exporation of abdominal fascial wound dehiscence close, insertion TLC right IJ    CARDIAC DEFIBRILLATOR PLACEMENT Left 05/23/2007    CARDIAC DEFIBRILLATOR PLACEMENT  04/17/2018    BIV ICD GENT CHANGE ([facility]SCI)    VAMSHI    CARPAL TUNNEL RELEASE      DIAGNOSTIC CARDIAC CATH LAB PROCEDURE      ENDOSCOPY, COLON, DIAGNOSTIC  01/26/2018    upper endo    HERNIA REPAIR      HYSTERECTOMY (CERVIX STATUS UNKNOWN)      ILEOSTOMY OR JEJUNOSTOMY      INCISIONAL HERNIA REPAIR  04/21/2017    LAPAROSCOPIC; WITH MESH    MANDIBLE SURGERY      OTHER SURGICAL HISTORY      ostomy placed having reversal done on 9/30/2016    OTHER SURGICAL HISTORY  09/30/2016    open reveral ileostomy    OTHER SURGICAL HISTORY  01/07/2009    BiV/ICD    OTHER SURGICAL HISTORY Left     vascularized fibular graft harvest [facility] 9/22       Medications Prior to Admission:      Prior to Admission medications    Medication Sig Start Date End Date Taking? Authorizing Provider   escitalopram (LEXAPRO) 10 MG tablet Take 1 tablet by mouth daily 10/11/22   Historical Provider, MD   famotidine (PEPCID) 20 MG tablet Take 1 tablet by mouth 2 times daily 10/11/22   Historical Provider, MD   traMADol (ULTRAM) 50 MG tablet Take 1 tablet by mouth every 8 hours as needed. 10/26/22   Historical Provider, MD   oxyCODONE (ROXICODONE) 5 MG immediate release tablet Take 1 tablet by mouth every 8 hours as needed. 11/1/22   Historical Provider, MD   levoFLOXacin (LEVAQUIN) 750 MG tablet Take 750 mg by mouth daily Last dose to be Sunday 11/6/22    Historical Provider, MD   gabapentin (NEURONTIN) 100 MG capsule Take 100 mg by mouth every 8 (eight) hours. Historical Provider, MD   magnesium oxide (MAG-OX) 400 MG tablet Take 400 mg by mouth daily    Historical Provider, MD   apixaban (ELIQUIS) 5 MG TABS tablet TAKE 1 TABLET TWICE DAILY 5/14/21   Galilea Perez MD   fluticasone (FLONASE) 50 MCG/ACT nasal spray 1 spray by Each Nostril route as needed for Rhinitis    Historical Provider, MD   carvedilol (COREG) 6.25 MG tablet Take 1 tablet by mouth 2 times daily (with meals) 6/1/16   Brittany Scott MD   [facility] 18 MCG inhalation capsule Inhale 18 mcg into the lungs daily  8/23/15   Historical Provider, MD       Allergies:  Patient has no known allergies. Social History:      The patient currently lives ALONE    TOBACCO:   reports that she quit smoking about 15 years ago. Her smoking use included cigarettes. She has never used smokeless tobacco.  ETOH:   reports that she does not currently use alcohol. Family History:       Reviewed in detail and negative for DM, CAD, Cancer, CVA. Positive as follows:        Problem Relation Age of Onset    Alzheimer's Disease Mother     Heart Disease Father     Cancer Father     Alzheimer's Disease Father     Cancer Brother        REVIEW OF SYSTEMS:   Pertinent positives as noted in the HPI.  All other systems reviewed and negative. PHYSICAL EXAM:    BP (!) 115/54   Pulse 87   Temp 98.2 °F (36.8 °C) (Temporal)   Resp 16   Ht 5' 2\" (1.575 m)   Wt 119 lb (54 kg)   LMP  (LMP Unknown)   SpO2 97%   BMI 21.77 kg/m²     General appearance:  No apparent distress, appears stated age. HEENT:  Normal cephalic, atraumatic without obvious deformity. Pupils equal, round, and reactive to light. Extra ocular muscles intact. Conjunctivae/corneas clear. WHITE COATING  ON TONGUE  Neck: Supple, with full range of motion. No jugular venous distention. Trachea midline. Respiratory:  Normal respiratory effort. Clear to auscultation,   Cardiovascular:  RRR  Abdomen: Soft, non-tender, non-distended with normal bowel sounds. Musculoskeletal:  No clubbing, cyanosis or edema bilaterally. Skin: smooth and dry   Neurologic:   Cranial nerves: II-XII intact,   Psychiatric:  Alert and oriented x 3        Labs:     Recent Labs     11/02/22 1649 11/03/22 2151 11/04/22  0623   WBC 11.7* 10.4 7.4   HGB 12.2 11.7 8.7*   HCT 39.4 36.9 27.8*    271 206     Recent Labs     11/03/22 2151 11/04/22  0025 11/04/22  0623 11/04/22  1041     --  145 143   K 5.5* 4.5 3.4* 4.5     --  117* 108*   CO2 26  --  22 25   BUN 35*  --  24* 28*   CREATININE 1.4*  --  0.9 1.3*   CALCIUM 9.8  --  6.8* 9.7     Recent Labs     11/02/22  1649 11/03/22  2151   AST 16 29   ALT 19 17   BILITOT <0.2 <0.2   ALKPHOS 129* 108*     No results for input(s): INR in the last 72 hours. No results for input(s): Corky Stallion in the last 72 hours. Urinalysis:      Lab Results   Component Value Date/Time    NITRU Negative 11/03/2022 10:15 PM    Ann [place] 1-3 10/05/2016 11:20 AM    BACTERIA FEW 10/05/2016 11:20 AM    RBCUA 0-1 10/05/2016 11:20 AM    BLOODU Negative 11/03/2022 10:15 PM    SPECGRAV >=1.030 11/03/2022 10:15 PM    GLUCOSEU Negative 11/03/2022 10:15 PM       Radiology:           CT HEAD WO CONTRAST   Final Result   No acute intracranial abnormality. CT CERVICAL SPINE WO CONTRAST   Final Result   No acute abnormality of the cervical spine. Multilevel degenerative changes. XR TIBIA FIBULA LEFT (2 VIEWS)   Final Result   Soft tissue wound involving the lateral left lower leg. Previous partial resection of the left fibula. No radiographic evidence for acute osteomyelitis. XR CHEST PORTABLE   Final Result   No acute process. ASSESSMENT:    Active Hospital Problems    Diagnosis Date Noted    Fall [W19. 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 ([facility] [L97.923] 10/26/2022     Priority: Medium    Hypotension [I95.9] 05/26/2016    Bundle branch block, left [I44.7] 09/15/2011    Cardiomyopathy, nonischemic ([address] [I42.8] 09/15/2011   ORAL THRUSH  HYPOTENSION, ORTHOSTATIC  PAF   ELEVATED TT  PLAN:  HYDRATION  VASCULAR SURGERY  CARD CONSULT    DVT Prophylaxis: ELIQUIS  Diet: ADULT TUBE FEEDING; PEG; 2.0 Calorie; Bolus; 2 Times Daily; 300; 30; Before and after each bolus  ADULT DIET; Full Liquid  Code Status: Full Code    PT/OT Eval Status: ORDERED    Dispo -  HOME    Electronically signed by Samina White DO on 11/4/2022 at 9:25 PM [facility]       Thank you Shruthi Carvalho MD for the opportunity to be involved in this patient's care.  If you have any questions or concerns please feel free to contact me at [phone]

## 2022-11-05 NOTE — DISCHARGE INSTRUCTIONS
LEFT LATERAL LOWER LEG- cleanse with normal saline, pack loosely with calcium alginate ag , ABD pads and coban 2. Change Monday- Wednesday (at [facility])- Friday.

## 2022-11-05 NOTE — PROGRESS NOTES
PS vascular Flavia DAVILA (covering for [facility]) if ok to restart Joshua Falcon MD states not covering patient, notified Dr Chelsy Lai per request.

## 2022-11-05 NOTE — PROGRESS NOTES
Notified Dr Milan Ruano patients' daughter called stating her mother had not been seen by PT and did not feel she could get her into her home by herself. Dr Milan Ruano advised to order PT stat and if patient could not go home she was fine however she did not want patients' PT delayed until Monday. Calls placed to OBDULIA Mondragon who is attempting to contact PT.

## 2022-11-05 NOTE — PROGRESS NOTES
Hospitalist Progress Note      PCP: Jagjit Powers MD    Date of Admission: 11/3/2022        Hospital Course:     65 y.o. female presented with FALL,  HAS A KNOWN HISTORY OF SQ. CELL CARCINOMA  OF THE MOUTH. HAD A FIBULA BONE GRAFT . FOUND TO BE ORTHOSTATIC ** AWAITING PT EVAL BEFORE DISCHARGE        Subjective: NO COMPLAINTS           Medications:  Reviewed    Infusion Medications    sodium chloride Stopped (11/05/22 1248)    sodium chloride 100 mL/hr at 11/05/22 1232     Scheduled Medications    apixaban  5 mg Oral BID    gabapentin  100 mg Oral q8h    ipratropium  500 mcg Nebulization 4x daily    sodium chloride flush  10 mL IntraVENous 2 times per day    levoFLOXacin  750 mg Oral Q48H    docusate sodium  100 mg Oral Nightly    pantoprazole  40 mg Oral QAM AC    carvedilol  3.125 mg Oral BID WC    clotrimazole  10 mg Oral 5x Daily     PRN Meds: fluticasone, sodium chloride flush, sodium chloride, potassium chloride **OR** potassium alternative oral replacement **OR** potassium chloride, ondansetron **OR** ondansetron, magnesium hydroxide, acetaminophen **OR** acetaminophen, traMADol, oxyCODONE      Intake/Output Summary (Last 24 hours) at 11/5/2022 1904  Last data filed at 11/5/2022 1633  Gross per 24 hour   Intake 940 ml   Output 550 ml   Net 390 ml       Exam:    BP (!) 104/58   Pulse 78   Temp 99 °F (37.2 °C) (Temporal)   Resp 18   Ht 5' 2\" (1.575 m)   Wt 135 lb (61.2 kg)   LMP  (LMP Unknown)   SpO2 (!) 64%   BMI 24.69 kg/m²         General appearance:  No apparent distress, appears stated age. HEENT:  Normal cephalic, atraumatic without obvious deformity. Pupils equal, round, and reactive to light. Extra ocular muscles intact. Conjunctivae/corneas clear. WHITE COATING  ON TONGUE  Neck: Supple, with full range of motion. No jugular venous distention. Trachea midline. Respiratory:  Normal respiratory effort.  Clear to auscultation,   Cardiovascular:  RRR  Abdomen: Soft, non-tender, non-distended with normal bowel sounds. Musculoskeletal:  No clubbing, cyanosis or edema bilaterally. Skin: smooth and dry   Neurologic:   Cranial nerves: II-XII intact,   Psychiatric:  Alert and oriented x 3              Labs:   Recent Labs     11/03/22 2151 11/04/22 0623 11/05/22 0625   WBC 10.4 7.4  --    HGB 11.7 8.7* 11.2*   HCT 36.9 27.8* 36.9    206  --      Recent Labs     11/03/22 2151 11/04/22  0025 11/04/22 0623 11/04/22  1041     --  145 143   K 5.5* 4.5 3.4* 4.5     --  117* 108*   CO2 26  --  22 25   BUN 35*  --  24* 28*   CREATININE 1.4*  --  0.9 1.3*   CALCIUM 9.8  --  6.8* 9.7     Recent Labs     11/03/22 2151   AST 29   ALT 17   BILITOT <0.2   ALKPHOS 108*     No results for input(s): INR in the last 72 hours. No results for input(s): Priya Rowels in the last 72 hours. Recent Labs     11/03/22 2151   AST 29   ALT 17   BILITOT <0.2   ALKPHOS 108*     No results for input(s): LACTA in the last 72 hours. No results found for: Jolly Ny  Lab Results   Component Value Date    AMMONIA 28.9 10/05/2016       Assessment:    Active Hospital Problems    Diagnosis Date Noted    Fall [W19. 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 ([facility] [L97.923] 10/26/2022     Priority: Medium    Hypotension [I95.9] 05/26/2016    Bundle branch block, left [I44.7] 09/15/2011    Cardiomyopathy, nonischemic (HCC) [I42.8] 09/15/2011   ORAL THRUSH  HYPOTENSION, ORTHOSTATIC  PAF   ELEVATED TT  PLAN:  HYDRATION  COREG DECREASED     DVT Prophylaxis: ELIQUIS  Diet: ADULT TUBE FEEDING; PEG; 2.0 Calorie; Bolus; 2 Times Daily; 300; 30; Before and after each bolus  ADULT DIET;  Full Liquid  Code Status: Full Code     PT/OT Eval Status: ORDERED     Dispo -  HOME    Electronically signed by Ada King DO on 11/5/2022 at 7:04 PM [facility]

## 2022-11-06 PROBLEM — E44.0 MODERATE PROTEIN-CALORIE MALNUTRITION (HCC): Chronic | Status: ACTIVE | Noted: 2022-11-06

## 2022-11-06 LAB
HCT VFR BLD CALC: 34.3 % (ref 34–48)
HEMOGLOBIN: 10.7 G/DL (ref 11.5–15.5)

## 2022-11-06 PROCEDURE — 94640 AIRWAY INHALATION TREATMENT: CPT

## 2022-11-06 PROCEDURE — 97530 THERAPEUTIC ACTIVITIES: CPT

## 2022-11-06 PROCEDURE — 85014 HEMATOCRIT: CPT

## 2022-11-06 PROCEDURE — 36415 COLL VENOUS BLD VENIPUNCTURE: CPT

## 2022-11-06 PROCEDURE — 97161 PT EVAL LOW COMPLEX 20 MIN: CPT

## 2022-11-06 PROCEDURE — 6370000000 HC RX 637 (ALT 250 FOR IP): Performed by: INTERNAL MEDICINE

## 2022-11-06 PROCEDURE — 2060000000 HC ICU INTERMEDIATE R&B

## 2022-11-06 PROCEDURE — 2580000003 HC RX 258: Performed by: PHYSICIAN ASSISTANT

## 2022-11-06 PROCEDURE — 6370000000 HC RX 637 (ALT 250 FOR IP): Performed by: PHYSICIAN ASSISTANT

## 2022-11-06 PROCEDURE — 85018 HEMOGLOBIN: CPT

## 2022-11-06 PROCEDURE — 2700000000 HC OXYGEN THERAPY PER DAY

## 2022-11-06 PROCEDURE — 2580000003 HC RX 258: Performed by: EMERGENCY MEDICINE

## 2022-11-06 PROCEDURE — 97535 SELF CARE MNGMENT TRAINING: CPT

## 2022-11-06 PROCEDURE — 6360000002 HC RX W HCPCS: Performed by: PHYSICIAN ASSISTANT

## 2022-11-06 RX ADMIN — CLOTRIMAZOLE 10 MG: 10 LOZENGE ORAL at 21:15

## 2022-11-06 RX ADMIN — GABAPENTIN 100 MG: 100 CAPSULE ORAL at 13:39

## 2022-11-06 RX ADMIN — GABAPENTIN 100 MG: 100 CAPSULE ORAL at 05:27

## 2022-11-06 RX ADMIN — CLOTRIMAZOLE 10 MG: 10 LOZENGE ORAL at 05:27

## 2022-11-06 RX ADMIN — CARVEDILOL 3.12 MG: 3.12 TABLET, FILM COATED ORAL at 07:44

## 2022-11-06 RX ADMIN — IPRATROPIUM BROMIDE 0.5 MG: 0.5 SOLUTION RESPIRATORY (INHALATION) at 12:29

## 2022-11-06 RX ADMIN — IPRATROPIUM BROMIDE 0.5 MG: 0.5 SOLUTION RESPIRATORY (INHALATION) at 17:36

## 2022-11-06 RX ADMIN — CLOTRIMAZOLE 10 MG: 10 LOZENGE ORAL at 17:51

## 2022-11-06 RX ADMIN — CLOTRIMAZOLE 10 MG: 10 LOZENGE ORAL at 10:23

## 2022-11-06 RX ADMIN — ACETAMINOPHEN 650 MG: 325 TABLET ORAL at 14:20

## 2022-11-06 RX ADMIN — APIXABAN 5 MG: 5 TABLET, FILM COATED ORAL at 07:45

## 2022-11-06 RX ADMIN — IPRATROPIUM BROMIDE 0.5 MG: 0.5 SOLUTION RESPIRATORY (INHALATION) at 20:51

## 2022-11-06 RX ADMIN — APIXABAN 5 MG: 5 TABLET, FILM COATED ORAL at 21:15

## 2022-11-06 RX ADMIN — SODIUM CHLORIDE: 9 INJECTION, SOLUTION INTRAVENOUS at 09:16

## 2022-11-06 RX ADMIN — CARVEDILOL 3.12 MG: 3.12 TABLET, FILM COATED ORAL at 16:41

## 2022-11-06 RX ADMIN — IPRATROPIUM BROMIDE 0.5 MG: 0.5 SOLUTION RESPIRATORY (INHALATION) at 08:01

## 2022-11-06 RX ADMIN — CLOTRIMAZOLE 10 MG: 10 LOZENGE ORAL at 14:20

## 2022-11-06 RX ADMIN — OXYCODONE 5 MG: 5 TABLET ORAL at 14:20

## 2022-11-06 RX ADMIN — GABAPENTIN 100 MG: 100 CAPSULE ORAL at 21:15

## 2022-11-06 RX ADMIN — PANTOPRAZOLE SODIUM 40 MG: 40 TABLET, DELAYED RELEASE ORAL at 05:27

## 2022-11-06 RX ADMIN — SODIUM CHLORIDE, PRESERVATIVE FREE 10 ML: 5 INJECTION INTRAVENOUS at 21:15

## 2022-11-06 ASSESSMENT — PAIN SCALES - GENERAL: PAINLEVEL_OUTOF10: 8

## 2022-11-06 ASSESSMENT — PAIN DESCRIPTION - ORIENTATION: ORIENTATION: LEFT

## 2022-11-06 ASSESSMENT — PAIN DESCRIPTION - LOCATION: LOCATION: LEG

## 2022-11-06 NOTE — PROGRESS NOTES
Occupational Therapy  OT BEDSIDE TREATMENT NOTE      Date:2022  Patient Name: Otcavio Garcia  MRN: [id number]  : 1945  Room: [address] [place] [address]      Evaluating OT: Al CHAO, СВЕТЛАНА/L, [id number]       Referring Provider: BERONICA Raya    Specific Provider Orders/Date: OT eval and treat (22)    Diagnosis: Weakness [R53.1]  General weakness [R53.1]  Injury of head, initial encounter [S09.90XA]    Surgeries/Procedures: None this admission        Pt admitted with weakness and multiple falls. 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 Precautions:  Fall Risk, full liquid diet, chronic PEG, purewick, LLE skin graft.       Assessment of current deficits    [x] Functional mobility            [x]ADLs           [x] Strength                   []Cognition    [x] Functional transfers          [x] IADLs          [x] Safety Awareness   [x]Endurance    [] Fine Coordination              [x] Balance      [] Vision/perception    []Sensation      []Gross Motor Coordination  [] ROM           [] Delirium                   [] Motor Control      OT PLAN OF CARE   OT POC based on physician orders, patient diagnosis and results of clinical assessment     Frequency/Duration 1-3 days/wk for 2 weeks PRN   Specific OT Treatment Interventions to include:   * Instruction/training on adapted ADL techniques and AE recommendations to increase functional independence within precautions       * Training on energy conservation strategies, correct breathing pattern and techniques to improve independence/tolerance for self-care routine  * Functional transfer/mobility training/DME recommendations for increased independence, safety, and fall prevention  * Patient/Family education to increase follow through with safety techniques and functional independence  * Recommendation of environmental modifications for increased safety with functional transfers/mobility and ADLs  * Cognitive retraining/development of therapeutic activities to improve problem solving, judgement, memory, and attention for increased safety/participation in ADL/IADL tasks  * Therapeutic exercise to improve motor endurance, ROM, and functional strength for ADLs/functional transfers  * Therapeutic activities to facilitate/challenge dynamic balance, stand tolerance for increased safety and independence with ADLs  * Therapeutic activities to facilitate gross/fine motor skills for increased independence with ADLs  * Positioning to improve skin integrity, interaction with environment and functional independence  * Delirium prevention/treatment     Recommended Adaptive Equipment/DME: BSC for hospital use, TBD      Home Living: Pt lives alone in [facility] with 1 JEWELL and 2 HR and bed and bath on main level. Bathroom setup:walk-in shower, standard commode   Equipment owned: FWW, grab bars, shower chair     Prior Level of Function: Ind with ADLs , Assist from daughter with IADLs; Used FWW for functional mobility. Driving: No  Occupation: None stated     Pain Level: 0/10 pain at rest    Cognition: A&O: x3;  Follows 2 step directions              Memory: F              Sequencing: F              Problem solving: F              Judgement/safety: F-     Vitals Assessed:   SpO2: 96% with standing, 86 HR      BP: NT                Functional Assessment:  AM-PAC Daily Activity Raw Score: 16/24    Initial Eval Status  Date: 11/4/22 Treatment Status  Date: 11/6/22 STGs = LTGs  Time frame: 10-14 days   Feeding Independent      indep     Grooming Minimal Assist      Min A    Standing grooming tasks  Independent    UB Dressing Minimal Assist      Min A    Donning robe; assist threading around back  Independent    LB Dressing Moderate Assist      Mod A    Donning socks; cuing on technique; assist with L LE Independent    Bathing Moderate Assist     Mod A    Simulated toileting tasks   Independent    Toileting Moderate Assist   BSC requested from RN for patient. Mod A     Toileting tasks - all aspects in bathroom  Independent    Bed Mobility  Supine to sit: Stand by Assist   Sit to supine: Stand by Assist    Min A     Supine to sit: Independent   Sit to supine: Independent    Functional Transfers Sit to stand:Minimal Assist  Stand to sit: Minimal Assist       Min A     Various surfaces; cuing on hand placement, body mechanics and safety  Independent    Functional Mobility Minimal Assist with FWW  For short distance from EOB. Pt reporting mild dizziness, alleviated with increased time. Mod A    Ambulated to/from bathroom with HHA; + unsteadiness Independent    Balance Sitting:     Static: S    Dynamic:SBA  Standing: Min A    during functional activity  Seated: SBA    Standing: min A Sitting:     Static:  Ind    Dynamic:Ind  Standing: ind   Activity Tolerance Fair with light activity  Pt limited by dizziness at EOB. Per nursing +orthostatic     F- WFL  For full ADL/IADL tasks   Visual/  Perceptual Glasses: No          Safety G- F        Comments: Upon arrival pt supine in bed and agreeable to OT Session. At end of session seated in chair with all lines and tubes intact, call light within reach.      Treatment: Facilitation of bed mobility, unsupported sitting balance at EOB (impacting ADLs; addressing posture, weight shifting, dynamic reaching), functional transfers (various surfaces: bed, toilet, chair), standing tolerance tasks (addressing posture, balance and activity tolerance while incorporating light functional reaching; impacting ADLs and functional activity) and functional ambulation tasks with HHA (including to/ from bathroom and in preparation for item retrieval tasks; cuing on posture and safety) - skilled cuing on hand placement, posture, body mechanics, energy conservation techniques and safety. Therapist facilitated self-care retraining: UB/LB self-care tasks (gown, sock), toileting task and grooming tasks while educating pt on modified techniques, posture, safety and energy conservation techniques. Skilled monitoring of HR, O2 sats and pts response to treatment. Pt has made fair progress towards set goals. Continue with current plan of care: addressing adl retraining, transfer training and functional ambulation.       Treatment Time In:1050            Treatment Time Out: 3406              Treatment Charges: Mins Units   Ther Ex  00816     Manual Therapy Sulema Raman [id number] 12 1   ADL/Home Mgt 06671 13 1   Neuro Re-ed 16207     Group Therapy      Orthotic manage/training  72984     Non-Billable Time     Total Timed Treatment 25 2        [facility] OTR/L #[id number]

## 2022-11-06 NOTE — PROGRESS NOTES
Physical Therapy  Physical Therapy Initial Assessment     Name: Jose Kaye  : 1945  MRN: [id number]      Date of Service: 2022    Evaluating PT:  Julieta Cortez PT, DPT [id number]    Room #:  8749/3603-P  Diagnosis:  Weakness [R53.1]  General weakness [R53.1]  Injury of head, initial encounter [S09.90XA]  PMHx/PSHx:    Past Medical History:   Diagnosis Date    Anemia     Arthritis     Bowel obstruction ([facility] 2016    Cancer (H[facility])     oral/mandible    CHF (congestive heart failure) (HCC)     COPD (chronic obstructive pulmonary disease) (HCC)     Hyperlipidemia     Hypertension     LBBB (left bundle branch block)     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 Pt ambulated without device and was independent PTA. OBJECTIVE:   Initial Evaluation  Date: 22 Treatment Short Term/ Long Term   Goals   AM-PAC 6 Clicks 53/31     Was pt agreeable to Eval/treatment? yes     Does pt have pain? No c/o pain     Bed Mobility  Rolling: NT  Supine to sit: [place]  Sit to supine: NT  Scooting: [place]  Mod Independent   Transfers Sit to stand: Beth[place]  Stand to sit: Beth[place]  Stand pivot: ModA no device  Mod Independent with AAD   Ambulation   15 feet x 2 reps with ModA no device  >150 feet with Mod Independent with AAD   Stair negotiation: ascended and descended NT  >4 steps with 1 rail Mod Independent   ROM BUE:  WFL  BLE:  WFL     Strength BUE:  WFL  BLE:  4/5 grossly  Increase by 1/3 MMT grade   Balance Sitting EOB:  SBA  Dynamic Standing:  ModA no device  Sitting EOB:  Independent  Dynamic Standing:   Mod Independent with AAD     Pt is A & O x 4  Sensation:  chronic paresthesias in BLE  Edema:  None    Therapeutic Exercises:  NA    Patient education  Pt educated on safety    Patient response to education:   Pt verbalized understanding Pt demonstrated skill Pt requires further education in this area   x x x     ASSESSMENT:    Conditions Requiring Skilled Therapeutic Intervention:    [x]Decreased strength     []Decreased ROM  [x]Decreased functional mobility  [x]Decreased balance   [x]Decreased endurance   [x]Decreased posture  []Decreased sensation  []Decreased coordination   []Decreased vision  [x]Decreased safety awareness   []Increased pain       Comments:  Pt was in bed upon arrival, agreeable to initial evaluation. Pt ambulated with unsteadiness and occasional LOBs that required assistance to correct. Narrow JESSY and step-to gait pattern used. Pt requested to use bathroom and then returned to chair. All needs met and call light in reach. RN notified. Treatment:  Patient practiced and was instructed in the following treatment:    Bed mobility training - pt given verbal and tactile cues to facilitate proper sequencing and safety during supine>sit as well as provided with physical assistance. Sitting EOB for >3 minutes for upright tolerance, postural awareness and BLE ROM  Transfer training - pt was given verbal and tactile cues to facilitate proper hand placement, technique and safety during sit to stand, stand to sit and stand pivot transfers as well as provided with physical assistance. Gait training- pt was given verbal and tactile cues to facilitate safety and balance during ambulation as well as provided with physical assistance. Pt's/ family goals   1. Return home    Prognosis is good for reaching above PT goals. Patient and or family understand(s) diagnosis, prognosis, and plan of care.   yes    PHYSICAL THERAPY PLAN OF CARE:    PT POC is established based on physician order and patient diagnosis     Referring provider/PT Order:  Mary Montague,  /11/05/22 1300 PT eval and treat  Diagnosis:  Weakness [R53.1]  General weakness [R53.1]  Injury of head, initial encounter [S09.90XA]  Specific instructions for next treatment:  Progress activity    Current Treatment Recommendations:     [x] Strengthening to improve independence with functional mobility   [] ROM to improve independence with functional mobility   [x] Balance Training to improve static/dynamic balance and to reduce fall risk  [x] Endurance Training to improve activity tolerance during functional mobility   [x] Transfer Training to improve safety and independence with all functional transfers   [x] Gait Training to improve gait mechanics, endurance and asses need for appropriate assistive device  [x] Stair Training in preparation for safe discharge home and/or into the community   [] Positioning to prevent skin breakdown and contractures  [x] Safety and Education Training   [x] Patient/Caregiver Education   [] HEP  [] Other     PT long term treatment goals are located in above grid    Frequency of treatments: 2-5x/week x 1-2 weeks. Time in  1045  Time out  1105    Total Treatment Time  10 minutes     Evaluation Time includes thorough review of current medical information, gathering information on past medical history/social history and prior level of function, completion of standardized testing/informal observation of tasks, assessment of data and education on plan of care and goals.     CPT codes:  [x] Low Complexity PT evaluation 33218  [] Moderate Complexity PT evaluation 11982  [] High Complexity PT evaluation 98099  [] PT Re-evaluation 24142  [] Gait training 61870 - minutes  [] Manual therapy 84210 - minutes  [x] Therapeutic activities 56677 10 minutes  [] Therapeutic exercises 59658 - minutes  [] Neuromuscular reeducation 52429 - minutes     Brain Arcos, PT, DPT  [id number]

## 2022-11-06 NOTE — CONSULTS
Comprehensive Nutrition Assessment    Type and Reason for Visit:  Initial, Positive Nutrition Screen, Consult    Nutrition Recommendations/Plan:   Continue Current Diet, Continue Current Tube Feeding   Add 1 protein modular daily via feeding tube  Current TF meets 92% estimated calorie needs & 56% estimated protein needs  Will meet 100% estimated calorie needs & 84% estimated protein needs w/ pro mod     Malnutrition Assessment:  Malnutrition Status: Moderate malnutrition (11/06/22 1049)    Context:  Chronic Illness     Findings of the 6 clinical characteristics of malnutrition:  Energy Intake:  75% or less estimated energy requirements for 1 month or longer  Weight Loss:  Mild weight loss (specify amount and time period)     Body Fat Loss:  No significant body fat loss     Muscle Mass Loss:  Mild muscle mass loss Clavicles (pectoralis & deltoids), Temples (temporalis)  Fluid Accumulation:  No significant fluid accumulation     Strength:  Not Performed    Nutrition Assessment:    pt admit s/p fall. 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 Pt on a full liquid diet w/ supplemental bolus EN feedings. Noted moderate malnutrition. Will provide updated TF recs and monitor. Nutrition Related Findings:    pt alert, active BS, PEG clamped, no edema, +I/Os Wound Type: Surgical Incision (non-healing surgical wound)       Current Nutrition Intake & Therapies:    Average Meal Intake: 26-50% (varies)     ADULT TUBE FEEDING; PEG; 2.0 Calorie; Bolus; 2 Times Daily; 300; 30; Before and after each bolus  ADULT DIET; Full Liquid  Current Tube Feeding (TF) Orders:  Feeding Route: PEG  Formula: 2.0 Calorie  Schedule:  Bolus  Feeding Regimen: 300 ml bolus BID  Water Flushes: 30 ml water before and after each feeding = 120 ml water  Current TF & Flush Orders Provides: 600 ml tv, 1200 kcals, 50 gm pro, 420 ml free water, 540 ml total fluids    Anthropometric Measures:  Height: 5' 2\" (157.5 cm)  Ideal Body Weight (IBW): 110 lbs (50 kg)    Admission Body Weight: 135 lb (61.2 kg) (11/5 bed scale)  Current Body Weight: 135 lb (61.2 kg) (admit wt as CBW likely elevated), 122.7 % IBW. Current BMI (kg/m2): 24.7  Usual Body Weight: 168 lb (76.2 kg) (12/2020 actual per EMR. Pt states #, unable to verify per EMR at this time)  % Weight Change (Calculated): -19.6                    BMI Categories: Normal Weight (BMI 22.0 to 24.9) age over 72    Estimated Daily Nutrient Needs:  Energy Requirements Based On: Formula  Weight Used for Energy Requirements: Admission  Energy (kcal/day): MSJ x 1.3 SF = 8839-4080  Weight Used for Protein Requirements: Admission  Protein (g/day):   Method Used for Fluid Requirements: 1 ml/kcal  Fluid (ml/day): 2376-3592    Nutrition Diagnosis:   Moderate malnutrition, In context of chronic illness related to catabolic illness as evidenced by poor intake prior to admission, weight loss, mild muscle loss (20% wt loss x 2 years)    Nutrition Interventions:   Food and/or Nutrient Delivery: Continue Current Diet, Continue Current Tube Feeding (Current TF meets 92% estimated calorie needs & 56% estimated protein needs.  Add 1 protein modular to better meet nutrient needs)  Nutrition Education/Counseling: Education not indicated (pt declined ONS options at this time)  Coordination of Nutrition Care: Continue to monitor while inpatient       Goals:     Goals: PO intake 50% or greater, by next RD assessment, Tolerate nutrition support at goal rate       Nutrition Monitoring and Evaluation:      Food/Nutrient Intake Outcomes: Food and Nutrient Intake, Enteral Nutrition Intake/Tolerance  Physical Signs/Symptoms Outcomes: Biochemical Data, Chewing or Swallowing, GI Status, Fluid Status or Edema, Nutrition Focused Physical Findings, Skin, Weight    Discharge Planning:    Enteral Nutrition     Bonita Leung, MS, RD, LD  Contact: [phone]

## 2022-11-07 ENCOUNTER — PREP FOR PROCEDURE (OUTPATIENT)
Dept: VASCULAR SURGERY | Age: 77
End: 2022-11-07

## 2022-11-07 VITALS
DIASTOLIC BLOOD PRESSURE: 60 MMHG | BODY MASS INDEX: 25.21 KG/M2 | HEIGHT: 62 IN | OXYGEN SATURATION: 95 % | HEART RATE: 80 BPM | WEIGHT: 137 LBS | TEMPERATURE: 97.2 F | RESPIRATION RATE: 17 BRPM | SYSTOLIC BLOOD PRESSURE: 132 MMHG

## 2022-11-07 LAB
HCT VFR BLD CALC: 33.3 % (ref 34–48)
HEMOGLOBIN: 10.4 G/DL (ref 11.5–15.5)
METER GLUCOSE: 173 MG/DL (ref 74–99)

## 2022-11-07 PROCEDURE — 6360000002 HC RX W HCPCS: Performed by: PHYSICIAN ASSISTANT

## 2022-11-07 PROCEDURE — 97530 THERAPEUTIC ACTIVITIES: CPT

## 2022-11-07 PROCEDURE — 6370000000 HC RX 637 (ALT 250 FOR IP): Performed by: INTERNAL MEDICINE

## 2022-11-07 PROCEDURE — 2580000003 HC RX 258: Performed by: PHYSICIAN ASSISTANT

## 2022-11-07 PROCEDURE — 2700000000 HC OXYGEN THERAPY PER DAY

## 2022-11-07 PROCEDURE — 36415 COLL VENOUS BLD VENIPUNCTURE: CPT

## 2022-11-07 PROCEDURE — 94640 AIRWAY INHALATION TREATMENT: CPT

## 2022-11-07 PROCEDURE — 82962 GLUCOSE BLOOD TEST: CPT

## 2022-11-07 PROCEDURE — 97535 SELF CARE MNGMENT TRAINING: CPT

## 2022-11-07 PROCEDURE — 2580000003 HC RX 258: Performed by: EMERGENCY MEDICINE

## 2022-11-07 PROCEDURE — 6370000000 HC RX 637 (ALT 250 FOR IP): Performed by: PHYSICIAN ASSISTANT

## 2022-11-07 PROCEDURE — 85014 HEMATOCRIT: CPT

## 2022-11-07 PROCEDURE — 85018 HEMOGLOBIN: CPT

## 2022-11-07 RX ADMIN — TRAMADOL HYDROCHLORIDE 50 MG: 50 TABLET, COATED ORAL at 13:17

## 2022-11-07 RX ADMIN — IPRATROPIUM BROMIDE 0.5 MG: 0.5 SOLUTION RESPIRATORY (INHALATION) at 12:49

## 2022-11-07 RX ADMIN — OXYCODONE 5 MG: 5 TABLET ORAL at 09:03

## 2022-11-07 RX ADMIN — LEVOFLOXACIN 750 MG: 750 TABLET, FILM COATED ORAL at 08:57

## 2022-11-07 RX ADMIN — CLOTRIMAZOLE 10 MG: 10 LOZENGE ORAL at 11:44

## 2022-11-07 RX ADMIN — SODIUM CHLORIDE: 9 INJECTION, SOLUTION INTRAVENOUS at 09:00

## 2022-11-07 RX ADMIN — IPRATROPIUM BROMIDE 0.5 MG: 0.5 SOLUTION RESPIRATORY (INHALATION) at 09:25

## 2022-11-07 RX ADMIN — OXYCODONE 5 MG: 5 TABLET ORAL at 01:36

## 2022-11-07 RX ADMIN — SODIUM CHLORIDE, PRESERVATIVE FREE 10 ML: 5 INJECTION INTRAVENOUS at 08:58

## 2022-11-07 RX ADMIN — CLOTRIMAZOLE 10 MG: 10 LOZENGE ORAL at 05:25

## 2022-11-07 RX ADMIN — PANTOPRAZOLE SODIUM 40 MG: 40 TABLET, DELAYED RELEASE ORAL at 05:25

## 2022-11-07 RX ADMIN — CARVEDILOL 3.12 MG: 3.12 TABLET, FILM COATED ORAL at 08:57

## 2022-11-07 RX ADMIN — GABAPENTIN 100 MG: 100 CAPSULE ORAL at 05:25

## 2022-11-07 RX ADMIN — APIXABAN 5 MG: 5 TABLET, FILM COATED ORAL at 08:57

## 2022-11-07 ASSESSMENT — PAIN DESCRIPTION - LOCATION
LOCATION: LEG
LOCATION: LEG

## 2022-11-07 ASSESSMENT — PAIN SCALES - GENERAL
PAINLEVEL_OUTOF10: 7
PAINLEVEL_OUTOF10: 7
PAINLEVEL_OUTOF10: 6
PAINLEVEL_OUTOF10: 6

## 2022-11-07 ASSESSMENT — PAIN DESCRIPTION - ORIENTATION: ORIENTATION: LEFT

## 2022-11-07 ASSESSMENT — PAIN DESCRIPTION - DESCRIPTORS: DESCRIPTORS: ACHING;TINGLING

## 2022-11-07 NOTE — FLOWSHEET NOTE
Inpatient Wound KWTS([id number])    Admit Date: 11/3/2022  9:08 PM    Reason for consult:  dressing change per Dr. Ernico Chahal    Findings:     11/07/22 1415   Wound 10/26/22 Leg Left;Lateral #1   Date First Assessed/Time First Assessed: 10/26/22 0818   Wound Approximate Age at First Assessment (Weeks): 6 weeks  Primary Wound Type: Surgical Type  Location: Leg  Wound Location Orientation: Left;Lateral  Wound Description (Comments): #1   Wound Image     Wound Etiology Non-Healing Surgical   Dressing Status New dressing applied   Wound Cleansed Cleansed with saline   Dressing/Treatment ABD; Ace wrap;Coban/self-adherent bandages; Alginate   Wound Length (cm) 20 cm   Wound Width (cm) 7 cm   Wound Depth (cm) 3 cm   Wound Surface Area (cm^2) 140 cm^2   Change in Wound Size % (l*w) -13.64   Wound Volume (cm^3) 420 cm^3   Wound Healing % -582   Wound Assessment Exposed structure tendon;Pink/red;Purple/maroon;Slough   Drainage Amount Small   Drainage Description Serosanguinous   Odor None   Cherise-wound Assessment Intact     **Informed Consent**    The patient has given verbal consent to have photos taken of wound and inserted into their chart as part of their permanent medical record for purposes of documentation, treatment management and/or medical review. All Images taken on 11/7/22 of patient name: Laurie Mayberry were transmitted and stored on secured [employer] located within [facility] by a registered [employer] Mobile Application Device.       Plan:  Dressing changed as requested by Vascular  Discharge today    Kirill Messer RN 11/7/2022 2:45 PM

## 2022-11-07 NOTE — DISCHARGE INSTR - COC
Continuity of Care Form    Patient Name: Cait Freire   :  1945  MRN:  [id number]    Admit date:  11/3/2022  Discharge date:  ***    Code Status Order: Full Code   Advance Directives:     Admitting Physician:  No admitting provider for patient encounter.   PCP: Greg Patino MD    Discharging Nurse: [facility] Unit/Room#: 6163/9055-O  Discharging Unit Phone Number: ***    Emergency Contact:   Extended Emergency Contact Information  Primary Emergency Contact: [facility] [address] Phone: [phone]  Relation: Child  Secondary Emergency Contact: Edgar Crockett  Address: [address] [place] [address] [address]  Phone: [phone]  Relation: Child    Past Surgical History:  Past Surgical History:   Procedure Laterality Date    ABDOMEN SURGERY  10/05/2016    exporation of abdominal fascial wound dehiscence close, insertion TLC right IJ    CARDIAC DEFIBRILLATOR PLACEMENT Left 2007    CARDIAC DEFIBRILLATOR PLACEMENT  2018    BIV ICD GENT CHANGE (BSCI)    [facility] CATH LAB PROCEDURE      ENDOSCOPY, COLON, DIAGNOSTIC  2018    upper endo    HERNIA REPAIR      HYSTERECTOMY (CERVIX STATUS UNKNOWN)      ILEOSTOMY OR JEJUNOSTOMY      INCISIONAL HERNIA REPAIR  2017    LAPAROSCOPIC; WITH MESH    MANDIBLE SURGERY      OTHER SURGICAL HISTORY      ostomy placed having reversal done on 2016    OTHER SURGICAL HISTORY  2016    open reveral ileostomy    OTHER SURGICAL HISTORY  2009    BiV/ICD    OTHER SURGICAL HISTORY Left     vascularized fibular graft harvest [facility]        Immunization History:   Immunization History   Administered Date(s) Administered    Influenza Virus Vaccine 10/18/2017       Active Problems:  Patient Active Problem List   Diagnosis Code    Cardiomyopathy, nonischemic ([facility] I42.8    Bundle branch block, left I44.7    Biventricular implantable cardioverter-defibrillator in situ Z95.810    Hyperlipidemia E78.5    Blood loss anemia D50.0    Acute respiratory failure ([facility]) J96.00    SIRS (systemic inflammatory response syndrome) ([facility]) R65.10    Hypotension I95.9    History of ischemic colitis Z87.19    Anemia D64.9    Acute renal failure ([facility]) N17.9    Hypomagnesemia E83.42    Acute delirium R41.0    Incarcerated ventral hernia K43.6    Chronic kidney disease, stage III (moderate) ([facility]) N18.30    Anemia of chronic renal failure N18.9, D63.1    Implantable cardioverter-defibrillator (ICD) at end of device life Z45.02    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 XXXA    Elevated troponin R77.8    Moderate protein-calorie malnutrition ([facility]) E44.0       Isolation/Infection:   Isolation            Contact          Patient Infection Status       None to display            Nurse Assessment:  Last Vital Signs: /60   Pulse 80   Temp 97.2 °F (36.2 °C) (Temporal)   Resp 17   Ht 5' 2\" (1.575 m)   Wt 137 lb (62.1 kg)   LMP  (LMP Unknown)   SpO2 95%   BMI 25.06 kg/m²     Last documented pain score (0-10 scale): Pain Level: 6  Last Weight:   Wt Readings from Last 1 Encounters:   22 137 lb (62.1 kg)     Mental Status:  {IP PT MENTAL STATUS:95751}    IV Access:  { FREDDY IV ACCESS:383962863}    Nursing Mobility/ADLs:  Walking   {[facility] DME KIQP:254910595}  Transfer  {[facility] DME DCUB:000620557}  Bathing  {[facility] DME FRXX:191440888}  Dressing  {[facility] DME ZJXC:126283246}  Toileting  {[facility] DME KAQ}  Feeding  {[facility] DME ANBS:846519294}  Med Admin  {[facility] DME OYAP:776272219}  Med Delivery   { FREDDY MED Delivery:122862412}    Wound Care Documentation and Therapy:  Incision 10/05/16 Abdomen Mid (Active)   Number of days: 3840       Incision 17 Abdomen Right;Left;Mid (Active)   Number of days:        Wound 10/26/22 Leg Left;Lateral #1 (Active)   Wound Etiology Non-Healing Surgical 22 8495   Dressing Status Clean;Dry; Intact; Reinforced dressing 22 0900   Wound Cleansed Cleansed with saline;Irrigated with saline 22 164   Dressing/Treatment Alginate;ABD; Ace wrap 22 164   Number of days: 12        Elimination:  Continence: Bowel: {YES / PN:13554}  Bladder: {YES / WP:52272}  Urinary Catheter: {Urinary Catheter:810378818}   Colostomy/Ileostomy/Ileal Conduit: {YES / CP:92352}       Date of Last BM: ***    Intake/Output Summary (Last 24 hours) at 2022 1443  Last data filed at 2022 0947  Gross per 24 hour   Intake 1200 ml   Output 600 ml   Net 600 ml     I/O last 3 completed shifts:   In: 2330 [P.O.:480; NG/GT:650; IV Piggyback:1200]  Out: 800 [Urine:800]    Safety Concerns:     [employer] Safety Concerns:057070684}    Impairments/Disabilities:      [employer] Impairments/Disabilities:769086549}    Nutrition Therapy:  Current Nutrition Therapy:   [employer] Diet List:162478205}    Routes of Feeding: {[facility] DME Other Feedings:591440564}  Liquids: {Slp liquid thickness:70830}  Daily Fluid Restriction: {[facility] DME Yes amt example:370048523}  Last Modified Barium Swallow with Video (Video Swallowing Test): {Done Not Done UUTV:885810102}    Treatments at the Time of Hospital Discharge:   Respiratory Treatments: ***  Oxygen Therapy:  {Therapy; copd oxygen:49653}  Ventilator:    {[facility] Vent EKPO:671551257}    Rehab Therapies: {THERAPEUTIC INTERVENTION:6893969579}  Weight Bearing Status/Restrictions: [address] Cosme  Weight Bearin}  Other Medical Equipment (for information only, NOT a DME order):  {EQUIPMENT:460229353}  Other Treatments: ***    Patient's personal belongings (please select all that are sent with patient):  {[facility] DME Belongings:143038795}    RN SIGNATURE:  {Esignature:951673558}    CASE MANAGEMENT/SOCIAL WORK SECTION    Inpatient Status Date: ***    Readmission Risk Assessment Score:  Readmission Risk              Risk of Unplanned Readmission:  14           Discharging to Facility/ Agency   Name: [address], [address] [place] [address] 176 2914 [address] [place] [address] [address] [address]  Phone: [phone]  Fax:    Dialysis Facility (if applicable)   Name:  Address:  Dialysis Schedule:  Phone:  Fax:    / signature: Electronically signed by CLARIBEL Mendieta on 11/7/22 at 2:50 PM EST    PHYSICIAN SECTION    Prognosis: {Prognosis:6945518018}    Condition at Discharge: [address] [place] [address] Patient Condition:341262813}    Rehab Potential (if transferring to Rehab): {Prognosis:7382306103}    Recommended Labs or Other Treatments After Discharge: ***    Physician Certification: I certify the above information and transfer of Jamaal Garza  is necessary for the continuing treatment of the diagnosis listed and that she requires {Admit to Appropriate Level of Care:54592} for {GREATER/LESS:203272239} 30 days.      Update Admission H&P: {[facility]P DME Changes in BJXMM:658735314}    PHYSICIAN SIGNATURE:  {Esignature:191144533}

## 2022-11-07 NOTE — PROGRESS NOTES
Hospitalist Progress Note      PCP: Levi Barron MD    Date of Admission: 11/3/2022        Hospital Course:  65 y.o. female presented with FALL,  HAS A KNOWN HISTORY OF SQ. CELL CARCINOMA  OF THE MOUTH. HAD A FIBULA BONE GRAFT . FOUND TO BE ORTHOSTATIC ** AWAITING PT EVAL BEFORE DISCHARGE           Subjective:  FEELING BETTER            Medications:  Reviewed    Infusion Medications    sodium chloride Stopped (11/05/22 1248)    sodium chloride 100 mL/hr at 11/06/22 0916     Scheduled Medications    apixaban  5 mg Oral BID    gabapentin  100 mg Oral q8h    ipratropium  500 mcg Nebulization 4x daily    sodium chloride flush  10 mL IntraVENous 2 times per day    levoFLOXacin  750 mg Oral Q48H    docusate sodium  100 mg Oral Nightly    pantoprazole  40 mg Oral QAM AC    carvedilol  3.125 mg Oral BID WC    clotrimazole  10 mg Oral 5x Daily     PRN Meds: fluticasone, sodium chloride flush, sodium chloride, potassium chloride **OR** potassium alternative oral replacement **OR** potassium chloride, ondansetron **OR** ondansetron, magnesium hydroxide, acetaminophen **OR** acetaminophen, traMADol, oxyCODONE      Intake/Output Summary (Last 24 hours) at 11/6/2022 1956  Last data filed at 11/6/2022 1753  Gross per 24 hour   Intake 2330 ml   Output 800 ml   Net 1530 ml       Exam:    BP (!) 112/55   Pulse 74   Temp 98.3 °F (36.8 °C) (Temporal)   Resp 16   Ht 5' 2\" (1.575 m)   Wt 141 lb 6.4 oz (64.1 kg)   LMP  (LMP Unknown)   SpO2 90%   BMI 25.86 kg/m²       General appearance:  No apparent distress, appears stated age. HEENT:  Normal cephalic, atraumatic without obvious deformity. Pupils equal, round, and reactive to light. Extra ocular muscles intact. Conjunctivae/corneas clear. WHITE COATING  ON TONGUE  Neck: Supple, with full range of motion. No jugular venous distention. Trachea midline. Respiratory:  Normal respiratory effort.  Clear to auscultation,   Cardiovascular:  RRR  Abdomen: Soft, non-tender, non-distended with normal bowel sounds. Musculoskeletal:  No clubbing, cyanosis or edema bilaterally. Skin: smooth and dry   Neurologic:   Cranial nerves: II-XII intact,   Psychiatric:  Alert and oriented x 3                 Labs:   Recent Labs     11/03/22 2151 11/04/22 0623 11/05/22 0625 11/06/22  0719   WBC 10.4 7.4  --   --    HGB 11.7 8.7* 11.2* 10.7*   HCT 36.9 27.8* 36.9 34.3    206  --   --      Recent Labs     11/03/22 2151 11/04/22  0025 11/04/22 0623 11/04/22  1041     --  145 143   K 5.5* 4.5 3.4* 4.5     --  117* 108*   CO2 26  --  22 25   BUN 35*  --  24* 28*   CREATININE 1.4*  --  0.9 1.3*   CALCIUM 9.8  --  6.8* 9.7     Recent Labs     11/03/22 2151   AST 29   ALT 17   BILITOT <0.2   ALKPHOS 108*     No results for input(s): INR in the last 72 hours. No results for input(s): Caro Coombes in the last 72 hours. Recent Labs     11/03/22 2151   AST 29   ALT 17   BILITOT <0.2   ALKPHOS 108*     No results for input(s): LACTA in the last 72 hours. No results found for: Gold Bone  Lab Results   Component Value Date    AMMONIA 28.9 10/05/2016       Assessment:    Active Hospital Problems    Diagnosis Date Noted    Moderate protein-calorie malnutrition ([email or web address] [E44.0] 11/06/2022     Priority: Medium    Fall [W19. 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 ([email or web address] [L97.923] 10/26/2022     Priority: Medium    Hypotension [I95.9] 05/26/2016    Bundle branch block, left [I44.7] 09/15/2011    Cardiomyopathy, nonischemic (HCC) [I42.8] 09/15/2011   ORAL THRUSH  HYPOTENSION, ORTHOSTATIC  PAF   ELEVATED TT  PLAN:  HYDRATION  COREG DECREASED     DVT Prophylaxis: ELIQUIS  Diet: ADULT TUBE FEEDING; PEG; 2.0 Calorie; Bolus; 2 Times Daily; 300; 30; Before and after each bolus  ADULT DIET;  Full Liquid  Code Status: Full Code     PT/OT Eval Status: ORDERED     Dispo -  HOME      Electronically signed by Ivanna Gama DO on 11/6/2022 at 7:56 PM [facility]

## 2022-11-07 NOTE — PROGRESS NOTES
Hospitalist Progress Note      PCP: Shannan Fragoso MD    Date of Admission: 11/3/2022        Hospital Course:  65 y.o. female presented with FALL,  HAS A KNOWN HISTORY OF SQ. CELL CARCINOMA  OF THE MOUTH. HAD A FIBULA BONE GRAFT . FOUND TO BE ORTHOSTATIC  . HER PT SCORES ARE 16 , SHE WILL GO HOME WITH HHC AND OXYGEN DUE TO HYPOXIA WITH AMBULATION        Subjective:  WANTS TO GO HOME           Medications:  Reviewed    Infusion Medications    sodium chloride Stopped (11/05/22 1248)    sodium chloride 100 mL/hr at 11/07/22 0900     Scheduled Medications    apixaban  5 mg Oral BID    gabapentin  100 mg Oral q8h    ipratropium  500 mcg Nebulization 4x daily    sodium chloride flush  10 mL IntraVENous 2 times per day    levoFLOXacin  750 mg Oral Q48H    docusate sodium  100 mg Oral Nightly    pantoprazole  40 mg Oral QAM AC    carvedilol  3.125 mg Oral BID WC    clotrimazole  10 mg Oral 5x Daily     PRN Meds: fluticasone, sodium chloride flush, sodium chloride, potassium chloride **OR** potassium alternative oral replacement **OR** potassium chloride, ondansetron **OR** ondansetron, magnesium hydroxide, acetaminophen **OR** acetaminophen, traMADol, oxyCODONE      Intake/Output Summary (Last 24 hours) at 11/7/2022 1512  Last data filed at 11/7/2022 0947  Gross per 24 hour   Intake 1200 ml   Output 600 ml   Net 600 ml       Exam:    /60   Pulse 80   Temp 97.2 °F (36.2 °C) (Temporal)   Resp 17   Ht 5' 2\" (1.575 m)   Wt 137 lb (62.1 kg)   LMP  (LMP Unknown)   SpO2 95%   BMI 25.06 kg/m²       General appearance:  No apparent distress, appears stated age. HEENT:  Normal cephalic, atraumatic without obvious deformity. Pupils equal, round, and reactive to light. Extra ocular muscles intact. Conjunctivae/corneas clear. WHITE COATING  ON TONGUE  Neck: Supple, with full range of motion. No jugular venous distention. Trachea midline. Respiratory:  Normal respiratory effort.  Clear to auscultation, Cardiovascular:  RRR  Abdomen: Soft, non-tender, non-distended with normal bowel sounds. Musculoskeletal:  No clubbing, cyanosis NO edema bilaterally. DSD INTACT LLE  Skin: smooth and dry   Neurologic:   Cranial nerves: II-XII intact,   Psychiatric:  Alert and oriented x 3          Labs:   Recent Labs     11/05/22  0625 11/06/22  0719 11/07/22  0424   HGB 11.2* 10.7* 10.4*   HCT 36.9 34.3 33.3*     No results for input(s): NA, K, CL, CO2, BUN, CREATININE, CALCIUM, PHOS in the last 72 hours. Invalid input(s): MAGNES  No results for input(s): AST, ALT, BILIDIR, BILITOT, ALKPHOS in the last 72 hours. No results for input(s): INR in the last 72 hours. No results for input(s): Edwin Gubler in the last 72 hours. No results for input(s): AST, ALT, ALB, BILIDIR, BILITOT, ALKPHOS in the last 72 hours. No results for input(s): LACTA in the last 72 hours. No results found for: Jah Max  Lab Results   Component Value Date    AMMONIA 28.9 10/05/2016       Assessment:    Active Hospital Problems    Diagnosis Date Noted    Mod[facility]cal[facility] [E44.0] 11/06/2022     Priority: Medium    Fall [W19. 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

## 2022-11-07 NOTE — PROGRESS NOTES
Pulse Ox RA resting 98%  Pulse Ox RA ambulating 88%  Pulse Ox 2L recovering 94%  Pulse Ox 2L ambulating Recovering 92%

## 2022-11-07 NOTE — PROGRESS NOTES
Physical Therapy  Physical Therapy Treatment    Name: Song Orr  : 1945  MRN: [id number]      Date of Service: 2022    Evaluating PT:  Alisha Latif PT, DPT [id number]    Room #:  2970/0895-S  Diagnosis:  Weakness [R53.1]  General weakness [R53.1]  Injury of head, initial encounter [S09.90XA]  PMHx/PSHx:    Past Medical History:   Diagnosis Date    Anemia     Arthritis     Bowel obstruction ([email or web address] 2016    Cancer (HCC)     oral/mandible    CHF (congestive heart failure) (HCC)     COPD (chronic obstructive pulmonary disease) (HCC)     Hyperlipidemia     Hypertension     LBBB (left bundle branch block)     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 Pt ambulated without device and was independent PTA. OBJECTIVE:   Initial Evaluation  Date: 22 Treatment  22 Short Term/ Long Term   Goals   AM-PAC 6 Clicks 60/47     Was pt agreeable to Eval/treatment? yes yes    Does pt have pain?  No c/o pain No c/o pain    Bed Mobility  Rolling: NT  Supine to sit: Bethany  Sit to supine: NT  Scooting: [place] Rolling: NT  Supine to sit: SBA  Sit to supine: NT  Scooting: SBA Mod Independent   Transfers Sit to stand: Bethany  Stand to sit: Bethany  Stand pivot: ModA no device Sit to stand: SBA  Stand to sit: SBA  Stand pivot: SBA [employer] Mod Independent with AAD   Ambulation   15 feet x 2 reps with ModA no device 15 feet x2 with [employer] SBA >150 feet with Mod Independent with AAD   Stair negotiation: ascended and descended NT NT d/t isolation >4 steps with 1 rail Mod Independent   ROM BUE:  WFL  BLE:  WFL     Strength BUE:  WFL  BLE:  4/5 grossly  Increase by 1/3 MMT grade   Balance Sitting EOB:  SBA  Dynamic Standing:  ModA no device Sitting EOB:  SBA  Dynamic Standing:  SBA with [employer] Sitting EOB:  Independent  Dynamic Standing: Mod Independent with AAD   Pt is A & O x 4  Sensation:  NT  Edema:  none noted    Vitals:  SPO2 on 3L at rest: 97%  SPO2 on room air after mobility: 96%    Patient education  Pt educated on role of PT, safety during mobility with use of [employer]    Patient response to education:   Pt verbalized understanding Pt demonstrated skill Pt requires further education in this area   yes yes yes     ASSESSMENT:    Comments:  patient semi-supine in bed upon entry and agreeable to PT treatment. Pt able to complete all mobility with no hands on assist. Pt requested to use restroom in room. Ambulation with WW was slow, but steady throughout. Time provided for pt to void in seated. Ambulation back to bedside chair with similar assist. Pt left sitting in chair at end of session with all needs met. Discussed DC plan with family and doctor present at end of session. All needs met and call light in reach. Treatment:  Patient practiced and was instructed in the following treatment:    Bed Mobility: VCs provided for sequencing and safety during mobility. Transfer Training: Verbal and tactile cueing provided for sequencing and safety during mobility. Gait Training: Ambulation with [employer] and verbal cues for proper technique and safety. PLAN:    Patient is making good progress towards established goals. Will continue with current POC.       Time in  1145  Time out  1210    Total Treatment Time  25 minutes     CPT codes:  [] Gait training 83380 - minutes  [] Manual therapy 35987 - minutes  [x] Therapeutic activities 09393 25 minutes  [] Therapeutic exercises 37661 - minutes  [] Neuromuscular reeducation 49506 - minutes    Brock Mcfarlane PT, DPT  [id number]

## 2022-11-07 NOTE — CARE COORDINATION
Put Walking Pulse Oxy testing in sticky note for RN. Dr. Shady Sharif informed that Dtr feels the Pt needs oxygen at night. Spoke with Pt and Dtr about Transition Plan of Care. Pt lives alone with 2 steps to enter with dual rails Pt stated that she has all the equipment at home  that she needs (ww, shower bench, BSC, and Nebulizer). PCP: Dr. Mitch Baird. Pharmacy:  [facility]. Pt is active with [facility] for SN, PT, OT, and ST. Pt/DTR has no preference for home Oxygen. Choice [facility]. Verified with Liaison [address]. Informed Edith that Pt is discharging today with home oxygen with [facility]. Referral made to [address] [place] [address].. Discharge Plan is to return to home with Anupam  and home oxygen. Joselin Garnett, L.S.W.  [phone]     The Plan for Transition of Care is related to the following treatment goals: medical stable    The Patient and/or patient representative was provided with a choice of provider and agrees with the discharge plan. [x] Yes [] No    Freedom of choice list was provided with basic dialogue that supports the patient's individualized plan of care/goals, treatment preferences and shares the quality data associated with the providers.  [x] Yes [] No

## 2022-11-09 ENCOUNTER — HOSPITAL ENCOUNTER (OUTPATIENT)
Dept: WOUND CARE | Age: 77
Discharge: HOME | End: 2022-11-09
Payer: MEDICARE

## 2022-11-09 VITALS
WEIGHT: 137 LBS | BODY MASS INDEX: 25.21 KG/M2 | HEART RATE: 84 BPM | RESPIRATION RATE: 18 BRPM | TEMPERATURE: 96.8 F | SYSTOLIC BLOOD PRESSURE: 116 MMHG | DIASTOLIC BLOOD PRESSURE: 65 MMHG | HEIGHT: 62 IN

## 2022-11-09 DIAGNOSIS — L97.923 NON-PRESSURE CHRONIC ULCER OF LEFT LOWER LEG WITH NECROSIS OF MUSCLE (HCC): Primary | ICD-10-CM

## 2022-11-09 PROCEDURE — 11043 DBRDMT MUSC&/FSCA 1ST 20/<: CPT

## 2022-11-09 PROCEDURE — 11046 DBRDMT MUSC&/FSCA EA ADDL: CPT

## 2022-11-09 PROCEDURE — 11046 DBRDMT MUSC&/FSCA EA ADDL: CPT | Performed by: SURGERY

## 2022-11-09 PROCEDURE — 11043 DBRDMT MUSC&/FSCA 1ST 20/<: CPT | Performed by: SURGERY

## 2022-11-09 RX ORDER — CLOBETASOL PROPIONATE 0.5 MG/G
OINTMENT TOPICAL ONCE
Status: CANCELLED | OUTPATIENT
Start: 2022-11-09 | End: 2022-11-09

## 2022-11-09 RX ORDER — LIDOCAINE HYDROCHLORIDE 40 MG/ML
SOLUTION TOPICAL ONCE
Status: COMPLETED | OUTPATIENT
Start: 2022-11-09 | End: 2022-11-09

## 2022-11-09 RX ORDER — GINSENG 100 MG
CAPSULE ORAL ONCE
Status: CANCELLED | OUTPATIENT
Start: 2022-11-09 | End: 2022-11-09

## 2022-11-09 RX ORDER — LIDOCAINE HYDROCHLORIDE 20 MG/ML
JELLY TOPICAL ONCE
Status: CANCELLED | OUTPATIENT
Start: 2022-11-09 | End: 2022-11-09

## 2022-11-09 RX ORDER — BETAMETHASONE DIPROPIONATE 0.05 %
OINTMENT (GRAM) TOPICAL ONCE
Status: CANCELLED | OUTPATIENT
Start: 2022-11-09 | End: 2022-11-09

## 2022-11-09 RX ORDER — BACITRACIN ZINC AND POLYMYXIN B SULFATE 500; 1000 [USP'U]/G; [USP'U]/G
OINTMENT TOPICAL ONCE
Status: CANCELLED | OUTPATIENT
Start: 2022-11-09 | End: 2022-11-09

## 2022-11-09 RX ORDER — LIDOCAINE HYDROCHLORIDE 40 MG/ML
SOLUTION TOPICAL ONCE
Status: CANCELLED | OUTPATIENT
Start: 2022-11-09 | End: 2022-11-09

## 2022-11-09 RX ORDER — LIDOCAINE 40 MG/G
CREAM TOPICAL ONCE
Status: CANCELLED | OUTPATIENT
Start: 2022-11-09 | End: 2022-11-09

## 2022-11-09 RX ORDER — LIDOCAINE 50 MG/G
OINTMENT TOPICAL ONCE
Status: CANCELLED | OUTPATIENT
Start: 2022-11-09 | End: 2022-11-09

## 2022-11-09 RX ORDER — GENTAMICIN SULFATE 1 MG/G
OINTMENT TOPICAL ONCE
Status: CANCELLED | OUTPATIENT
Start: 2022-11-09 | End: 2022-11-09

## 2022-11-09 RX ORDER — BACITRACIN, NEOMYCIN, POLYMYXIN B 400; 3.5; 5 [USP'U]/G; MG/G; [USP'U]/G
OINTMENT TOPICAL ONCE
Status: CANCELLED | OUTPATIENT
Start: 2022-11-09 | End: 2022-11-09

## 2022-11-09 RX ADMIN — LIDOCAINE HYDROCHLORIDE 10 ML: 40 SOLUTION TOPICAL at 13:56

## 2022-11-09 ASSESSMENT — PAIN SCALES - GENERAL: PAINLEVEL_OUTOF10: 0

## 2022-11-09 NOTE — PLAN OF CARE
Problem: Compression therapy:  Goal: Will be free from complications associated with compression therapy  Description: Will be free from complications associated with compression therapy  Outcome: Progressing

## 2022-11-09 NOTE — PROGRESS NOTES
Wound Healing Center Followup Visit Note    Referring Physician : Greg Patino MD  [facility] RECORD NUMBER:  [id number]  AGE: 65 y.o. GENDER: female  : 1945  EPISODE DATE:  2022    Subjective:     Chief Complaint   Patient presents with    Wound Check      HISTORY of PRESENT ILLNESS HPI   Cait Freire is a 65 y.o. female who presents today in regards to follow up evaluation and treatment of wound/ulcer. That patient's past medical, family and social hx were reviewed and changes were made if present. History of Wound Context:  The patient has had a wound of left calf which was first noted approximately 2022. This has been treated local wound care. On their initial visit to the wound healing center, 22,  the patient has noted that the wound has been improving after seeing Dr Erica Hampton last week. The patient has not had similar previous wounds in the past.      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 She underwent right segmental mandibulectomy, bilateral selective neck dissection of levels 1 through 4, open tracheostomy on  with L fibula resection and implant of fibular free flap in her jaw. Per pt report wound in left leg was never closed. She receive preop chemotherapy, immunotherapy. She is no longer receiving chemotherapy. Her lymph node resection was negative. Her left calf postop wound from where she had bone/free flap taken has been non healing and they have had issues with it since immediately postop per pt and family. They said [facility] surgeon is aware, recommended local wound care and was going to see them in 2023. Her daughter had asked to follow with me going forward. She has peg in place and receives nutrition via. She follows with Dr. Josafat Guo in regards to CKD. Pt is on abx at time of initial visit.  She was started on levaquin per [facility] who she had culture sent to.      22  She does have an area of muscle that doesn't appear to be viable  She may need surgical debridement in the future  Coban 2, aquacell ag  She is already on levaquin from recent culture  No evidence of arterial occlusive disease  Labs ordered  Ok to cancel abis and pvrs  11/9/22  Wound slightly smaller  Coban 2, aquacell ag  Still may need surgical debridement in future      Wound/Ulcer Pain Timing/Severity: mild  Quality of pain: aching  Severity:  1 / 10   Modifying Factors: Pain worsens with debridement, dressing changes  Associated Signs/Symptoms: drainage edema, pain    Ulcer Identification:  Ulcer Type: non-healing surgical  Contributing Factors: edema, immunosuppression, anticoagulation therapy, and malnutrition    Diabetic/Pressure/Non Pressure Ulcers only:  Ulcer: Non-Pressure ulcer, muscle necrosis  Wound: Wound dehiscence        PAST MEDICAL HISTORY      Diagnosis Date    Anemia     Arthritis     Bowel obstruction ([address] 04/28/2016    Cancer (HCC)     oral/mandible    CHF (congestive heart failure) (HCC)     COPD (chronic obstructive pulmonary disease) (HCC)     Hyperlipidemia     Hypertension     LBBB (left bundle branch block)     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 ([address] 10/26/2022    Nonischemic cardiomyopathy ([address]     Paroxysmal A-fib ([address]      Past Surgical History:   Procedure Laterality Date    ABDOMEN SURGERY  10/05/2016    exporation of abdominal fascial wound dehiscence close, insertion TLC right IJ    CARDIAC DEFIBRILLATOR PLACEMENT Left 05/23/2007    CARDIAC DEFIBRILLATOR PLACEMENT  04/17/2018    BIV ICD GENT CHANGE (BSCI)    BONDS    CARPAL TUNNEL RELEASE      DIAGNOSTIC CARDIAC CATH LAB PROCEDURE      ENDOSCOPY, COLON, DIAGNOSTIC  01/26/2018    upper endo    HERNIA REPAIR      HYSTERECTOMY (CERVIX STATUS UNKNOWN)      ILEOSTOMY OR JEJUNOSTOMY      INCISIONAL HERNIA REPAIR  04/21/2017    LAPAROSCOPIC; WITH MESH    MANDIBLE SURGERY      OTHER SURGICAL HISTORY      ostomy placed having reversal done on 9/30/2016    OTHER SURGICAL HISTORY  09/30/2016    open reveral ileostomy    OTHER SURGICAL HISTORY  01/07/2009    BiV/ICD    OTHER SURGICAL HISTORY Left     vascularized fibular graft harvest [facility] 9/22     Family History   Problem Relation Age of Onset    Alzheimer's Disease Mother     Heart Disease Father     Cancer Father     Alzheimer's Disease Father     Cancer Brother      Social History     Tobacco Use    Smoking status: Former     Types: Cigarettes     Quit date: 12/19/2006     Years since quitting: 15.9    Smokeless tobacco: Never   Vaping Use    Vaping Use: Never used   Substance Use Topics    Alcohol use: Not Currently     Comment: occasional    Drug use: No     No Known Allergies  Current Outpatient Medications on File Prior to Encounter   Medication Sig Dispense Refill    carvedilol (COREG) 3.125 MG tablet Take 1 tablet by mouth 2 times daily (with meals) 60 tablet 3    clotrimazole (MYCELEX) 10 MG amadou Take 1 tablet by mouth 5 times daily for 44 doses 44 tablet 0    escitalopram (LEXAPRO) 10 MG tablet Take 1 tablet by mouth daily      famotidine (PEPCID) 20 MG tablet Take 1 tablet by mouth 2 times daily      traMADol (ULTRAM) 50 MG tablet Take 1 tablet by mouth every 8 hours as needed. oxyCODONE (ROXICODONE) 5 MG immediate release tablet Take 1 tablet by mouth every 8 hours as needed. levoFLOXacin (LEVAQUIN) 750 MG tablet Take 750 mg by mouth daily Last dose to be Sunday 11/6/22      gabapentin (NEURONTIN) 100 MG capsule Take 100 mg by mouth every 8 (eight) hours.       magnesium oxide (MAG-OX) 400 MG tablet Take 400 mg by mouth daily      apixaban (ELIQUIS) 5 MG TABS tablet TAKE 1 TABLET TWICE DAILY 180 tablet 3    fluticasone (FLONASE) 50 MCG/ACT nasal spray 1 spray by Each Nostril route as needed for Rhinitis      SPIRIVA HANDIHALER 18 MCG inhalation capsule Inhale 18 mcg into the lungs daily        No current facility-administered medications on file prior to encounter. 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 ([address] - Primary (Chronic)    Relevant Orders    Initiate Outpatient Wound Care Protocol       Pre Debridement Measurements:  Are located in the [place]  Documentation Flow Sheet  Post Debridement Measurements:  Wound/Ulcer Descriptions are Pre Debridement except measurements:    Incision 10/05/16 Abdomen Mid (Active)   Number of days: 2583       Incision 04/21/17 Abdomen Right;Left;Mid (Active)   Number of days: 2028       Wound 10/26/22 Leg Left;Lateral #1 (Active)   Wound Image    11/07/22 1415   Wound Etiology Non-Healing Surgical 11/07/22 1415   Dressing Status New dressing applied 11/07/22 1415   Wound Cleansed Cleansed with saline 11/07/22 1415   Dressing/Treatment ABD; Ace wrap;Coban/self-adherent bandages; Alginate 11/07/22 1415   Offloading for Diabetic Foot Ulcers Offloading not required 10/26/22 0902   Wound Length (cm) 20.8 cm 11/09/22 1401   Wound Width (cm) 6.4 cm 11/09/22 1401   Wound Depth (cm) 2 cm 11/09/22 1401   Wound Surface Area (cm^2) 133.12 cm^2 11/09/22 1401   Change in Wound Size % (l*w) -8.05 11/09/22 1401   Wound Volume (cm^3) 266.24 cm^3 11/09/22 1401   Wound Healing % -332 11/09/22 1401   Post-Procedure Length (cm) 20.9 cm 11/09/22 1442   Post-Procedure Width (cm) 6.6 cm 11/09/22 1442   Post-Procedure Depth (cm) 2.6 cm 11/09/22 1442   Post-Procedure Surface Area (cm^2) 137.94 cm^2 11/09/22 1442   Post-Procedure Volume (cm^3) 358.644 cm^3 11/09/22 1442   Distance Tunneling (cm) 2.3 cm 11/09/22 1401   Tunneling Position ___ O'Clock 12 11/09/22 1401   Wound Assessment Exposed structure tendon;Pink/red;Slough;Pale granulation tissue 11/09/22 1401   Drainage Amount Copious 11/09/22 1401   Drainage Description Yellow;Brown;Serosanguinous 11/09/22 1401   Odor None 11/09/22 1401   Cherise-wound Assessment Intact 11/09/22 1401   Wound Thickness Description not for Pressure Injury Full thickness 10/26/22 0814   Number of days: 14          Procedure Note  Indications:  Based on my examination of this patient's wound(s)/ulcer(s) today, debridement is required to promote healing and evaluate the wound base. Performed by: Amanda Simpson MD    Consent obtained:  Yes    Time out taken:  Yes    Pain Control: Anesthetic  Anesthetic: 4% Lidocaine Liquid Topical     Debridement:Excisional Debridement    Using curette the wound(s)/ulcer(s) was/were sharply debrided down through and including the removal of epidermis, dermis, subcutaneous tissue, and muscle/fascia. Devitalized Tissue Debrided:  fibrin, biofilm, slough, and exudate to stimulate bleeding to promote healing, post debridement good bleeding base and wound edges noted    Wound/Ulcer #: 1    Percent of Wound/Ulcer Debrided: 30%    Total Surface Area Debrided:  40 sq cm     Estimated Blood Loss:  Minimal  Hemostasis Achieved:  by pressure    Procedural Pain:  3  / 10   Post Procedural Pain:  2 / 10     Response to treatment:  Well tolerated by patient. Plan:   Treatment Note please see attached Discharge Instructions    Written patient dismissal instructions given to patient and signed by patient or POA.          Discharge Instructions         Discharge condition: Stable     Assessment of pain at discharge:minimal     Anesthetic used: 4% lidocaine solution     Discharge to: Home Left via:Private automobile     Accompanied by: family      ECF/HHA: [address]      Dressing Orders:LEFT LATERAL LOWER LEG- cleanse with normal saline, pack loosely with calcium alginate ag , ABD pads and coban 2. Change Monday- Wednesday (at [facility])- Friday. Treatment Orders:Eat a diet high in protein and vitamin C. Take a multiple vitamin daily unless contraindicated. Elevate leg above the level of the heart as much as possible throughout the day. [facility] followup visit:1 week_____________________________  (Please note your next appointment above and if you are unable to keep, kindly give a 24 hour notice. Thank you.)     Physician signature:__________________________      If you experience any of the following, please call the [facility] during business hours:     * Increase in Pain  * Temperature over 101  * Increase in drainage from your wound  * Drainage with a foul odor  * Bleeding  * Increase in swelling  * Need for compression bandage changes due to slippage, breakthrough drainage. If you need medical attention outside of the business hours of the [address] please contact your PCP or go to the nearest emergency room.          Electronically signed by Amrita Adam MD on 11/9/2022 at 3:09 PM

## 2022-11-11 NOTE — DISCHARGE INSTR - COC
Continuity of Care Form    Patient Name: Lyric De Souza   :  1945  MRN:  [id number]    Admit date:  (Not on file)  Discharge date:  ***    Code Status Order: Prior   Advance Directives:     Admitting Physician:  No admitting provider for patient encounter. PCP: Joslyn Grey MD    Discharging Nurse: [facility] Unit/Room#: No information available for this encounter.   Discharging Unit Phone Number: ***    Emergency Contact:   Extended Emergency Contact Information  Primary Emergency Contact: Ari Duncan [facility] [address] Phone: [phone]  Relation: Child  Secondary Emergency Contact: Saleem Cancino  Address: [address] [place] [address] [address]  Phone: [phone]  Relation: Child    Past Surgical History:  Past Surgical History:   Procedure Laterality Date    ABDOMEN SURGERY  10/05/2016    exporation of abdominal fascial wound dehiscence close, insertion TLC right IJ    CARDIAC DEFIBRILLATOR PLACEMENT Left 2007    CARDIAC DEFIBRILLATOR PLACEMENT  2018    BIV ICD GENT CHANGE (BSCI)    [facility] CATH LAB PROCEDURE      ENDOSCOPY, COLON, DIAGNOSTIC  2018    upper endo    HERNIA REPAIR      HYSTERECTOMY (CERVIX STATUS UNKNOWN)      ILEOSTOMY OR JEJUNOSTOMY      INCISIONAL HERNIA REPAIR  2017    LAPAROSCOPIC; WITH MESH    MANDIBLE SURGERY      OTHER SURGICAL HISTORY      ostomy placed having reversal done on 2016    OTHER SURGICAL HISTORY  2016    open reveral ileostomy    OTHER SURGICAL HISTORY  2009    BiV/ICD    OTHER SURGICAL HISTORY Left     vascularized fibular graft harvest [facility]        Immunization History:   Immunization History   Administered Date(s) Administered    Influenza Virus Vaccine 10/18/2017       Active Problems:  Patient Active Problem List   Diagnosis Code    Cardiomyopathy, nonischemic ([facility] [address] [address] I42.8    Bundle branch block, left I44.7 Biventricular implantable cardioverter-defibrillator in situ Z95.810    Hyperlipidemia E78.5    Blood loss anemia D50.0    Acute respiratory failure ([facility]) J96.00    SIRS (systemic inflammatory response syndrome) ([facility]) R65.10    Hypotension I95.9    History of ischemic colitis Z87.19    Anemia D64.9    Acute renal failure ([facility]) N17.9    Hypomagnesemia E83.42    Acute delirium R41.0    Incarcerated ventral hernia K43.6    Chronic kidney disease, stage III (moderate) ([facility]) N18.30    Anemia of chronic renal failure N18.9, D63.1    Implantable cardioverter-defibrillator (ICD) at end of device life Z45.02    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 XXXA    Elevated troponin R77.8    Moderate protein-calorie malnutrition ([facility]) E44.0       Isolation/Infection:   Isolation            No Isolation           Unreconciled Outside Infections       External data in this report might not trigger clinical decision support. .      Infection Onset Last Indicated Last Received Source    No mapped external infections found      .     Unmapped Infections        VRE [facility] Ambulatory                  Patient Infection Status       None to display            Nurse Assessment:  Last Vital Signs: LMP  (LMP Unknown)     Last documented pain score (0-10 scale):    Last Weight:   Wt Readings from Last 1 Encounters:   22 137 lb (62.1 kg)     Mental Status:  {IP PT MENTAL STATUS:86349}    IV Access:  {[facility] [place] [facility] IV ACCESS:457658961}    Nursing Mobility/ADLs:  Walking   {[facility] DME INVR:688994277}  Transfer  {[facility] DME VINW:561795133}  Bathing  {[facility] DME ZAS}  Dressing  {[facility] DME ULWK:114253138}  Toileting  {[facility] DME RHOI:615688825}  Feeding  {[facility] CMVZ:360437063}  Med Admin  {[facility] DME LCGJ:012890308}  Med Delivery   {[facility] [place] [facility] MED Delivery:680365325}    Wound Care Documentation and Therapy:  Incision 10/05/16 Abdomen Mid (Active)   Number of days: 2227       Incision 04/21/17 Abdomen Right;Left;Mid (Active)   Number of days: 2029       Wound 10/26/22 Leg Left;Lateral #1 (Active)   Wound Image    11/07/22 1415   Wound Etiology Non-Healing Surgical 11/07/22 1415   Dressing Status New dressing applied 11/07/22 1415   Wound Cleansed Cleansed with saline 11/07/22 1415   Dressing/Treatment ABD; Ace wrap;Coban/self-adherent bandages; Alginate 11/07/22 1415   Offloading for Diabetic Foot Ulcers Offloading not required 10/26/22 0902   Wound Length (cm) 20.8 cm 11/09/22 1401   Wound Width (cm) 6.4 cm 11/09/22 1401   Wound Depth (cm) 2 cm 11/09/22 1401   Wound Surface Area (cm^2) 133.12 cm^2 11/09/22 1401   Change in Wound Size % (l*w) -8.05 11/09/22 1401   Wound Volume (cm^3) 266.24 cm^3 11/09/22 1401   Wound Healing % -332 11/09/22 1401   Post-Procedure Length (cm) 20.9 cm 11/09/22 1442   Post-Procedure Width (cm) 6.6 cm 11/09/22 1442   Post-Procedure Depth (cm) 2.6 cm 11/09/22 1442   Post-Procedure Surface Area (cm^2) 137.94 cm^2 11/09/22 1442   Post-Procedure Volume (cm^3) 358.644 cm^3 11/09/22 1442   Distance Tunneling (cm) 2.3 cm 11/09/22 1401   Tunneling Position ___ O'Clock 12 11/09/22 1401   Wound Assessment Exposed structure tendon;Pink/red;Slough;Pale granulation tissue 11/09/22 1401   Drainage Amount Copious 11/09/22 1401   Drainage Description Yellow;Brown;Serosanguinous 11/09/22 1401   Odor None 11/09/22 1401   Cherise-wound Assessment Intact 11/09/22 1401   Wound Thickness Description not for Pressure Injury Full thickness 10/26/22 0814   Number of days: 16        Elimination:  Continence: Bowel: {YES / EJ:66266}  Bladder: {YES / BE:71907}  Urinary Catheter: {Urinary Catheter:753947059}   Colostomy/Ileostomy/Ileal Conduit: {YES / AP:80126}       Date of Last BM: ***  No intake or output data in the 24 hours ending 11/11/22 1016  No intake/output data recorded.     Safety Concerns:     Miguel HAYES Safety Concerns:121074565}    Impairments/Disabilities: [address] [facility] Impairments/Disabilities:497435235}    Nutrition Therapy:  Current Nutrition Therapy:   [address] [facility] Diet List:246394077}    Routes of Feeding: {[facility] DME Other Feedings:330814008}  Liquids: {Slp liquid thickness:86055}  Daily Fluid Restriction: {CHP DME Yes amt example:835346279}  Last Modified Barium Swallow with Video (Video Swallowing Test): {Done Not Done CZMN:388723621}    Treatments at the Time of Hospital Discharge:   Respiratory Treatments: ***  Oxygen Therapy:  {Therapy; copd oxygen:48895}  Ventilator:    { CC Vent JBIK:924346161}    Rehab Therapies: {THERAPEUTIC INTERVENTION:0444108054}  Weight Bearing Status/Restrictions: [address] [place] [address] Weight Bearin}  Other Medical Equipment (for information only, NOT a DME order):  {EQUIPMENT:566609935}  Other Treatments: ***    Patient's personal belongings (please select all that are sent with patient):  {[facility] DME Belongings:855300296}    RN SIGNATURE:  {Esignature:626327638}    CASE MANAGEMENT/SOCIAL WORK SECTION    Inpatient Status Date: ***    Readmission Risk Assessment Score:  Readmission Risk              Risk of Unplanned Readmission:  0           Discharging to Facility/ Agency   Name:   Address:  Phone:  Fax:    Dialysis Facility (if applicable)   Name:  Address:  Dialysis Schedule:  Phone:  Fax:    / signature: {Esignature:477533624}    PHYSICIAN SECTION    Prognosis: {Prognosis:6530460974}    Condition at Discharge: [address] Patient Condition:821358672}    Rehab Potential (if transferring to Rehab): {Prognosis:8075328175}    Recommended Labs or Other Treatments After Discharge: ***    Physician Certification: I certify the above information and transfer of Catherine Figueroa  is necessary for the continuing treatment of the diagnosis listed and that she requires {Admit to Appropriate Level of Care:48981} for {GREATER/LESS:186202113} 30 days.      Update Admission H&P: {CHP DME Changes in SSEIV:932395729}    PHYSICIAN SIGNATURE: {[facility]:431062738}

## 2022-11-11 NOTE — DISCHARGE SUMMARY
Hospitalist Discharge Summary    Patient ID: Gerhard Malave   Patient : 1945  Patient's PCP: Layne Atkins MD    Admit Date: 11/3/2022   Admitting Physician: No admitting provider for patient encounter. Discharge Date:  2022   Discharge Physician: Tracy Wong DO   Discharge Condition: Stable  Discharge Disposition: Home with  [facility]      Discharge Diagnoses: Active Hospital Problems    Diagnosis Date Noted    Moderate protein-calorie malnutrition ([facility] [E44.0] 2022     Priority: Medium    Fall [W19. 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 female presented with FALL,  HAS A KNOWN HISTORY OF SQ. CELL CARCINOMA  OF THE MOUTH. HAD A FIBULA BONE GRAFT . FOUND TO BE ORTHOSTATIC  . HER PT SCORES ARE 16 , SHE WILL GO HOME WITH HHC AND OXYGEN DUE TO HYPOXIA WITH AMBULATION           PHYSICAL EXAM:    /60   Pulse 80   Temp 97.2 °F (36.2 °C) (Temporal)   Resp 17   Ht 5' 2\" (1.575 m)   Wt 137 lb (62.1 kg)   LMP  (LMP Unknown)   SpO2 95%   BMI 25.06 kg/m²     General appearance:  No apparent distress, appears stated age. HEENT:  Normal cephalic, atraumatic without obvious deformity. Pupils equal, round, and reactive to light. Extra ocular muscles intact. Conjunctivae/corneas clear. WHITE COATING  ON TONGUE  Neck: Supple, with full range of motion. No jugular venous distention. Trachea midline. Respiratory:  Normal respiratory effort.  Clear to auscultation,   Cardiovascular: RRR  Abdomen: Soft, non-tender, non-distended with normal bowel sounds. Musculoskeletal:  No clubbing, cyanosis NO edema bilaterally. DSD INTACT LLE  Skin: smooth and dry   Neurologic:   Cranial nerves: II-XII intact,   Psychiatric:  Alert and oriented x 3       Prior to Admission medications    Medication Sig Start Date End Date Taking? Authorizing Provider   carvedilol (COREG) 3.125 MG tablet Take 1 tablet by mouth 2 times daily (with meals) 11/5/22  Yes Bryson Burks DO   clotrimazole [facility]) 10 MG amadou Take 1 tablet by mouth 5 times daily for 44 doses 11/5/22 11/14/22 Yes Bryson Burks DO   escitalopram (LEXAPRO) 10 MG tablet Take 1 tablet by mouth daily 10/11/22   Historical Provider, MD   famotidine (PEPCID) 20 MG tablet Take 1 tablet by mouth 2 times daily 10/11/22   Historical Provider, MD   traMADol (ULTRAM) 50 MG tablet Take 1 tablet by mouth every 8 hours as needed. 10/26/22   Historical Provider, MD   oxyCODONE (ROXICODONE) 5 MG immediate release tablet Take 1 tablet by mouth every 8 hours as needed. 11/1/22   Historical Provider, MD   levoFLOXacin (LEVAQUIN) 750 MG tablet Take 750 mg by mouth daily Last dose to be Sunday 11/6/22    Historical Provider, MD   gabapentin (NEURONTIN) 100 MG capsule Take 100 mg by mouth every 8 (eight) hours.     Historical Provider, MD   magnesium oxide (MAG-OX) 400 MG tablet Take 400 mg by mouth daily    Historical Provider, MD   apixaban (ELIQUIS) 5 MG TABS tablet TAKE 1 TABLET TWICE DAILY 5/14/21   Haroldo Perez MD   fluticasone (FLONASE) 50 MCG/ACT nasal spray 1 spray by Each Nostril route as needed for Rhinitis    Historical Provider, MD Wells Je 18 MCG inhalation capsule Inhale 18 mcg into the lungs daily  8/23/15   Historical Provider, MD       Consults:   IP CONSULT TO SOCIAL WORK  IP CONSULT TO VASCULAR SURGERY  IP CONSULT TO CARDIOLOGY  IP CONSULT TO DIETITIAN  IP CONSULT TO HOME CARE NEEDS            Discharge Instructions / Follow up:    Future Appointments   Date Time Provider Sam Karolina   11/16/2022  2:15 PM Lois Mckoy MD [facility] WOUND [facility]   11/28/2022 10:00 AM SEB XRAY RM 2 [facility] RAD [facility]       Continued appropriate risk factor modification of blood pressure, diabetes and serum lipids will remain essential to reducing risk of future atherosclerotic development    Activity: activity as tolerated    Significant labs:  CBC:   No results for input(s): WBC, RBC, HGB, HCT, MCV, RDW, PLT in the last 72 hours. BMP: No results for input(s): NA, K, CL, CO2, BUN, CREATININE, CA, MG, PHOS in the last 72 hours. LFT:  No results for input(s): PROT, ALB, ALKPHOS, ALT, AST, BILITOT, AMYLASE, LIPASE in the last 72 hours. PT/INR: No results for input(s): INR, APTT in the last 72 hours. BNP: No results for input(s): BNP in the last 72 hours. Hgb A1C:   Lab Results   Component Value Date    LABA1C 5.0 11/02/2022     Folate and B12:   Lab Results   Component Value Date    TZKTTXFR82 509 07/14/2016   ,   Lab Results   Component Value Date    FOLATE >20.0 07/14/2016     Thyroid Studies:   Lab Results   Component Value Date    TSH 2.110 05/25/2016       Urinalysis:    Lab Results   Component Value Date/Time    NITRU Negative 11/03/2022 10:15 PM    [address] 10/05/2016 11:20 AM    BACTERIA FEW 10/05/2016 11:20 AM    RBCUA 0-1 10/05/2016 11:20 AM    BLOODU Negative 11/03/2022 10:15 PM    SPECGRAV >=1.030 11/03/2022 10:15 PM    GLUCOSEU Negative 11/03/2022 10:15 PM       Imaging:  XR TIBIA FIBULA LEFT (2 VIEWS)    Result Date: 11/3/2022  EXAMINATION: XRAY VIEWS OF THE LEFT TIBIA AND FIBULA 11/3/2022 9:44 pm COMPARISON: None. HISTORY: ORDERING SYSTEM PROVIDED HISTORY: wound, leg pain TECHNOLOGIST PROVIDED HISTORY: Reason for exam:->wound, leg pain What reading provider will be dictating this exam?->CRC FINDINGS: No acute fracture or dislocation is identified. No radiographic evidence of acute osteomyelitis.   Evidence of previous partial fibular resection. There is a soft tissue wound involving the lateral left lower leg. Soft tissue wound involving the lateral left lower leg. Previous partial resection of the left fibula. No radiographic evidence for acute osteomyelitis. CT HEAD WO CONTRAST    Result Date: 11/4/2022  EXAMINATION: CT OF THE HEAD WITHOUT CONTRAST  11/3/2022 11:26 pm TECHNIQUE: CT of the head was performed without the administration of intravenous contrast. Automated exposure control, iterative reconstruction, and/or weight based adjustment of the mA/kV was utilized to reduce the radiation dose to as low as reasonably achievable. COMPARISON: None. HISTORY: ORDERING SYSTEM PROVIDED HISTORY: Evaluate intracranial abnormality TECHNOLOGIST PROVIDED HISTORY: Has a \"code stroke\" or \"stroke alert\" been called? ->No Reason for exam:->Evaluate intracranial abnormality Decision Support Exception - unselect if not a suspected or confirmed emergency medical condition->Emergency Medical Condition (MA) What reading provider will be dictating this exam?->CRC FINDINGS: BRAIN/VENTRICLES: There is no acute intracranial hemorrhage, mass effect or midline shift. No abnormal extra-axial fluid collection. The gray-white differentiation is maintained without evidence of an acute infarct. There is no evidence of hydrocephalus. ORBITS: The visualized portion of the orbits demonstrate no acute abnormality. SINUSES: The visualized paranasal sinuses and mastoid air cells demonstrate no acute abnormality. SOFT TISSUES/SKULL:  No acute abnormality of the visualized skull or soft tissues. No acute intracranial abnormality. CT CERVICAL SPINE WO CONTRAST    Result Date: 11/3/2022  EXAMINATION: CT OF THE CERVICAL SPINE WITHOUT CONTRAST 11/3/2022 11:26 pm TECHNIQUE: CT of the cervical spine was performed without the administration of intravenous contrast. Multiplanar reformatted images are provided for review.  Automated exposure control, iterative reconstruction, and/or weight based adjustment of the mA/kV was utilized to reduce the radiation dose to as low as reasonably achievable. COMPARISON: None. HISTORY: ORDERING SYSTEM PROVIDED HISTORY: fall TECHNOLOGIST PROVIDED HISTORY: Reason for exam:->fall Decision Support Exception - unselect if not a suspected or confirmed emergency medical condition->Emergency Medical Condition (MA) What reading provider will be dictating this exam?->CRC FINDINGS: BONES/ALIGNMENT: There is no acute fracture or traumatic malalignment. DEGENERATIVE CHANGES: Multilevel degenerative changes. SOFT TISSUES: There is no prevertebral soft tissue swelling. No acute abnormality of the cervical spine. Multilevel degenerative changes. XR CHEST PORTABLE    Result Date: 11/3/2022  EXAMINATION: ONE XRAY VIEW OF THE CHEST 11/3/2022 9:19 pm COMPARISON: 04/24/2017 HISTORY: ORDERING SYSTEM PROVIDED HISTORY: weakness TECHNOLOGIST PROVIDED HISTORY: Reason for exam:->weakness What reading provider will be dictating this exam?->CRC FINDINGS: The lungs are without acute focal process. There is no effusion or pneumothorax. The cardiomediastinal silhouette is without acute process. The osseous structures are without acute process. Left-sided transvenous pacer device. No acute process.        Discharge Medications:      Medication List        START taking these medications      clotrimazole 10 MG amadou  Commonly known as: MYCELEX  Take 1 tablet by mouth 5 times daily for 44 doses            CHANGE how you take these medications      carvedilol 3.125 MG tablet  Commonly known as: COREG  Take 1 tablet by mouth 2 times daily (with meals)  What changed:   medication strength  how much to take            CONTINUE taking these medications      apixaban 5 MG Tabs tablet  Commonly known as: Eliquis  TAKE 1 TABLET TWICE DAILY     escitalopram 10 MG tablet  Commonly known as: LEXAPRO     famotidine 20 MG tablet  Commonly known as: PEPCID     fluticasone 50 MCG/ACT nasal spray  Commonly known as: FLONASE     gabapentin 100 MG capsule  Commonly known as: NEURONTIN     levoFLOXacin 750 MG tablet  Commonly known as: LEVAQUIN     magnesium oxide 400 MG tablet  Commonly known as: MAG-OX     oxyCODONE 5 MG immediate release tablet  Commonly known as: ROXICODONE     Spiriva HandiHaler 18 MCG inhalation capsule  Generic drug: tiotropium     traMADol 50 MG tablet  Commonly known as: ULTRAM               Where to Get Your Medications        [address] [facility]  [phone] [place] [phone]  [address] [facility] [facility] [phone]      Phone: [phone]   carvedilol 3.125 MG tablet  clotrimazole 10 MG amadou         Time Spent on discharge is 45 minutes in the review of medications and discharge plan  and exam.    +++++++++++++++++++++++++++++++++++++++++++++++++  David Mann, DO  [address], [place]  +++++++++++++++++++++++++++++++++++++++++++++++++  NOTE: This report was transcribed using voice recognition software. Every effort was made to ensure accuracy; however, inadvertent computerized transcription errors may be present.

## 2022-11-16 ENCOUNTER — HOSPITAL ENCOUNTER (OUTPATIENT)
Dept: WOUND CARE | Age: 77
Discharge: HOME | End: 2022-11-16
Payer: MEDICARE

## 2022-11-16 VITALS
HEART RATE: 87 BPM | HEIGHT: 62 IN | DIASTOLIC BLOOD PRESSURE: 50 MMHG | TEMPERATURE: 97.3 F | SYSTOLIC BLOOD PRESSURE: 112 MMHG | BODY MASS INDEX: 25.21 KG/M2 | WEIGHT: 137 LBS

## 2022-11-16 DIAGNOSIS — L97.923 NON-PRESSURE CHRONIC ULCER OF LEFT LOWER LEG WITH NECROSIS OF MUSCLE (HCC): Primary | ICD-10-CM

## 2022-11-16 PROCEDURE — 11042 DBRDMT SUBQ TIS 1ST 20SQCM/<: CPT

## 2022-11-16 PROCEDURE — 11045 DBRDMT SUBQ TISS EACH ADDL: CPT

## 2022-11-16 PROCEDURE — 11043 DBRDMT MUSC&/FSCA 1ST 20/<: CPT | Performed by: SURGERY

## 2022-11-16 PROCEDURE — 11046 DBRDMT MUSC&/FSCA EA ADDL: CPT | Performed by: SURGERY

## 2022-11-16 RX ORDER — BACITRACIN, NEOMYCIN, POLYMYXIN B 400; 3.5; 5 [USP'U]/G; MG/G; [USP'U]/G
OINTMENT TOPICAL ONCE
Status: CANCELLED | OUTPATIENT
Start: 2022-11-16 | End: 2022-11-16

## 2022-11-16 RX ORDER — LIDOCAINE HYDROCHLORIDE 40 MG/ML
SOLUTION TOPICAL ONCE
Status: COMPLETED | OUTPATIENT
Start: 2022-11-16 | End: 2022-11-16

## 2022-11-16 RX ORDER — GENTAMICIN SULFATE 1 MG/G
OINTMENT TOPICAL ONCE
Status: CANCELLED | OUTPATIENT
Start: 2022-11-16 | End: 2022-11-16

## 2022-11-16 RX ORDER — LIDOCAINE 50 MG/G
OINTMENT TOPICAL ONCE
Status: CANCELLED | OUTPATIENT
Start: 2022-11-16 | End: 2022-11-16

## 2022-11-16 RX ORDER — CLOBETASOL PROPIONATE 0.5 MG/G
OINTMENT TOPICAL ONCE
Status: CANCELLED | OUTPATIENT
Start: 2022-11-16 | End: 2022-11-16

## 2022-11-16 RX ORDER — LIDOCAINE HYDROCHLORIDE 40 MG/ML
SOLUTION TOPICAL ONCE
Status: CANCELLED | OUTPATIENT
Start: 2022-11-16 | End: 2022-11-16

## 2022-11-16 RX ORDER — LIDOCAINE 40 MG/G
CREAM TOPICAL ONCE
Status: CANCELLED | OUTPATIENT
Start: 2022-11-16 | End: 2022-11-16

## 2022-11-16 RX ORDER — LIDOCAINE HYDROCHLORIDE 20 MG/ML
JELLY TOPICAL ONCE
Status: CANCELLED | OUTPATIENT
Start: 2022-11-16 | End: 2022-11-16

## 2022-11-16 RX ORDER — BETAMETHASONE DIPROPIONATE 0.05 %
OINTMENT (GRAM) TOPICAL ONCE
Status: CANCELLED | OUTPATIENT
Start: 2022-11-16 | End: 2022-11-16

## 2022-11-16 RX ORDER — GINSENG 100 MG
CAPSULE ORAL ONCE
Status: CANCELLED | OUTPATIENT
Start: 2022-11-16 | End: 2022-11-16

## 2022-11-16 RX ORDER — BACITRACIN ZINC AND POLYMYXIN B SULFATE 500; 1000 [USP'U]/G; [USP'U]/G
OINTMENT TOPICAL ONCE
Status: CANCELLED | OUTPATIENT
Start: 2022-11-16 | End: 2022-11-16

## 2022-11-16 RX ADMIN — LIDOCAINE HYDROCHLORIDE 15 ML: 40 SOLUTION TOPICAL at 14:26

## 2022-11-16 NOTE — PLAN OF CARE
Problem: Chronic Conditions and Co-morbidities  Goal: Patient's chronic conditions and co-morbidity symptoms are monitored and maintained or improved  Outcome: Progressing     Problem: Cognitive:  Goal: Knowledge of wound care  Description: Knowledge of wound care  Outcome: Progressing  Goal: Understands risk factors for wounds  Description: Understands risk factors for wounds  Outcome: Progressing     Problem: Compression therapy:  Goal: Will be free from complications associated with compression therapy  Description: Will be free from complications associated with compression therapy  Outcome: Progressing

## 2022-11-17 NOTE — PROGRESS NOTES
Wound Healing Center Followup Visit Note    Referring Physician : Maggie Lange MD  [address] [place] [address] RECORD NUMBER:  [id number]  AGE: 65 y.o. GENDER: female  : 1945  EPISODE DATE:  2022    Subjective:     Chief Complaint   Patient presents with    Wound Check     Left leg      HISTORY of PRESENT ILLNESS HPI   Raphael Ortiz is a 65 y.o. female who presents today in regards to follow up evaluation and treatment of wound/ulcer. That patient's past medical, family and social hx were reviewed and changes were made if present. History of Wound Context:  The patient has had a wound of left calf which was first noted approximately 2022. This has been treated local wound care. On their initial visit to the wound healing center, 22,  the patient has noted that the wound has been improving after seeing Dr Radha Blackwood last week. The patient has not had similar previous wounds in the past.      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 She underwent right segmental mandibulectomy, bilateral selective neck dissection of levels 1 through 4, open tracheostomy on  with L fibula resection and implant of fibular free flap in her jaw. Per pt report wound in left leg was never closed. She receive preop chemotherapy, immunotherapy. She is no longer receiving chemotherapy. Her lymph node resection was negative. Her left calf postop wound from where she had bone/free flap taken has been non healing and they have had issues with it since immediately postop per pt and family. They said [facility] surgeon is aware, recommended local wound care and was going to see them in 2023. Her daughter had asked to follow with me going forward. She has peg in place and receives nutrition via. She follows with Dr. Taylor Conrad in regards to CKD. Pt is on abx at time of initial visit. She was started on levaquin per [facility] who she had culture sent to. 11/2/22  She does have an area of muscle that doesn't appear to be viable  She may need surgical debridement in the future  Coban 2, aquacell ag  She is already on levaquin from recent culture  No evidence of arterial occlusive disease  Labs ordered  Ok to cancel abis and pvrs  11/9/22  Wound slightly smaller  Coban 2, aquacell ag  Still may need surgical debridement in future  11/16/22  Wound smaller 109 (133)      Wound/Ulcer Pain Timing/Severity: mild  Quality of pain: aching  Severity:  1 / 10   Modifying Factors: Pain worsens with debridement, dressing changes  Associated Signs/Symptoms: drainage edema, pain    Ulcer Identification:  Ulcer Type: non-healing surgical  Contributing Factors: edema, immunosuppression, anticoagulation therapy, and malnutrition    Diabetic/Pressure/Non Pressure Ulcers only:  Ulcer: Non-Pressure ulcer, muscle necrosis  Wound: Wound dehiscence        PAST MEDICAL HISTORY      Diagnosis Date    Anemia     Arthritis     Bowel obstruction ([address] 04/28/2016    Cancer (HCC)     oral/mandible    CHF (congestive heart failure) (HCC)     COPD (chronic obstructive pulmonary disease) (HCC)     Hyperlipidemia     Hypertension     LBBB (left bundle branch block)     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 ([address] 10/26/2022    Nonischemic cardiomyopathy ([address]     Paroxysmal A-fib ([address]      Past Surgical History:   Procedure Laterality Date    ABDOMEN SURGERY  10/05/2016    exporation of abdominal fascial wound dehiscence close, insertion TLC right IJ    CARDIAC DEFIBRILLATOR PLACEMENT Left 05/23/2007    CARDIAC DEFIBRILLATOR PLACEMENT  04/17/2018    BIV ICD GENT CHANGE ([facility])    VAMSHI    CARPAL TUNNEL RELEASE      DIAGNOSTIC CARDIAC CATH LAB PROCEDURE      ENDOSCOPY, COLON, DIAGNOSTIC  01/26/2018    upper endo    HERNIA REPAIR      HYSTERECTOMY (CERVIX STATUS UNKNOWN)      ILEOSTOMY OR JEJUNOSTOMY      INCISIONAL HERNIA REPAIR  04/21/2017    LAPAROSCOPIC; WITH MESH MANDIBLE SURGERY      OTHER SURGICAL HISTORY      ostomy placed having reversal done on 9/30/2016    OTHER SURGICAL HISTORY  09/30/2016    open reveral ileostomy    OTHER SURGICAL HISTORY  01/07/2009    BiV/ICD    OTHER SURGICAL HISTORY Left     vascularized fibular graft harvest [facility] 9/22     Family History   Problem Relation Age of Onset    Alzheimer's Disease Mother     Heart Disease Father     Cancer Father     Alzheimer's Disease Father     Cancer Brother      Social History     Tobacco Use    Smoking status: Former     Types: Cigarettes     Quit date: 12/19/2006     Years since quitting: 15.9    Smokeless tobacco: Never   Vaping Use    Vaping Use: Never used   Substance Use Topics    Alcohol use: Not Currently     Comment: occasional    Drug use: No     No Known Allergies  Current Outpatient Medications on File Prior to Encounter   Medication Sig Dispense Refill    carvedilol (COREG) 3.125 MG tablet Take 1 tablet by mouth 2 times daily (with meals) 60 tablet 3    escitalopram (LEXAPRO) 10 MG tablet Take 1 tablet by mouth daily      famotidine (PEPCID) 20 MG tablet Take 1 tablet by mouth 2 times daily      traMADol (ULTRAM) 50 MG tablet Take 1 tablet by mouth every 8 hours as needed. oxyCODONE (ROXICODONE) 5 MG immediate release tablet Take 1 tablet by mouth every 8 hours as needed. levoFLOXacin (LEVAQUIN) 750 MG tablet Take 750 mg by mouth daily Last dose to be Sunday 11/6/22      gabapentin (NEURONTIN) 100 MG capsule Take 100 mg by mouth every 8 (eight) hours. magnesium oxide (MAG-OX) 400 MG tablet Take 400 mg by mouth daily      apixaban (ELIQUIS) 5 MG TABS tablet TAKE 1 TABLET TWICE DAILY 180 tablet 3    fluticasone (FLONASE) 50 MCG/ACT nasal spray 1 spray by Each Nostril route as needed for Rhinitis      SPIRIVA HANDIHALER 18 MCG inhalation capsule Inhale 18 mcg into the lungs daily        No current facility-administered medications on file prior to encounter.        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 ([address] - Primary (Chronic)    Relevant Orders    Initiate Outpatient Wound Care Protocol       Pre Debridement Measurements:  Are located in the [place]  Documentation Flow Sheet  Post Debridement Measurements:  Wound/Ulcer Descriptions are Pre Debridement except measurements:    Incision 10/05/16 Abdomen Mid (Active)   Number of days: 4397       Incision 04/21/17 Abdomen Right;Left;Mid (Active)   Number of days: 2035       Wound 10/26/22 Leg Left;Lateral #1 (Active)   Wound Image   11/16/22 1422   Wound Etiology Non-Healing Surgical 11/07/22 1415   Dressing Status New dressing applied 11/16/22 1537   Wound Cleansed Cleansed with saline 11/16/22 1537   Dressing/Treatment Alginate with Ag;ABD 11/16/22 1537   Offloading for Diabetic Foot Ulcers Offloading not required 11/16/22 1537   Wound Length (cm) 21 cm 11/16/22 1422   Wound Width (cm) 5.2 cm 11/16/22 1422   Wound Depth (cm) 1.1 cm 11/16/22 1422   Wound Surface Area (cm^2) 109.2 cm^2 11/16/22 1422   Change in Wound Size % (l*w) 11.36 11/16/22 1422   Wound Volume (cm^3) 120.12 cm^3 11/16/22 1422   Wound Healing % -95 11/16/22 1422   Post-Procedure Length (cm) 21.2 cm 11/16/22 1521   Post-Procedure Width (cm) 5.3 cm 11/16/22 1521   Post-Procedure Depth (cm) 1.3 cm 11/16/22 1521   Post-Procedure Surface Area (cm^2) 112.36 cm^2 11/16/22 1521 Post-Procedure Volume (cm^3) 146.068 cm^3 11/16/22 1521   Distance Tunneling (cm) 2.3 cm 11/16/22 1521   Tunneling Position ___ O'Clock 12 11/16/22 1521   Wound Assessment Exposed structure tendon;Exposed structure fascia 11/16/22 1422   Drainage Amount Copious 11/16/22 1422   Drainage Description Thick; Yellow 11/16/22 1422   Odor None 11/16/22 1422   Cehrise-wound Assessment Dry/flaky 11/16/22 1422   Wound Thickness Description not for Pressure Injury Full thickness 10/26/22 0814   Number of days: 21          Procedure Note  Indications:  Based on my examination of this patient's wound(s)/ulcer(s) today, debridement is required to promote healing and evaluate the wound base. Performed by: Sanam Joyner MD    Consent obtained:  Yes    Time out taken:  Yes    Pain Control: Anesthetic  Anesthetic: 4% Lidocaine Liquid Topical     Debridement:Excisional Debridement    Using curette the wound(s)/ulcer(s) was/were sharply debrided down through and including the removal of epidermis, dermis, subcutaneous tissue, and muscle/fascia. Devitalized Tissue Debrided:  fibrin, biofilm, slough, and exudate to stimulate bleeding to promote healing, post debridement good bleeding base and wound edges noted    Wound/Ulcer #: 1    Percent of Wound/Ulcer Debrided: 30%    Total Surface Area Debrided:  40 sq cm     Estimated Blood Loss:  Minimal  Hemostasis Achieved:  by pressure    Procedural Pain:  3  / 10   Post Procedural Pain:  2 / 10     Response to treatment:  Well tolerated by patient. Plan:   Treatment Note please see attached Discharge Instructions    Written patient dismissal instructions given to patient and signed by patient or POA.          Discharge Instructions         Discharge condition: Stable     Assessment of pain at discharge:minimal     Anesthetic used: 4% lidocaine solution     Discharge to: Home     Left via:Private automobile     Accompanied by: family      ECF/HHA: [facility] Dressing Orders:LEFT LATERAL LOWER LEG- cleanse with normal saline, pack loosely with calcium alginate ag (also loosely pack area tunneling at 12 of 2.3 cm), ABD pads and coban 2. Change Monday- Wednesday (at [facility])- Friday. Treatment Orders:Eat a diet high in protein and vitamin C. Take a multiple vitamin daily unless contraindicated. Elevate leg above the level of the heart as much as possible throughout the day. [facility] followup visit:1 week_____________________________  (Please note your next appointment above and if you are unable to keep, kindly give a 24 hour notice. Thank you.)     Physician signature:__________________________      If you experience any of the following, please call the [email or web address] during business hours:     * Increase in Pain  * Temperature over 101  * Increase in drainage from your wound  * Drainage with a foul odor  * Bleeding  * Increase in swelling  * Need for compression bandage changes due to slippage, breakthrough drainage. If you need medical attention outside of the business hours of the [facility] please contact your PCP or go to the nearest emergency room.        Electronically signed by Giuseppe Arceo MD on 11/16/2022 at 10:02 PM

## 2022-11-21 NOTE — PROGRESS NOTES
Physician Progress Note      PATIENT:               Colin Alatorre  CSN #:                  [id number]  :                       1945  ADMIT DATE:       11/3/2022 9:08 PM  [address] DATE:        2022 5:36 PM  RESPONDING  PROVIDER #:        Alistair Tena DO          QUERY TEXT:    Patient admitted with Orthostatic Hypotension. Noted documentation of acute   respiratory failure with hypoxia in Discharge Summary. In order to support the   diagnosis of acute respiratory failure, please include additional clinical   indicators in your documentation. Or please document if the diagnosis of   acute respiratory failure has been ruled out after further study. The medical record reflects the following:  Risk Factors: PAF; Chronic Illness  Clinical Indicators:  22: Per H&P: no apparent distress; Respiratory: normal respiratory   effort. 22: Per Discharge Summary: acute respiratory failure with hypoxia. She   will go home with oxygen due to hypoxia with ambulation  11/3/22: 95% on RA;  22: 99% on 2 liters which is what she was on for hospital stay; 88% RA  Respiratory Rate 13-20 throughout stay  Treatment: Pulse oximetry monitoring    Thank You,  Berta OATESN, R.N.  Clinical Documentation Improvement  [phone]    Acute Respiratory Failure Clinical Indicators per [employer] [employer] Training Guide and   Quick Reference Guide:  pO2 < 60 mmHg  pCO2 > 50 and pH < 7.35  P/F ratio (pO2 / FIO2) < 300  pO2 decrease or pCO2 increase by 10 mmHg from baseline (if known)  Supplemental oxygen of 40% or more  Presence of respiratory distress, tachypnea, dyspnea, shortness of breath,   wheezing  Unable to speak in complete sentences  Use of accessory muscles to breathe  Extreme anxiety and feeling of impending doom  Tripod position  Confusion/altered mental status/obtunded  Options provided:  -- Acute Respiratory Failure with hypoxia as evidenced by, Please document   evidence.   -- Acute Respiratory Failure with hypoxia ruled out after study  -- Other - I will add my own diagnosis  -- Disagree - Not applicable / Not valid  -- Disagree - Clinically unable to determine / Unknown  -- Refer to Clinical Documentation Reviewer    PROVIDER RESPONSE TEXT:    This patient is in acute respiratory failure with hypoxia as evidenced by   SATURATION 88% on room air    Query created by: Leidy Arazte on 11/14/2022 12:20 PM      Electronically signed by:  Amy Carlin DO 11/21/2022 1:49 PM

## 2022-11-21 NOTE — DISCHARGE INSTRUCTIONS
Discharge condition: Stable     Assessment of pain at discharge:minimal     Anesthetic used: 4% lidocaine solution     Discharge to: Home     Left via:Private automobile     Accompanied by: family      ECF/HHA: [address]- cleanse with normal saline, pack loosely with calcium alginate ag (also loosely pack area tunneling at 12 of 2.3 cmand areas of undermoning), ABD pads and coban 2. Change Monday- Wednesday (at [address] [place] [address] Avenue,[address])- Friday. Treatment Orders:Eat a diet high in protein and vitamin C. Take a multiple vitamin daily unless contraindicated. Elevate leg above the level of the heart as much as possible throughout the day. [address] [place] [address],[address] followup visit:1 week_____________________________  (Please note your next appointment above and if you are unable to keep, kindly give a 24 hour notice. Thank you.)     Physician signature:__________________________      If you experience any of the following, please call the [facility] during business hours:     * Increase in Pain  * Temperature over 101  * Increase in drainage from your wound  * Drainage with a foul odor  * Bleeding  * Increase in swelling  * Need for compression bandage changes due to slippage, breakthrough drainage. If you need medical attention outside of the business hours of the [facility] please contact your PCP or go to the nearest emergency room.

## 2022-11-23 ENCOUNTER — HOSPITAL ENCOUNTER (OUTPATIENT)
Dept: WOUND CARE | Age: 77
Discharge: HOME | End: 2022-11-23
Payer: MEDICARE

## 2022-11-23 VITALS
SYSTOLIC BLOOD PRESSURE: 133 MMHG | RESPIRATION RATE: 18 BRPM | TEMPERATURE: 97.3 F | HEART RATE: 86 BPM | DIASTOLIC BLOOD PRESSURE: 70 MMHG

## 2022-11-23 DIAGNOSIS — L97.923 NON-PRESSURE CHRONIC ULCER OF LEFT LOWER LEG WITH NECROSIS OF MUSCLE (HCC): Primary | ICD-10-CM

## 2022-11-23 PROCEDURE — 11046 DBRDMT MUSC&/FSCA EA ADDL: CPT

## 2022-11-23 PROCEDURE — 11043 DBRDMT MUSC&/FSCA 1ST 20/<: CPT

## 2022-11-23 RX ORDER — GENTAMICIN SULFATE 1 MG/G
OINTMENT TOPICAL ONCE
Status: CANCELLED | OUTPATIENT
Start: 2022-11-23 | End: 2022-11-23

## 2022-11-23 RX ORDER — LIDOCAINE HYDROCHLORIDE 40 MG/ML
SOLUTION TOPICAL ONCE
Status: COMPLETED | OUTPATIENT
Start: 2022-11-23 | End: 2022-11-23

## 2022-11-23 RX ORDER — LIDOCAINE HYDROCHLORIDE 20 MG/ML
JELLY TOPICAL ONCE
Status: CANCELLED | OUTPATIENT
Start: 2022-11-23 | End: 2022-11-23

## 2022-11-23 RX ORDER — GINSENG 100 MG
CAPSULE ORAL ONCE
Status: CANCELLED | OUTPATIENT
Start: 2022-11-23 | End: 2022-11-23

## 2022-11-23 RX ORDER — BETAMETHASONE DIPROPIONATE 0.05 %
OINTMENT (GRAM) TOPICAL ONCE
Status: CANCELLED | OUTPATIENT
Start: 2022-11-23 | End: 2022-11-23

## 2022-11-23 RX ORDER — LIDOCAINE HYDROCHLORIDE 40 MG/ML
SOLUTION TOPICAL ONCE
Status: CANCELLED | OUTPATIENT
Start: 2022-11-23 | End: 2022-11-23

## 2022-11-23 RX ORDER — BACITRACIN ZINC AND POLYMYXIN B SULFATE 500; 1000 [USP'U]/G; [USP'U]/G
OINTMENT TOPICAL ONCE
Status: CANCELLED | OUTPATIENT
Start: 2022-11-23 | End: 2022-11-23

## 2022-11-23 RX ORDER — BACITRACIN, NEOMYCIN, POLYMYXIN B 400; 3.5; 5 [USP'U]/G; MG/G; [USP'U]/G
OINTMENT TOPICAL ONCE
Status: CANCELLED | OUTPATIENT
Start: 2022-11-23 | End: 2022-11-23

## 2022-11-23 RX ORDER — CLOBETASOL PROPIONATE 0.5 MG/G
OINTMENT TOPICAL ONCE
Status: CANCELLED | OUTPATIENT
Start: 2022-11-23 | End: 2022-11-23

## 2022-11-23 RX ORDER — LIDOCAINE 40 MG/G
CREAM TOPICAL ONCE
Status: CANCELLED | OUTPATIENT
Start: 2022-11-23 | End: 2022-11-23

## 2022-11-23 RX ORDER — LIDOCAINE 50 MG/G
OINTMENT TOPICAL ONCE
Status: CANCELLED | OUTPATIENT
Start: 2022-11-23 | End: 2022-11-23

## 2022-11-23 RX ADMIN — LIDOCAINE HYDROCHLORIDE 10 ML: 40 SOLUTION TOPICAL at 14:35

## 2022-11-23 NOTE — PLAN OF CARE
Problem: Chronic Conditions and Co-morbidities  Goal: Patient's chronic conditions and co-morbidity symptoms are monitored and maintained or improved  Outcome: Progressing     Problem: Wound:  Goal: Will show signs of wound healing; wound closure and no evidence of infection  Description: Will show signs of wound healing; wound closure and no evidence of infection  Outcome: Progressing     Problem: Cognitive:  Goal: Knowledge of wound care  Description: Knowledge of wound care  Outcome: Progressing  Goal: Understands risk factors for wounds  Description: Understands risk factors for wounds  Outcome: Progressing     Problem: Compression therapy:  Goal: Will be free from complications associated with compression therapy  Description: Will be free from complications associated with compression therapy  Outcome: Progressing

## 2022-11-28 ENCOUNTER — HOSPITAL ENCOUNTER (OUTPATIENT)
Dept: GENERAL RADIOLOGY | Age: 77
Discharge: HOME | End: 2022-11-30
Payer: MEDICARE

## 2022-11-28 DIAGNOSIS — R13.10 DYSPHAGIA, UNSPECIFIED TYPE: ICD-10-CM

## 2022-11-28 PROCEDURE — 74230 X-RAY XM SWLNG FUNCJ C+: CPT

## 2022-11-28 PROCEDURE — 6360000004 HC RX CONTRAST MEDICATION: Performed by: RADIOLOGY

## 2022-11-28 PROCEDURE — 2500000003 HC RX 250 WO HCPCS: Performed by: RADIOLOGY

## 2022-11-28 PROCEDURE — A4641 RADIOPHARM DX AGENT NOC: HCPCS | Performed by: RADIOLOGY

## 2022-11-28 PROCEDURE — 92526 ORAL FUNCTION THERAPY: CPT | Performed by: SPEECH-LANGUAGE PATHOLOGIST

## 2022-11-28 PROCEDURE — 92611 MOTION FLUOROSCOPY/SWALLOW: CPT | Performed by: SPEECH-LANGUAGE PATHOLOGIST

## 2022-11-28 RX ADMIN — BARIUM SULFATE 120 ML: 400 SUSPENSION ORAL at 10:44

## 2022-11-28 RX ADMIN — BARIUM SULFATE 70 G: 0.81 POWDER, FOR SUSPENSION ORAL at 10:44

## 2022-11-28 RX ADMIN — BARIUM SULFATE 10 ML: 400 PASTE ORAL at 10:39

## 2022-11-28 RX ADMIN — BARIUM SULFATE 1 TABLET: 700 TABLET ORAL at 10:43

## 2022-11-28 NOTE — PROGRESS NOTES
SPEECH/LANGUAGE PATHOLOGY  VIDEOFLUOROSCOPIC STUDY OF SWALLOWING (MBS)   and PLAN OF CARE    PATIENT NAME:  Leatha Huggins  (female)     MRN:  [id number]    :  1945  (65 y.o.)  STATUS:  Outpatient    TODAY'S DATE:  2022  REFERRING PROVIDER:   Salvador Perez MD   SPECIFIC PROVIDER ORDER: FL modified barium swallow with video   REASON FOR REFERRAL: assess for aspiration    EVALUATING THERAPIST: PINA Vivar      RESULTS:      DYSPHAGIA DIAGNOSIS:  moderate-severe oral phase dysphagia and mild pharyngeal phase dysphagia     DIET RECOMMENDATIONS:  Pureed consistency solids (IDDSI level 4) with small sips of thin liquids (IDDSI level 0)     Pt will require continued PEG feedings at this time to meet nutritional needs. Pt may benefit from a nutrition consult to adjust tube feeds as needed and provide oral supplement options to attempt to begin to rely more on oral feedings rather than non oral at this time. Med administration: Per patient choice/treating SLP judgement    FEEDING RECOMMENDATIONS:    Assistance level:  No assistance needed     Compensatory strategies recommended: Small bites/sips, Alternate solids and liquids, and Check for oral pocketing     Discussed recommendations with nursing and/or faxed report to referring provider: Yes    Laryngeal Penetration and Aspiration:  Penetration WITHOUT aspiration was observed in today's study with  thin liquid, mildly thick liquid (nectar)    SPEECH THERAPY  PLAN OF CARE   The dysphagia POC is established based on physician order and dysphagia diagnosis    Outpatient OR Home Care Skilled SLP intervention for dysphagia management is recommended 1-2 times per week to address the established treatment plan      Conditions Requiring Skilled Therapeutic Intervention for dysphagia:    Patient is performing below functional baseline d/t  current acute condition, Multiple diagnoses, multiple medications, and increased dependency upon caregivers.   Reduced oral control of bolus   Oral motor strength/coordination impairment  Reduced laryngeal closure resulting in penetration  Swallow triggered when bolus head at level of pyriform sinus increasing risk of aspiration    SPECIFIC DYSPHAGIA INTERVENTIONS TO INCLUDE:     Compensatory strategy training   Therapeutic exercises  Trials of upgraded diet/liquid     Specific instructions for next treatment:  therapeutic po trial to determine safety of advanced diet textures and consistencies, initiate instruction of therapeutic exercises , and initiate instruction of compensatory strategies  Treatment Goals:    Short Term Goals:  Pt will implement identified compensatory swallowing strategies on 90% of opportunities or greater to improve airway protection and swallow function. Pt will complete PO trials of upgraded diet textures with SLP only to determine the least restrictive PO diet to maintain adequate nutrition/hydration with no more than 1 overt s/s of pen/asp. Pt will complete oral motor strength/ coordination exercises to improve bolus prep/ control and mastication with  minimal verbal prompts . Pt will complete laryngeal strength/ ROM therapeutic exercises to improve airway protection for the least restrictive PO diet minimal verbal prompts    Long Term Goals:   Pt will maintain adequate nutrition/hydration via PO intake of the least restrictive oral diet with implementation of safe swallow/ compensatory strategies and decrease signs/symptoms of aspiration to less than 1 x/day. Pt will improve oropharyngeal swallow function to ensure airway protection during PO intake to maintain adequate nutrition/hydration and decrease signs/symptoms of aspiration to less than 1 x/day.       Patient/family Goal:    To be able to eat regular foods and drink regular liquids    Plan of care discussed with Patient and Family   The Patient and Family understand(s) the diagnosis, prognosis and plan of care     Rehabilitation Potential/Prognosis: fair                      ADMITTING DIAGNOSIS: Dysphagia, unspecified type [R13.10]     VISIT DIAGNOSIS:   Visit Diagnoses         Codes    Dysphagia, unspecified type     R13.10                PATIENT REPORT/COMPLAINT: difficulty chewing  difficulty swallowing pills  patient currently on modified diet. PRIOR LEVEL OF SWALLOW FUNCTION:    Past History of Dysphagia?:  yes    Home diet: Pureed consistency solids (IDDSI level 4) with  thin liquids (IDDSI level 0) pleasure feeds, PEG tube  Current Diet Order:  No diet orders on file    PROCEDURE:  Consistencies Administered During the Evaluation   Liquids: thin liquid and nectar thick liquid   Solids:  pureed foods, solid foods-refused due to limited dentition, and OTHER:  Barium tablet      Method of Intake:   cup, straw-attempted and unable, spoon  Self fed      Position:   Seated, upright, Lateral plane    INSTRUMENTAL ASSESSMENT:    ORAL PREP/ ORAL PHASE:    Delayed A-P   Decreased bolus formation resulting in observed premature pharyngeal spillage  Piecemeal deglutition  Oral residuals were noted :  on the superior tongue, pt did not consistently sense residue and cues needed to clear     PHARYNGEAL PHASE:     ONSET TIME       Delayed initiation of the pharyngeal swallow was noted with swallow reflex triggered at the level of the pyriform sinus        PHARYNGEAL RESIDUALS        Vallecula/Pharyngeal Wall           No significant residuals were noted in the vallecula      Pyriform Sinuses      No significant residuals were noted in the pyriform sinuses     LARYNGEAL PENETRATION   Laryngeal penetration occurred in the absence of aspiration BEFORE the swallow for thin liquid and nectar consistency liquid due to decreased bolus formation and premature pharyngeal entry which cleared from the laryngeal vestibule spontaneously (transient).  Laryngeal penetration was mild and occurred consistently   an absent cough/throat clear was noted    ASPIRATION  Aspiration was not present during this evaluation    PENETRATION-ASPIRATION SCALE (PAS):  THIN 2 = Material enters the airway, remains above vocal folds, and is ejected from the airway   MILDLY THICK 2 = Material enters the airway, remains above vocal folds, and is ejected from the airway   MODERATELY THICK item not administered  PUREE 1 = Material does not enter the airway  HARD SOLID item not administered       COMPENSATORY STRATEGIES    Compensatory strategies that were beneficial included Small bites/sips and Alternate solids and liquids      STRUCTURAL/FUNCTIONAL ANOMALIES   Anatomical changes consistent with post surgical status    CERVICAL ESOPHAGEAL STAGE :     The cervical esophagus appeared adequate          ___________    Cognition:   Within functional limits for this exam    Oral Peripheral Examination   Decreased oral opening, decreased lingual ROM    Current Respiratory Status   room air     Parameters of Speech Production  Respiration:  Adequate for speech production  Quality:   Within functional limits  Intensity: Quiet    Dysarthria noted    Pain: No pain reported. EDUCATION:   The Speech Language Pathologist (SLP) completed education regarding results of evaluation and that intervention is warranted at this time. Learner: Patient and Family  Education: Reviewed results and recommendations of this evaluation  Evaluation of Education:  Grabiel Crews understanding    This plan may be re-evaluated and revised as warranted. Evaluation Time includes thorough review of current medical information, gathering information on past medical history/social history and prior level of function, completion of standardized testing/informal observation of tasks, assessment of data and education on plan of care and goals. INTERVENTION    Pt/family educated on above results and plan of care.  Pt/family trained on compensatory strategies for safe swallow and signs and symptoms of aspiration (throat clearing, coughing/choking, wet vocal quality. , etc.) and encouraged to notify staff if observed. Handouts provided as warranted. Pt/family encouraged to engage in question/answer session. All questions answered and pt/family verbalized understanding of above. [x]The admitting diagnosis and active problem list, have been reviewed prior to initiation of this evaluation. CPT Code: 69291  dysphagia study, 92021 dysphagia therapy    ACTIVE PROBLEM LIST:   Patient Active Problem List   Diagnosis    Cardiomyopathy, nonischemic (HCC)    Bundle branch block, left    Biventricular implantable cardioverter-defibrillator in situ    Hyperlipidemia    Blood loss anemia    Acute respiratory failure (HCC)    SIRS (systemic inflammatory response syndrome) (HCC)    Hypotension    History of ischemic colitis    Anemia    Acute renal failure (HCC)    Hypomagnesemia    Acute delirium    Incarcerated ventral hernia    Chronic kidney disease, stage III (moderate) (HCC)    Anemia of chronic renal failure    Implantable cardioverter-defibrillator (ICD) at end of device life    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

## 2022-11-29 NOTE — DISCHARGE INSTRUCTIONS
Discharge condition: Stable     Assessment of pain at discharge:minimal     Anesthetic used: 4% lidocaine solution     Discharge to: Home     Left via:Private automobile     Accompanied by: family      ECF/HHA: [address]- cleanse with normal saline, pack loosely with calcium alginate ag (also loosely pack area tunneling at 12 of 2.3 cmand areas of undermining), ABD pads and coban 2. Change Monday- Wednesday (at [address] [place] [address] Avenue,[address])- Friday. Treatment Orders:Eat a diet high in protein and vitamin C. Take a multiple vitamin daily unless contraindicated. Elevate leg above the level of the heart as much as possible throughout the day. [address] [place] [address],[address] followup visit:1 week_____________________________  (Please note your next appointment above and if you are unable to keep, kindly give a 24 hour notice. Thank you.)     Physician signature:__________________________      If you experience any of the following, please call the [facility] during business hours:     * Increase in Pain  * Temperature over 101  * Increase in drainage from your wound  * Drainage with a foul odor  * Bleeding  * Increase in swelling  * Need for compression bandage changes due to slippage, breakthrough drainage. If you need medical attention outside of the business hours of the [facility] please contact your PCP or go to the nearest emergency room.

## 2022-11-29 NOTE — PROGRESS NOTES
Wound Healing Center Followup Visit Note    Referring Physician : Brandon Farnsworth MD  [address] [facility] RECORD NUMBER:  [id number]  AGE: 65 y.o. GENDER: female  : 1945  EPISODE DATE:  2022    Subjective:     Chief Complaint   Patient presents with    Wound Check     Left leg      HISTORY of PRESENT ILLNESS HPI   Brad Edwards is a 65 y.o. female who presents today in regards to follow up evaluation and treatment of wound/ulcer. That patient's past medical, family and social hx were reviewed and changes were made if present. History of Wound Context:  The patient has had a wound of left calf which was first noted approximately 2022. This has been treated local wound care. On their initial visit to the wound healing center, 22,  the patient has noted that the wound has been improving after seeing Dr Deidre Holliday last week. The patient has not had similar previous wounds in the past.      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 She underwent right segmental mandibulectomy, bilateral selective neck dissection of levels 1 through 4, open tracheostomy on  with L fibula resection and implant of fibular free flap in her jaw. Per pt report wound in left leg was never closed. She receive preop chemotherapy, immunotherapy. She is no longer receiving chemotherapy. Her lymph node resection was negative. Her left calf postop wound from where she had bone/free flap taken has been non healing and they have had issues with it since immediately postop per pt and family. They said [facility] surgeon is aware, recommended local wound care and was going to see them in 2023. Her daughter had asked to follow with me going forward. She has peg in place and receives nutrition via. She follows with Dr. Iris Leal in regards to CKD. Pt is on abx at time of initial visit. She was started on levaquin per [facility] who she had culture sent to. 11/2/22  She does have an area of muscle that doesn't appear to be viable  She may need surgical debridement in the future  Coban 2, aquacell ag  She is already on levaquin from recent culture  No evidence of arterial occlusive disease  Labs ordered  Ok to cancel abis and pvrs  11/9/22  Wound slightly smaller  Coban 2, aquacell ag  Still may need surgical debridement in future  11/16/22  Wound smaller 109 (133)    11/23/22  Wound smaller 66    Wound/Ulcer Pain Timing/Severity: mild  Quality of pain: aching  Severity:  1 / 10   Modifying Factors: Pain worsens with debridement, dressing changes  Associated Signs/Symptoms: drainage edema, pain    Ulcer Identification:  Ulcer Type: non-healing surgical  Contributing Factors: edema, immunosuppression, anticoagulation therapy, and malnutrition    Diabetic/Pressure/Non Pressure Ulcers only:  Ulcer: Non-Pressure ulcer, muscle necrosis  Wound: Wound dehiscence        PAST MEDICAL HISTORY      Diagnosis Date    Anemia     Arthritis     Bowel obstruction ([address] 04/28/2016    Cancer (HCC)     oral/mandible    CHF (congestive heart failure) (HCC)     COPD (chronic obstructive pulmonary disease) (HCC)     Hyperlipidemia     Hypertension     LBBB (left bundle branch block)     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 ([address] 10/26/2022    Nonischemic cardiomyopathy ([address]     Paroxysmal A-fib ([address]      Past Surgical History:   Procedure Laterality Date    ABDOMEN SURGERY  10/05/2016    exporation of abdominal fascial wound dehiscence close, insertion TLC right IJ    CARDIAC DEFIBRILLATOR PLACEMENT Left 05/23/2007    CARDIAC DEFIBRILLATOR PLACEMENT  04/17/2018    BIV ICD GENT CHANGE (BSCI)    VAMSHI    CARPAL TUNNEL RELEASE      DIAGNOSTIC CARDIAC CATH LAB PROCEDURE      ENDOSCOPY, COLON, DIAGNOSTIC  01/26/2018    upper endo    HERNIA REPAIR      HYSTERECTOMY (CERVIX STATUS UNKNOWN)      ILEOSTOMY OR JEJUNOSTOMY      INCISIONAL HERNIA REPAIR  04/21/2017 LAPAROSCOPIC; WITH MESH    MANDIBLE SURGERY      OTHER SURGICAL HISTORY      ostomy placed having reversal done on 9/30/2016    OTHER SURGICAL HISTORY  09/30/2016    open reveral ileostomy    OTHER SURGICAL HISTORY  01/07/2009    BiV/ICD    OTHER SURGICAL HISTORY Left     vascularized fibular graft harvest [facility] 9/22     Family History   Problem Relation Age of Onset    Alzheimer's Disease Mother     Heart Disease Father     Cancer Father     Alzheimer's Disease Father     Cancer Brother      Social History     Tobacco Use    Smoking status: Former     Types: Cigarettes     Quit date: 12/19/2006     Years since quitting: 15.9    Smokeless tobacco: Never   Vaping Use    Vaping Use: Never used   Substance Use Topics    Alcohol use: Not Currently     Comment: occasional    Drug use: No     No Known Allergies  Current Outpatient Medications on File Prior to Encounter   Medication Sig Dispense Refill    carvedilol (COREG) 3.125 MG tablet Take 1 tablet by mouth 2 times daily (with meals) 60 tablet 3    escitalopram (LEXAPRO) 10 MG tablet Take 1 tablet by mouth daily      famotidine (PEPCID) 20 MG tablet Take 1 tablet by mouth 2 times daily      traMADol (ULTRAM) 50 MG tablet Take 1 tablet by mouth every 8 hours as needed. oxyCODONE (ROXICODONE) 5 MG immediate release tablet Take 1 tablet by mouth every 8 hours as needed. levoFLOXacin (LEVAQUIN) 750 MG tablet Take 750 mg by mouth daily Last dose to be Sunday 11/6/22      gabapentin (NEURONTIN) 100 MG capsule Take 100 mg by mouth every 8 (eight) hours. magnesium oxide (MAG-OX) 400 MG tablet Take 400 mg by mouth daily      apixaban (ELIQUIS) 5 MG TABS tablet TAKE 1 TABLET TWICE DAILY 180 tablet 3    fluticasone (FLONASE) 50 MCG/ACT nasal spray 1 spray by Each Nostril route as needed for Rhinitis      SPIRIVA HANDIHALER 18 MCG inhalation capsule Inhale 18 mcg into the lungs daily        No current facility-administered medications on file prior to encounter. 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 ([address] - Primary (Chronic)       Pre Debridement Measurements:  Are located in the [place]  Documentation Flow Sheet  Post Debridement Measurements:  Wound/Ulcer Descriptions are Pre Debridement except measurements:    Incision 10/05/16 Abdomen Mid (Active)   Number of days: 8826       Incision 04/21/17 Abdomen Right;Left;Mid (Active)   Number of days: 2047       Wound 10/26/22 Leg Left;Lateral #1 (Active)   Wound Image   11/16/22 1422   Wound Etiology Non-Healing Surgical 11/07/22 1415   Dressing Status New dressing applied 11/16/22 1537   Wound Cleansed Cleansed with saline 11/16/22 1537   Dressing/Treatment Alginate with Ag;ABD 11/16/22 1537   Offloading for Diabetic Foot Ulcers Offloading not required 11/16/22 1537   Wound Length (cm) 16.5 cm 11/23/22 1431   Wound Width (cm) 4 cm 11/23/22 1431   Wound Depth (cm) 0.8 cm 11/23/22 1431   Wound Surface Area (cm^2) 66 cm^2 11/23/22 1431   Change in Wound Size % (l*w) 46.43 11/23/22 1431   Wound Volume (cm^3) 52.8 cm^3 11/23/22 1431   Wound Healing % 14 11/23/22 1431   Post-Procedure Length (cm) 16.9 cm 11/23/22 1517   Post-Procedure Width (cm) 4.4 cm 11/23/22 1517   Post-Procedure Depth (cm) 0.9 cm 11/23/22 1517   Post-Procedure Surface Area (cm^2) 74.36 cm^2 11/23/22 1517   Post-Procedure Volume (cm^3) 66.924 cm^3 11/23/22 1517   Distance Tunneling (cm) 2.3 cm 11/16/22 1521 Tunneling Position ___ O'Clock 12 11/16/22 1521   Wound Assessment Exposed structure tendon;Granulation tissue;Fibrin 11/23/22 1431   Drainage Amount Large 11/23/22 1431   Drainage Description Thick; Yellow;Serosanguinous 11/23/22 1431   Odor None 11/23/22 1431   Cherise-wound Assessment Dry/flaky 11/23/22 1431   Wound Thickness Description not for Pressure Injury Full thickness 10/26/22 0814   Number of days: 34          Procedure Note  Indications:  Based on my examination of this patient's wound(s)/ulcer(s) today, debridement is required to promote healing and evaluate the wound base. Performed by: Sofie Edmonds MD    Consent obtained:  Yes    Time out taken:  Yes    Pain Control: Anesthetic  Anesthetic: 4% Lidocaine Liquid Topical     Debridement:Excisional Debridement    Using curette the wound(s)/ulcer(s) was/were sharply debrided down through and including the removal of epidermis, dermis, subcutaneous tissue, and muscle/fascia. Devitalized Tissue Debrided:  fibrin, biofilm, slough, and exudate to stimulate bleeding to promote healing, post debridement good bleeding base and wound edges noted    Wound/Ulcer #: 1    Percent of Wound/Ulcer Debrided: 70%    Total Surface Area Debrided:  40 sq cm     Estimated Blood Loss:  Minimal  Hemostasis Achieved:  by pressure    Procedural Pain:  3  / 10   Post Procedural Pain:  2 / 10     Response to treatment:  Well tolerated by patient. Plan:   Treatment Note please see attached Discharge Instructions    Written patient dismissal instructions given to patient and signed by patient or POA.          Discharge Instructions         Discharge condition: Stable     Assessment of pain at discharge:minimal     Anesthetic used: 4% lidocaine solution     Discharge to: Home     Left via:Private automobile     Accompanied by: family      ECF/HHA: [address]- cleanse with normal saline, pack loosely with calcium alginate ag (also loosely pack area tunneling at 12 of 2.3 cmand areas of undermoning), ABD pads and coban 2. Change Monday- Wednesday (at [address])- Friday. Treatment Orders:Eat a diet high in protein and vitamin C. Take a multiple vitamin daily unless contraindicated. Elevate leg above the level of the heart as much as possible throughout the day. [address] [place] [address],[address] followup visit:1 week_____________________________  (Please note your next appointment above and if you are unable to keep, kindly give a 24 hour notice. Thank you.)     Physician signature:__________________________      If you experience any of the following, please call the [facility] during business hours:     * Increase in Pain  * Temperature over 101  * Increase in drainage from your wound  * Drainage with a foul odor  * Bleeding  * Increase in swelling  * Need for compression bandage changes due to slippage, breakthrough drainage. If you need medical attention outside of the business hours of the [facility] please contact your PCP or go to the nearest emergency room.        Electronically signed by Marbin Guerrero MD o

## 2022-11-30 ENCOUNTER — HOSPITAL ENCOUNTER (OUTPATIENT)
Dept: WOUND CARE | Age: 77
Discharge: HOME | End: 2022-11-30
Payer: MEDICARE

## 2022-11-30 VITALS
SYSTOLIC BLOOD PRESSURE: 119 MMHG | TEMPERATURE: 98 F | DIASTOLIC BLOOD PRESSURE: 56 MMHG | HEART RATE: 93 BPM | RESPIRATION RATE: 16 BRPM

## 2022-11-30 DIAGNOSIS — L97.923 NON-PRESSURE CHRONIC ULCER OF LEFT LOWER LEG WITH NECROSIS OF MUSCLE (HCC): Primary | ICD-10-CM

## 2022-11-30 PROCEDURE — 11046 DBRDMT MUSC&/FSCA EA ADDL: CPT

## 2022-11-30 PROCEDURE — 11043 DBRDMT MUSC&/FSCA 1ST 20/<: CPT

## 2022-11-30 RX ORDER — GENTAMICIN SULFATE 1 MG/G
OINTMENT TOPICAL ONCE
OUTPATIENT
Start: 2022-11-30 | End: 2022-11-30

## 2022-11-30 RX ORDER — LIDOCAINE HYDROCHLORIDE 20 MG/ML
JELLY TOPICAL ONCE
OUTPATIENT
Start: 2022-11-30 | End: 2022-11-30

## 2022-11-30 RX ORDER — LIDOCAINE 50 MG/G
OINTMENT TOPICAL ONCE
OUTPATIENT
Start: 2022-11-30 | End: 2022-11-30

## 2022-11-30 RX ORDER — GINSENG 100 MG
CAPSULE ORAL ONCE
OUTPATIENT
Start: 2022-11-30 | End: 2022-11-30

## 2022-11-30 RX ORDER — LIDOCAINE 40 MG/G
CREAM TOPICAL ONCE
OUTPATIENT
Start: 2022-11-30 | End: 2022-11-30

## 2022-11-30 RX ORDER — BETAMETHASONE DIPROPIONATE 0.05 %
OINTMENT (GRAM) TOPICAL ONCE
OUTPATIENT
Start: 2022-11-30 | End: 2022-11-30

## 2022-11-30 RX ORDER — LIDOCAINE HYDROCHLORIDE 40 MG/ML
SOLUTION TOPICAL ONCE
Status: COMPLETED | OUTPATIENT
Start: 2022-11-30 | End: 2022-11-30

## 2022-11-30 RX ORDER — CLOBETASOL PROPIONATE 0.5 MG/G
OINTMENT TOPICAL ONCE
OUTPATIENT
Start: 2022-11-30 | End: 2022-11-30

## 2022-11-30 RX ORDER — BACITRACIN, NEOMYCIN, POLYMYXIN B 400; 3.5; 5 [USP'U]/G; MG/G; [USP'U]/G
OINTMENT TOPICAL ONCE
OUTPATIENT
Start: 2022-11-30 | End: 2022-11-30

## 2022-11-30 RX ORDER — BACITRACIN ZINC AND POLYMYXIN B SULFATE 500; 1000 [USP'U]/G; [USP'U]/G
OINTMENT TOPICAL ONCE
OUTPATIENT
Start: 2022-11-30 | End: 2022-11-30

## 2022-11-30 RX ORDER — LIDOCAINE HYDROCHLORIDE 40 MG/ML
SOLUTION TOPICAL ONCE
OUTPATIENT
Start: 2022-11-30 | End: 2022-11-30

## 2022-11-30 RX ADMIN — LIDOCAINE HYDROCHLORIDE 5 ML: 40 SOLUTION TOPICAL at 14:50

## 2022-11-30 NOTE — PLAN OF CARE
Problem: Chronic Conditions and Co-morbidities  Goal: Patient's chronic conditions and co-morbidity symptoms are monitored and maintained or improved  Outcome: Progressing     Problem: Cognitive:  Goal: Knowledge of wound care  Description: Knowledge of wound care  Outcome: Progressing  Goal: Understands risk factors for wounds  Description: Understands risk factors for wounds  Outcome: Progressing     Problem: Wound:  Goal: Will show signs of wound healing; wound closure and no evidence of infection  Description: Will show signs of wound healing; wound closure and no evidence of infection  Outcome: Progressing     Problem: Compression therapy:  Goal: Will be free from complications associated with compression therapy  Description: Will be free from complications associated with compression therapy  Outcome: Progressing

## 2022-11-30 NOTE — PROGRESS NOTES
Wound Healing Center Followup Visit Note    Referring Physician : Levi Barron MD  [address] [place] [address] RECORD NUMBER:  [id number]  AGE: 65 y.o. GENDER: female  : 1945  EPISODE DATE:  2022    Subjective:     Chief Complaint   Patient presents with    Wound Check     Left leg        HISTORY of PRESENT ILLNESS HPI   Thien Dietz is a 65 y.o. female who presents today in regards to follow up evaluation and treatment of wound/ulcer. That patient's past medical, family and social hx were reviewed and changes were made if present. History of Wound Context:  The patient has had a wound of left calf which was first noted approximately 2022. This has been treated local wound care. On their initial visit to the wound healing center, 22,  the patient has noted that the wound has been improving after seeing Dr Leopold Rockers last week. The patient has not had similar previous wounds in the past.      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 She underwent right segmental mandibulectomy, bilateral selective neck dissection of levels 1 through 4, open tracheostomy on  with L fibula resection and implant of fibular free flap in her jaw. Per pt report wound in left leg was never closed. She receive preop chemotherapy, immunotherapy. She is no longer receiving chemotherapy. Her lymph node resection was negative. Her left calf postop wound from where she had bone/free flap taken has been non healing and they have had issues with it since immediately postop per pt and family. They said [facility] surgeon is aware, recommended local wound care and was going to see them in 2023. Her daughter had asked to follow with me going forward. She has peg in place and receives nutrition via. She follows with Dr. Radha Joshua in regards to CKD. Pt is on abx at time of initial visit. She was started on levaquin per [facility] who she had culture sent to. 11/2/22  She does have an area of muscle that doesn't appear to be viable  She may need surgical debridement in the future  Coban 2, aquacell ag  She is already on levaquin from recent culture  No evidence of arterial occlusive disease  Labs ordered  Ok to cancel abis and pvrs  11/9/22  Wound slightly smaller  Coban 2, aquacell ag  Still may need surgical debridement in future  11/16/22  Wound smaller 109 (133)    11/23/22  Wound smaller 66  11/30/22  Wound smaller 63   Wound appearance tremendously improved  Area of depth dramatically improved  Muscle coverage improved    Wound/Ulcer Pain Timing/Severity: mild  Quality of pain: aching  Severity:  1 / 10   Modifying Factors: Pain worsens with debridement, dressing changes  Associated Signs/Symptoms: drainage edema, pain    Ulcer Identification:  Ulcer Type: non-healing surgical  Contributing Factors: edema, immunosuppression, anticoagulation therapy, and malnutrition    Diabetic/Pressure/Non Pressure Ulcers only:  Ulcer: Non-Pressure ulcer, muscle necrosis  Wound: Wound dehiscence        PAST MEDICAL HISTORY      Diagnosis Date    Anemia     Arthritis     Bowel obstruction (HCC) 04/28/2016    Cancer (HCC)     oral/mandible    CHF (congestive heart failure) (HCC)     COPD (chronic obstructive pulmonary disease) (HCC)     Hyperlipidemia     Hypertension     LBBB (left bundle branch block)     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 ([address] 10/26/2022    Nonischemic cardiomyopathy ([address]     Paroxysmal A-fib ([address]      Past Surgical History:   Procedure Laterality Date    ABDOMEN SURGERY  10/05/2016    exporation of abdominal fascial wound dehiscence close, insertion TLC right IJ    CARDIAC DEFIBRILLATOR PLACEMENT Left 05/23/2007    CARDIAC DEFIBRILLATOR PLACEMENT  04/17/2018    BIV ICD GENT CHANGE (BSCI)    VAMSHI    CARPAL TUNNEL RELEASE      DIAGNOSTIC CARDIAC CATH LAB PROCEDURE      ENDOSCOPY, COLON, DIAGNOSTIC  01/26/2018    upper endo HERNIA REPAIR      HYSTERECTOMY (CERVIX STATUS UNKNOWN)      ILEOSTOMY OR JEJUNOSTOMY      INCISIONAL HERNIA REPAIR  04/21/2017    LAPAROSCOPIC; WITH MESH    MANDIBLE SURGERY      OTHER SURGICAL HISTORY      ostomy placed having reversal done on 9/30/2016    OTHER SURGICAL HISTORY  09/30/2016    open reveral ileostomy    OTHER SURGICAL HISTORY  01/07/2009    BiV/ICD    OTHER SURGICAL HISTORY Left     vascularized fibular graft harvest [facility] 9/22     Family History   Problem Relation Age of Onset    Alzheimer's Disease Mother     Heart Disease Father     Cancer Father     Alzheimer's Disease Father     Cancer Brother      Social History     Tobacco Use    Smoking status: Former     Types: Cigarettes     Quit date: 12/19/2006     Years since quitting: 15.9    Smokeless tobacco: Never   Vaping Use    Vaping Use: Never used   Substance Use Topics    Alcohol use: Not Currently     Comment: occasional    Drug use: No     No Known Allergies  Current Outpatient Medications on File Prior to Encounter   Medication Sig Dispense Refill    carvedilol (COREG) 3.125 MG tablet Take 1 tablet by mouth 2 times daily (with meals) 60 tablet 3    escitalopram (LEXAPRO) 10 MG tablet Take 1 tablet by mouth daily      famotidine (PEPCID) 20 MG tablet Take 1 tablet by mouth 2 times daily      traMADol (ULTRAM) 50 MG tablet Take 1 tablet by mouth every 8 hours as needed. oxyCODONE (ROXICODONE) 5 MG immediate release tablet Take 1 tablet by mouth every 8 hours as needed. levoFLOXacin (LEVAQUIN) 750 MG tablet Take 750 mg by mouth daily Last dose to be Sunday 11/6/22      gabapentin (NEURONTIN) 100 MG capsule Take 100 mg by mouth every 8 (eight) hours.       magnesium oxide (MAG-OX) 400 MG tablet Take 400 mg by mouth daily      apixaban (ELIQUIS) 5 MG TABS tablet TAKE 1 TABLET TWICE DAILY 180 tablet 3    fluticasone (FLONASE) 50 MCG/ACT nasal spray 1 spray by Each Nostril route as needed for Rhinitis      SPIRIVA HANDIHALER 18 MCG inhalation capsule Inhale 18 mcg into the lungs daily        No current facility-administered medications on file prior to encounter.        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 ([address] - Primary (Chronic)    Relevant Orders    Initiate Outpatient Wound Care Protocol       Pre Debridement Measurements:  Are located in the [place]  Documentation Flow Sheet  Post Debridement Measurements:  Wound/Ulcer Descriptions are Pre Debridement except measurements:    Incision 10/05/16 Abdomen Mid (Active)   Number of days: 2055       Incision 04/21/17 Abdomen Right;Left;Mid (Active)   Number of days: 2049       Wound 10/26/22 Leg Left;Lateral #1 (Active)   Wound Image   11/16/22 1422   Wound Etiology Non-Healing Surgical 11/07/22 1415   Dressing Status New dressing applied 11/16/22 1537   Wound Cleansed Cleansed with saline 11/16/22 1537   Dressing/Treatment Alginate with Ag;ABD 11/16/22 1537   Offloading for Diabetic Foot Ulcers Offloading not required 11/16/22 1537   Wound Length (cm) 15.2 cm 11/30/22 1448   Wound Width (cm) 4.2 cm 11/30/22 1448   Wound Depth (cm) 0.5 cm 11/30/22 1448   Wound Surface Area (cm^2) 63.84 cm^2 11/30/22 1448   Change in Wound Size % (l*w) 48.18 11/30/22 1448   Wound Volume (cm^3) 31.92 cm^3 11/30/22 1448   Wound Healing % 48 11/30/22 1448   Post-Procedure Length (cm) 15.3 cm 11/30/22 1518   Post-Procedure Width (cm) 4.4 cm 11/30/22 1518   Post-Procedure Depth (cm) 0.6 cm 22   Post-Procedure Surface Area (cm^2) 67.32 cm^2 22 1518   Post-Procedure Volume (cm^3) 40.392 cm^3 22 1518   Distance Tunneling (cm) 2.3 cm 22 152   Tunneling Position ___ O'Clock 12 22 1521   Wound Assessment Exposed structure tendon;Slough;Pink/red 22 1448   Drainage Amount Large 22 144   Drainage Description Thick;Green;Serosanguinous 228   Odor None 22   Cherise-wound Assessment Intact 22   Wound Thickness Description not for Pressure Injury Full thickness 10/26/22 0814   Number of days: 35          Procedure Note  Indications:  Based on my examination of this patient's wound(s)/ulcer(s) today, debridement is required to promote healing and evaluate the wound base. Performed by: Fabio Hernandez MD    Consent obtained:  Yes    Time out taken:  Yes    Pain Control: Anesthetic  Anesthetic: 4% Lidocaine Liquid Topical     Debridement:Excisional Debridement    Using curette the wound(s)/ulcer(s) was/were sharply debrided down through and including the removal of epidermis, dermis, subcutaneous tissue, and muscle/fascia. Devitalized Tissue Debrided:  fibrin, biofilm, slough, and exudate to stimulate bleeding to promote healing, post debridement good bleeding base and wound edges noted    Wound/Ulcer #: 1    Percent of Wound/Ulcer Debrided: 70%    Total Surface Area Debrided:  40 sq cm     Estimated Blood Loss:  Minimal  Hemostasis Achieved:  by pressure    Procedural Pain:  3  / 10   Post Procedural Pain:  2 / 10     Response to treatment:  Well tolerated by patient. Plan:   Treatment Note please see attached Discharge Instructions    Written patient dismissal instructions given to patient and signed by patient or POA.          Discharge Instructions         Discharge condition: Stable     Assessment of pain at discharge:minimal     Anesthetic used: 4% lidocaine solution     Discharge to: Home Left via:Private automobile     Accompanied by: family      ECF/HHA: [phone]       Dressing Orders:LEFT LATERAL LOWER LEG- cleanse with normal saline, pack loosely with calcium alginate ag (also loosely pack area tunneling at 12 of 2.3 cmand areas of undermining), ABD pads and coban 2. Change Monday- Wednesday (at [address])- Friday. Treatment Orders:Eat a diet high in protein and vitamin C. Take a multiple vitamin daily unless contraindicated. Elevate leg above the level of the heart as much as possible throughout the day. [address] [place] [address],[address] followup visit:1 week_____________________________  (Please note your next appointment above and if you are unable to keep, kindly give a 24 hour notice. Thank you.)     Physician signature:__________________________      If you experience any of the following, please call the [facility] during business hours:     * Increase in Pain  * Temperature over 101  * Increase in drainage from your wound  * Drainage with a foul odor  * Bleeding  * Increase in swelling  * Need for compression bandage changes due to slippage, breakthrough drainage. If you need medical attention outside of the business hours of the [facility] please contact your PCP or go to the nearest emergency room.        Electronically signed by MD yakov Corral
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
## 2022-12-04 PROBLEM — R79.89 ELEVATED TROPONIN: Status: RESOLVED | Noted: 2022-11-04 | Resolved: 2022-12-04

## 2022-12-04 PROBLEM — R77.8 ELEVATED TROPONIN: Status: RESOLVED | Noted: 2022-11-04 | Resolved: 2022-12-04

## 2022-12-05 NOTE — DISCHARGE INSTRUCTIONS
Discharge condition: Stable     Assessment of pain at discharge:minimal     Anesthetic used: 4% lidocaine solution     Discharge to: Home     Left via:Private automobile     Accompanied by: family      ECF/HHA: [address]- cleanse with normal saline, pack loosely with calcium alginate ag , ABD pads and coban 2. Change Monday- Wednesday (at [address] [place] [address],[address])- Friday. Treatment Orders:Eat a diet high in protein and vitamin C. Take a multiple vitamin daily unless contraindicated. Elevate leg above the level of the heart as much as possible throughout the day. [address] [place] [address],[address] followup visit:1 week_____________________________  (Please note your next appointment above and if you are unable to keep, kindly give a 24 hour notice. Thank you.)     Physician signature:__________________________      If you experience any of the following, please call the [email or web address] during business hours:     * Increase in Pain  * Temperature over 101  * Increase in drainage from your wound  * Drainage with a foul odor  * Bleeding  * Increase in swelling  * Need for compression bandage changes due to slippage, breakthrough drainage.      If you need medical attention outside of the business hours of the [facility] please contact your PCP or go to the nearest emergency room

## 2022-12-07 ENCOUNTER — HOSPITAL ENCOUNTER (OUTPATIENT)
Dept: WOUND CARE | Age: 77
Discharge: HOME | End: 2022-12-07
Payer: MEDICARE

## 2022-12-07 VITALS
RESPIRATION RATE: 16 BRPM | DIASTOLIC BLOOD PRESSURE: 56 MMHG | TEMPERATURE: 97.1 F | HEART RATE: 102 BPM | SYSTOLIC BLOOD PRESSURE: 118 MMHG

## 2022-12-07 DIAGNOSIS — L97.923 NON-PRESSURE CHRONIC ULCER OF LEFT LOWER LEG WITH NECROSIS OF MUSCLE (HCC): Primary | ICD-10-CM

## 2022-12-07 PROCEDURE — 11045 DBRDMT SUBQ TISS EACH ADDL: CPT

## 2022-12-07 PROCEDURE — 11042 DBRDMT SUBQ TIS 1ST 20SQCM/<: CPT

## 2022-12-07 RX ORDER — LIDOCAINE HYDROCHLORIDE 20 MG/ML
JELLY TOPICAL ONCE
OUTPATIENT
Start: 2022-12-07 | End: 2022-12-07

## 2022-12-07 RX ORDER — LIDOCAINE HYDROCHLORIDE 40 MG/ML
SOLUTION TOPICAL ONCE
Status: COMPLETED | OUTPATIENT
Start: 2022-12-07 | End: 2022-12-07

## 2022-12-07 RX ORDER — GENTAMICIN SULFATE 1 MG/G
OINTMENT TOPICAL ONCE
OUTPATIENT
Start: 2022-12-07 | End: 2022-12-07

## 2022-12-07 RX ORDER — BACITRACIN ZINC AND POLYMYXIN B SULFATE 500; 1000 [USP'U]/G; [USP'U]/G
OINTMENT TOPICAL ONCE
OUTPATIENT
Start: 2022-12-07 | End: 2022-12-07

## 2022-12-07 RX ORDER — BETAMETHASONE DIPROPIONATE 0.05 %
OINTMENT (GRAM) TOPICAL ONCE
OUTPATIENT
Start: 2022-12-07 | End: 2022-12-07

## 2022-12-07 RX ORDER — CLOBETASOL PROPIONATE 0.5 MG/G
OINTMENT TOPICAL ONCE
OUTPATIENT
Start: 2022-12-07 | End: 2022-12-07

## 2022-12-07 RX ORDER — LIDOCAINE 50 MG/G
OINTMENT TOPICAL ONCE
OUTPATIENT
Start: 2022-12-07 | End: 2022-12-07

## 2022-12-07 RX ORDER — LIDOCAINE 40 MG/G
CREAM TOPICAL ONCE
OUTPATIENT
Start: 2022-12-07 | End: 2022-12-07

## 2022-12-07 RX ORDER — GINSENG 100 MG
CAPSULE ORAL ONCE
OUTPATIENT
Start: 2022-12-07 | End: 2022-12-07

## 2022-12-07 RX ORDER — LIDOCAINE HYDROCHLORIDE 40 MG/ML
SOLUTION TOPICAL ONCE
OUTPATIENT
Start: 2022-12-07 | End: 2022-12-07

## 2022-12-07 RX ORDER — BACITRACIN, NEOMYCIN, POLYMYXIN B 400; 3.5; 5 [USP'U]/G; MG/G; [USP'U]/G
OINTMENT TOPICAL ONCE
OUTPATIENT
Start: 2022-12-07 | End: 2022-12-07

## 2022-12-07 RX ADMIN — LIDOCAINE HYDROCHLORIDE 5 ML: 40 SOLUTION TOPICAL at 15:19

## 2022-12-07 NOTE — PROGRESS NOTES
Wound Healing Center Followup Visit Note    Referring Physician : Amaury Valentine MD  [address] [place] [address] RECORD NUMBER:  [id number]  AGE: 65 y.o. GENDER: female  : 1945  EPISODE DATE:  2022    Subjective:     Chief Complaint   Patient presents with    Wound Check     Left leg        HISTORY of PRESENT ILLNESS HPI   Janet Lazo is a 65 y.o. female who presents today in regards to follow up evaluation and treatment of wound/ulcer. That patient's past medical, family and social hx were reviewed and changes were made if present. History of Wound Context:  The patient has had a wound of left calf which was first noted approximately 2022. This has been treated local wound care. On their initial visit to the wound healing center, 22,  the patient has noted that the wound has been improving after seeing Dr Yuko Harmon last week. The patient has not had similar previous wounds in the past.      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 She underwent right segmental mandibulectomy, bilateral selective neck dissection of levels 1 through 4, open tracheostomy on  with L fibula resection and implant of fibular free flap in her jaw. Per pt report wound in left leg was never closed. She receive preop chemotherapy, immunotherapy. She is no longer receiving chemotherapy. Her lymph node resection was negative. Her left calf postop wound from where she had bone/free flap taken has been non healing and they have had issues with it since immediately postop per pt and family. They said [facility] is aware, recommended local wound care and was going to see them in 2023. Her daughter had asked to follow with me going forward. She has peg in place and receives nutrition via. She follows with Dr. Lisa Salazar in regards to CKD. Pt is on abx at time of initial visit. She was started on levaquin per [facility] who she had culture sent to. 11/2/22  She does have an area of muscle that doesn't appear to be viable  She may need surgical debridement in the future  Coban 2, aquacell ag  She is already on levaquin from recent culture  No evidence of arterial occlusive disease  Labs ordered  Ok to cancel abis and pvrs  11/9/22  Wound slightly smaller  Coban 2, aquacell ag  Still may need surgical debridement in future  11/16/22  Wound smaller 109 (133)    11/23/22  Wound smaller 66  11/30/22  Wound smaller 63   Wound appearance tremendously improved  Area of depth dramatically improved  Muscle coverage improved  12/7/22  Wound 58  Continued improvement, and more muscle coverage    Wound/Ulcer Pain Timing/Severity: mild  Quality of pain: aching  Severity:  1 / 10   Modifying Factors: Pain worsens with debridement, dressing changes  Associated Signs/Symptoms: drainage edema, pain    Ulcer Identification:  Ulcer Type: non-healing surgical  Contributing Factors: edema, immunosuppression, anticoagulation therapy, and malnutrition    Diabetic/Pressure/Non Pressure Ulcers only:  Ulcer: Non-Pressure ulcer, muscle necrosis  Wound: Wound dehiscence        PAST MEDICAL HISTORY      Diagnosis Date    Anemia     Arthritis     Bowel obstruction ([address] 04/28/2016    Cancer (HCC)     oral/mandible    CHF (congestive heart failure) (HCC)     COPD (chronic obstructive pulmonary disease) (HCC)     Hyperlipidemia     Hypertension     LBBB (left bundle branch block)     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 ([address] 10/26/2022    Nonischemic cardiomyopathy ([address]     Paroxysmal A-fib ([address]      Past Surgical History:   Procedure Laterality Date    ABDOMEN SURGERY  10/05/2016    exporation of abdominal fascial wound dehiscence close, insertion TLC right IJ    CARDIAC DEFIBRILLATOR PLACEMENT Left 05/23/2007    CARDIAC DEFIBRILLATOR PLACEMENT  04/17/2018    BIV ICD GENT CHANGE (BSCI)    VAMSHI    CARPAL TUNNEL RELEASE      DIAGNOSTIC CARDIAC CATH LAB PROCEDURE      ENDOSCOPY, COLON, DIAGNOSTIC  01/26/2018    upper endo    HERNIA REPAIR      HYSTERECTOMY (CERVIX STATUS UNKNOWN)      ILEOSTOMY OR JEJUNOSTOMY      INCISIONAL HERNIA REPAIR  04/21/2017    LAPAROSCOPIC; WITH MESH    MANDIBLE SURGERY      OTHER SURGICAL HISTORY      ostomy placed having reversal done on 9/30/2016    OTHER SURGICAL HISTORY  09/30/2016    open reveral ileostomy    OTHER SURGICAL HISTORY  01/07/2009    BiV/ICD    OTHER SURGICAL HISTORY Left     vascularized fibular graft harvest [facility] 9/22     Family History   Problem Relation Age of Onset    Alzheimer's Disease Mother     Heart Disease Father     Cancer Father     Alzheimer's Disease Father     Cancer Brother      Social History     Tobacco Use    Smoking status: Former     Types: Cigarettes     Quit date: 12/19/2006     Years since quitting: 15.9    Smokeless tobacco: Never   Vaping Use    Vaping Use: Never used   Substance Use Topics    Alcohol use: Not Currently     Comment: occasional    Drug use: No     No Known Allergies  Current Outpatient Medications on File Prior to Encounter   Medication Sig Dispense Refill    carvedilol (COREG) 3.125 MG tablet Take 1 tablet by mouth 2 times daily (with meals) 60 tablet 3    escitalopram (LEXAPRO) 10 MG tablet Take 1 tablet by mouth daily      famotidine (PEPCID) 20 MG tablet Take 1 tablet by mouth 2 times daily      traMADol (ULTRAM) 50 MG tablet Take 1 tablet by mouth every 8 hours as needed. oxyCODONE (ROXICODONE) 5 MG immediate release tablet Take 1 tablet by mouth every 8 hours as needed. levoFLOXacin (LEVAQUIN) 750 MG tablet Take 750 mg by mouth daily Last dose to be Sunday 11/6/22      gabapentin (NEURONTIN) 100 MG capsule Take 100 mg by mouth every 8 (eight) hours.       magnesium oxide (MAG-OX) 400 MG tablet Take 400 mg by mouth daily      apixaban (ELIQUIS) 5 MG TABS tablet TAKE 1 TABLET TWICE DAILY 180 tablet 3    fluticasone (FLONASE) 50 MCG/ACT nasal spray 1 spray by Each Nostril route as needed for Rhinitis      SPIRIVA HANDIHALER 18 MCG inhalation capsule Inhale 18 mcg into the lungs daily        No current facility-administered medications on file prior to encounter.        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 ([address] - Primary (Chronic)    Relevant Orders    Initiate Outpatient Wound Care Protocol       Pre Debridement Measurements:  Are located in the [place]  Documentation Flow Sheet  Post Debridement Measurements:  Wound/Ulcer Descriptions are Pre Debridement except measurements:    Incision 10/05/16 Abdomen Mid (Active)   Number of days: 4482       Incision 04/21/17 Abdomen Right;Left;Mid (Active)   Number of days: 2056       Wound 10/26/22 Leg Left;Lateral #1 (Active)   Wound Image   11/16/22 1422   Wound Etiology Non-Healing Surgical 11/30/22 1613   Dressing Status New dressing applied 11/30/22 1613   Wound Cleansed Cleansed with saline 11/30/22 1613   Dressing/Treatment Alginate with Ag;ABD 11/30/22 1613   Offloading for Diabetic Foot Ulcers Offloading not required 11/30/22 1613   Wound Length (cm) 14.5 cm 12/07/22 1519   Wound Width (cm) 4 cm 12/07/22 1519   Wound Depth (cm) 0.5 cm 12/07/22 1519   Wound Surface Area (cm^2) 58 cm^2 12/07/22 1519   Change in Wound Size % (l*w) 52.92 12/07/22 1519   Wound Volume (cm^3) 29 cm^3 12/07/22 1519   Wound Healing % 53 12/07/22 1519   Post-Procedure Length (cm) 14.6 cm 12/07/22 6968   Post-Procedure Width (cm) 4 cm 12/07/22 1602   Post-Procedure Depth (cm) 0.5 cm 12/07/22 1602   Post-Procedure Surface Area (cm^2) 58.4 cm^2 12/07/22 1602   Post-Procedure Volume (cm^3) 29.2 cm^3 12/07/22 1602   Distance Tunneling (cm) 2.3 cm 11/16/22 1521   Tunneling Position ___ O'Clock 12 11/16/22 1521   Wound Assessment Exposed structure tendon;Pale granulation tissue;Pink/red 12/07/22 1519   Drainage Amount Moderate 12/07/22 1519   Drainage Description Serosanguinous; Thick; Yellow 12/07/22 1519   Odor None 12/07/22 1519   Cherise-wound Assessment Intact 12/07/22 1519   Wound Thickness Description not for Pressure Injury Full thickness 10/26/22 0814   Number of days: 42          Procedure Note  Indications:  Based on my examination of this patient's wound(s)/ulcer(s) today, debridement is required to promote healing and evaluate the wound base. Performed by: Perfecto Young MD    Consent obtained:  Yes    Time out taken:  Yes    Pain Control: Anesthetic  Anesthetic: 4% Lidocaine Liquid Topical     Debridement:Excisional Debridement    Using curette the wound(s)/ulcer(s) was/were sharply debrided down through and including the removal of epidermis, dermis, subcutaneous tissue, and muscle/fascia. Devitalized Tissue Debrided:  fibrin, biofilm, slough, and exudate to stimulate bleeding to promote healing, post debridement good bleeding base and wound edges noted    Wound/Ulcer #: 1    Percent of Wound/Ulcer Debrided: 70%    Total Surface Area Debrided:  40 sq cm     Estimated Blood Loss:  Minimal  Hemostasis Achieved:  by pressure    Procedural Pain:  3  / 10   Post Procedural Pain:  2 / 10     Response to treatment:  Well tolerated by patient. Plan:   Treatment Note please see attached Discharge Instructions    Written patient dismissal instructions given to patient and signed by patient or POA.          Discharge Instructions         Discharge condition: Stable     Assessment of pain at discharge:minimal     Anesthetic used: 4% lidocaine solution     Discharge to: Home     Left via:Private automobile     Accompanied by: family      ECF/HHA: [id number]       Dressing Orders:LEFT LATERAL LOWER LEG- cleanse with normal saline, pack loosely with calcium alginate ag (also loosely pack area tunneling at 12 of 2.3 cmand areas of undermining), ABD pads and coban 2. Change Monday- Wednesday (at [address])- Friday. Treatment Orders:Eat a diet high in protein and vitamin C. Take a multiple vitamin daily unless contraindicated. Elevate leg above the level of the heart as much as possible throughout the day. [address] [place] [address],[address] followup visit:1 week_____________________________  (Please note your next appointment above and if you are unable to keep, kindly give a 24 hour notice. Thank you.)     Physician signature:__________________________      If you experience any of the following, please call the [facility] during business hours:     * Increase in Pain  * Temperature over 101  * Increase in drainage from your wound  * Drainage with a foul odor  * Bleeding  * Increase in swelling  * Need for compression bandage changes due to slippage, breakthrough drainage.      If you need medical attention outside of the business hours of the [facility] please contact your PCP or go to the nearest emergency room      Electronically signed by MD yakov Perez

## 2022-12-08 NOTE — DISCHARGE INSTRUCTIONS
Discharge condition: Stable     Assessment of pain at discharge:minimal     Anesthetic used: 4% lidocaine solution     Discharge to: Home     Left via:Private automobile     Accompanied by: family      ECF/HHA: [address]- cleanse with normal saline, pack loosely with calcium alginate ag , ABD pads and coban 2. Change Monday- Wednesday (at [address] [place] [address],[address])- Friday. Treatment Orders:Eat a diet high in protein and vitamin C. Take a multiple vitamin daily unless contraindicated. Elevate leg above the level of the heart as much as possible throughout the day. [address] [place] [address],[address] followup visit:1 week_____________________________  (Please note your next appointment above and if you are unable to keep, kindly give a 24 hour notice. Thank you.)     Physician signature:__________________________      If you experience any of the following, please call the [facility] during business hours:     * Increase in Pain  * Temperature over 101  * Increase in drainage from your wound  * Drainage with a foul odor  * Bleeding  * Increase in swelling  * Need for compression bandage changes due to slippage, breakthrough drainage.      If you need medical attention outside of the business hours of the [facility] please contact your PCP or go to the nearest emergency room

## 2022-12-14 ENCOUNTER — HOSPITAL ENCOUNTER (OUTPATIENT)
Dept: WOUND CARE | Age: 77
Discharge: HOME | End: 2022-12-14
Payer: MEDICARE

## 2022-12-14 VITALS
SYSTOLIC BLOOD PRESSURE: 106 MMHG | DIASTOLIC BLOOD PRESSURE: 53 MMHG | TEMPERATURE: 96.6 F | HEIGHT: 62 IN | HEART RATE: 92 BPM | WEIGHT: 137 LBS | BODY MASS INDEX: 25.21 KG/M2 | RESPIRATION RATE: 18 BRPM

## 2022-12-14 DIAGNOSIS — L97.923 NON-PRESSURE CHRONIC ULCER OF LEFT LOWER LEG WITH NECROSIS OF MUSCLE (HCC): Primary | ICD-10-CM

## 2022-12-14 PROCEDURE — 11045 DBRDMT SUBQ TISS EACH ADDL: CPT

## 2022-12-14 PROCEDURE — 11042 DBRDMT SUBQ TIS 1ST 20SQCM/<: CPT

## 2022-12-14 RX ORDER — BACITRACIN, NEOMYCIN, POLYMYXIN B 400; 3.5; 5 [USP'U]/G; MG/G; [USP'U]/G
OINTMENT TOPICAL ONCE
OUTPATIENT
Start: 2022-12-14 | End: 2022-12-14

## 2022-12-14 RX ORDER — LIDOCAINE HYDROCHLORIDE 40 MG/ML
SOLUTION TOPICAL ONCE
OUTPATIENT
Start: 2022-12-14 | End: 2022-12-14

## 2022-12-14 RX ORDER — LIDOCAINE 50 MG/G
OINTMENT TOPICAL ONCE
OUTPATIENT
Start: 2022-12-14 | End: 2022-12-14

## 2022-12-14 RX ORDER — BETAMETHASONE DIPROPIONATE 0.05 %
OINTMENT (GRAM) TOPICAL ONCE
OUTPATIENT
Start: 2022-12-14 | End: 2022-12-14

## 2022-12-14 RX ORDER — GINSENG 100 MG
CAPSULE ORAL ONCE
OUTPATIENT
Start: 2022-12-14 | End: 2022-12-14

## 2022-12-14 RX ORDER — BACITRACIN ZINC AND POLYMYXIN B SULFATE 500; 1000 [USP'U]/G; [USP'U]/G
OINTMENT TOPICAL ONCE
OUTPATIENT
Start: 2022-12-14 | End: 2022-12-14

## 2022-12-14 RX ORDER — LIDOCAINE HYDROCHLORIDE 20 MG/ML
JELLY TOPICAL ONCE
OUTPATIENT
Start: 2022-12-14 | End: 2022-12-14

## 2022-12-14 RX ORDER — LIDOCAINE HYDROCHLORIDE 40 MG/ML
SOLUTION TOPICAL ONCE
Status: COMPLETED | OUTPATIENT
Start: 2022-12-14 | End: 2022-12-14

## 2022-12-14 RX ORDER — LIDOCAINE 40 MG/G
CREAM TOPICAL ONCE
OUTPATIENT
Start: 2022-12-14 | End: 2022-12-14

## 2022-12-14 RX ORDER — GENTAMICIN SULFATE 1 MG/G
OINTMENT TOPICAL ONCE
OUTPATIENT
Start: 2022-12-14 | End: 2022-12-14

## 2022-12-14 RX ORDER — CLOBETASOL PROPIONATE 0.5 MG/G
OINTMENT TOPICAL ONCE
OUTPATIENT
Start: 2022-12-14 | End: 2022-12-14

## 2022-12-14 RX ADMIN — LIDOCAINE HYDROCHLORIDE 10 ML: 40 SOLUTION TOPICAL at 14:02

## 2022-12-14 ASSESSMENT — PAIN SCALES - GENERAL: PAINLEVEL_OUTOF10: 0

## 2022-12-14 NOTE — PROGRESS NOTES
Wound Healing Center Followup Visit Note    Referring Physician : Nayeli Young MD  [address] [facility] RECORD NUMBER:  [id number]  AGE: 65 y.o. GENDER: female  : 1945  EPISODE DATE:  2022    Subjective:     Chief Complaint   Patient presents with    Wound Check      HISTORY of PRESENT ILLNESS HPI   Ascencion Arambula is a 65 y.o. female who presents today in regards to follow up evaluation and treatment of wound/ulcer. That patient's past medical, family and social hx were reviewed and changes were made if present. History of Wound Context:  The patient has had a wound of left calf which was first noted approximately 2022. This has been treated local wound care. On their initial visit to the wound healing center, 22,  the patient has noted that the wound has been improving after seeing Dr Audrey Chicas last week. The patient has not had similar previous wounds in the past.      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 She underwent right segmental mandibulectomy, bilateral selective neck dissection of levels 1 through 4, open tracheostomy on  with L fibula resection and implant of fibular free flap in her jaw. Per pt report wound in left leg was never closed. She receive preop chemotherapy, immunotherapy. She is no longer receiving chemotherapy. Her lymph node resection was negative. Her left calf postop wound from where she had bone/free flap taken has been non healing and they have had issues with it since immediately postop per pt and family. They said [facility] surgeon is aware, recommended local wound care and was going to see them in 2023. Her daughter had asked to follow with me going forward. She has peg in place and receives nutrition via. She follows with Dr. Frankie Munguia in regards to CKD. Pt is on abx at time of initial visit.  She was started on levaquin per [facility] who she had culture sent to.      22  She does have an area of muscle that doesn't appear to be viable  She may need surgical debridement in the future  Coban 2, aquacell ag  She is already on levaquin from recent culture  No evidence of arterial occlusive disease  Labs ordered  Ok to cancel abis and pvrs  11/9/22  Wound slightly smaller  Coban 2, aquacell ag  Still may need surgical debridement in future  11/16/22  Wound smaller 109 (133)    11/23/22  Wound smaller 66  11/30/22  Wound smaller 63   Wound appearance tremendously improved  Area of depth dramatically improved  Muscle coverage improved  12/7/22  Wound 58  Continued improvement, and more muscle coverage  12/14/22  Wound 55  Improved    Wound/Ulcer Pain Timing/Severity: mild  Quality of pain: aching  Severity:  1 / 10   Modifying Factors: Pain worsens with debridement, dressing changes  Associated Signs/Symptoms: drainage edema, pain    Ulcer Identification:  Ulcer Type: non-healing surgical  Contributing Factors: edema, immunosuppression, anticoagulation therapy, and malnutrition    Diabetic/Pressure/Non Pressure Ulcers only:  Ulcer: Non-Pressure ulcer, muscle necrosis  Wound: Wound dehiscence        PAST MEDICAL HISTORY      Diagnosis Date    Anemia     Arthritis     Bowel obstruction ([address] 04/28/2016    Cancer (HCC)     oral/mandible    CHF (congestive heart failure) (HCC)     COPD (chronic obstructive pulmonary disease) (HCC)     Hyperlipidemia     Hypertension     LBBB (left bundle branch block)     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 ([address] 10/26/2022    Nonischemic cardiomyopathy ([address]     Paroxysmal A-fib ([address]      Past Surgical History:   Procedure Laterality Date    ABDOMEN SURGERY  10/05/2016    exporation of abdominal fascial wound dehiscence close, insertion TLC right IJ    CARDIAC DEFIBRILLATOR PLACEMENT Left 05/23/2007    CARDIAC DEFIBRILLATOR PLACEMENT  04/17/2018    BIV ICD GENT CHANGE (BSCI)    VAMSHI    CARPAL TUNNEL RELEASE      DIAGNOSTIC CARDIAC CATH LAB PROCEDURE      ENDOSCOPY, COLON, DIAGNOSTIC  01/26/2018    upper endo    HERNIA REPAIR      HYSTERECTOMY (CERVIX STATUS UNKNOWN)      ILEOSTOMY OR JEJUNOSTOMY      INCISIONAL HERNIA REPAIR  04/21/2017    LAPAROSCOPIC; WITH MESH    MANDIBLE SURGERY      OTHER SURGICAL HISTORY      ostomy placed having reversal done on 9/30/2016    OTHER SURGICAL HISTORY  09/30/2016    open reveral ileostomy    OTHER SURGICAL HISTORY  01/07/2009    BiV/ICD    OTHER SURGICAL HISTORY Left     vascularized fibular graft harvest [facility] 9/22     Family History   Problem Relation Age of Onset    Alzheimer's Disease Mother     Heart Disease Father     Cancer Father     Alzheimer's Disease Father     Cancer Brother      Social History     Tobacco Use    Smoking status: Former     Types: Cigarettes     Quit date: 12/19/2006     Years since quitting: 15.9    Smokeless tobacco: Never   Vaping Use    Vaping Use: Never used   Substance Use Topics    Alcohol use: Not Currently     Comment: occasional    Drug use: No     No Known Allergies  Current Outpatient Medications on File Prior to Encounter   Medication Sig Dispense Refill    carvedilol (COREG) 3.125 MG tablet Take 1 tablet by mouth 2 times daily (with meals) 60 tablet 3    escitalopram (LEXAPRO) 10 MG tablet Take 1 tablet by mouth daily      famotidine (PEPCID) 20 MG tablet Take 1 tablet by mouth 2 times daily      traMADol (ULTRAM) 50 MG tablet Take 1 tablet by mouth every 8 hours as needed. oxyCODONE (ROXICODONE) 5 MG immediate release tablet Take 1 tablet by mouth every 8 hours as needed. levoFLOXacin (LEVAQUIN) 750 MG tablet Take 750 mg by mouth daily Last dose to be Sunday 11/6/22      gabapentin (NEURONTIN) 100 MG capsule Take 100 mg by mouth every 8 (eight) hours.       magnesium oxide (MAG-OX) 400 MG tablet Take 400 mg by mouth daily      apixaban (ELIQUIS) 5 MG TABS tablet TAKE 1 TABLET TWICE DAILY 180 tablet 3    fluticasone (FLONASE) 50 MCG/ACT nasal spray 1 spray by Each Nostril route as needed for Rhinitis      SPIRIVA HANDIHALER 18 MCG inhalation capsule Inhale 18 mcg into the lungs daily        No current facility-administered medications on file prior to encounter.        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 ([address] - Primary (Chronic)    Relevant Orders    Initiate Outpatient Wound Care Protocol       Pre Debridement Measurements:  Are located in the [place]  Documentation Flow Sheet  Post Debridement Measurements:  Wound/Ulcer Descriptions are Pre Debridement except measurements:    Incision 10/05/16 Abdomen Mid (Active)   Number of days: 3566       Incision 04/21/17 Abdomen Right;Left;Mid (Active)   Number of days: 2063       Wound 10/26/22 Leg Left;Lateral #1 (Active)   Wound Image   12/14/22 1400   Wound Etiology Non-Healing Surgical 12/07/22 1614   Dressing Status New dressing applied 12/07/22 1614   Wound Cleansed Cleansed with saline 12/07/22 1614   Dressing/Treatment Alginate with Ag;ABD 12/07/22 1614   Offloading for Diabetic Foot Ulcers Offloading not required 12/07/22 1614   Wound Length (cm) 13.8 cm 12/14/22 1400   Wound Width (cm) 4 cm 12/14/22 1400   Wound Depth (cm) 0.2 cm 12/14/22 1400   Wound Surface Area (cm^2) 55.2 cm^2 12/14/22 1400   Change in Wound Size % (l*w) 55.19 12/14/22 1400   Wound Volume (cm^3) 11.04 cm^3 12/14/22 1400   Wound Healing % 82 12/14/22 1400   Post-Procedure Length (cm) 13.9 cm 12/14/22 1439   Post-Procedure Width (cm) 4.1 cm 12/14/22 1439   Post-Procedure Depth (cm) 0.3 cm 12/14/22 1439   Post-Procedure Surface Area (cm^2) 56.99 cm^2 12/14/22 1439   Post-Procedure Volume (cm^3) 17. 097 cm^3 12/14/22 1439   Distance Tunneling (cm) 2.3 cm 11/16/22 1521   Tunneling Position ___ O'Clock 12 11/16/22 1521   Wound Assessment Exposed structure tendon;Pale granulation tissue;Pink/red 12/14/22 1400   Drainage Amount Moderate 12/14/22 1400   Drainage Description Serosanguinous 12/14/22 1400   Odor None 12/14/22 1400   Cherise-wound Assessment Intact 12/14/22 1400   Wound Thickness Description not for Pressure Injury Full thickness 10/26/22 0814   Number of days: 49          Procedure Note  Indications:  Based on my examination of this patient's wound(s)/ulcer(s) today, debridement is required to promote healing and evaluate the wound base. Performed by: Leonardo Krishnamurthy MD    Consent obtained:  Yes    Time out taken:  Yes    Pain Control: Anesthetic  Anesthetic: 4% Lidocaine Liquid Topical     Debridement:Excisional Debridement    Using curette the wound(s)/ulcer(s) was/were sharply debrided down through and including the removal of epidermis, dermis, subcutaneous tissue, and muscle/fascia. Devitalized Tissue Debrided:  fibrin, biofilm, slough, and exudate to stimulate bleeding to promote healing, post debridement good bleeding base and wound edges noted    Wound/Ulcer #: 1    Percent of Wound/Ulcer Debrided: 80%    Total Surface Area Debrided:  40 sq cm     Estimated Blood Loss:  Minimal  Hemostasis Achieved:  by pressure    Procedural Pain:  3  / 10   Post Procedural Pain:  2 / 10     Response to treatment:  Well tolerated by patient. Plan:   Treatment Note please see attached Discharge Instructions    Written patient dismissal instructions given to patient and signed by patient or POA.          Discharge Instructions Discharge condition: Stable     Assessment of pain at discharge:minimal     Anesthetic used: 4% lidocaine solution     Discharge to: Home     Left via:Private automobile     Accompanied by: family      ECF/HHA: [address]- cleanse with normal saline, pack loosely with calcium alginate ag , ABD pads and coban 2. Change Monday- Wednesday (at [facility])- Friday. Treatment Orders:Eat a diet high in protein and vitamin C. Take a multiple vitamin daily unless contraindicated. Elevate leg above the level of the heart as much as possible throughout the day. [facility] followup visit:1 week_____________________________  (Please note your next appointment above and if you are unable to keep, kindly give a 24 hour notice. Thank you.)     Physician signature:__________________________      If you experience any of the following, please call the [address] [place] [address] during business hours:     * Increase in Pain  * Temperature over 101  * Increase in drainage from your wound  * Drainage with a foul odor  * Bleeding  * Increase in swelling  * Need for compression bandage changes due to slippage, breakthrough drainage.      If you need medical attention outside of the business hours of the [facility] [address] please contact your PCP or go to the nearest emergency room      Electronically signed by MD yakov Pineda

## 2022-12-14 NOTE — PLAN OF CARE
Problem: Chronic Conditions and Co-morbidities  Goal: Patient's chronic conditions and co-morbidity symptoms are monitored and maintained or improved  Outcome: Progressing     Problem: Pain  Goal: Verbalizes/displays adequate comfort level or baseline comfort level  Outcome: Progressing     Problem: Cognitive:  Goal: Knowledge of wound care  Description: Knowledge of wound care  Outcome: Progressing  Goal: Understands risk factors for wounds  Description: Understands risk factors for wounds  Outcome: Progressing     Problem: Wound:  Goal: Will show signs of wound healing; wound closure and no evidence of infection  Description: Will show signs of wound healing; wound closure and no evidence of infection  Outcome: Progressing     Problem: Compression therapy:  Goal: Will be free from complications associated with compression therapy  Description: Will be free from complications associated with compression therapy  Outcome: Progressing

## 2022-12-15 NOTE — DISCHARGE INSTRUCTIONS
Discharge condition: Stable     Assessment of pain at discharge:minimal     Anesthetic used: 4% lidocaine solution     Discharge to: Home     Left via:Private automobile     Accompanied by: family      ECF/HHA: [address]- cleanse with normal saline, pack loosely with calcium alginate ag , ABD pads and coban 2. Change Monday- Wednesday (at [facility])- Friday. Treatment Orders:Eat a diet high in protein and vitamin C. Take a multiple vitamin daily unless contraindicated. Elevate leg above the level of the heart as much as possible throughout the day. [facility] followup visit:1 week_____________________________  (Please note your next appointment above and if you are unable to keep, kindly give a 24 hour notice. Thank you.)     Physician signature:__________________________      If you experience any of the following, please call the [address] [place] [address] [facility] during business hours:     * Increase in Pain  * Temperature over 101  * Increase in drainage from your wound  * Drainage with a foul odor  * Bleeding  * Increase in swelling  * Need for compression bandage changes due to slippage, breakthrough drainage.      If you need medical attention outside of the business hours of the [employer] please contact your PCP or go to the nearest emergency room

## 2022-12-21 ENCOUNTER — HOSPITAL ENCOUNTER (OUTPATIENT)
Dept: WOUND CARE | Age: 77
Discharge: HOME | End: 2022-12-21
Payer: MEDICARE

## 2022-12-21 VITALS
TEMPERATURE: 98.1 F | HEART RATE: 92 BPM | RESPIRATION RATE: 16 BRPM | SYSTOLIC BLOOD PRESSURE: 121 MMHG | DIASTOLIC BLOOD PRESSURE: 54 MMHG

## 2022-12-21 DIAGNOSIS — L97.923 NON-PRESSURE CHRONIC ULCER OF LEFT LOWER LEG WITH NECROSIS OF MUSCLE (HCC): Primary | ICD-10-CM

## 2022-12-21 PROCEDURE — 11042 DBRDMT SUBQ TIS 1ST 20SQCM/<: CPT

## 2022-12-21 PROCEDURE — 11045 DBRDMT SUBQ TISS EACH ADDL: CPT

## 2022-12-21 RX ORDER — LIDOCAINE 50 MG/G
OINTMENT TOPICAL ONCE
OUTPATIENT
Start: 2022-12-21 | End: 2022-12-21

## 2022-12-21 RX ORDER — LIDOCAINE HYDROCHLORIDE 20 MG/ML
JELLY TOPICAL ONCE
OUTPATIENT
Start: 2022-12-21 | End: 2022-12-21

## 2022-12-21 RX ORDER — BETAMETHASONE DIPROPIONATE 0.05 %
OINTMENT (GRAM) TOPICAL ONCE
OUTPATIENT
Start: 2022-12-21 | End: 2022-12-21

## 2022-12-21 RX ORDER — LIDOCAINE 40 MG/G
CREAM TOPICAL ONCE
OUTPATIENT
Start: 2022-12-21 | End: 2022-12-21

## 2022-12-21 RX ORDER — CLOBETASOL PROPIONATE 0.5 MG/G
OINTMENT TOPICAL ONCE
OUTPATIENT
Start: 2022-12-21 | End: 2022-12-21

## 2022-12-21 RX ORDER — LIDOCAINE HYDROCHLORIDE 40 MG/ML
SOLUTION TOPICAL ONCE
Status: COMPLETED | OUTPATIENT
Start: 2022-12-21 | End: 2022-12-21

## 2022-12-21 RX ORDER — BACITRACIN ZINC AND POLYMYXIN B SULFATE 500; 1000 [USP'U]/G; [USP'U]/G
OINTMENT TOPICAL ONCE
OUTPATIENT
Start: 2022-12-21 | End: 2022-12-21

## 2022-12-21 RX ORDER — GINSENG 100 MG
CAPSULE ORAL ONCE
OUTPATIENT
Start: 2022-12-21 | End: 2022-12-21

## 2022-12-21 RX ORDER — LIDOCAINE HYDROCHLORIDE 40 MG/ML
SOLUTION TOPICAL ONCE
OUTPATIENT
Start: 2022-12-21 | End: 2022-12-21

## 2022-12-21 RX ORDER — GENTAMICIN SULFATE 1 MG/G
OINTMENT TOPICAL ONCE
OUTPATIENT
Start: 2022-12-21 | End: 2022-12-21

## 2022-12-21 RX ORDER — BACITRACIN, NEOMYCIN, POLYMYXIN B 400; 3.5; 5 [USP'U]/G; MG/G; [USP'U]/G
OINTMENT TOPICAL ONCE
OUTPATIENT
Start: 2022-12-21 | End: 2022-12-21

## 2022-12-21 RX ADMIN — LIDOCAINE HYDROCHLORIDE 5 ML: 40 SOLUTION TOPICAL at 14:54

## 2022-12-21 NOTE — PROGRESS NOTES
Wound Healing Center Followup Visit Note    Referring Physician : Hugo Rodriguez MD  [address] [facility] RECORD NUMBER:  [id number]  AGE: 65 y.o. GENDER: female  : 1945  EPISODE DATE:  2022    Subjective:     Chief Complaint   Patient presents with    Wound Check     Left leg        HISTORY of PRESENT ILLNESS HPI   Lise Philip is a 65 y.o. female who presents today in regards to follow up evaluation and treatment of wound/ulcer. That patient's past medical, family and social hx were reviewed and changes were made if present. History of Wound Context:  The patient has had a wound of left calf which was first noted approximately 2022. This has been treated local wound care. On their initial visit to the wound healing center, 22,  the patient has noted that the wound has been improving after seeing Dr Ramona Matt last week. The patient has not had similar previous wounds in the past.      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 She underwent right segmental mandibulectomy, bilateral selective neck dissection of levels 1 through 4, open tracheostomy on  with L fibula resection and implant of fibular free flap in her jaw. Per pt report wound in left leg was never closed. She receive preop chemotherapy, immunotherapy. She is no longer receiving chemotherapy. Her lymph node resection was negative. Her left calf postop wound from where she had bone/free flap taken has been non healing and they have had issues with it since immediately postop per pt and family. They said [facility] surgeon is aware, recommended local wound care and was going to see them in 2023. Her daughter had asked to follow with me going forward. She has peg in place and receives nutrition via. She follows with Dr. Marshall Diaz in regards to CKD. Pt is on abx at time of initial visit. She was started on levaquin per [facility] who she had culture sent to. 11/2/22  She does have an area of muscle that doesn't appear to be viable  She may need surgical debridement in the future  Coban 2, aquacell ag  She is already on levaquin from recent culture  No evidence of arterial occlusive disease  Labs ordered  Ok to cancel abis and pvrs  11/9/22  Wound slightly smaller  Coban 2, aquacell ag  Still may need surgical debridement in future  11/16/22  Wound smaller 109 (133)    11/23/22  Wound smaller 66  11/30/22  Wound smaller 63   Wound appearance tremendously improved  Area of depth dramatically improved  Muscle coverage improved  12/7/22  Wound 58  Continued improvement, and more muscle coverage  12/14/22  Wound 55  Improved  12/21/22  Wound 50  Muscle almost completely covered    Wound/Ulcer Pain Timing/Severity: mild  Quality of pain: aching  Severity:  1 / 10   Modifying Factors: Pain worsens with debridement, dressing changes  Associated Signs/Symptoms: drainage edema, pain    Ulcer Identification:  Ulcer Type: non-healing surgical  Contributing Factors: edema, immunosuppression, anticoagulation therapy, and malnutrition    Diabetic/Pressure/Non Pressure Ulcers only:  Ulcer: Non-Pressure ulcer, muscle necrosis  Wound: Wound dehiscence        PAST MEDICAL HISTORY      Diagnosis Date    Anemia     Arthritis     Bowel obstruction ([address] 04/28/2016    Cancer (HCC)     oral/mandible    CHF (congestive heart failure) (HCC)     COPD (chronic obstructive pulmonary disease) (HCC)     Hyperlipidemia     Hypertension     LBBB (left bundle branch block)     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 ([address] 10/26/2022    Nonischemic cardiomyopathy ([address]     Paroxysmal A-fib ([address]      Past Surgical History:   Procedure Laterality Date    ABDOMEN SURGERY  10/05/2016    exporation of abdominal fascial wound dehiscence close, insertion TLC right IJ    CARDIAC DEFIBRILLATOR PLACEMENT Left 05/23/2007    CARDIAC DEFIBRILLATOR PLACEMENT  04/17/2018    BIV ICD GENT CHANGE (BSCI)    VAMSHI    CARPAL TUNNEL RELEASE      DIAGNOSTIC CARDIAC CATH LAB PROCEDURE      ENDOSCOPY, COLON, DIAGNOSTIC  2018    upper endo    HERNIA REPAIR      HYSTERECTOMY (CERVIX STATUS UNKNOWN)      ILEOSTOMY OR JEJUNOSTOMY      INCISIONAL HERNIA REPAIR  2017    LAPAROSCOPIC; WITH MESH    MANDIBLE SURGERY      OTHER SURGICAL HISTORY      ostomy placed having reversal done on 2016    OTHER SURGICAL HISTORY  2016    open reveral ileostomy    OTHER SURGICAL HISTORY  2009    BiV/ICD    OTHER SURGICAL HISTORY Left     vascularized fibular graft harvest [facility]      Family History   Problem Relation Age of Onset    Alzheimer's Disease Mother     Heart Disease Father     Cancer Father     Alzheimer's Disease Father     Cancer Brother      Social History     Tobacco Use    Smoking status: Former     Types: Cigarettes     Quit date: 2006     Years since quittin.0    Smokeless tobacco: Never   Vaping Use    Vaping Use: Never used   Substance Use Topics    Alcohol use: Not Currently     Comment: occasional    Drug use: No     No Known Allergies  Current Outpatient Medications on File Prior to Encounter   Medication Sig Dispense Refill    carvedilol (COREG) 3.125 MG tablet Take 1 tablet by mouth 2 times daily (with meals) 60 tablet 3    escitalopram (LEXAPRO) 10 MG tablet Take 1 tablet by mouth daily      famotidine (PEPCID) 20 MG tablet Take 1 tablet by mouth 2 times daily      traMADol (ULTRAM) 50 MG tablet Take 1 tablet by mouth every 8 hours as needed. oxyCODONE (ROXICODONE) 5 MG immediate release tablet Take 1 tablet by mouth every 8 hours as needed. levoFLOXacin (LEVAQUIN) 750 MG tablet Take 750 mg by mouth daily Last dose to be 22      gabapentin (NEURONTIN) 100 MG capsule Take 100 mg by mouth every 8 (eight) hours.       magnesium oxide (MAG-OX) 400 MG tablet Take 400 mg by mouth daily      apixaban (ELIQUIS) 5 MG TABS tablet TAKE 1 TABLET TWICE DAILY 180 tablet 3    fluticasone (FLONASE) 50 MCG/ACT nasal spray 1 spray by Each Nostril route as needed for Rhinitis      SPIRIVA HANDIHALER 18 MCG inhalation capsule Inhale 18 mcg into the lungs daily        No current facility-administered medications on file prior to encounter. 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 ([address] - Primary (Chronic)    Relevant Orders    Initiate Outpatient Wound Care Protocol       Pre Debridement Measurements:  Are located in the [place]  Documentation Flow Sheet  Post Debridement Measurements:  Wound/Ulcer Descriptions are Pre Debridement except measurements:    Incision 10/05/16 Abdomen Mid (Active)   Number of days: 2267       Incision 04/21/17 Abdomen Right;Left;Mid (Active)   Number of days: 2070       Wound 10/26/22 Leg Left;Lateral #1 (Active)   Wound Image   12/14/22 1400   Wound Etiology Non-Healing Surgical 12/07/22 1614   Dressing Status New dressing applied;Clean;Dry; Intact 12/14/22 1500   Wound Cleansed Cleansed with saline 12/14/22 1500   Dressing/Treatment Alginate with Ag;ABD 12/14/22 1500   Offloading for Diabetic Foot Ulcers Offloading not required 12/07/22 1614   Wound Length (cm) 12.7 cm 12/21/22 1455   Wound Width (cm) 4 cm 12/21/22 1455   Wound Depth (cm) 0.1 cm 12/21/22 1455   Wound Surface Area (cm^2) 50.8 cm^2 12/21/22 1455   Change in Wound Size % (l*w) 58.77 12/21/22 1455   Wound Volume (cm^3) 5.08 cm^3 12/21/22 1455   Wound Healing % 92 12/21/22 1455   Post-Procedure Length (cm) 12.9 cm 12/21/22 1547   Post-Procedure Width (cm) 4.1 cm 12/21/22 1547   Post-Procedure Depth (cm) 0.1 cm 12/21/22 1547   Post-Procedure Surface Area (cm^2) 52.89 cm^2 12/21/22 1547   Post-Procedure Volume (cm^3) 5.289 cm^3 12/21/22 1547   Distance Tunneling (cm) 2.3 cm 11/16/22 1521   Tunneling Position ___ O'Clock 12 11/16/22 1521   Wound Assessment Slough;Pink/red;Fibrin 12/21/22 1455   Drainage Amount Moderate 12/21/22 1455   Drainage Description Yellow 12/21/22 1455   Odor None 12/21/22 1455   Cherise-wound Assessment Blanchable erythema;Dry/flaky 12/21/22 1455   Wound Thickness Description not for Pressure Injury Full thickness 10/26/22 0814   Number of days: 56          Procedure Note  Indications:  Based on my examination of this patient's wound(s)/ulcer(s) today, debridement is required to promote healing and evaluate the wound base. Performed by: Nasreen Romero MD    Consent obtained:  Yes    Time out taken:  Yes    Pain Control: Anesthetic  Anesthetic: 4% Lidocaine Liquid Topical     Debridement:Excisional Debridement    Using curette the wound(s)/ulcer(s) was/were sharply debrided down through and including the removal of epidermis, dermis, subcutaneous tissue, and muscle/fascia. Devitalized Tissue Debrided:  fibrin, biofilm, slough, and exudate to stimulate bleeding to promote healing, post debridement good bleeding base and wound edges noted    Wound/Ulcer #: 1    Percent of Wound/Ulcer Debrided: 80%    Total Surface Area Debrided:  40 sq cm     Estimated Blood Loss:  Minimal  Hemostasis Achieved:  by pressure    Procedural Pain:  3  / 10   Post Procedural Pain:  2 / 10     Response to treatment:  Well tolerated by patient. Plan:   Treatment Note please see attached Discharge Instructions    Written patient dismissal instructions given to patient and signed by patient or POA.          Discharge Instructions

## 2022-12-22 NOTE — DISCHARGE INSTRUCTIONS
Discharge condition: Stable     Assessment of pain at discharge:minimal     Anesthetic used: 4% lidocaine solution     Discharge to: Home     Left via:Private automobile     Accompanied by: family      ECF/HHA: [address]- cleanse with normal saline, apply drawtex , ABD pads and coban 2. Change Monday- Wednesday (at [facility])- Friday. Treatment Orders:Eat a diet high in protein and vitamin C. Take a multiple vitamin daily unless contraindicated. Elevate leg above the level of the heart as much as possible throughout the day. [facility] followup visit:1 week_____________________________  (Please note your next appointment above and if you are unable to keep, kindly give a 24 hour notice. Thank you.)     Physician signature:__________________________      If you experience any of the following, please call the [facility] during business hours:     * Increase in Pain  * Temperature over 101  * Increase in drainage from your wound  * Drainage with a foul odor  * Bleeding  * Increase in swelling  * Need for compression bandage changes due to slippage, breakthrough drainage.      If you need medical attention outside of the business hours of the [address] please contact your PCP or go to the nearest emergency room

## 2022-12-28 ENCOUNTER — HOSPITAL ENCOUNTER (OUTPATIENT)
Dept: WOUND CARE | Age: 77
Discharge: HOME | End: 2022-12-28
Payer: MEDICARE

## 2022-12-28 VITALS
HEART RATE: 84 BPM | WEIGHT: 137 LBS | TEMPERATURE: 97.2 F | BODY MASS INDEX: 25.21 KG/M2 | SYSTOLIC BLOOD PRESSURE: 140 MMHG | DIASTOLIC BLOOD PRESSURE: 49 MMHG | HEIGHT: 62 IN | RESPIRATION RATE: 16 BRPM

## 2022-12-28 DIAGNOSIS — L97.923 NON-PRESSURE CHRONIC ULCER OF LEFT LOWER LEG WITH NECROSIS OF MUSCLE (HCC): Primary | ICD-10-CM

## 2022-12-28 PROCEDURE — 11045 DBRDMT SUBQ TISS EACH ADDL: CPT | Performed by: SURGERY

## 2022-12-28 PROCEDURE — 11042 DBRDMT SUBQ TIS 1ST 20SQCM/<: CPT

## 2022-12-28 PROCEDURE — 11042 DBRDMT SUBQ TIS 1ST 20SQCM/<: CPT | Performed by: SURGERY

## 2022-12-28 PROCEDURE — 11045 DBRDMT SUBQ TISS EACH ADDL: CPT

## 2022-12-28 RX ORDER — LIDOCAINE 40 MG/G
CREAM TOPICAL ONCE
OUTPATIENT
Start: 2022-12-28 | End: 2022-12-28

## 2022-12-28 RX ORDER — GINSENG 100 MG
CAPSULE ORAL ONCE
OUTPATIENT
Start: 2022-12-28 | End: 2022-12-28

## 2022-12-28 RX ORDER — LIDOCAINE HYDROCHLORIDE 40 MG/ML
SOLUTION TOPICAL ONCE
Status: COMPLETED | OUTPATIENT
Start: 2022-12-28 | End: 2022-12-28

## 2022-12-28 RX ORDER — LIDOCAINE HYDROCHLORIDE 20 MG/ML
JELLY TOPICAL ONCE
OUTPATIENT
Start: 2022-12-28 | End: 2022-12-28

## 2022-12-28 RX ORDER — LIDOCAINE HYDROCHLORIDE 40 MG/ML
SOLUTION TOPICAL ONCE
OUTPATIENT
Start: 2022-12-28 | End: 2022-12-28

## 2022-12-28 RX ORDER — GENTAMICIN SULFATE 1 MG/G
OINTMENT TOPICAL ONCE
OUTPATIENT
Start: 2022-12-28 | End: 2022-12-28

## 2022-12-28 RX ORDER — LIDOCAINE 50 MG/G
OINTMENT TOPICAL ONCE
OUTPATIENT
Start: 2022-12-28 | End: 2022-12-28

## 2022-12-28 RX ORDER — BETAMETHASONE DIPROPIONATE 0.05 %
OINTMENT (GRAM) TOPICAL ONCE
OUTPATIENT
Start: 2022-12-28 | End: 2022-12-28

## 2022-12-28 RX ORDER — BACITRACIN, NEOMYCIN, POLYMYXIN B 400; 3.5; 5 [USP'U]/G; MG/G; [USP'U]/G
OINTMENT TOPICAL ONCE
OUTPATIENT
Start: 2022-12-28 | End: 2022-12-28

## 2022-12-28 RX ORDER — BACITRACIN ZINC AND POLYMYXIN B SULFATE 500; 1000 [USP'U]/G; [USP'U]/G
OINTMENT TOPICAL ONCE
OUTPATIENT
Start: 2022-12-28 | End: 2022-12-28

## 2022-12-28 RX ORDER — CLOBETASOL PROPIONATE 0.5 MG/G
OINTMENT TOPICAL ONCE
OUTPATIENT
Start: 2022-12-28 | End: 2022-12-28

## 2022-12-28 RX ADMIN — LIDOCAINE HYDROCHLORIDE 10 ML: 40 SOLUTION TOPICAL at 14:46

## 2022-12-28 NOTE — PROGRESS NOTES
Wound Healing Center Followup Visit Note    Referring Physician : Hugo Rodriguez MD  [address] [facility] RECORD NUMBER:  [id number]  AGE: 65 y.o. GENDER: female  : 1945  EPISODE DATE:  2022    Subjective:     Chief Complaint   Patient presents with    Wound Check     Left leg      HISTORY of PRESENT ILLNESS HPI   Lise Philip is a 65 y.o. female who presents today in regards to follow up evaluation and treatment of wound/ulcer. That patient's past medical, family and social hx were reviewed and changes were made if present. History of Wound Context:  The patient has had a wound of left calf which was first noted approximately 2022. This has been treated local wound care. On their initial visit to the wound healing center, 22,  the patient has noted that the wound has been improving after seeing Dr Ramona Matt last week. The patient has not had similar previous wounds in the past.      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 She underwent right segmental mandibulectomy, bilateral selective neck dissection of levels 1 through 4, open tracheostomy on  with L fibula resection and implant of fibular free flap in her jaw. Per pt report wound in left leg was never closed. She receive preop chemotherapy, immunotherapy. She is no longer receiving chemotherapy. Her lymph node resection was negative. Her left calf postop wound from where she had bone/free flap taken has been non healing and they have had issues with it since immediately postop per pt and family. They said [facility] surgeon is aware, recommended local wound care and was going to see them in 2023. Her daughter had asked to follow with me going forward. She has peg in place and receives nutrition via. She follows with Dr. Marshall Diaz in regards to CKD. Pt is on abx at time of initial visit. She was started on levaquin per [facility] who she had culture sent to. 11/2/22  She does have an area of muscle that doesn't appear to be viable  She may need surgical debridement in the future  Coban 2, aquacell ag  She is already on levaquin from recent culture  No evidence of arterial occlusive disease  Labs ordered  Ok to cancel abis and pvrs  11/9/22  Wound slightly smaller  Cobangie ramon 2  Still may need surgical debridement in future  11/16/22  Wound smaller 109 (133)    11/23/22  Wound smaller 66  11/30/22  Wound smaller 63   Wound appearance tremendously improved  Area of depth dramatically improved  Muscle coverage improved  12/7/22  Wound 58  Continued improvement, and more muscle coverage  12/14/22  Wound 55  Improved  12/21/22  Wound 50  Muscle almost completely covered  12/28/22  Wound size stable 50, appearance improved  Change to drawtek    Wound/Ulcer Pain Timing/Severity: mild  Quality of pain: aching  Severity:  1 / 10   Modifying Factors: Pain worsens with debridement, dressing changes  Associated Signs/Symptoms: drainage edema, pain    Ulcer Identification:  Ulcer Type: non-healing surgical  Contributing Factors: edema, immunosuppression, anticoagulation therapy, and malnutrition    Diabetic/Pressure/Non Pressure Ulcers only:  Ulcer: Non-Pressure ulcer, muscle necrosis  Wound: Wound dehiscence        PAST MEDICAL HISTORY      Diagnosis Date    Anemia     Arthritis     Bowel obstruction (HCC) 04/28/2016    Cancer (HCC)     oral/mandible    CHF (congestive heart failure) (HCC)     COPD (chronic obstructive pulmonary disease) (HCC)     Hyperlipidemia     Hypertension     LBBB (left bundle branch block)     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 ([address] 10/26/2022    Nonischemic cardiomyopathy ([address]     Paroxysmal A-fib ([address]      Past Surgical History:   Procedure Laterality Date    ABDOMEN SURGERY  10/05/2016    exporation of abdominal fascial wound dehiscence close, insertion TLC right IJ    CARDIAC DEFIBRILLATOR PLACEMENT Left 2007    CARDIAC DEFIBRILLATOR PLACEMENT  2018    BIV ICD GENT CHANGE (BSCI)    BONDS    CARPAL TUNNEL RELEASE      DIAGNOSTIC CARDIAC CATH LAB PROCEDURE      ENDOSCOPY, COLON, DIAGNOSTIC  2018    upper endo    HERNIA REPAIR      HYSTERECTOMY (CERVIX STATUS UNKNOWN)      ILEOSTOMY OR JEJUNOSTOMY      INCISIONAL HERNIA REPAIR  2017    LAPAROSCOPIC; WITH MESH    MANDIBLE SURGERY      OTHER SURGICAL HISTORY      ostomy placed having reversal done on 2016    OTHER SURGICAL HISTORY  2016    open reveral ileostomy    OTHER SURGICAL HISTORY  2009    BiV/ICD    OTHER SURGICAL HISTORY Left     vascularized fibular graft [facility]      Family History   Problem Relation Age of Onset    Alzheimer's Disease Mother     Heart Disease Father     Cancer Father     Alzheimer's Disease Father     Cancer Brother      Social History     Tobacco Use    Smoking status: Former     Types: Cigarettes     Quit date: 2006     Years since quittin.0    Smokeless tobacco: Never   Vaping Use    Vaping Use: Never used   Substance Use Topics    Alcohol use: Not Currently     Comment: occasional    Drug use: No     No Known Allergies  Current Outpatient Medications on File Prior to Encounter   Medication Sig Dispense Refill    carvedilol (COREG) 3.125 MG tablet Take 1 tablet by mouth 2 times daily (with meals) 60 tablet 3    escitalopram (LEXAPRO) 10 MG tablet Take 1 tablet by mouth daily      famotidine (PEPCID) 20 MG tablet Take 1 tablet by mouth 2 times daily      traMADol (ULTRAM) 50 MG tablet Take 1 tablet by mouth every 8 hours as needed. oxyCODONE (ROXICODONE) 5 MG immediate release tablet Take 1 tablet by mouth every 8 hours as needed. gabapentin (NEURONTIN) 100 MG capsule Take 100 mg by mouth every 8 (eight) hours.       magnesium oxide (MAG-OX) 400 MG tablet Take 400 mg by mouth daily      apixaban (ELIQUIS) 5 MG TABS tablet TAKE 1 TABLET TWICE DAILY 180 tablet 3 fluticasone (FLONASE) 50 MCG/ACT nasal spray 1 spray by Each Nostril route as needed for Rhinitis      SPIRIVA HANDIHALER 18 MCG inhalation capsule Inhale 18 mcg into the lungs daily        No current facility-administered medications on file prior to encounter. 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 ([facility] - Primary (Chronic)    Relevant Orders    Initiate Outpatient Wound Care Protocol       Pre Debridement Measurements:  Are located in the [place]  Documentation Flow Sheet  Post Debridement Measurements:  Wound/Ulcer Descriptions are Pre Debridement except measurements:    Incision 10/05/16 Abdomen Mid (Active)   Number of days: 2274       Incision 04/21/17 Abdomen Right;Left;Mid (Active)   Number of days: 2077       Wound 10/26/22 Leg Left;Lateral #1 (Active)   Wound Image   12/14/22 1400   Wound Etiology Non-Healing Surgical 12/07/22 1614   Dressing Status New dressing applied;Clean;Dry; Intact 12/21/22 1553   Wound Cleansed Cleansed with saline 12/21/22 1553   Dressing/Treatment Alginate with Ag;ABD 12/21/22 1553   Offloading for Diabetic Foot Ulcers Offloading not required 12/07/22 1614   Wound Length (cm) 12.5 cm 12/28/22 1444   Wound Width (cm) 4 cm 12/28/22 1444   Wound Depth (cm) 0.1 cm 12/28/22 1444   Wound Surface Area (cm^2) 50 cm^2 12/28/22 1444   Change in Wound Size % (l*w) 59.42 12/28/22 1444   Wound Volume (cm^3) 5 cm^3 12/28/22 1444   Wound Healing % 92 12/28/22 1444   Post-Procedure Length (cm) 12.6 cm 12/28/22 1452   Post-Procedure Width (cm) 4 cm 12/28/22 1452   Post-Procedure Depth (cm) 0.1 cm 12/28/22 1452   Post-Procedure Surface Area (cm^2) 50.4 cm^2 12/28/22 1452   Post-Procedure Volume (cm^3) 5.04 cm^3 12/28/22 1452   Distance Tunneling (cm) 2.3 cm 11/16/22 1521   Tunneling Position ___ O'Clock 12 11/16/22 1521   Wound Assessment Fibrin;Pink/red;Slough 12/28/22 1444   Drainage Amount Moderate 12/28/22 1444   Drainage Description Serosanguinous 12/28/22 1444   Odor None 12/28/22 1444   Cherise-wound Assessment Dry/flaky 12/28/22 1444   Wound Thickness Description not for Pressure Injury Full thickness 10/26/22 0814   Number of days: 63          Procedure Note  Indications:  Based on my examination of this patient's wound(s)/ulcer(s) today, debridement is required to promote healing and evaluate the wound base. Performed by: Leanord Sacks, MD    Consent obtained:  Yes    Time out taken:  Yes    Pain Control: Anesthetic  Anesthetic: 4% Lidocaine Liquid Topical     Debridement:Excisional Debridement    Using curette the wound(s)/ulcer(s) was/were sharply debrided down through and including the removal of epidermis, dermis, subcutaenous    Devitalized Tissue Debrided:  fibrin, biofilm, slough, and exudate to stimulate bleeding to promote healing, post debridement good bleeding base and wound edges noted    Wound/Ulcer #: 1    Percent of Wound/Ulcer Debrided: 80%    Total Surface Area Debrided:  40 sq cm     Estimated Blood Loss:  Minimal  Hemostasis Achieved:  by pressure    Procedural Pain:  3  / 10   Post Procedural Pain:  2 / 10     Response to treatment:  Well tolerated by patient. Plan:   Treatment Note please see attached Discharge Instructions    Written patient dismissal instructions given to patient and signed by patient or POA.          Discharge Instructions Discharge condition: Stable     Assessment of pain at discharge:minimal     Anesthetic used: 4% lidocaine solution     Discharge to: Home     Left via:Private automobile     Accompanied by: family      ECF/HHA: [address]- cleanse with normal saline, apply drawtex , ABD pads and coban 2. Change Monday- Wednesday (at [facility])- Friday. Treatment Orders:Eat a diet high in protein and vitamin C. Take a multiple vitamin daily unless contraindicated. Elevate leg above the level of the heart as much as possible throughout the day. [facility] followup visit:1 week_____________________________  (Please note your next appointment above and if you are unable to keep, kindly give a 24 hour notice. Thank you.)     Physician signature:__________________________      If you experience any of the following, please call the [address] [place] [address] [facility] during business hours:     * Increase in Pain  * Temperature over 101  * Increase in drainage from your wound  * Drainage with a foul odor  * Bleeding  * Increase in swelling  * Need for compression bandage changes due to slippage, breakthrough drainage.      If you need medical attention outside of the business hours of the [facility] please contact your PCP or go to the nearest emergency room      Electronically signed by MD yakov Dawson

## 2023-01-03 NOTE — DISCHARGE INSTRUCTIONS
Discharge condition: Stable     Assessment of pain at discharge:minimal     Anesthetic used: 4% lidocaine solution     Discharge to: Home     Left via:Private automobile     Accompanied by: family      ECF/HHA: [facility] (note order change)      Dressing Orders:LEFT LATERAL LOWER LEG- cleanse with normal saline, apply drawtex , ABD pads , unna boot and coban . Change Monday- Wednesday (at [facility])- Friday. Treatment Orders:Eat a diet high in protein and vitamin C. Take a multiple vitamin daily unless contraindicated. Elevate leg above the level of the heart as much as possible throughout the day. [facility] followup visit:1 week_____________________________  (Please note your next appointment above and if you are unable to keep, kindly give a 24 hour notice. Thank you.)     Physician signature:__________________________      If you experience any of the following, please call the [facility] during business hours:     * Increase in Pain  * Temperature over 101  * Increase in drainage from your wound  * Drainage with a foul odor  * Bleeding  * Increase in swelling  * Need for compression bandage changes due to slippage, breakthrough drainage.      If you need medical attention outside of the business hours of the [facility] please contact your PCP or go to the nearest emergency room

## 2023-01-04 ENCOUNTER — HOSPITAL ENCOUNTER (OUTPATIENT)
Dept: WOUND CARE | Age: 78
Discharge: HOME | End: 2023-01-04
Payer: MEDICARE

## 2023-01-04 VITALS
DIASTOLIC BLOOD PRESSURE: 57 MMHG | SYSTOLIC BLOOD PRESSURE: 135 MMHG | HEART RATE: 91 BPM | RESPIRATION RATE: 18 BRPM | TEMPERATURE: 97.6 F

## 2023-01-04 DIAGNOSIS — L97.923 NON-PRESSURE CHRONIC ULCER OF LEFT LOWER LEG WITH NECROSIS OF MUSCLE (HCC): Primary | ICD-10-CM

## 2023-01-04 PROCEDURE — 11045 DBRDMT SUBQ TISS EACH ADDL: CPT

## 2023-01-04 PROCEDURE — 11042 DBRDMT SUBQ TIS 1ST 20SQCM/<: CPT

## 2023-01-04 RX ORDER — CLOBETASOL PROPIONATE 0.5 MG/G
OINTMENT TOPICAL ONCE
OUTPATIENT
Start: 2023-01-04 | End: 2023-01-04

## 2023-01-04 RX ORDER — BACITRACIN, NEOMYCIN, POLYMYXIN B 400; 3.5; 5 [USP'U]/G; MG/G; [USP'U]/G
OINTMENT TOPICAL ONCE
OUTPATIENT
Start: 2023-01-04 | End: 2023-01-04

## 2023-01-04 RX ORDER — LIDOCAINE HYDROCHLORIDE 20 MG/ML
JELLY TOPICAL ONCE
OUTPATIENT
Start: 2023-01-04 | End: 2023-01-04

## 2023-01-04 RX ORDER — LIDOCAINE 40 MG/G
CREAM TOPICAL ONCE
OUTPATIENT
Start: 2023-01-04 | End: 2023-01-04

## 2023-01-04 RX ORDER — BETAMETHASONE DIPROPIONATE 0.05 %
OINTMENT (GRAM) TOPICAL ONCE
OUTPATIENT
Start: 2023-01-04 | End: 2023-01-04

## 2023-01-04 RX ORDER — LIDOCAINE 50 MG/G
OINTMENT TOPICAL ONCE
OUTPATIENT
Start: 2023-01-04 | End: 2023-01-04

## 2023-01-04 RX ORDER — GINSENG 100 MG
CAPSULE ORAL ONCE
OUTPATIENT
Start: 2023-01-04 | End: 2023-01-04

## 2023-01-04 RX ORDER — LIDOCAINE HYDROCHLORIDE 40 MG/ML
SOLUTION TOPICAL ONCE
OUTPATIENT
Start: 2023-01-04 | End: 2023-01-04

## 2023-01-04 RX ORDER — BACITRACIN ZINC AND POLYMYXIN B SULFATE 500; 1000 [USP'U]/G; [USP'U]/G
OINTMENT TOPICAL ONCE
OUTPATIENT
Start: 2023-01-04 | End: 2023-01-04

## 2023-01-04 RX ORDER — LIDOCAINE HYDROCHLORIDE 40 MG/ML
SOLUTION TOPICAL ONCE
Status: COMPLETED | OUTPATIENT
Start: 2023-01-04 | End: 2023-01-04

## 2023-01-04 RX ORDER — GENTAMICIN SULFATE 1 MG/G
OINTMENT TOPICAL ONCE
OUTPATIENT
Start: 2023-01-04 | End: 2023-01-04

## 2023-01-04 RX ADMIN — LIDOCAINE HYDROCHLORIDE 5 ML: 40 SOLUTION TOPICAL at 14:34

## 2023-01-04 NOTE — PROGRESS NOTES
Wound Healing Center Followup Visit Note    Referring Physician : Nayeli Guerra MD  [address] [facility] RECORD NUMBER:  [id number]  AGE: 65 y.o. GENDER: female  : 1945  EPISODE DATE:  2023    Subjective:     Chief Complaint   Patient presents with    Wound Check     Left leg      HISTORY of PRESENT ILLNESS HPI   Ruben Stoll is a 65 y.o. female who presents today in regards to follow up evaluation and treatment of wound/ulcer. That patient's past medical, family and social hx were reviewed and changes were made if present. History of Wound Context:  The patient has had a wound of left calf which was first noted approximately 2022. This has been treated local wound care. On their initial visit to the wound healing center, 22,  the patient has noted that the wound has been improving after seeing Dr Bassam Oglesby last week. The patient has not had similar previous wounds in the past.      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 She underwent right segmental mandibulectomy, bilateral selective neck dissection of levels 1 through 4, open tracheostomy on  with L fibula resection and implant of fibular free flap in her jaw. Per pt report wound in left leg was never closed. She receive preop chemotherapy, immunotherapy. She is no longer receiving chemotherapy. Her lymph node resection was negative. Her left calf postop wound from where she had bone/free flap taken has been non healing and they have had issues with it since immediately postop per pt and family. They said [facility] surgeon is aware, recommended local wound care and was going to see them in 2023. Her daughter had asked to follow with me going forward. She has peg in place and receives nutrition via. She follows with Dr. Chloe Arora in regards to CKD. Pt is on abx at time of initial visit. She was started on levaquin per [facility] who she had culture sent to. 11/2/22  She does have an area of muscle that doesn't appear to be viable  She may need surgical debridement in the future  Coban 2, aquacell ag  She is already on levaquin from recent culture  No evidence of arterial occlusive disease  Labs ordered  Ok to cancel abis and pvrs  11/9/22  Wound slightly smaller  Cobangie ramon 2  Still may need surgical debridement in future  11/16/22  Wound smaller 109 (133)    11/23/22  Wound smaller 66  11/30/22  Wound smaller 63   Wound appearance tremendously improved  Area of depth dramatically improved  Muscle coverage improved  12/7/22  Wound 58  Continued improvement, and more muscle coverage  12/14/22  Wound 55  Improved  12/21/22  Wound 50  Muscle almost completely covered  12/28/22  Wound size stable 50, appearance improved  Change to drawtek  1/4/23  Wound size improved 44    Wound/Ulcer Pain Timing/Severity: mild  Quality of pain: aching  Severity:  1 / 10   Modifying Factors: Pain worsens with debridement, dressing changes  Associated Signs/Symptoms: drainage edema, pain    Ulcer Identification:  Ulcer Type: non-healing surgical  Contributing Factors: edema, immunosuppression, anticoagulation therapy, and malnutrition    Diabetic/Pressure/Non Pressure Ulcers only:  Ulcer: Non-Pressure ulcer, muscle necrosis  Wound: Wound dehiscence        PAST MEDICAL HISTORY      Diagnosis Date    Anemia     Arthritis     Bowel obstruction (HCC) 04/28/2016    Cancer (HCC)     oral/mandible    CHF (congestive heart failure) (HCC)     COPD (chronic obstructive pulmonary disease) (HCC)     Hyperlipidemia     Hypertension     LBBB (left bundle branch block)     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 ([address] 10/26/2022    Nonischemic cardiomyopathy ([address]     Paroxysmal A-fib ([address]      Past Surgical History:   Procedure Laterality Date    ABDOMEN SURGERY  10/05/2016    exporation of abdominal fascial wound dehiscence close, insertion TLC right IJ CARDIAC DEFIBRILLATOR PLACEMENT Left 2007    CARDIAC DEFIBRILLATOR PLACEMENT  2018    BIV ICD GENT CHANGE (BSCI)    OBNDS    CARPAL TUNNEL RELEASE      DIAGNOSTIC CARDIAC CATH LAB PROCEDURE      ENDOSCOPY, COLON, DIAGNOSTIC  2018    upper endo    HERNIA REPAIR      HYSTERECTOMY (CERVIX STATUS UNKNOWN)      ILEOSTOMY OR JEJUNOSTOMY      INCISIONAL HERNIA REPAIR  2017    LAPAROSCOPIC; WITH MESH    MANDIBLE SURGERY      OTHER SURGICAL HISTORY      ostomy placed having reversal done on 2016    OTHER SURGICAL HISTORY  2016    open reveral ileostomy    OTHER SURGICAL HISTORY  2009    BiV/ICD    OTHER SURGICAL HISTORY Left     vascularized fibular graft harvest [facility]      Family History   Problem Relation Age of Onset    Alzheimer's Disease Mother     Heart Disease Father     Cancer Father     Alzheimer's Disease Father     Cancer Brother      Social History     Tobacco Use    Smoking status: Former     Types: Cigarettes     Quit date: 2006     Years since quittin.0    Smokeless tobacco: Never   Vaping Use    Vaping Use: Never used   Substance Use Topics    Alcohol use: Not Currently     Comment: occasional    Drug use: No     No Known Allergies  Current Outpatient Medications on File Prior to Encounter   Medication Sig Dispense Refill    carvedilol (COREG) 3.125 MG tablet Take 1 tablet by mouth 2 times daily (with meals) 60 tablet 3    escitalopram (LEXAPRO) 10 MG tablet Take 1 tablet by mouth daily      famotidine (PEPCID) 20 MG tablet Take 1 tablet by mouth 2 times daily      traMADol (ULTRAM) 50 MG tablet Take 1 tablet by mouth every 8 hours as needed. oxyCODONE (ROXICODONE) 5 MG immediate release tablet Take 1 tablet by mouth every 8 hours as needed. gabapentin (NEURONTIN) 100 MG capsule Take 100 mg by mouth every 8 (eight) hours.       magnesium oxide (MAG-OX) 400 MG tablet Take 400 mg by mouth daily      apixaban (ELIQUIS) 5 MG TABS tablet TAKE 1 TABLET TWICE DAILY 180 tablet 3    fluticasone (FLONASE) 50 MCG/ACT nasal spray 1 spray by Each Nostril route as needed for Rhinitis      SPIRIVA HANDIHALER 18 MCG inhalation capsule Inhale 18 mcg into the lungs daily        No current facility-administered medications on file prior to encounter.        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 ([address] - Primary (Chronic)    Relevant Orders    Initiate Outpatient Wound Care Protocol       Pre Debridement Measurements:  Are located in the [place]  Documentation Flow Sheet  Post Debridement Measurements:  Wound/Ulcer Descriptions are Pre Debridement except measurements:    Incision 10/05/16 Abdomen Mid (Active)   Number of days: 3721       Incision 04/21/17 Abdomen Right;Left;Mid (Active)   Number of days: 2084       Wound 10/26/22 Leg Left;Lateral #1 (Active)   Wound Image   12/14/22 1400   Wound Etiology Non-Healing Surgical 12/07/22 1614   Dressing Status New dressing applied 12/28/22 1452   Wound Cleansed Cleansed with saline 12/28/22 1452   Dressing/Treatment ABD 12/28/22 1452   Offloading for Diabetic Foot Ulcers Offloading not required 12/07/22 1614   Wound Length (cm) 12 cm 01/04/23 1435   Wound Width (cm) 3.7 cm 01/04/23 1435   Wound Depth (cm) 0.1 cm 01/04/23 1435   Wound Surface Area (cm^2) 44.4 cm^2 01/04/23 1435   Change in Wound Size % (l*w) 63.96 01/04/23 1435   Wound Volume (cm^3) 4.44 cm^3 01/04/23 1435   Wound Healing % 93 01/04/23 1435   Post-Procedure Length (cm) 12.2 cm 01/04/23 1509   Post-Procedure Width (cm) 3.7 cm 01/04/23 1509   Post-Procedure Depth (cm) 0.1 cm 01/04/23 1509   Post-Procedure Surface Area (cm^2) 45.14 cm^2 01/04/23 1509   Post-Procedure Volume (cm^3) 4.514 cm^3 01/04/23 1509   Distance Tunneling (cm) 2.3 cm 11/16/22 1521   Tunneling Position ___ O'Clock 12 11/16/22 1521   Wound Assessment Fibrin;Pink/red;Slough 01/04/23 1435   Drainage Amount Moderate 01/04/23 1435   Drainage Description Serosanguinous; Yellow 01/04/23 1435   Odor None 01/04/23 1435   Cherise-wound Assessment Blanchable erythema 01/04/23 1435   Wound Thickness Description not for Pressure Injury Full thickness 10/26/22 0814   Number of days: 70          Procedure Note  Indications:  Based on my examination of this patient's wound(s)/ulcer(s) today, debridement is required to promote healing and evaluate the wound base. Performed by: Thomas Mandujano MD    Consent obtained:  Yes    Time out taken:  Yes    Pain Control:       Debridement:Excisional Debridement    Using curette the wound(s)/ulcer(s) was/were sharply debrided down through and including the removal of epidermis, dermis, subcutaenous    Devitalized Tissue Debrided:  fibrin, biofilm, slough, and exudate to stimulate bleeding to promote healing, post debridement good bleeding base and wound edges noted    Wound/Ulcer #: 1    Percent of Wound/Ulcer Debrided: 90%    Total Surface Area Debrided:  40 sq cm     Estimated Blood Loss:  Minimal  Hemostasis Achieved:  by pressure    Procedural Pain:  3  / 10   Post Procedural Pain:  2 / 10     Response to treatment:  Well tolerated by patient. Plan:   Treatment Note please see attached Discharge Instructions    Written patient dismissal instructions given to patient and signed by patient or POA.          Discharge Instructions         Discharge condition: Stable     Assessment of pain at discharge:minimal     Anesthetic used: 4% lidocaine solution     Discharge to: Home     Left via:Private automobile     Accompanied by: family      ECF/HHA: [facility] (note order change)      Dressing Orders:LEFT LATERAL LOWER LEG- cleanse with normal saline, apply drawtex , ABD pads , unna boot and coban . Change Monday- Wednesday (at [address] [place] [address],[address])- Friday. Treatment Orders:Eat a diet high in protein and vitamin C. Take a multiple vitamin daily unless contraindicated. Elevate leg above the level of the heart as much as possible throughout the day. [address] [place] [address],[address] followup visit:1 week_____________________________  (Please note your next appointment above and if you are unable to keep, kindly give a 24 hour notice. Thank you.)     Physician signature:__________________________      If you experience any of the following, please call the [facility] during business hours:     * Increase in Pain  * Temperature over 101  * Increase in drainage from your wound  * Drainage with a foul odor  * Bleeding  * Increase in swelling  * Need for compression bandage changes due to slippage, breakthrough drainage.      If you need medical attention outside of the business hours of the [address] please contact your PCP or go to the nearest emergency room                    Electronically signed by MD yakov Coronel

## 2023-01-09 NOTE — DISCHARGE INSTRUCTIONS
Discharge condition: Stable     Assessment of pain at discharge:minimal     Anesthetic used: 4% lidocaine solution     Discharge to: Home     Left via:Private automobile     Accompanied by: family      ECF/HHA: [address]- cleanse with normal saline, apply drawtex , ABD pads , unna boot and coban . Change Monday- Wednesday (at [facility])- Friday. Treatment Orders:Eat a diet high in protein and vitamin C. Take a multiple vitamin daily unless contraindicated. Elevate leg above the level of the heart as much as possible throughout the day. [facility] followup visit:1 week_____________________________  (Please note your next appointment above and if you are unable to keep, kindly give a 24 hour notice. Thank you.)     Physician signature:__________________________      If you experience any of the following, please call the [address] [place] [address] [facility] during business hours:     * Increase in Pain  * Temperature over 101  * Increase in drainage from your wound  * Drainage with a foul odor  * Bleeding  * Increase in swelling  * Need for compression bandage changes due to slippage, breakthrough drainage.      If you need medical attention outside of the business hours of the [facility] please contact your PCP or go to the nearest emergency room

## 2023-01-11 ENCOUNTER — HOSPITAL ENCOUNTER (OUTPATIENT)
Dept: WOUND CARE | Age: 78
Discharge: HOME | End: 2023-01-11
Payer: MEDICARE

## 2023-01-11 VITALS
SYSTOLIC BLOOD PRESSURE: 132 MMHG | DIASTOLIC BLOOD PRESSURE: 58 MMHG | TEMPERATURE: 97.2 F | RESPIRATION RATE: 18 BRPM | HEART RATE: 88 BPM

## 2023-01-11 DIAGNOSIS — L97.923 NON-PRESSURE CHRONIC ULCER OF LEFT LOWER LEG WITH NECROSIS OF MUSCLE (HCC): Primary | ICD-10-CM

## 2023-01-11 PROCEDURE — 11042 DBRDMT SUBQ TIS 1ST 20SQCM/<: CPT

## 2023-01-11 PROCEDURE — 11045 DBRDMT SUBQ TISS EACH ADDL: CPT

## 2023-01-11 RX ORDER — BACITRACIN ZINC AND POLYMYXIN B SULFATE 500; 1000 [USP'U]/G; [USP'U]/G
OINTMENT TOPICAL ONCE
OUTPATIENT
Start: 2023-01-11 | End: 2023-01-11

## 2023-01-11 RX ORDER — LIDOCAINE HYDROCHLORIDE 20 MG/ML
JELLY TOPICAL ONCE
OUTPATIENT
Start: 2023-01-11 | End: 2023-01-11

## 2023-01-11 RX ORDER — LIDOCAINE 50 MG/G
OINTMENT TOPICAL ONCE
OUTPATIENT
Start: 2023-01-11 | End: 2023-01-11

## 2023-01-11 RX ORDER — CLOBETASOL PROPIONATE 0.5 MG/G
OINTMENT TOPICAL ONCE
OUTPATIENT
Start: 2023-01-11 | End: 2023-01-11

## 2023-01-11 RX ORDER — LIDOCAINE HYDROCHLORIDE 40 MG/ML
SOLUTION TOPICAL ONCE
OUTPATIENT
Start: 2023-01-11 | End: 2023-01-11

## 2023-01-11 RX ORDER — GENTAMICIN SULFATE 1 MG/G
OINTMENT TOPICAL ONCE
OUTPATIENT
Start: 2023-01-11 | End: 2023-01-11

## 2023-01-11 RX ORDER — LIDOCAINE 40 MG/G
CREAM TOPICAL ONCE
OUTPATIENT
Start: 2023-01-11 | End: 2023-01-11

## 2023-01-11 RX ORDER — BACITRACIN, NEOMYCIN, POLYMYXIN B 400; 3.5; 5 [USP'U]/G; MG/G; [USP'U]/G
OINTMENT TOPICAL ONCE
OUTPATIENT
Start: 2023-01-11 | End: 2023-01-11

## 2023-01-11 RX ORDER — GINSENG 100 MG
CAPSULE ORAL ONCE
OUTPATIENT
Start: 2023-01-11 | End: 2023-01-11

## 2023-01-11 RX ORDER — BETAMETHASONE DIPROPIONATE 0.05 %
OINTMENT (GRAM) TOPICAL ONCE
OUTPATIENT
Start: 2023-01-11 | End: 2023-01-11

## 2023-01-11 RX ORDER — LIDOCAINE HYDROCHLORIDE 40 MG/ML
SOLUTION TOPICAL ONCE
Status: COMPLETED | OUTPATIENT
Start: 2023-01-11 | End: 2023-01-11

## 2023-01-11 RX ADMIN — LIDOCAINE HYDROCHLORIDE 5 ML: 40 SOLUTION TOPICAL at 09:51

## 2023-01-11 ASSESSMENT — PAIN DESCRIPTION - ORIENTATION: ORIENTATION: LEFT

## 2023-01-11 ASSESSMENT — PAIN SCALES - GENERAL: PAINLEVEL_OUTOF10: 2

## 2023-01-11 ASSESSMENT — PAIN - FUNCTIONAL ASSESSMENT: PAIN_FUNCTIONAL_ASSESSMENT: PREVENTS OR INTERFERES SOME ACTIVE ACTIVITIES AND ADLS

## 2023-01-11 ASSESSMENT — PAIN DESCRIPTION - LOCATION: LOCATION: LEG

## 2023-01-11 ASSESSMENT — PAIN DESCRIPTION - DESCRIPTORS: DESCRIPTORS: ACHING

## 2023-01-11 NOTE — PROGRESS NOTES
Wound Healing Center Followup Visit Note    Referring Physician : Amaury Valentine MD  [address] [facility] RECORD NUMBER:  [id number]  AGE: 65 y.o. GENDER: female  : 1945  EPISODE DATE:  2023    Subjective:     Chief Complaint   Patient presents with    Wound Check     Leg left      HISTORY of PRESENT ILLNESS HPI   Janet Lazo is a 65 y.o. female who presents today in regards to follow up evaluation and treatment of wound/ulcer. That patient's past medical, family and social hx were reviewed and changes were made if present. History of Wound Context:  The patient has had a wound of left calf which was first noted approximately 2022. This has been treated local wound care. On their initial visit to the wound healing center, 22,  the patient has noted that the wound has been improving after seeing Dr Yuko Harmon last week. The patient has not had similar previous wounds in the past.      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 She underwent right segmental mandibulectomy, bilateral selective neck dissection of levels 1 through 4, open tracheostomy on  with L fibula resection and implant of fibular free flap in her jaw. Per pt report wound in left leg was never closed. She receive preop chemotherapy, immunotherapy. She is no longer receiving chemotherapy. Her lymph node resection was negative. Her left calf postop wound from where she had bone/free flap taken has been non healing and they have had issues with it since immediately postop per pt and family. They said [facility] surgeon is aware, recommended local wound care and was going to see them in 2023. Her daughter had asked to follow with me going forward. She has peg in place and receives nutrition via. She follows with Dr. Lisa Salazar in regards to CKD. Pt is on abx at time of initial visit. She was started on levaquin per [facility] who she had culture sent to. 11/2/22  She does have an area of muscle that doesn't appear to be viable  She may need surgical debridement in the future  Coban 2, aquac[employer] ag  She is already on levaquin from recent culture  No evidence of arterial occlusive disease  Labs ordered  Ok to cancel abis and pvrs  11/9/22  Wound slightly smaller  Cobangie ramon 2  Still may need surgical debridement in future  11/16/22  Wound smaller 109 (133)    11/23/22  Wound smaller 66  11/30/22  Wound smaller 63   Wound appearance tremendously improved  Area of depth dramatically improved  Muscle coverage improved  12/7/22  Wound 58  Continued improvement, and more muscle coverage  12/14/22  Wound 55  Improved  12/21/22  Wound 50  Muscle almost completely covered  12/28/22  Wound size stable 50, appearance improved  Change to drawtek  1/4/23  Wound size improved 44  1/11/23  Wound size 40 improved  Itching better with unna boot    Wound/Ulcer Pain Timing/Severity: mild  Quality of pain: aching  Severity:  1 / 10   Modifying Factors: Pain worsens with debridement, dressing changes  Associated Signs/Symptoms: drainage edema, pain    Ulcer Identification:  Ulcer Type: non-healing surgical  Contributing Factors: edema, immunosuppression, anticoagulation therapy, and malnutrition    Diabetic/Pressure/Non Pressure Ulcers only:  Ulcer: Non-Pressure ulcer, muscle necrosis  Wound: Wound dehiscence        PAST MEDICAL HISTORY      Diagnosis Date    Anemia     Arthritis     Bowel obstruction (HCC) 04/28/2016    Cancer (HCC)     oral/mandible    CHF (congestive heart failure) (HCC)     COPD (chronic obstructive pulmonary disease) (HCC)     Hyperlipidemia     Hypertension     LBBB (left bundle branch block)     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 ([address] 10/26/2022    Nonischemic cardiomyopathy ([address]     Paroxysmal A-fib ([address]      Past Surgical History:   Procedure Laterality Date    ABDOMEN SURGERY  10/05/2016    exporation of abdominal fascial wound dehiscence close, insertion TLC right IJ    CARDIAC DEFIBRILLATOR PLACEMENT Left 2007    CARDIAC DEFIBRILLATOR PLACEMENT  2018    BIV ICD GENT CHANGE (BSCI)    BONDS    CARPAL TUNNEL RELEASE      DIAGNOSTIC CARDIAC CATH LAB PROCEDURE      ENDOSCOPY, COLON, DIAGNOSTIC  2018    upper endo    HERNIA REPAIR      HYSTERECTOMY (CERVIX STATUS UNKNOWN)      ILEOSTOMY OR JEJUNOSTOMY      INCISIONAL HERNIA REPAIR  2017    LAPAROSCOPIC; WITH MESH    MANDIBLE SURGERY      OTHER SURGICAL HISTORY      ostomy placed having reversal done on 2016    OTHER SURGICAL HISTORY  2016    open reveral ileostomy    OTHER SURGICAL HISTORY  2009    BiV/ICD    OTHER SURGICAL HISTORY Left     vascularized fibular graft harvest [facility]      Family History   Problem Relation Age of Onset    Alzheimer's Disease Mother     Heart Disease Father     Cancer Father     Alzheimer's Disease Father     Cancer Brother      Social History     Tobacco Use    Smoking status: Former     Types: Cigarettes     Quit date: 2006     Years since quittin.0    Smokeless tobacco: Never   Vaping Use    Vaping Use: Never used   Substance Use Topics    Alcohol use: Not Currently     Comment: occasional    Drug use: No     No Known Allergies  Current Outpatient Medications on File Prior to Encounter   Medication Sig Dispense Refill    carvedilol (COREG) 3.125 MG tablet Take 1 tablet by mouth 2 times daily (with meals) 60 tablet 3    escitalopram (LEXAPRO) 10 MG tablet Take 1 tablet by mouth daily      famotidine (PEPCID) 20 MG tablet Take 1 tablet by mouth 2 times daily      traMADol (ULTRAM) 50 MG tablet Take 1 tablet by mouth every 8 hours as needed. oxyCODONE (ROXICODONE) 5 MG immediate release tablet Take 1 tablet by mouth every 8 hours as needed. gabapentin (NEURONTIN) 100 MG capsule Take 100 mg by mouth every 8 (eight) hours.       magnesium oxide (MAG-OX) 400 MG tablet Take 400 mg by mouth daily      apixaban (ELIQUIS) 5 MG TABS tablet TAKE 1 TABLET TWICE DAILY 180 tablet 3    fluticasone (FLONASE) 50 MCG/ACT nasal spray 1 spray by Each Nostril route as needed for Rhinitis      SPIRIVA HANDIHALER 18 MCG inhalation capsule Inhale 18 mcg into the lungs daily        No current facility-administered medications on file prior to encounter. 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 ([address] - Primary (Chronic)    Relevant Orders    Initiate Outpatient Wound Care Protocol       Pre Debridement Measurements:  Are located in the [place]  Documentation Flow Sheet  Post Debridement Measurements:  Wound/Ulcer Descriptions are Pre Debridement except measurements:    Incision 10/05/16 Abdomen Mid (Active)   Number of days: 3485       Incision 04/21/17 Abdomen Right;Left;Mid (Active)   Number of days: 2090       Wound 10/26/22 Leg Left;Lateral #1 (Active)   Wound Image   01/11/23 0941   Wound Etiology Non-Healing Surgical 01/04/23 1533   Dressing Status New dressing applied 01/04/23 1533   Wound Cleansed Cleansed with saline 01/04/23 1533   Dressing/Treatment ABD; Other (comment) 01/04/23 1533   Offloading for Diabetic Foot Ulcers Offloading not required 01/04/23 1533   Wound Length (cm) 11 cm 01/11/23 0941   Wound Width (cm) 3.6 cm 01/11/23 0941   Wound Depth (cm) 0.1 cm 01/11/23 0941   Wound Surface Area (cm^2) 39.6 cm^2 01/11/23 0941   Change in Wound Size % (l*w) 67.86 01/11/23 0941   Wound Volume (cm^3) 3.96 cm^3 01/11/23 0941   Wound Healing % 94 01/11/23 0941   Post-Procedure Length (cm) 11.3 cm 01/11/23 1040   Post-Procedure Width (cm) 3.7 cm 01/11/23 1040   Post-Procedure Depth (cm) 0.1 cm 01/11/23 1040   Post-Procedure Surface Area (cm^2) 41.81 cm^2 01/11/23 1040   Post-Procedure Volume (cm^3) 4.181 cm^3 01/11/23 1040   Distance Tunneling (cm) 2.3 cm 11/16/22 1521   Tunneling Position ___ O'Clock 12 11/16/22 1521   Wound Assessment Fibrin;Pink/red;Slough 01/11/23 0941   Drainage Amount Moderate 01/11/23 0941   Drainage Description Serosanguinous; Yellow 01/11/23 0941   Odor None 01/11/23 0941   Cherise-wound Assessment Blanchable erythema 01/11/23 0941   Wound Thickness Description not for Pressure Injury Full thickness 01/11/23 0941   Number of days: 77          Procedure Note  Indications:  Based on my examination of this patient's wound(s)/ulcer(s) today, debridement is required to promote healing and evaluate the wound base. Performed by: Zachary Saleem MD    Consent obtained:  Yes    Time out taken:  Yes    Pain Control: Anesthetic  Anesthetic: 4% Lidocaine Cream     Debridement:Excisional Debridement    Using curette the wound(s)/ulcer(s) was/were sharply debrided down through and including the removal of epidermis, dermis, subcutaenous    Devitalized Tissue Debrided:  fibrin, biofilm, slough, and exudate to stimulate bleeding to promote healing, post debridement good bleeding base and wound edges noted    Wound/Ulcer #: 1    Percent of Wound/Ulcer Debrided: 100%    Total Surface Area Debrided:  40 sq cm     Estimated Blood Loss:  Minimal  Hemostasis Achieved:  by pressure    Procedural Pain:  3  / 10   Post Procedural Pain:  2 / 10     Response to treatment:  Well tolerated by patient. Plan:   Treatment Note please see attached Discharge Instructions    Written patient dismissal instructions given to patient and signed by patient or POA. Discharge Instructions         Discharge condition: Stable     Assessment of pain at discharge:minimal     Anesthetic used: 4% lidocaine solution     Discharge to: Home     Left via:Private automobile     Accompanied by: family      ECF/HHA: [address]- cleanse with normal saline, apply drawtex , ABD pads , unna boot and coban . Change Monday- Wednesday (at [address] [place] [address],[address])- Friday. Treatment Orders:Eat a diet high in protein and vitamin C. Take a multiple vitamin daily unless contraindicated. Elevate leg above the level of the heart as much as possible throughout the day. [address] [place] [address],[address] followup visit:1 week_____________________________  (Please note your next appointment above and if you are unable to keep, kindly give a 24 hour notice. Thank you.)     Physician signature:__________________________      If you experience any of the following, please call the [facility] during business hours:     * Increase in Pain  * Temperature over 101  * Increase in drainage from your wound  * Drainage with a foul odor  * Bleeding  * Increase in swelling  * Need for compression bandage changes due to slippage, breakthrough drainage.      If you need medical attention outside of the business hours of the [facility] please contact your PCP or go to the nearest emergency room         Electronically signed by MD yakov Mina

## 2023-01-11 NOTE — DISCHARGE INSTR - COC
Continuity of Care Form    Patient Name: Landy Lucas   :  1945  MRN:  [id number]    Admit date:  2023  Discharge date:  ***    Code Status Order: Prior   Advance Directives:     Admitting Physician:  No admitting provider for patient encounter. PCP: Krishan Kraus MD    Discharging Nurse: [facility] Care Unit/Room#: No information available for this encounter.   Discharging Unit Phone Number: ***    Emergency Contact:   Extended Emergency Contact Information  Primary Emergency Contact: [facility] [address] Phone: [phone]  Relation: Child  Secondary Emergency Contact: Yolande Pemichael  Address: [address] [place] [address] [address]  Phone: [phone]  Relation: Child    Past Surgical History:  Past Surgical History:   Procedure Laterality Date    ABDOMEN SURGERY  10/05/2016    exporation of abdominal fascial wound dehiscence close, insertion TLC right IJ    CARDIAC DEFIBRILLATOR PLACEMENT Left 2007    CARDIAC DEFIBRILLATOR PLACEMENT  2018    BIV ICD GENT CHANGE (BSCI)    [facility] CATH LAB PROCEDURE      ENDOSCOPY, COLON, DIAGNOSTIC  2018    upper endo    HERNIA REPAIR      HYSTERECTOMY (CERVIX STATUS UNKNOWN)      ILEOSTOMY OR JEJUNOSTOMY      INCISIONAL HERNIA REPAIR  2017    LAPAROSCOPIC; WITH MESH    MANDIBLE SURGERY      OTHER SURGICAL HISTORY      ostomy placed having reversal done on 2016    OTHER SURGICAL HISTORY  2016    open reveral ileostomy    OTHER SURGICAL HISTORY  2009    BiV/ICD    OTHER SURGICAL HISTORY Left     vascularized fibular graft harvest [facility]        Immunization History:   Immunization History   Administered Date(s) Administered    Influenza Virus Vaccine 10/18/2017       Active Problems:  Patient Active Problem List   Diagnosis Code    Cardiomyopathy, nonischemic ([facility] I42.8    Bundle branch block, left I44.7 Biventricular implantable cardioverter-defibrillator in situ Z95.810    Hyperlipidemia E78.5    Blood loss anemia D50.0    Acute respiratory failure ([facility]) J96.00    SIRS (systemic inflammatory response syndrome) ([facility]) R65.10    Hypotension I95.9    History of ischemic colitis Z87.19    Anemia D64.9    Acute renal failure (HCC) N17.9    Hypomagnesemia E83.42    Acute delirium R41.0    Incarcerated ventral hernia K43.6    Chronic kidney disease, stage III (moderate) ([facility]) N18.30    Anemia of chronic renal failure N18.9, D63.1    Implantable cardioverter-defibrillator (ICD) at end of device life Z45.02    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 .      Infection Onset Last Indicated Last Received Source    No mapped external infections found      .     Unmapped Infections        VRE 08/23/22 08/23/22 08/23/22 [facility]                  Patient Infection Status       None to display            Nurse Assessment:  Last Vital Signs: BP (!) 132/58   Pulse 88   Temp 97.2 °F (36.2 °C) (Temporal)   Resp 18   LMP  (LMP Unknown)     Last documented pain score (0-10 scale): Pain Level: 2  Last Weight:   Wt Readings from Last 1 Encounters:   12/28/22 137 lb (62.1 kg)     Mental Status:  {IP PT MENTAL STATUS:43802}    IV Access:  {[facility] IV ACCESS:939682775}    Nursing Mobility/ADLs:  Walking   {[facility] DME FDXS:976449786}  Transfer  {[facility] DME YLHE:792454600}  Bathing  {[facility] DME OYUL:267070038}  Dressing  {[facility] DME KOGW:290076514}  Toileting  {P DME CJUC:818856106}  Feeding  {[facility] DME RNNA:693417962}  Med Admin  {P DME NMEC:943895268}  Med Delivery   {[facility] MED Delivery:079829588}    Wound Care Documentation and Therapy:  Incision 10/05/16 Abdomen Mid (Active)   Number of days: 8999       Incision 04/21/17 Abdomen Right;Left;Mid (Active)   Number of days: 2090       Wound 10/26/22 Leg Left;Lateral #1 (Active)   Wound Image   01/11/23 0941   Wound Etiology Non-Healing Surgical 01/04/23 1533   Dressing Status New dressing applied 01/04/23 1533   Wound Cleansed Cleansed with saline 01/04/23 1533   Dressing/Treatment ABD; Other (comment) 01/04/23 1533   Offloading for Diabetic Foot Ulcers Offloading not required 01/04/23 1533   Wound Length (cm) 11 cm 01/11/23 0941   Wound Width (cm) 3.6 cm 01/11/23 0941   Wound Depth (cm) 0.1 cm 01/11/23 0941   Wound Surface Area (cm^2) 39.6 cm^2 01/11/23 0941   Change in Wound Size % (l*w) 67.86 01/11/23 0941   Wound Volume (cm^3) 3.96 cm^3 01/11/23 0941   Wound Healing % 94 01/11/23 0941   Post-Procedure Length (cm) 11.3 cm 01/11/23 1040   Post-Procedure Width (cm) 3.7 cm 01/11/23 1040   Post-Procedure Depth (cm) 0.1 cm 01/11/23 1040   Post-Procedure Surface Area (cm^2) 41.81 cm^2 01/11/23 1040   Post-Procedure Volume (cm^3) 4.181 cm^3 01/11/23 1040   Distance Tunneling (cm) 2.3 cm 11/16/22 1521   Tunneling Position ___ O'Clock 12 11/16/22 1521   Wound Assessment Fibrin;Pink/red;Slough 01/11/23 0941   Drainage Amount Moderate 01/11/23 0941   Drainage Description Serosanguinous; Yellow 01/11/23 0941   Odor None 01/11/23 0941   Cherise-wound Assessment Blanchable erythema 01/11/23 0941   Wound Thickness Description not for Pressure Injury Full thickness 01/11/23 0941   Number of days: 77        Elimination:  Continence: Bowel: {YES / MK:08346}  Bladder: {YES / NF:40072}  Urinary Catheter: {Urinary Catheter:969185354}   Colostomy/Ileostomy/Ileal Conduit: {YES / QD:15226}       Date of Last BM: ***  No intake or output data in the 24 hours ending 01/11/23 1042  No intake/output data recorded.     Safety Concerns:     [address] [facility] Safety Concerns:193082606}    Impairments/Disabilities:      [address] HAYES Impairments/Disabilities:059806578}    Nutrition Therapy:  Current Nutrition Therapy:   [address] FREDDY Diet List:850425158}    Routes of Feeding: {[facility] DME Other Feedings:319832913}  Liquids: {Slp liquid thickness:34297}  Daily Fluid Restriction: {[facility] DME Yes amt example:851425192}  Last Modified Barium Swallow with Video (Video Swallowing Test): {Done Not Done RPOZ:664746283}    Treatments at the Time of Hospital Discharge:   Respiratory Treatments: ***  Oxygen Therapy:  {Therapy; copd oxygen:73252}  Ventilator:    {[facility] GUJO:287547872}    Rehab Therapies: {THERAPEUTIC INTERVENTION:4268490264}  Weight Bearing Status/Restrictions: {[facility] Weight Bearin}  Other Medical Equipment (for information only, NOT a DME order):  {EQUIPMENT:753906811}  Other Treatments: ***    Patient's personal belongings (please select all that are sent with patient):  {[facility] DME Belongings:308935734}    RN SIGNATURE:  {Esignature:286298414}    CASE MANAGEMENT/SOCIAL WORK SECTION    Inpatient Status Date: ***    Readmission Risk Assessment Score:  Readmission Risk              Risk of Unplanned Readmission:  0           Discharging to Facility/ Agency   Name:   Address:  Phone:  Fax:    Dialysis Facility (if applicable)   Name:  Address:  Dialysis Schedule:  Phone:  Fax:    / signature: {Esignature:424713409}    PHYSICIAN SECTION    Prognosis: {Prognosis:7530453573}    Condition at Discharge: [address] Patient Condition:987629440}    Rehab Potential (if transferring to Rehab): {Prognosis:0533572131}    Recommended Labs or Other Treatments After Discharge: ***    Physician Certification: I certify the above information and transfer of Jaylyn Hills  is necessary for the continuing treatment of the diagnosis listed and that she requires {Admit to Appropriate Level of Care:42136} for {GREATER/LESS:091198980} 30 days.      Update Admission H&P: {CHP DME Changes in OEXIB:693928063}    PHYSICIAN SIGNATURE: {[facility]:176911431}

## 2023-01-16 NOTE — DISCHARGE INSTRUCTIONS
Discharge condition: Stable     Assessment of pain at discharge:minimal     Anesthetic used: 4% lidocaine solution     Discharge to: Home     Left via:Private automobile     Accompanied by: family      ECF/HHA: [address]- cleanse with normal saline, apply drawtex , ABD pads , unna boot and coban . Change Monday- Wednesday (at [facility])- Friday. Treatment Orders:Eat a diet high in protein and vitamin C. Take a multiple vitamin daily unless contraindicated. Elevate leg above the level of the heart as much as possible throughout the day. [facility] followup visit:1 week_____________________________  (Please note your next appointment above and if you are unable to keep, kindly give a 24 hour notice. Thank you.)     Physician signature:__________________________      If you experience any of the following, please call the [address] [place] [address] [employer] during business hours:     * Increase in Pain  * Temperature over 101  * Increase in drainage from your wound  * Drainage with a foul odor  * Bleeding  * Increase in swelling  * Need for compression bandage changes due to slippage, breakthrough drainage.      If you need medical attention outside of the business hours of the [employer] please contact your PCP or go to the nearest emergency room

## 2023-01-18 ENCOUNTER — HOSPITAL ENCOUNTER (OUTPATIENT)
Dept: WOUND CARE | Age: 78
Discharge: HOME | End: 2023-01-18
Payer: MEDICARE

## 2023-01-18 VITALS
DIASTOLIC BLOOD PRESSURE: 52 MMHG | RESPIRATION RATE: 18 BRPM | WEIGHT: 137 LBS | TEMPERATURE: 97.1 F | SYSTOLIC BLOOD PRESSURE: 124 MMHG | HEART RATE: 86 BPM | HEIGHT: 62 IN | BODY MASS INDEX: 25.21 KG/M2

## 2023-01-18 DIAGNOSIS — L97.923 NON-PRESSURE CHRONIC ULCER OF LEFT LOWER LEG WITH NECROSIS OF MUSCLE (HCC): Primary | ICD-10-CM

## 2023-01-18 PROCEDURE — 11042 DBRDMT SUBQ TIS 1ST 20SQCM/<: CPT | Performed by: SURGERY

## 2023-01-18 PROCEDURE — 11042 DBRDMT SUBQ TIS 1ST 20SQCM/<: CPT

## 2023-01-18 RX ORDER — GINSENG 100 MG
CAPSULE ORAL ONCE
OUTPATIENT
Start: 2023-01-18 | End: 2023-01-18

## 2023-01-18 RX ORDER — GENTAMICIN SULFATE 1 MG/G
OINTMENT TOPICAL ONCE
OUTPATIENT
Start: 2023-01-18 | End: 2023-01-18

## 2023-01-18 RX ORDER — LIDOCAINE HYDROCHLORIDE 20 MG/ML
JELLY TOPICAL ONCE
OUTPATIENT
Start: 2023-01-18 | End: 2023-01-18

## 2023-01-18 RX ORDER — LIDOCAINE HYDROCHLORIDE 40 MG/ML
SOLUTION TOPICAL ONCE
Status: COMPLETED | OUTPATIENT
Start: 2023-01-18 | End: 2023-01-18

## 2023-01-18 RX ORDER — BACITRACIN ZINC AND POLYMYXIN B SULFATE 500; 1000 [USP'U]/G; [USP'U]/G
OINTMENT TOPICAL ONCE
OUTPATIENT
Start: 2023-01-18 | End: 2023-01-18

## 2023-01-18 RX ORDER — BACITRACIN, NEOMYCIN, POLYMYXIN B 400; 3.5; 5 [USP'U]/G; MG/G; [USP'U]/G
OINTMENT TOPICAL ONCE
OUTPATIENT
Start: 2023-01-18 | End: 2023-01-18

## 2023-01-18 RX ORDER — LIDOCAINE 50 MG/G
OINTMENT TOPICAL ONCE
OUTPATIENT
Start: 2023-01-18 | End: 2023-01-18

## 2023-01-18 RX ORDER — LIDOCAINE 40 MG/G
CREAM TOPICAL ONCE
OUTPATIENT
Start: 2023-01-18 | End: 2023-01-18

## 2023-01-18 RX ORDER — CLOBETASOL PROPIONATE 0.5 MG/G
OINTMENT TOPICAL ONCE
OUTPATIENT
Start: 2023-01-18 | End: 2023-01-18

## 2023-01-18 RX ORDER — BETAMETHASONE DIPROPIONATE 0.05 %
OINTMENT (GRAM) TOPICAL ONCE
OUTPATIENT
Start: 2023-01-18 | End: 2023-01-18

## 2023-01-18 RX ORDER — LIDOCAINE HYDROCHLORIDE 40 MG/ML
SOLUTION TOPICAL ONCE
OUTPATIENT
Start: 2023-01-18 | End: 2023-01-18

## 2023-01-18 RX ADMIN — LIDOCAINE HYDROCHLORIDE 8 ML: 40 SOLUTION TOPICAL at 13:50

## 2023-01-18 ASSESSMENT — PAIN DESCRIPTION - PAIN TYPE: TYPE: CHRONIC PAIN

## 2023-01-18 ASSESSMENT — PAIN DESCRIPTION - FREQUENCY: FREQUENCY: CONTINUOUS

## 2023-01-18 ASSESSMENT — PAIN DESCRIPTION - LOCATION: LOCATION: LEG

## 2023-01-18 ASSESSMENT — PAIN - FUNCTIONAL ASSESSMENT: PAIN_FUNCTIONAL_ASSESSMENT: PREVENTS OR INTERFERES SOME ACTIVE ACTIVITIES AND ADLS

## 2023-01-18 ASSESSMENT — PAIN DESCRIPTION - ONSET: ONSET: ON-GOING

## 2023-01-18 ASSESSMENT — PAIN DESCRIPTION - DESCRIPTORS: DESCRIPTORS: ACHING

## 2023-01-18 ASSESSMENT — PAIN DESCRIPTION - ORIENTATION: ORIENTATION: LEFT

## 2023-01-18 ASSESSMENT — PAIN SCALES - GENERAL: PAINLEVEL_OUTOF10: 2

## 2023-01-18 NOTE — PROGRESS NOTES
Wound Healing Center Followup Visit Note    Referring Physician : Nicolas García MD  [facility] RECORD NUMBER:  [id number]  AGE: 65 y.o. GENDER: female  : 1945  EPISODE DATE:  2023    Subjective:     Chief Complaint   Patient presents with    Wound Check     Left leg wound      HISTORY of PRESENT ILLNESS HPI   Juan A Matthews is a 65 y.o. female who presents today in regards to follow up evaluation and treatment of wound/ulcer. That patient's past medical, family and social hx were reviewed and changes were made if present. History of Wound Context:  The patient has had a wound of left calf which was first noted approximately 2022. This has been treated local wound care. On their initial visit to the wound healing center, 22,  the patient has noted that the wound has been improving after seeing Dr Deo Lee last week. The patient has not had similar previous wounds in the past.      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 She underwent right segmental mandibulectomy, bilateral selective neck dissection of levels 1 through 4, open tracheostomy on  with L fibula resection and implant of fibular free flap in her jaw. Per pt report wound in left leg was never closed. She receive preop chemotherapy, immunotherapy. She is no longer receiving chemotherapy. Her lymph node resection was negative. Her left calf postop wound from where she had bone/free flap taken has been non healing and they have had issues with it since immediately postop per pt and family. They said [facility] surgeon is aware, recommended local wound care and was going to see them in 2023. Her daughter had asked to follow with me going forward. She has peg in place and receives nutrition via. She follows with Dr. Elizabeth Garcia in regards to CKD. Pt is on abx at time of initial visit. She was started on levaquin per [facility] who she had culture sent to. 11/2/22  She does have an area of muscle that doesn't appear to be viable  She may need surgical debridement in the future  Coban 2, aquacell ag  She is already on levaquin from recent culture  No evidence of arterial occlusive disease  Labs ordered  Ok to cancel abis and pvrs  11/9/22  Wound slightly smaller  Coban 2, aquacell ag  Still may need surgical debridement in future  11/16/22  Wound smaller 109 (133)    11/23/22  Wound smaller 66  11/30/22  Wound smaller 63   Wound appearance tremendously improved  Area of depth dramatically improved  Muscle coverage improved  12/7/22  Wound 58  Continued improvement, and more muscle coverage  12/14/22  Wound 55  Improved  12/21/22  Wound 50  Muscle almost completely covered  12/28/22  Wound size stable 50, appearance improved  Change to drawtek  1/4/23  Wound size improved 44  1/11/23  Wound size 40 improved  Itching better with unna boot  1/18/23  Size decreased to 31    Wound/Ulcer Pain Timing/Severity: mild  Quality of pain: aching  Severity:  1 / 10   Modifying Factors: Pain worsens with debridement, dressing changes  Associated Signs/Symptoms: drainage edema, pain    Ulcer Identification:  Ulcer Type: non-healing surgical  Contributing Factors: edema, immunosuppression, anticoagulation therapy, and malnutrition    Diabetic/Pressure/Non Pressure Ulcers only:  Ulcer: Non-Pressure ulcer, muscle necrosis  Wound: Wound dehiscence        PAST MEDICAL HISTORY      Diagnosis Date    Anemia     Arthritis     Bowel obstruction (HCC) 04/28/2016    Cancer (HCC)     oral/mandible    CHF (congestive heart failure) (HCC)     COPD (chronic obstructive pulmonary disease) (HCC)     Hyperlipidemia     Hypertension     LBBB (left bundle branch block)     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 ([address] 10/26/2022    Nonischemic cardiomyopathy ([address]     Paroxysmal A-fib ([address]      Past Surgical History:   Procedure Laterality Date    ABDOMEN SURGERY 10/05/2016    exporation of abdominal fascial wound dehiscence close, insertion TLC right IJ    CARDIAC DEFIBRILLATOR PLACEMENT Left 2007    CARDIAC DEFIBRILLATOR PLACEMENT  2018    BIV ICD GENT CHANGE (BSCI)    BONDS    CARPAL TUNNEL RELEASE      DIAGNOSTIC CARDIAC CATH LAB PROCEDURE      ENDOSCOPY, COLON, DIAGNOSTIC  2018    upper endo    HERNIA REPAIR      HYSTERECTOMY (CERVIX STATUS UNKNOWN)      ILEOSTOMY OR JEJUNOSTOMY      INCISIONAL HERNIA REPAIR  2017    LAPAROSCOPIC; WITH MESH    MANDIBLE SURGERY      OTHER SURGICAL HISTORY      ostomy placed having reversal done on 2016    OTHER SURGICAL HISTORY  2016    open reveral ileostomy    OTHER SURGICAL HISTORY  2009    BiV/ICD    OTHER SURGICAL HISTORY Left     vascularized fibular graft harvest [facility]      Family History   Problem Relation Age of Onset    Alzheimer's Disease Mother     Heart Disease Father     Cancer Father     Alzheimer's Disease Father     Cancer Brother      Social History     Tobacco Use    Smoking status: Former     Types: Cigarettes     Quit date: 2006     Years since quittin.0    Smokeless tobacco: Never   Vaping Use    Vaping Use: Never used   Substance Use Topics    Alcohol use: Not Currently     Comment: occasional    Drug use: No     No Known Allergies  Current Outpatient Medications on File Prior to Encounter   Medication Sig Dispense Refill    carvedilol (COREG) 3.125 MG tablet Take 1 tablet by mouth 2 times daily (with meals) 60 tablet 3    escitalopram (LEXAPRO) 10 MG tablet Take 1 tablet by mouth daily      famotidine (PEPCID) 20 MG tablet Take 1 tablet by mouth 2 times daily      traMADol (ULTRAM) 50 MG tablet Take 1 tablet by mouth every 8 hours as needed. oxyCODONE (ROXICODONE) 5 MG immediate release tablet Take 1 tablet by mouth every 8 hours as needed. gabapentin (NEURONTIN) 100 MG capsule Take 100 mg by mouth every 8 (eight) hours.       magnesium oxide (MAG-OX) 400 MG tablet Take 400 mg by mouth daily      apixaban (ELIQUIS) 5 MG TABS tablet TAKE 1 TABLET TWICE DAILY 180 tablet 3    fluticasone (FLONASE) 50 MCG/ACT nasal spray 1 spray by Each Nostril route as needed for Rhinitis      SPIRIVA HANDIHALER 18 MCG inhalation capsule Inhale 18 mcg into the lungs daily        No current facility-administered medications on file prior to encounter. 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 ([address] - Primary (Chronic)    Relevant Orders    Initiate Outpatient Wound Care Protocol       Pre Debridement Measurements:  Are located in the [facility]  Documentation Flow Sheet  Post Debridement Measurements:  Wound/Ulcer Descriptions are Pre Debridement except measurements:    Incision 10/05/16 Abdomen Mid (Active)   Number of days: 2435       Incision 04/21/17 Abdomen Right;Left;Mid (Active)   Number of days: 2098       Wound 10/26/22 Leg Left;Lateral #1 (Active)   Wound Image   01/11/23 0941   Wound Etiology Non-Healing Surgical 01/04/23 1533   Dressing Status New dressing applied 01/11/23 1118   Wound Cleansed Cleansed with saline 01/11/23 1118   Dressing/Treatment ABD; Other (comment) 01/11/23 1118   Offloading for Diabetic Foot Ulcers Offloading not required 01/11/23 1118   Wound Length (cm) 10.8 cm 01/18/23 1347   Wound Width (cm) 2.9 cm 01/18/23 1347   Wound Depth (cm) 0.1 cm 01/18/23 1347   Wound Surface Area (cm^2) 31.32 cm^2 01/18/23 1347   Change in Wound Size % (l*w) 74.58 01/18/23 1347   Wound Volume (cm^3) 3.132 cm^3 01/18/23 1347   Wound Healing % 95 01/18/23 1347   Post-Procedure Length (cm) 10.8 cm 01/18/23 1414   Post-Procedure Width (cm) 3.1 cm 01/18/23 1414   Post-Procedure Depth (cm) 0.1 cm 01/18/23 1414   Post-Procedure Surface Area (cm^2) 33.48 cm^2 01/18/23 1414   Post-Procedure Volume (cm^3) 3.348 cm^3 01/18/23 1414   Distance Tunneling (cm) 2.3 cm 11/16/22 1521   Tunneling Position ___ O'Clock 12 11/16/22 1521   Wound Assessment Fibrin;Pink/red;Slough 01/18/23 1347   Drainage Amount Moderate 01/18/23 1347   Drainage Description Serosanguinous; Yellow 01/18/23 1347   Odor None 01/18/23 1347   Cherise-wound Assessment Blanchable erythema 01/18/23 1347   Wound Thickness Description not for Pressure Injury Full thickness 01/11/23 0941   Number of days: 84          Procedure Note  Indications:  Based on my examination of this patient's wound(s)/ulcer(s) today, debridement is required to promote healing and evaluate the wound base. Performed by: Jose Agrawal MD    Consent obtained:  Yes    Time out taken:  Yes    Pain Control: Anesthetic  Anesthetic: 4% Lidocaine Liquid Topical     Debridement:Excisional Debridement    Using curette the wound(s)/ulcer(s) was/were sharply debrided down through and including the removal of epidermis, dermis, subcutaenous    Devitalized Tissue Debrided:  fibrin, biofilm, slough, and exudate to stimulate bleeding to promote healing, post debridement good bleeding base and wound edges noted    Wound/Ulcer #: 1    Percent of Wound/Ulcer Debrided: 70%    Total Surface Area Debrided:  20 sq cm     Estimated Blood Loss:  Minimal  Hemostasis Achieved:  by pressure    Procedural Pain:  3  / 10   Post Procedural Pain:  2 / 10     Response to treatment:  Well tolerated by patient.      Plan:   Treatment Note please see attached Discharge Instructions    Written patient dismissal instructions given to patient and signed by patient or POA. Discharge Instructions         Discharge condition: Stable     Assessment of pain at discharge:minimal     Anesthetic used: 4% lidocaine solution     Discharge to: Home     Left via:Private automobile     Accompanied by: family      ECF/HHA: [address]- cleanse with normal saline, apply drawtex , ABD pads , unna boot and coban . Change Monday- Wednesday (at [address] [place] [address] Avenue[address])- Friday. Treatment Orders:Eat a diet high in protein and vitamin C. Take a multiple vitamin daily unless contraindicated. Elevate leg above the level of the heart as much as possible throughout the day. [address] [place] [address],[address] followup visit:1 week_____________________________  (Please note your next appointment above and if you are unable to keep, kindly give a 24 hour notice. Thank you.)     Physician signature:__________________________      If you experience any of the following, please call the [facility] [employer] [facility] during business hours:     * Increase in Pain  * Temperature over 101  * Increase in drainage from your wound  * Drainage with a foul odor  * Bleeding  * Increase in swelling  * Need for compression bandage changes due to slippage, breakthrough drainage.      If you need medical attention outside of the business hours of the [address] please contact your PCP or go to the nearest emergency room         Electronically signed by MD yakov Alejandro

## 2023-01-23 NOTE — DISCHARGE INSTRUCTIONS
Clinic note:  Consultation for 2nd episode of b/l PE, referrered for anticoagulation recommendations.  Currently on eliquis BID tolerating well.  Ist episode of b/l PE was after abdominal surgery.  Patient only took about 1 month of eliquis at that time and self discontinued.  ASL video  utilized.    Review of Systems   All other systems reviewed and are negative.       Discharge condition: Stable     Assessment of pain at discharge:minimal     Anesthetic used: 4% lidocaine solution     Discharge to: Home     Left via:Private automobile     Accompanied by: family      ECF/HHA: [address]- cleanse with normal saline, apply drawtex , ABD pads , unna boot and coban . Change Monday- Wednesday (at [facility])- Friday. Treatment Orders:Eat a diet high in protein and vitamin C. Take a multiple vitamin daily unless contraindicated. Elevate leg above the level of the heart as much as possible throughout the day. [facility] followup visit:1 week_____________________________  (Please note your next appointment above and if you are unable to keep, kindly give a 24 hour notice. Thank you.)     Physician signature:__________________________      If you experience any of the following, please call the [address] [place] [address] Road during business hours:     * Increase in Pain  * Temperature over 101  * Increase in drainage from your wound  * Drainage with a foul odor  * Bleeding  * Increase in swelling  * Need for compression bandage changes due to slippage, breakthrough drainage.

## 2023-01-25 ENCOUNTER — HOSPITAL ENCOUNTER (OUTPATIENT)
Dept: WOUND CARE | Age: 78
Discharge: HOME | End: 2023-01-25
Payer: MEDICARE

## 2023-01-25 VITALS
SYSTOLIC BLOOD PRESSURE: 116 MMHG | TEMPERATURE: 97.5 F | HEART RATE: 94 BPM | RESPIRATION RATE: 18 BRPM | DIASTOLIC BLOOD PRESSURE: 58 MMHG

## 2023-01-25 DIAGNOSIS — L97.923 NON-PRESSURE CHRONIC ULCER OF LEFT LOWER LEG WITH NECROSIS OF MUSCLE (HCC): Primary | ICD-10-CM

## 2023-01-25 PROCEDURE — 11042 DBRDMT SUBQ TIS 1ST 20SQCM/<: CPT

## 2023-01-25 RX ORDER — LIDOCAINE 40 MG/G
CREAM TOPICAL ONCE
OUTPATIENT
Start: 2023-01-25 | End: 2023-01-25

## 2023-01-25 RX ORDER — BACITRACIN ZINC AND POLYMYXIN B SULFATE 500; 1000 [USP'U]/G; [USP'U]/G
OINTMENT TOPICAL ONCE
OUTPATIENT
Start: 2023-01-25 | End: 2023-01-25

## 2023-01-25 RX ORDER — BACITRACIN, NEOMYCIN, POLYMYXIN B 400; 3.5; 5 [USP'U]/G; MG/G; [USP'U]/G
OINTMENT TOPICAL ONCE
OUTPATIENT
Start: 2023-01-25 | End: 2023-01-25

## 2023-01-25 RX ORDER — LIDOCAINE HYDROCHLORIDE 20 MG/ML
JELLY TOPICAL ONCE
OUTPATIENT
Start: 2023-01-25 | End: 2023-01-25

## 2023-01-25 RX ORDER — GENTAMICIN SULFATE 1 MG/G
OINTMENT TOPICAL ONCE
OUTPATIENT
Start: 2023-01-25 | End: 2023-01-25

## 2023-01-25 RX ORDER — CLOBETASOL PROPIONATE 0.5 MG/G
OINTMENT TOPICAL ONCE
OUTPATIENT
Start: 2023-01-25 | End: 2023-01-25

## 2023-01-25 RX ORDER — LIDOCAINE HYDROCHLORIDE 40 MG/ML
SOLUTION TOPICAL ONCE
Status: COMPLETED | OUTPATIENT
Start: 2023-01-25 | End: 2023-01-25

## 2023-01-25 RX ORDER — BETAMETHASONE DIPROPIONATE 0.05 %
OINTMENT (GRAM) TOPICAL ONCE
OUTPATIENT
Start: 2023-01-25 | End: 2023-01-25

## 2023-01-25 RX ORDER — LIDOCAINE 50 MG/G
OINTMENT TOPICAL ONCE
OUTPATIENT
Start: 2023-01-25 | End: 2023-01-25

## 2023-01-25 RX ORDER — GINSENG 100 MG
CAPSULE ORAL ONCE
OUTPATIENT
Start: 2023-01-25 | End: 2023-01-25

## 2023-01-25 RX ORDER — LIDOCAINE HYDROCHLORIDE 40 MG/ML
SOLUTION TOPICAL ONCE
OUTPATIENT
Start: 2023-01-25 | End: 2023-01-25

## 2023-01-25 RX ADMIN — LIDOCAINE HYDROCHLORIDE 5 ML: 40 SOLUTION TOPICAL at 13:22

## 2023-01-25 NOTE — PLAN OF CARE
Problem: Cognitive:  Goal: Knowledge of wound care  Description: Knowledge of wound care  Outcome: Progressing  Goal: Understands risk factors for wounds  Description: Understands risk factors for wounds  Outcome: Progressing     Problem: Wound:  Goal: Will show signs of wound healing; wound closure and no evidence of infection  Description: Will show signs of wound healing; wound closure and no evidence of infection  Outcome: Progressing     Problem: Compression therapy:  Goal: Will be free from complications associated with compression therapy  Description: Will be free from complications associated with compression therapy  Outcome: Progressing

## 2023-01-25 NOTE — PROGRESS NOTES
Wound Healing Center Followup Visit Note    Referring Physician : Will Skinner MD  66 [facility] RECORD NUMBER:  [id number]  AGE: 77 y.o. GENDER: female  : 1945  EPISODE DATE:  2023    Subjective:     Chief Complaint   Patient presents with    Wound Check     Left leg        HISTORY of PRESENT ILLNESS HPI   Ricardo Amaral is a 77 y.o. female who presents today in regards to follow up evaluation and treatment of wound/ulcer. That patient's past medical, family and social hx were reviewed and changes were made if present. History of Wound Context:  The patient has had a wound of left calf which was first noted approximately 2022. This has been treated local wound care. On their initial visit to the wound healing center, 22,  the patient has noted that the wound has been improving after seeing Dr Yani Khoury last week. The patient has not had similar previous wounds in the past.      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 She underwent right segmental mandibulectomy, bilateral selective neck dissection of levels 1 through 4, open tracheostomy on  with L fibula resection and implant of fibular free flap in her jaw. Per pt report wound in left leg was never closed. She receive preop chemotherapy, immunotherapy. She is no longer receiving chemotherapy. Her lymph node resection was negative. Her left calf postop wound from where she had bone/free flap taken has been non healing and they have had issues with it since immediately postop per pt and family. They said [facility] surgeon is aware, recommended local wound care and was going to see them in 2023. Her daughter had asked to follow with me going forward. She has peg in place and receives nutrition via. She follows with Dr. Octavia Moreira in regards to CKD. Pt is on abx at time of initial visit. She was started on levaquin per [facility] who she had culture sent to. 11/2/22  She does have an area of muscle that doesn't appear to be viable  She may need surgical debridement in the future  Coban 2, aquacell ag  She is already on levaquin from recent culture  No evidence of arterial occlusive disease  Labs ordered  Ok to cancel abis and pvrs  11/9/22  Wound slightly smaller  Cobangie ramon 2  Still may need surgical debridement in future  11/16/22  Wound smaller 109 (133)    11/23/22  Wound smaller 66  11/30/22  Wound smaller 63   Wound appearance tremendously improved  Area of depth dramatically improved  Muscle coverage improved  12/7/22  Wound 58  Continued improvement, and more muscle coverage  12/14/22  Wound 55  Improved  12/21/22  Wound 50  Muscle almost completely covered  12/28/22  Wound size stable 50, appearance improved  Change to drawtek  1/4/23  Wound size improved 44  1/11/23  Wound size 40 improved  Itching better with unna boot  1/18/23  Size decreased to 31  1/25/23  Size 31 but appearance better, appears smaller today     Wound/Ulcer Pain Timing/Severity: mild  Quality of pain: aching  Severity:  1 / 10   Modifying Factors: Pain worsens with debridement, dressing changes  Associated Signs/Symptoms: drainage edema, pain    Ulcer Identification:  Ulcer Type: non-healing surgical  Contributing Factors: edema, immunosuppression, anticoagulation therapy, and malnutrition    Diabetic/Pressure/Non Pressure Ulcers only:  Ulcer: Non-Pressure ulcer, muscle necrosis  Wound: Wound dehiscence        PAST MEDICAL HISTORY      Diagnosis Date    Anemia     Arthritis     Bowel obstruction (HCC) 04/28/2016    Cancer (HCC)     oral/mandible    CHF (congestive heart failure) (HCC)     COPD (chronic obstructive pulmonary disease) (HCC)     Hyperlipidemia     Hypertension     LBBB (left bundle branch block)     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 ([address] 10/26/2022    Nonischemic cardiomyopathy ([address]     Paroxysmal A-fib ([address]      Past Surgical History:   Procedure Laterality Date    ABDOMEN SURGERY  10/05/2016    exporation of abdominal fascial wound dehiscence close, insertion TLC right IJ    CARDIAC DEFIBRILLATOR PLACEMENT Left 2007    CARDIAC DEFIBRILLATOR PLACEMENT  2018    BIV ICD GENT CHANGE (BSCI)    BONDS    CARPAL TUNNEL RELEASE      DIAGNOSTIC CARDIAC CATH LAB PROCEDURE      ENDOSCOPY, COLON, DIAGNOSTIC  2018    upper endo    HERNIA REPAIR      HYSTERECTOMY (CERVIX STATUS UNKNOWN)      ILEOSTOMY OR JEJUNOSTOMY      INCISIONAL HERNIA REPAIR  2017    LAPAROSCOPIC; WITH MESH    MANDIBLE SURGERY      OTHER SURGICAL HISTORY      ostomy placed having reversal done on 2016    OTHER SURGICAL HISTORY  2016    open reveral ileostomy    OTHER SURGICAL HISTORY  2009    BiV/ICD    OTHER SURGICAL HISTORY Left     vascularized fibular graft harvest [facility]      Family History   Problem Relation Age of Onset    Alzheimer's Disease Mother     Heart Disease Father     Cancer Father     Alzheimer's Disease Father     Cancer Brother      Social History     Tobacco Use    Smoking status: Former     Types: Cigarettes     Quit date: 2006     Years since quittin.1    Smokeless tobacco: Never   Vaping Use    Vaping Use: Never used   Substance Use Topics    Alcohol use: Not Currently     Comment: occasional    Drug use: No     No Known Allergies  Current Outpatient Medications on File Prior to Encounter   Medication Sig Dispense Refill    carvedilol (COREG) 3.125 MG tablet Take 1 tablet by mouth 2 times daily (with meals) 60 tablet 3    escitalopram (LEXAPRO) 10 MG tablet Take 1 tablet by mouth daily      famotidine (PEPCID) 20 MG tablet Take 1 tablet by mouth 2 times daily      traMADol (ULTRAM) 50 MG tablet Take 1 tablet by mouth every 8 hours as needed. oxyCODONE (ROXICODONE) 5 MG immediate release tablet Take 1 tablet by mouth every 8 hours as needed.       gabapentin (NEURONTIN) 100 MG capsule Take 100 mg by mouth every 8 (eight) hours. magnesium oxide (MAG-OX) 400 MG tablet Take 400 mg by mouth daily      apixaban (ELIQUIS) 5 MG TABS tablet TAKE 1 TABLET TWICE DAILY 180 tablet 3    fluticasone (FLONASE) 50 MCG/ACT nasal spray 1 spray by Each Nostril route as needed for Rhinitis      SPIRIVA HANDIHALER 18 MCG inhalation capsule Inhale 18 mcg into the lungs daily        No current facility-administered medications on file prior to encounter. 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 ([facility] - Primary (Chronic)    Relevant Orders    Initiate Outpatient Wound Care Protocol       Pre Debridement Measurements:  Are located in the [facility]  Documentation Flow Sheet  Post Debridement Measurements:  Wound/Ulcer Descriptions are Pre Debridement except measurements:    Incision 10/05/16 Abdomen Mid (Active)   Number of days: 8606       Incision 04/21/17 Abdomen Right;Left;Mid (Active)   Number of days: 2105       Wound 10/26/22 Leg Left;Lateral #1 (Active)   Wound Image   01/11/23 0941   Wound Etiology Non-Healing Surgical 01/25/23 1410   Dressing Status New dressing applied 01/25/23 1410   Wound Cleansed Cleansed with saline 01/25/23 1410   Dressing/Treatment ABD; Other (comment) 01/25/23 1410   Offloading for Diabetic Foot Ulcers Offloading not required 01/25/23 1410   Wound Length (cm) 9.3 cm 01/25/23 1324   Wound Width (cm) 3.4 cm 01/25/23 1324   Wound Depth (cm) 0.1 cm 01/25/23 1324   Wound Surface Area (cm^2) 31.62 cm^2 01/25/23 1324   Change in Wound Size % (l*w) 74.33 01/25/23 1324   Wound Volume (cm^3) 3.162 cm^3 01/25/23 1324   Wound Healing % 95 01/25/23 1324   Post-Procedure Length (cm) 9.3 cm 01/25/23 1403   Post-Procedure Width (cm) 3.5 cm 01/25/23 1403   Post-Procedure Depth (cm) 0.1 cm 01/25/23 1403   Post-Procedure Surface Area (cm^2) 32.55 cm^2 01/25/23 1403   Post-Procedure Volume (cm^3) 3.255 cm^3 01/25/23 1403   Distance Tunneling (cm) 2.3 cm 11/16/22 1521   Tunneling Position ___ O'Clock 12 11/16/22 1521   Wound Assessment Fibrin;Pink/red;Bleeding 01/25/23 1324   Drainage Amount Moderate 01/25/23 1324   Drainage Description Yellow;Green 01/25/23 1324   Odor None 01/25/23 1324   Cherise-wound Assessment Dry/flaky 01/25/23 1324   Wound Thickness Description not for Pressure Injury Full thickness 01/11/23 0941   Number of days: 91          Procedure Note  Indications:  Based on my examination of this patient's wound(s)/ulcer(s) today, debridement is required to promote healing and evaluate the wound base. Performed by: Antonia Cano MD    Consent obtained:  Yes    Time out taken:  Yes    Pain Control: Anesthetic  Anesthetic: 4% Lidocaine Liquid Topical     Debridement:Excisional Debridement    Using curette the wound(s)/ulcer(s) was/were sharply debrided down through and including the removal of epidermis, dermis, subcutaenous    Devitalized Tissue Debrided:  fibrin, biofilm, slough, and exudate to stimulate bleeding to promote healing, post debridement good bleeding base and wound edges noted    Wound/Ulcer #: 1    Percent of Wound/Ulcer Debrided: 60%    Total Surface Area Debrided:  20 sq cm     Estimated Blood Loss:  Minimal  Hemostasis Achieved:  by pressure    Procedural Pain:  3  / 10   Post Procedural Pain:  2 / 10     Response to treatment:  Well tolerated by patient.      Plan:   Treatment Note please see attached Discharge Instructions    Written patient dismissal instructions given to patient and signed by patient or POA. Discharge Instructions         Discharge condition: Stable     Assessment of pain at discharge:minimal     Anesthetic used: 4% lidocaine solution     Discharge to: Home     Left via:Private automobile     Accompanied by: family      ECF/HHA: [address]- cleanse with normal saline, apply drawtex , ABD pads , unna boot and coban . Change Monday- Wednesday (at [facility])- Friday. Treatment Orders:Eat a diet high in protein and vitamin C. Take a multiple vitamin daily unless contraindicated. Elevate leg above the level of the heart as much as possible throughout the day. [facility] followup visit:1 week_____________________________  (Please note your next appointment above and if you are unable to keep, kindly give a 24 hour notice. Thank you.)     Physician signature:__________________________      If you experience any of the following, please call the [address] [place] [address] Road during business hours:     * Increase in Pain  * Temperature over 101  * Increase in drainage from your wound  * Drainage with a foul odor  * Bleeding  * Increase in swelling  * Need for compression bandage changes due to slippage, breakthrough drainage.          Electronically signed by Francesco Kraft MD o

## 2023-02-01 ENCOUNTER — HOSPITAL ENCOUNTER (OUTPATIENT)
Dept: WOUND CARE | Age: 78
Discharge: HOME | End: 2023-02-01
Payer: MEDICARE

## 2023-02-01 VITALS
TEMPERATURE: 96 F | HEIGHT: 62 IN | HEART RATE: 83 BPM | DIASTOLIC BLOOD PRESSURE: 53 MMHG | WEIGHT: 137 LBS | RESPIRATION RATE: 18 BRPM | BODY MASS INDEX: 25.21 KG/M2 | SYSTOLIC BLOOD PRESSURE: 128 MMHG

## 2023-02-01 DIAGNOSIS — L97.923 NON-PRESSURE CHRONIC ULCER OF LEFT LOWER LEG WITH NECROSIS OF MUSCLE (HCC): Primary | ICD-10-CM

## 2023-02-01 PROCEDURE — 11042 DBRDMT SUBQ TIS 1ST 20SQCM/<: CPT

## 2023-02-01 RX ORDER — CLOBETASOL PROPIONATE 0.5 MG/G
OINTMENT TOPICAL ONCE
OUTPATIENT
Start: 2023-02-01 | End: 2023-02-01

## 2023-02-01 RX ORDER — LIDOCAINE HYDROCHLORIDE 20 MG/ML
JELLY TOPICAL ONCE
OUTPATIENT
Start: 2023-02-01 | End: 2023-02-01

## 2023-02-01 RX ORDER — BACITRACIN, NEOMYCIN, POLYMYXIN B 400; 3.5; 5 [USP'U]/G; MG/G; [USP'U]/G
OINTMENT TOPICAL ONCE
OUTPATIENT
Start: 2023-02-01 | End: 2023-02-01

## 2023-02-01 RX ORDER — LIDOCAINE 40 MG/G
CREAM TOPICAL ONCE
OUTPATIENT
Start: 2023-02-01 | End: 2023-02-01

## 2023-02-01 RX ORDER — LIDOCAINE HYDROCHLORIDE 40 MG/ML
SOLUTION TOPICAL ONCE
Status: COMPLETED | OUTPATIENT
Start: 2023-02-01 | End: 2023-02-01

## 2023-02-01 RX ORDER — BETAMETHASONE DIPROPIONATE 0.05 %
OINTMENT (GRAM) TOPICAL ONCE
OUTPATIENT
Start: 2023-02-01 | End: 2023-02-01

## 2023-02-01 RX ORDER — LIDOCAINE 50 MG/G
OINTMENT TOPICAL ONCE
OUTPATIENT
Start: 2023-02-01 | End: 2023-02-01

## 2023-02-01 RX ORDER — BACITRACIN ZINC AND POLYMYXIN B SULFATE 500; 1000 [USP'U]/G; [USP'U]/G
OINTMENT TOPICAL ONCE
OUTPATIENT
Start: 2023-02-01 | End: 2023-02-01

## 2023-02-01 RX ORDER — GINSENG 100 MG
CAPSULE ORAL ONCE
OUTPATIENT
Start: 2023-02-01 | End: 2023-02-01

## 2023-02-01 RX ORDER — GENTAMICIN SULFATE 1 MG/G
OINTMENT TOPICAL ONCE
OUTPATIENT
Start: 2023-02-01 | End: 2023-02-01

## 2023-02-01 RX ORDER — LIDOCAINE HYDROCHLORIDE 40 MG/ML
SOLUTION TOPICAL ONCE
OUTPATIENT
Start: 2023-02-01 | End: 2023-02-01

## 2023-02-01 RX ADMIN — LIDOCAINE HYDROCHLORIDE 10 ML: 40 SOLUTION TOPICAL at 14:50

## 2023-02-01 NOTE — PROGRESS NOTES
Wound Healing Center Followup Visit Note    Referring Physician : Shannan Fragoso MD  66 [facility] RECORD NUMBER:  [id number]  AGE: 77 y.o. GENDER: female  : 1945  EPISODE DATE:  2023    Subjective:     Chief Complaint   Patient presents with    Wound Check     Left leg      HISTORY of PRESENT ILLNESS HPI   Ki Murillo is a 77 y.o. female who presents today in regards to follow up evaluation and treatment of wound/ulcer. That patient's past medical, family and social hx were reviewed and changes were made if present. History of Wound Context:  The patient has had a wound of left calf which was first noted approximately 2022. This has been treated local wound care. On their initial visit to the wound healing center, 22,  the patient has noted that the wound has been improving after seeing Dr Meg Sy last week. The patient has not had similar previous wounds in the past.      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 She underwent right segmental mandibulectomy, bilateral selective neck dissection of levels 1 through 4, open tracheostomy on  with L fibula resection and implant of fibular free flap in her jaw. Per pt report wound in left leg was never closed. She receive preop chemotherapy, immunotherapy. She is no longer receiving chemotherapy. Her lymph node resection was negative. Her left calf postop wound from where she had bone/free flap taken has been non healing and they have had issues with it since immediately postop per pt and family. They said [facility] surgeon is aware, recommended local wound care and was going to see them in 2023. Her daughter had asked to follow with me going forward. She has peg in place and receives nutrition via. She follows with Dr. Jade Shaw in regards to CKD. Pt is on abx at time of initial visit. She was started on levaquin per [facility] who she had culture sent to. 11/2/22  She does have an area of muscle that doesn't appear to be viable  She may need surgical debridement in the future  Coban 2, aquacell ag  She is already on levaquin from recent culture  No evidence of arterial occlusive disease  Labs ordered  Ok to cancel abis and pvrs  11/9/22  Wound slightly smaller  Coban 2, aquacell ag  Still may need surgical debridement in future  11/16/22  Wound smaller 109 (133)    11/23/22  Wound smaller 66  11/30/22  Wound smaller 63   Wound appearance tremendously improved  Area of depth dramatically improved  Muscle coverage improved  12/7/22  Wound 58  Continued improvement, and more muscle coverage  12/14/22  Wound 55  Improved  12/21/22  Wound 50  Muscle almost completely covered  12/28/22  Wound size stable 50, appearance improved  Change to drawtek  1/4/23  Wound size improved 44  1/11/23  Wound size 40 improved  Itching better with unna boot  1/18/23  Size decreased to 31  1/25/23  Size 31 but appearance better, appears smaller today   21/23  Smaller 20    Wound/Ulcer Pain Timing/Severity: mild  Quality of pain: aching  Severity:  1 / 10   Modifying Factors: Pain worsens with debridement, dressing changes  Associated Signs/Symptoms: drainage edema, pain    Ulcer Identification:  Ulcer Type: non-healing surgical  Contributing Factors: edema, immunosuppression, anticoagulation therapy, and malnutrition    Diabetic/Pressure/Non Pressure Ulcers only:  Ulcer: Non-Pressure ulcer, muscle necrosis  Wound: Wound dehiscence        PAST MEDICAL HISTORY      Diagnosis Date    Anemia     Arthritis     Bowel obstruction (HCC) 04/28/2016    Cancer (HCC)     oral/mandible    CHF (congestive heart failure) (HCC)     COPD (chronic obstructive pulmonary disease) (HCC)     Hyperlipidemia     Hypertension     LBBB (left bundle branch block)     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 ([facility] 10/26/2022    Nonischemic cardiomyopathy (HCC)     Paroxysmal A-fib ([address] Past Surgical History:   Procedure Laterality Date    ABDOMEN SURGERY  10/05/2016    exporation of abdominal fascial wound dehiscence close, insertion TLC right IJ    CARDIAC DEFIBRILLATOR PLACEMENT Left 2007    CARDIAC DEFIBRILLATOR PLACEMENT  2018    BIV ICD GENT CHANGE (BSCI)    BONDS    CARPAL TUNNEL RELEASE      DIAGNOSTIC CARDIAC CATH LAB PROCEDURE      ENDOSCOPY, COLON, DIAGNOSTIC  2018    upper endo    HERNIA REPAIR      HYSTERECTOMY (CERVIX STATUS UNKNOWN)      ILEOSTOMY OR JEJUNOSTOMY      INCISIONAL HERNIA REPAIR  2017    LAPAROSCOPIC; WITH MESH    MANDIBLE SURGERY      OTHER SURGICAL HISTORY      ostomy placed having reversal done on 2016    OTHER SURGICAL HISTORY  2016    open reveral ileostomy    OTHER SURGICAL HISTORY  2009    BiV/ICD    OTHER SURGICAL HISTORY Left     vascularized fibular graft harvest [facility]      Family History   Problem Relation Age of Onset    Alzheimer's Disease Mother     Heart Disease Father     Cancer Father     Alzheimer's Disease Father     Cancer Brother      Social History     Tobacco Use    Smoking status: Former     Types: Cigarettes     Quit date: 2006     Years since quittin.1    Smokeless tobacco: Never   Vaping Use    Vaping Use: Never used   Substance Use Topics    Alcohol use: Not Currently     Comment: occasional    Drug use: No     No Known Allergies  Current Outpatient Medications on File Prior to Encounter   Medication Sig Dispense Refill    carvedilol (COREG) 3.125 MG tablet Take 1 tablet by mouth 2 times daily (with meals) 60 tablet 3    escitalopram (LEXAPRO) 10 MG tablet Take 1 tablet by mouth daily      famotidine (PEPCID) 20 MG tablet Take 1 tablet by mouth 2 times daily      traMADol (ULTRAM) 50 MG tablet Take 1 tablet by mouth every 8 hours as needed. oxyCODONE (ROXICODONE) 5 MG immediate release tablet Take 1 tablet by mouth every 8 hours as needed.       gabapentin (NEURONTIN) 100 MG capsule Take 100 mg by mouth every 8 (eight) hours. magnesium oxide (MAG-OX) 400 MG tablet Take 400 mg by mouth daily      apixaban (ELIQUIS) 5 MG TABS tablet TAKE 1 TABLET TWICE DAILY 180 tablet 3    fluticasone (FLONASE) 50 MCG/ACT nasal spray 1 spray by Each Nostril route as needed for Rhinitis      SPIRIVA HANDIHALER 18 MCG inhalation capsule Inhale 18 mcg into the lungs daily        No current facility-administered medications on file prior to encounter. 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 ([address] - Primary (Chronic)    Relevant Orders    Initiate Outpatient Wound Care Protocol       Pre Debridement Measurements:  Are located in the [facility]  Documentation Flow Sheet  Post Debridement Measurements:  Wound/Ulcer Descriptions are Pre Debridement except measurements:    Incision 10/05/16 Abdomen Mid (Active)   Number of days: 7868       Incision 04/21/17 Abdomen Right;Left;Mid (Active)   Number of days: 2112       Wound 10/26/22 Leg Left;Lateral #1 (Active)   Wound Image   01/11/23 0941   Wound Etiology Non-Healing Surgical 02/01/23 1554   Dressing Status New dressing applied;Clean;Dry; Intact 02/01/23 1554   Wound Cleansed Cleansed with saline 02/01/23 1554   Dressing/Treatment ABD; Other (comment) 02/01/23 1554   Offloading for Diabetic Foot Ulcers Offloading not required 01/25/23 1410 Wound Length (cm) 9 cm 02/01/23 1446   Wound Width (cm) 2.2 cm 02/01/23 1446   Wound Depth (cm) 0.1 cm 02/01/23 1446   Wound Surface Area (cm^2) 19.8 cm^2 02/01/23 1446   Change in Wound Size % (l*w) 83.93 02/01/23 1446   Wound Volume (cm^3) 1.98 cm^3 02/01/23 1446   Wound Healing % 97 02/01/23 1446   Post-Procedure Length (cm) 9 cm 02/01/23 1533   Post-Procedure Width (cm) 2.2 cm 02/01/23 1533   Post-Procedure Depth (cm) 0.1 cm 02/01/23 1533   Post-Procedure Surface Area (cm^2) 19.8 cm^2 02/01/23 1533   Post-Procedure Volume (cm^3) 1.98 cm^3 02/01/23 1533   Distance Tunneling (cm) 2.3 cm 11/16/22 1521   Tunneling Position ___ O'Clock 12 11/16/22 1521   Wound Assessment Fibrin;Pink/red 02/01/23 1446   Drainage Amount Moderate 02/01/23 1446   Drainage Description Serosanguinous 02/01/23 1446   Odor None 02/01/23 1446   Cherise-wound Assessment Dry/flaky 02/01/23 1446   Wound Thickness Description not for Pressure Injury Full thickness 01/11/23 0941   Number of days: 98          Procedure Note  Indications:  Based on my examination of this patient's wound(s)/ulcer(s) today, debridement is required to promote healing and evaluate the wound base.     Performed by: Jazmín Roe MD    Consent obtained:  Yes    Time out taken:  Yes    Pain Control: Anesthetic  Anesthetic: 4% Lidocaine Liquid Topical     Debridement:Excisional Debridement    Using curette the wound(s)/ulcer(s) was/were sharply debrided down through and including the removal of epidermis, dermis, subcutaenous    Devitalized Tissue Debrided:  fibrin, biofilm, slough, and exudate to stimulate bleeding to promote healing, post debridement good bleeding base and wound edges noted    Wound/Ulcer #: 1    Percent of Wound/Ulcer Debrided: 100%    Total Surface Area Debrided:  20 sq cm     Estimated Blood Loss:  Minimal  Hemostasis Achieved:  by pressure    Procedural Pain:  3  / 10   Post Procedural Pain:  2 / 10     Response to treatment:  Well tolerated by patient. Plan:   Treatment Note please see attached Discharge Instructions    Written patient dismissal instructions given to patient and signed by patient or POA. Discharge Instructions         Discharge condition: Stable     Assessment of pain at discharge:minimal     Anesthetic used: 4% lidocaine solution     Discharge to: Home     Left via:Private automobile     Accompanied by: family      ECF/HHA: [address]- cleanse with normal saline, apply drawtex , ABD pads , unna boot and coban . Change Monday- Wednesday (at [facility])- Friday. Treatment Orders:Eat a diet high in protein and vitamin C. Take a multiple vitamin daily unless contraindicated. Elevate leg above the level of the heart as much as possible throughout the day. [facility] followup visit:1 week_____________________________  (Please note your next appointment above and if you are unable to keep, kindly give a 24 hour notice. Thank you.)     Physician signature:__________________________      If you experience any of the following, please call the [address] [place] [address] Road during business hours:     * Increase in Pain  * Temperature over 101  * Increase in drainage from your wound  * Drainage with a foul odor  * Bleeding  * Increase in swelling  * Need for compression bandage changes due to slippage, breakthrough drainage.          Electronically signed by MD yakov Mendoza

## 2023-02-01 NOTE — DISCHARGE INSTRUCTIONS
Discharge condition: Stable     Assessment of pain at discharge:minimal     Anesthetic used: 4% lidocaine solution     Discharge to: Home     Left via:Private automobile     Accompanied by: family      ECF/HHA: [address]- cleanse with normal saline, apply drawtex , ABD pads , unna boot and coban . Change Monday- Wednesday (at [address] [place] [address],[address])- Friday. Treatment Orders:Eat a diet high in protein and vitamin C. Take a multiple vitamin daily unless contraindicated. Elevate leg above the level of the heart as much as possible throughout the day. [address] [place] [address],[address] followup visit:1 week_____________________________  (Please note your next appointment above and if you are unable to keep, kindly give a 24 hour notice. Thank you.)     Physician signature:__________________________      If you experience any of the following, please call the [address] [place] [address] Road during business hours:     * Increase in Pain  * Temperature over 101  * Increase in drainage from your wound  * Drainage with a foul odor  * Bleeding  * Increase in swelling  * Need for compression bandage changes due to slippage, breakthrough drainage.

## 2023-02-01 NOTE — PLAN OF CARE
Problem: Wound:  Goal: Will show signs of wound healing; wound closure and no evidence of infection  Description: Will show signs of wound healing; wound closure and no evidence of infection  Outcome: Progressing     Problem: Compression therapy:  Goal: Will be free from complications associated with compression therapy  Description: Will be free from complications associated with compression therapy  Outcome: Progressing     Problem: Cognitive:  Goal: Knowledge of wound care  Description: Knowledge of wound care  Outcome: Adequate for Discharge  Goal: Understands risk factors for wounds  Description: Understands risk factors for wounds  Outcome: Adequate for Discharge

## 2023-02-08 ENCOUNTER — HOSPITAL ENCOUNTER (OUTPATIENT)
Dept: WOUND CARE | Age: 78
Discharge: HOME | End: 2023-02-08
Payer: MEDICARE

## 2023-02-08 VITALS
HEART RATE: 86 BPM | RESPIRATION RATE: 18 BRPM | DIASTOLIC BLOOD PRESSURE: 53 MMHG | SYSTOLIC BLOOD PRESSURE: 115 MMHG | TEMPERATURE: 97.2 F

## 2023-02-08 DIAGNOSIS — L97.923 NON-PRESSURE CHRONIC ULCER OF LEFT LOWER LEG WITH NECROSIS OF MUSCLE (HCC): Primary | ICD-10-CM

## 2023-02-08 PROCEDURE — 11042 DBRDMT SUBQ TIS 1ST 20SQCM/<: CPT

## 2023-02-08 RX ORDER — BACITRACIN, NEOMYCIN, POLYMYXIN B 400; 3.5; 5 [USP'U]/G; MG/G; [USP'U]/G
OINTMENT TOPICAL ONCE
OUTPATIENT
Start: 2023-02-08 | End: 2023-02-08

## 2023-02-08 RX ORDER — BACITRACIN ZINC AND POLYMYXIN B SULFATE 500; 1000 [USP'U]/G; [USP'U]/G
OINTMENT TOPICAL ONCE
OUTPATIENT
Start: 2023-02-08 | End: 2023-02-08

## 2023-02-08 RX ORDER — LIDOCAINE HYDROCHLORIDE 40 MG/ML
SOLUTION TOPICAL ONCE
Status: COMPLETED | OUTPATIENT
Start: 2023-02-08 | End: 2023-02-08

## 2023-02-08 RX ORDER — GINSENG 100 MG
CAPSULE ORAL ONCE
OUTPATIENT
Start: 2023-02-08 | End: 2023-02-08

## 2023-02-08 RX ORDER — LIDOCAINE 40 MG/G
CREAM TOPICAL ONCE
OUTPATIENT
Start: 2023-02-08 | End: 2023-02-08

## 2023-02-08 RX ORDER — LIDOCAINE 50 MG/G
OINTMENT TOPICAL ONCE
OUTPATIENT
Start: 2023-02-08 | End: 2023-02-08

## 2023-02-08 RX ORDER — LIDOCAINE HYDROCHLORIDE 20 MG/ML
JELLY TOPICAL ONCE
OUTPATIENT
Start: 2023-02-08 | End: 2023-02-08

## 2023-02-08 RX ORDER — LIDOCAINE HYDROCHLORIDE 40 MG/ML
SOLUTION TOPICAL ONCE
OUTPATIENT
Start: 2023-02-08 | End: 2023-02-08

## 2023-02-08 RX ORDER — BETAMETHASONE DIPROPIONATE 0.05 %
OINTMENT (GRAM) TOPICAL ONCE
OUTPATIENT
Start: 2023-02-08 | End: 2023-02-08

## 2023-02-08 RX ORDER — CLOBETASOL PROPIONATE 0.5 MG/G
OINTMENT TOPICAL ONCE
OUTPATIENT
Start: 2023-02-08 | End: 2023-02-08

## 2023-02-08 RX ORDER — GENTAMICIN SULFATE 1 MG/G
OINTMENT TOPICAL ONCE
OUTPATIENT
Start: 2023-02-08 | End: 2023-02-08

## 2023-02-08 RX ADMIN — LIDOCAINE HYDROCHLORIDE 5 ML: 40 SOLUTION TOPICAL at 14:18

## 2023-02-08 NOTE — PROGRESS NOTES
Wound [facility]aling Center Followup Visit Note    Referring Physician : Tony Calix MD  [address] [facility] RECORD NUMBER:  [id number]  AGE: 77 y.o. GENDER: female  : 1945  EPISODE DATE:  2023    Subjective:     Chief Complaint   Patient presents with    Wound Check     Leg left      HISTORY of PRESENT ILLNESS HPI   Jeffry Lepe is a 77 y.o. female who presents today in regards to follow up evaluation and treatment of wound/ulcer. That patient's past medical, family and social hx were reviewed and changes were made if present. History of Wound Context:  The patient has had a wound of left calf which was first noted approximately 2022. This has been treated local wound care. On their initial visit to the wound healing center, 22,  the patient has noted that the wound has been improving after seeing Dr Ollie Chaudhari last week. The patient has not had similar previous wounds in the past.      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 She underwent right segmental mandibulectomy, bilateral selective neck dissection of levels 1 through 4, open tracheostomy on  with L fibula resection and implant of fibular free flap in her jaw. Per pt report wound in left leg was never closed. She receive preop chemotherapy, immunotherapy. She is no longer receiving chemotherapy. Her lymph node resection was negative. Her left calf postop wound from where she had bone/free flap taken has been non healing and they have had issues with it since immediately postop per pt and family. They said [facility] surgeon is aware, recommended local wound care and was going to see them in 2023. Her daughter had asked to follow with me going forward. She has peg in place and receives nutrition via. She follows with Dr. Mely Thurston in regards to CKD. Pt is on abx at time of initial visit. She was started on levaquin per [facility] who she had culture sent to. 11/2/22  She does have an area of muscle that doesn't appear to be viable  She may need surgical debridement in the future  Coban 2, [employer] ag  She is already on levaquin from recent culture  No evidence of arterial occlusive disease  Labs ordered  Ok to cancel abis and pvrs  11/9/22  Wound slightly smaller  Cobangie ramon 2  Still may need surgical debridement in future  11/16/22  Wound smaller 109 (133)    11/23/22  Wound smaller 66  11/30/22  Wound smaller 63   Wound appearance tremendously improved  Area of depth dramatically improved  Muscle coverage improved  12/7/22  Wound 58  Continued improvement, and more muscle coverage  12/14/22  Wound 55  Improved  12/21/22  Wound 50  Muscle almost completely covered  12/28/22  Wound size stable 50, appearance improved  Change to drawtek  1/4/23  Wound size improved 44  1/11/23  Wound size 40 improved  Itching better with unna boot  1/18/23  Size decreased to 31  1/25/23  Size 31 but appearance better, appears smaller today   21/23  Smaller 20  2/8/23  Slightly larger 24    Wound/Ulcer Pain Timing/Severity: mild  Quality of pain: aching  Severity:  1 / 10   Modifying Factors: Pain worsens with debridement, dressing changes  Associated Signs/Symptoms: drainage edema, pain    Ulcer Identification:  Ulcer Type: non-healing surgical  Contributing Factors: edema, immunosuppression, anticoagulation therapy, and malnutrition    Diabetic/Pressure/Non Pressure Ulcers only:  Ulcer: Non-Pressure ulcer, muscle necrosis  Wound: Wound dehiscence        PAST MEDICAL HISTORY      Diagnosis Date    Anemia     Arthritis     Bowel obstruction (HCC) 04/28/2016    Cancer (HCC)     oral/mandible    CHF (congestive heart failure) (HCC)     COPD (chronic obstructive pulmonary disease) (HCC)     Hyperlipidemia     Hypertension     LBBB (left bundle branch block)     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 ([facility] [address] 10/26/2022    Nonischemic cardiomyopathy ([address]     Paroxysmal A-fib [facility])      Past Surgical History:   Procedure Laterality Date    ABDOMEN SURGERY  10/05/2016    exporation of abdominal fascial wound dehiscence close, insertion TLC right IJ    CARDIAC DEFIBRILLATOR PLACEMENT Left 2007    CARDIAC DEFIBRILLATOR PLACEMENT  2018    BIV ICD GENT CHANGE (BSCI)    BONDS    CARPAL TUNNEL RELEASE      DIAGNOSTIC CARDIAC CATH LAB PROCEDURE      ENDOSCOPY, COLON, DIAGNOSTIC  2018    upper endo    HERNIA REPAIR      HYSTERECTOMY (CERVIX STATUS UNKNOWN)      ILEOSTOMY OR JEJUNOSTOMY      INCISIONAL HERNIA REPAIR  2017    LAPAROSCOPIC; WITH MESH    MANDIBLE SURGERY      OTHER SURGICAL HISTORY      ostomy placed having reversal done on 2016    OTHER SURGICAL HISTORY  2016    open reveral ileostomy    OTHER SURGICAL HISTORY  2009    BiV/ICD    OTHER SURGICAL HISTORY Left     vascularized fibular graft harvest [facility]      Family History   Problem Relation Age of Onset    Alzheimer's Disease Mother     Heart Disease Father     Cancer Father     Alzheimer's Disease Father     Cancer Brother      Social History     Tobacco Use    Smoking status: Former     Types: Cigarettes     Quit date: 2006     Years since quittin.1    Smokeless tobacco: Never   Vaping Use    Vaping Use: Never used   Substance Use Topics    Alcohol use: Not Currently     Comment: occasional    Drug use: No     No Known Allergies  Current Outpatient Medications on File Prior to Encounter   Medication Sig Dispense Refill    carvedilol (COREG) 3.125 MG tablet Take 1 tablet by mouth 2 times daily (with meals) 60 tablet 3    escitalopram (LEXAPRO) 10 MG tablet Take 1 tablet by mouth daily      famotidine (PEPCID) 20 MG tablet Take 1 tablet by mouth 2 times daily      traMADol (ULTRAM) 50 MG tablet Take 1 tablet by mouth every 8 hours as needed. oxyCODONE (ROXICODONE) 5 MG immediate release tablet Take 1 tablet by mouth every 8 hours as needed. gabapentin (NEURONTIN) 100 MG capsule Take 100 mg by mouth every 8 (eight) hours. magnesium oxide (MAG-OX) 400 MG tablet Take 400 mg by mouth daily      apixaban (ELIQUIS) 5 MG TABS tablet TAKE 1 TABLET TWICE DAILY 180 tablet 3    fluticasone (FLONASE) 50 MCG/ACT nasal spray 1 spray by Each Nostril route as needed for Rhinitis      SPIRIVA HANDIHALER 18 MCG inhalation capsule Inhale 18 mcg into the lungs daily        No current facility-administered medications on file prior to encounter. 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 ([address] - Primary (Chronic)    Relevant Orders    Initiate Outpatient Wound Care Protocol       Pre Debridement Measurements:  Are located in the [facility]  Documentation Flow Sheet  Post Debridement Measurements:  Wound/Ulcer Descriptions are Pre Debridement except measurements:    Incision 10/05/16 Abdomen Mid (Active)   Number of days: 2316       Incision 04/21/17 Abdomen Right;Left;Mid (Active)   Number of days: 2119       Wound 10/26/22 Leg Left;Lateral #1 (Active)   Wound Image   02/08/23 1419   Wound Etiology Non-Healing Surgical 02/01/23 1554   Dressing Status New dressing applied;Clean;Dry; Intact 02/01/23 1554   Wound Cleansed Cleansed with saline 02/01/23 1554   Dressing/Treatment ABD; Other (comment) 02/01/23 1554   Offloading for Diabetic Foot Ulcers Offloading not required 01/25/23 1410   Wound Length (cm) 8.3 cm 02/08/23 1419 Wound Width (cm) 2.9 cm 02/08/23 1419   Wound Depth (cm) 0.1 cm 02/08/23 1419   Wound Surface Area (cm^2) 24.07 cm^2 02/08/23 1419   Change in Wound Size % (l*w) 80.46 02/08/23 1419   Wound Volume (cm^3) 2.407 cm^3 02/08/23 1419   Wound Healing % 96 02/08/23 1419   Post-Procedure Length (cm) 8.4 cm 02/08/23 1513   Post-Procedure Width (cm) 3 cm 02/08/23 1513   Post-Procedure Depth (cm) 0.1 cm 02/08/23 1513   Post-Procedure Surface Area (cm^2) 25.2 cm^2 02/08/23 1513   Post-Procedure Volume (cm^3) 2.52 cm^3 02/08/23 1513   Distance Tunneling (cm) 2.3 cm 11/16/22 1521   Tunneling Position ___ O'Clock 12 11/16/22 1521   Wound Assessment Fibrin;Pink/red 02/08/23 1419   Drainage Amount Moderate 02/08/23 1419   Drainage Description Serosanguinous 02/08/23 1419   Odor None 02/08/23 1419   Cherise-wound Assessment Dry/flaky; Intact 02/08/23 1419   Wound Thickness Description not for Pressure Injury Full thickness 02/08/23 1419   Number of days: 105          Procedure Note  Indications:  Based on my examination of this patient's wound(s)/ulcer(s) today, debridement is required to promote healing and evaluate the wound base. Performed by: Alistair Soto MD    Consent obtained:  Yes    Time out taken:  Yes    Pain Control: Anesthetic  Anesthetic: 4% Lidocaine Liquid Topical     Debridement:Excisional Debridement    Using curette the wound(s)/ulcer(s) was/were sharply debrided down through and including the removal of epidermis, dermis, subcutaenous    Devitalized Tissue Debrided:  fibrin, biofilm, slough, and exudate to stimulate bleeding to promote healing, post debridement good bleeding base and wound edges noted    Wound/Ulcer #: 1    Percent of Wound/Ulcer Debrided: 80%    Total Surface Area Debrided:  20 sq cm     Estimated Blood Loss:  Minimal  Hemostasis Achieved:  by pressure    Procedural Pain:  3  / 10   Post Procedural Pain:  2 / 10     Response to treatment:  Well tolerated by patient.      Plan: Treatment Note please see attached Discharge Instructions    Written patient dismissal instructions given to patient and signed by patient or POA. Discharge Instructions         Discharge condition: Stable     Assessment of pain at discharge:minimal     Anesthetic used: 4% lidocaine solution     Discharge to: Home     Left via:Private automobile     Accompanied by: family      ECF/HHA: [address] *Pad foot and reddened areas with abd pads      Dressing Orders:LEFT LATERAL LOWER LEG- cleanse with normal saline, apply drawtex , ABD pads , unna boot and coban . Change Monday- Wednesday (at [facility])- Friday. Treatment Orders:Eat a diet high in protein and vitamin C. Take a multiple vitamin daily unless contraindicated. Elevate leg above the level of the heart as much as possible throughout the day. [facility] followup visit:1 week_____________________________  (Please note your next appointment above and if you are unable to keep, kindly give a 24 hour notice. Thank you.)     Physician signature:__________________________      If you experience any of the following, please call the [address] [place] [address] Road during business hours:     * Increase in Pain  * Temperature over 101  * Increase in drainage from your wound  * Drainage with a foul odor  * Bleeding  * Increase in swelling  * Need for compression bandage changes due to slippage, breakthrough drainage.          Electronically signed by MD yakov Castro

## 2023-02-08 NOTE — PLAN OF CARE
Problem: Cognitive:  Goal: Knowledge of wound care  Description: Knowledge of wound care  Outcome: Progressing  Goal: Understands risk factors for wounds  Description: Understands risk factors for wounds  Outcome: Progressing     Problem: Wound:  Goal: Will show signs of wound healing; wound closure and no evidence of infection  Description: Will show signs of wound healing; wound closure and no evidence of infection  Outcome: Adequate for Discharge     Problem: Compression therapy:  Goal: Will be free from complications associated with compression therapy  Description: Will be free from complications associated with compression therapy  Outcome: Adequate for Discharge

## 2023-02-08 NOTE — DISCHARGE INSTRUCTIONS
Discharge condition: Stable     Assessment of pain at discharge:minimal     Anesthetic used: 4% lidocaine solution     Discharge to: Home     Left via:Private automobile     Accompanied by: family      ECF/HHA: [address] *Pad foot and reddened areas with abd pads      Dressing Orders:LEFT LATERAL LOWER LEG- cleanse with normal saline, apply drawtex , ABD pads , unna boot and coban . Change Monday- Wednesday (at [facility])- Friday. Treatment Orders:Eat a diet high in protein and vitamin C. Take a multiple vitamin daily unless contraindicated. Elevate leg above the level of the heart as much as possible throughout the day. [facility] followup visit:1 week_____________________________  (Please note your next appointment above and if you are unable to keep, kindly give a 24 hour notice. Thank you.)     Physician signature:__________________________      If you experience any of the following, please call the [address] [place] [address] Road during business hours:     * Increase in Pain  * Temperature over 101  * Increase in drainage from your wound  * Drainage with a foul odor  * Bleeding  * Increase in swelling  * Need for compression bandage changes due to slippage, breakthrough drainage.

## 2023-02-15 ENCOUNTER — HOSPITAL ENCOUNTER (OUTPATIENT)
Dept: WOUND CARE | Age: 78
Discharge: HOME | End: 2023-02-15
Payer: MEDICARE

## 2023-02-15 VITALS
HEART RATE: 86 BPM | BODY MASS INDEX: 25.21 KG/M2 | DIASTOLIC BLOOD PRESSURE: 60 MMHG | RESPIRATION RATE: 18 BRPM | WEIGHT: 137 LBS | TEMPERATURE: 97.7 F | SYSTOLIC BLOOD PRESSURE: 137 MMHG | HEIGHT: 62 IN

## 2023-02-15 DIAGNOSIS — L97.923 NON-PRESSURE CHRONIC ULCER OF LEFT LOWER LEG WITH NECROSIS OF MUSCLE (HCC): Primary | ICD-10-CM

## 2023-02-15 PROCEDURE — 11042 DBRDMT SUBQ TIS 1ST 20SQCM/<: CPT

## 2023-02-15 RX ORDER — CLOBETASOL PROPIONATE 0.5 MG/G
OINTMENT TOPICAL ONCE
OUTPATIENT
Start: 2023-02-15 | End: 2023-02-15

## 2023-02-15 RX ORDER — LIDOCAINE HYDROCHLORIDE 20 MG/ML
JELLY TOPICAL ONCE
OUTPATIENT
Start: 2023-02-15 | End: 2023-02-15

## 2023-02-15 RX ORDER — BETAMETHASONE DIPROPIONATE 0.05 %
OINTMENT (GRAM) TOPICAL ONCE
OUTPATIENT
Start: 2023-02-15 | End: 2023-02-15

## 2023-02-15 RX ORDER — GINSENG 100 MG
CAPSULE ORAL ONCE
OUTPATIENT
Start: 2023-02-15 | End: 2023-02-15

## 2023-02-15 RX ORDER — LIDOCAINE HYDROCHLORIDE 40 MG/ML
SOLUTION TOPICAL ONCE
OUTPATIENT
Start: 2023-02-15 | End: 2023-02-15

## 2023-02-15 RX ORDER — LIDOCAINE 40 MG/G
CREAM TOPICAL ONCE
OUTPATIENT
Start: 2023-02-15 | End: 2023-02-15

## 2023-02-15 RX ORDER — BACITRACIN, NEOMYCIN, POLYMYXIN B 400; 3.5; 5 [USP'U]/G; MG/G; [USP'U]/G
OINTMENT TOPICAL ONCE
OUTPATIENT
Start: 2023-02-15 | End: 2023-02-15

## 2023-02-15 RX ORDER — BACITRACIN ZINC AND POLYMYXIN B SULFATE 500; 1000 [USP'U]/G; [USP'U]/G
OINTMENT TOPICAL ONCE
OUTPATIENT
Start: 2023-02-15 | End: 2023-02-15

## 2023-02-15 RX ORDER — LIDOCAINE HYDROCHLORIDE 40 MG/ML
SOLUTION TOPICAL ONCE
Status: DISCONTINUED | OUTPATIENT
Start: 2023-02-15 | End: 2023-02-16 | Stop reason: HOSPADM

## 2023-02-15 RX ORDER — GENTAMICIN SULFATE 1 MG/G
OINTMENT TOPICAL ONCE
OUTPATIENT
Start: 2023-02-15 | End: 2023-02-15

## 2023-02-15 RX ORDER — LIDOCAINE 50 MG/G
OINTMENT TOPICAL ONCE
OUTPATIENT
Start: 2023-02-15 | End: 2023-02-15

## 2023-02-15 NOTE — DISCHARGE INSTRUCTIONS
Discharge condition: Stable     Assessment of pain at discharge:minimal     Anesthetic used: 4% lidocaine solution     Discharge to: Home     Left via:Private automobile     Accompanied by: family      ECF/HHA: [address] *Pad foot and reddened areas with abd pads      Dressing Orders:LEFT LATERAL LOWER LEG- cleanse with normal saline, apply drawtex , ABD pads , unna boot and coban . Change Monday- Wednesday (at [address] [place] [address] Avenue,[address])- Friday. Treatment Orders:Eat a diet high in protein and vitamin C. Take a multiple vitamin daily unless contraindicated. Elevate leg above the level of the heart as much as possible throughout the day. [address] [place] [address],[address] followup visit:1 week_____________________________  (Please note your next appointment above and if you are unable to keep, kindly give a 24 hour notice. Thank you.)     Physician signature:__________________________      If you experience any of the following, please call the [address] [place] [address] Road during business hours:     * Increase in Pain  * Temperature over 101  * Increase in drainage from your wound  * Drainage with a foul odor  * Bleeding  * Increase in swelling  * Need for compression bandage changes due to slippage, breakthrough drainage.

## 2023-02-15 NOTE — PLAN OF CARE
Problem: Wound:  Goal: Will show signs of wound healing; wound closure and no evidence of infection  Description: Will show signs of wound healing; wound closure and no evidence of infection  Outcome: Progressing     Problem: Cognitive:  Goal: Knowledge of wound care  Description: Knowledge of wound care  Outcome: Adequate for Discharge  Goal: Understands risk factors for wounds  Description: Understands risk factors for wounds  Outcome: Adequate for Discharge     Problem: Compression therapy:  Goal: Will be free from complications associated with compression therapy  Description: Will be free from complications associated with compression therapy  Outcome: Adequate for Discharge

## 2023-02-16 NOTE — PROGRESS NOTES
Wound Healing Center Followup Visit Note    Referring Physician : Reena Lugo MD  [address] [facility] RECORD NUMBER:  [id number]  AGE: 77 y.o. GENDER: female  : 1945  EPISODE DATE:  2/15/2023    Subjective:     Chief Complaint   Patient presents with    Wound Check     left leg      HISTORY of PRESENT ILLNESS HPI   Eve David is a 77 y.o. female who presents today in regards to follow up evaluation and treatment of wound/ulcer. That patient's past medical, family and social hx were reviewed and changes were made if present. History of Wound Context:  The patient has had a wound of left calf which was first noted approximately 2022. This has been treated local wound care. On their initial visit to the wound healing center, 22,  the patient has noted that the wound has been improving after seeing Dr Regan Finn last week. The patient has not had similar previous wounds in the past.      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 She underwent right segmental mandibulectomy, bilateral selective neck dissection of levels 1 through 4, open tracheostomy on  with L fibula resection and implant of fibular free flap in her jaw. Per pt report wound in left leg was never closed. She receive preop chemotherapy, immunotherapy. She is no longer receiving chemotherapy. Her lymph node resection was negative. Her left calf postop wound from where she had bone/free flap taken has been non healing and they have had issues with it since immediately postop per pt and family. They said [facility] surgeon is aware, recommended local wound care and was going to see them in 2023. Her daughter had asked to follow with me going forward. She has peg in place and receives nutrition via. She follows with Dr. Lidia Caban in regards to CKD. Pt is on abx at time of initial visit. She was started on levaquin per [facility] who she had culture sent to. 11/2/22  She does have an area of muscle that doesn't appear to be viable  She may need surgical debridement in the future  Coban 2, [employer]ac[employer] ag  She is already on levaquin from recent culture  No evidence of arterial occlusive disease  Labs ordered  Ok to cancel abis and pvrs  11/9/22  Wound slightly smaller  Cobangie ramon 2  Still may need surgical debridement in future  11/16/22  Wound smaller 109 (133)    11/23/22  Wound smaller 66  11/30/22  Wound smaller 63   Wound appearance tremendously improved  Area of depth dramatically improved  Muscle coverage improved  12/7/22  Wound 58  Continued improvement, and more muscle coverage  12/14/22  Wound 55  Improved  12/21/22  Wound 50  Muscle almost completely covered  12/28/22  Wound size stable 50, appearance improved  Change to drawtek  1/4/23  Wound size improved 44  1/11/23  Wound size 40 improved  Itching better with unna boot  1/18/23  Size decreased to 31  1/25/23  Size 31 but appearance better, appears smaller today   21/23  Smaller 20  2/8/23  Slightly larger 24  2/15/23  Smaller 20    Wound/Ulcer Pain Timing/Severity: mild  Quality of pain: aching  Severity:  1 / 10   Modifying Factors: Pain worsens with debridement, dressing changes  Associated Signs/Symptoms: drainage edema, pain    Ulcer Identification:  Ulcer Type: non-healing surgical  Contributing Factors: edema, immunosuppression, anticoagulation therapy, and malnutrition    Diabetic/Pressure/Non Pressure Ulcers only:  Ulcer: Non-Pressure ulcer, muscle necrosis  Wound: Wound dehiscence        PAST MEDICAL HISTORY      Diagnosis Date    Anemia     Arthritis     Bowel obstruction (HCC) 04/28/2016    Cancer (HCC)     oral/mandible    CHF (congestive heart failure) (HCC)     COPD (chronic obstructive pulmonary disease) (HCC)     Hyperlipidemia     Hypertension     LBBB (left bundle branch block)     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 ([address] 10/26/2022 Nonischemic cardiomyopathy (HCC)     Paroxysmal A-fib [facility])      Past Surgical History:   Procedure Laterality Date    ABDOMEN SURGERY  10/05/2016    exporation of abdominal fascial wound dehiscence close, insertion TLC right IJ    CARDIAC DEFIBRILLATOR PLACEMENT Left 2007    CARDIAC DEFIBRILLATOR PLACEMENT  2018    BIV ICD GENT CHANGE (BSCI)    BONDS    CARPAL TUNNEL RELEASE      DIAGNOSTIC CARDIAC CATH LAB PROCEDURE      ENDOSCOPY, COLON, DIAGNOSTIC  2018    upper endo    HERNIA REPAIR      HYSTERECTOMY (CERVIX STATUS UNKNOWN)      ILEOSTOMY OR JEJUNOSTOMY      INCISIONAL HERNIA REPAIR  2017    LAPAROSCOPIC; WITH MESH    MANDIBLE SURGERY      OTHER SURGICAL HISTORY      ostomy placed having reversal done on 2016    OTHER SURGICAL HISTORY  2016    open reveral ileostomy    OTHER SURGICAL HISTORY  2009    BiV/ICD    OTHER SURGICAL HISTORY Left     vascularized fibular graft harvest [facility]      Family History   Problem Relation Age of Onset    Alzheimer's Disease Mother     Heart Disease Father     Cancer Father     Alzheimer's Disease Father     Cancer Brother      Social History     Tobacco Use    Smoking status: Former     Types: Cigarettes     Quit date: 2006     Years since quittin.1    Smokeless tobacco: Never   Vaping Use    Vaping Use: Never used   Substance Use Topics    Alcohol use: Not Currently     Comment: occasional    Drug use: No     No Known Allergies  Current Outpatient Medications on File Prior to Encounter   Medication Sig Dispense Refill    carvedilol (COREG) 3.125 MG tablet Take 1 tablet by mouth 2 times daily (with meals) 60 tablet 3    escitalopram (LEXAPRO) 10 MG tablet Take 1 tablet by mouth daily      famotidine (PEPCID) 20 MG tablet Take 1 tablet by mouth 2 times daily      traMADol (ULTRAM) 50 MG tablet Take 1 tablet by mouth every 8 hours as needed.       oxyCODONE (ROXICODONE) 5 MG immediate release tablet Take 1 tablet by mouth every 8 hours as needed. gabapentin (NEURONTIN) 100 MG capsule Take 100 mg by mouth every 8 (eight) hours. magnesium oxide (MAG-OX) 400 MG tablet Take 400 mg by mouth daily      apixaban (ELIQUIS) 5 MG TABS tablet TAKE 1 TABLET TWICE DAILY 180 tablet 3    fluticasone (FLONASE) 50 MCG/ACT nasal spray 1 spray by Each Nostril route as needed for Rhinitis      SPIRIVA HANDIHALER 18 MCG inhalation capsule Inhale 18 mcg into the lungs daily        No current facility-administered medications on file prior to encounter. 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 (HCC) - Primary (Chronic)    Relevant Medications    lidocaine (XYLOCAINE) 4 % external solution    Other Relevant Orders    Initiate Outpatient Wound Care Protocol       Pre Debridement Measurements:  Are located in the [place]  Documentation Flow Sheet  Post Debridement Measurements:  Wound/Ulcer Descriptions are Pre Debridement except measurements:    Incision 10/05/16 Abdomen Mid (Active)   Number of days: 7319       Incision 04/21/17 Abdomen Right;Left;Mid (Active)   Number of days: 2126       Wound 10/26/22 Leg Left;Lateral #1 (Active)   Wound Image   02/08/23 1419   Wound Etiology Non-Healing Surgical 02/01/23 1554   Dressing Status New dressing applied 02/15/23 1645   Wound Cleansed Cleansed with saline 02/15/23 1645   Dressing/Treatment ABD; Other (comment) 02/15/23 1645   Offloading for Diabetic Foot Ulcers Offloading not required 02/15/23 1645   Wound Length (cm) 8.1 cm 02/15/23 1453   Wound Width (cm) 2.5 cm 02/15/23 1453   Wound Depth (cm) 0.1 cm 02/15/23 1453   Wound Surface Area (cm^2) 20.25 cm^2 02/15/23 1453   Change in Wound Size % (l*w) 83.56 02/15/23 1453   Wound Volume (cm^3) 2.025 cm^3 02/15/23 1453   Wound Healing % 97 02/15/23 1453   Post-Procedure Length (cm) 8.2 cm 02/15/23 1541   Post-Procedure Width (cm) 2.6 cm 02/15/23 1541   Post-Procedure Depth (cm) 0.1 cm 02/15/23 1541   Post-Procedure Surface Area (cm^2) 21.32 cm^2 02/15/23 1541   Post-Procedure Volume (cm^3) 2.132 cm^3 02/15/23 1541   Distance Tunneling (cm) 0 cm 02/15/23 1453   Tunneling Position ___ O'Clock 0 02/15/23 1453   Wound Assessment Fibrin;Pink/red 02/15/23 1453   Drainage Amount Moderate 02/15/23 1453   Drainage Description Serosanguinous 02/15/23 1453   Odor None 02/15/23 1453   Cherise-wound Assessment Dry/flaky; Intact 02/15/23 1453   Wound Thickness Description not for Pressure Injury Full thickness 02/08/23 1419   Number of days: 112          Procedure Note  Indications:  Based on my examination of this patient's wound(s)/ulcer(s) today, debridement is required to promote healing and evaluate the wound base.     Performed by: Antonia Cano MD    Consent obtained:  Yes    Time out taken:  Yes    Pain Control: Anesthetic  Anesthetic: 4% Lidocaine Liquid Topical     Debridement:Excisional Debridement    Using curette the wound(s)/ulcer(s) was/were sharply debrided down through and including the removal of epidermis, dermis, subcutaenous    Devitalized Tissue Debrided:  fibrin, biofilm, slough, and exudate to stimulate bleeding to promote healing, post debridement good bleeding base and wound edges noted    Wound/Ulcer #: 1    Percent of Wound/Ulcer Debrided: 100%    Total Surface Area Debrided:  20 sq cm     Estimated Blood Loss:  Minimal  Hemostasis Achieved:  by pressure    Procedural Pain:  3  / 10   Post Procedural Pain:  2 / 10     Response to treatment:  Well tolerated by patient. Plan:   Treatment Note please see attached Discharge Instructions    Written patient dismissal instructions given to patient and signed by patient or POA. Discharge Instructions         Discharge condition: Stable     Assessment of pain at discharge:minimal     Anesthetic used: 4% lidocaine solution     Discharge to: Home     Left via:Private automobile     Accompanied by: family      ECF/HHA: [address] *Pad foot and reddened areas with abd pads      Dressing Orders:LEFT LATERAL LOWER LEG- cleanse with normal saline, apply drawtex , ABD pads , unna boot and coban . Change Monday- Wednesday (at [address])- Friday. Treatment Orders:Eat a diet high in protein and vitamin C. Take a multiple vitamin daily unless contraindicated. Elevate leg above the level of the heart as much as possible throughout the day. [address] [place] [address],[address] followup visit:1 week_____________________________  (Please note your next appointment above and if you are unable to keep, kindly give a 24 hour notice. Thank you.)     Physician signature:__________________________      If you experience any of the following, please call the [address] during business hours:     * Increase in Pain  * Temperature over 101  * Increase in drainage from your wound  * Drainage with a foul odor  * Bleeding  * Increase in swelling  * Need for compression bandage changes due to slippage, breakthrough drainage.       Electronically signed by MD yakov Camargo

## 2023-02-22 ENCOUNTER — HOSPITAL ENCOUNTER (OUTPATIENT)
Dept: WOUND CARE | Age: 78
Discharge: HOME | End: 2023-02-22
Payer: MEDICARE

## 2023-02-22 VITALS
TEMPERATURE: 96.9 F | SYSTOLIC BLOOD PRESSURE: 135 MMHG | DIASTOLIC BLOOD PRESSURE: 69 MMHG | RESPIRATION RATE: 18 BRPM | HEART RATE: 88 BPM

## 2023-02-22 DIAGNOSIS — L97.923 NON-PRESSURE CHRONIC ULCER OF LEFT LOWER LEG WITH NECROSIS OF MUSCLE (HCC): Primary | Chronic | ICD-10-CM

## 2023-02-22 PROCEDURE — 11042 DBRDMT SUBQ TIS 1ST 20SQCM/<: CPT

## 2023-02-22 NOTE — DISCHARGE INSTRUCTIONS
Discharge condition: Stable     Assessment of pain at discharge:minimal     Anesthetic used: 4% lidocaine solution     Discharge to: Home     Left via:Private automobile     Accompanied by: family      ECF/HHA: [address] *Pad foot and reddened areas with abd pads      Dressing Orders:LEFT LATERAL LOWER LEG- cleanse with normal saline, apply drawtex , ABD pads , unna boot and coban . Change Monday- Wednesday (at [facility])- Friday. Treatment Orders:Eat a diet high in protein and vitamin C. Take a multiple vitamin daily unless contraindicated. Elevate leg above the level of the heart as much as possible throughout the day. [facility] followup visit:1 week_____________________________  (Please note your next appointment above and if you are unable to keep, kindly give a 24 hour notice. Thank you.)     Physician signature:__________________________      If you experience any of the following, please call the [address] [place] [address] Road during business hours:     * Increase in Pain  * Temperature over 101  * Increase in drainage from your wound  * Drainage with a foul odor  * Bleeding  * Increase in swelling  * Need for compression bandage changes due to slippage, breakthrough drainage.

## 2023-02-26 NOTE — PROGRESS NOTES
Wound Healing Center Followup Visit Note    Referring Physician : Joslyn Grey MD  [address] [facility] RECORD NUMBER:  [id number]  AGE: 77 y.o. GENDER: female  : 1945  EPISODE DATE:  2023    Subjective:     Chief Complaint   Patient presents with    Wound Check     Right leg      HISTORY of PRESENT ILLNESS HPI   Lyric De Souza is a 77 y.o. female who presents today in regards to follow up evaluation and treatment of wound/ulcer. That patient's past medical, family and social hx were reviewed and changes were made if present. History of Wound Context:  The patient has had a wound of left calf which was first noted approximately 2022. This has been treated local wound care. On their initial visit to the wound healing center, 22,  the patient has noted that the wound has been improving after seeing Dr Yazan Lewis last week. The patient has not had similar previous wounds in the past.      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 She underwent right segmental mandibulectomy, bilateral selective neck dissection of levels 1 through 4, open tracheostomy on  with L fibula resection and implant of fibular free flap in her jaw. Per pt report wound in left leg was never closed. She receive preop chemotherapy, immunotherapy. She is no longer receiving chemotherapy. Her lymph node resection was negative. Her left calf postop wound from where she had bone/free flap taken has been non healing and they have had issues with it since immediately postop per pt and family. They said [facility] surgeon is aware, recommended local wound care and was going to see them in 2023. Her daughter had asked to follow with me going forward. She has peg in place and receives nutrition via. She follows with Dr. Elda Day in regards to CKD. Pt is on abx at time of initial visit. She was started on levaquin per [facility] who she had culture sent to. 11/2/22  She does have an area of muscle that doesn't appear to be viable  She may need surgical debridement in the future  Coban 2, [employer] ag  She is already on levaquin from recent culture  No evidence of arterial occlusive disease  Labs ordered  Ok to cancel abis and pvrs  11/9/22  Wound slightly smaller  Cobangie ramon 2  Still may need surgical debridement in future  11/16/22  Wound smaller 109 (133)    11/23/22  Wound smaller 66  11/30/22  Wound smaller 63   Wound appearance tremendously improved  Area of depth dramatically improved  Muscle coverage improved  12/7/22  Wound 58  Continued improvement, and more muscle coverage  12/14/22  Wound 55  Improved  12/21/22  Wound 50  Muscle almost completely covered  12/28/22  Wound size stable 50, appearance improved  Change to drawtek  1/4/23  Wound size improved 44  1/11/23  Wound size 40 improved  Itching better with unna boot  1/18/23  Size decreased to 31  1/25/23  Size 31 but appearance better, appears smaller today   21/23  Smaller 20  2/8/23  Slightly larger 24  2/15/23  Smaller 20  2/22/23  Stable, measures slightly larger at 23     Wound/Ulcer Pain Timing/Severity: mild  Quality of pain: aching  Severity:  1 / 10   Modifying Factors: Pain worsens with debridement, dressing changes  Associated Signs/Symptoms: drainage edema, pain    Ulcer Identification:  Ulcer Type: non-healing surgical  Contributing Factors: edema, immunosuppression, anticoagulation therapy, and malnutrition    Diabetic/Pressure/Non Pressure Ulcers only:  Ulcer: Non-Pressure ulcer, muscle necrosis  Wound: Wound dehiscence        PAST MEDICAL HISTORY      Diagnosis Date    Anemia     Arthritis     Bowel obstruction ([facility] 04/28/2016    Cancer (HCC)     oral/mandible    CHF (congestive heart failure) (HCC)     COPD (chronic obstructive pulmonary disease) (HCC)     Hyperlipidemia     Hypertension     LBBB (left bundle branch block)     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 ([facility] 10/26/2022    Nonischemic cardiomyopathy (HCC)     Paroxysmal A-fib (HCC)      Past Surgical History:   Procedure Laterality Date    ABDOMEN SURGERY  10/05/2016    exporation of abdominal fascial wound dehiscence close, insertion TLC right IJ    CARDIAC DEFIBRILLATOR PLACEMENT Left 2007    CARDIAC DEFIBRILLATOR PLACEMENT  2018    BIV ICD GENT CHANGE (BSCI)    BONDS    CARPAL TUNNEL RELEASE      DIAGNOSTIC CARDIAC CATH LAB PROCEDURE      ENDOSCOPY, COLON, DIAGNOSTIC  2018    upper endo    HERNIA REPAIR      HYSTERECTOMY (CERVIX STATUS UNKNOWN)      ILEOSTOMY OR JEJUNOSTOMY      INCISIONAL HERNIA REPAIR  2017    LAPAROSCOPIC; WITH MESH    MANDIBLE SURGERY      OTHER SURGICAL HISTORY      ostomy placed having reversal done on 2016    OTHER SURGICAL HISTORY  2016    open reveral ileostomy    OTHER SURGICAL HISTORY  2009    BiV/ICD    OTHER SURGICAL HISTORY Left     vascularized fibular graft harvest [facility]      Family History   Problem Relation Age of Onset    Alzheimer's Disease Mother     Heart Disease Father     Cancer Father     Alzheimer's Disease Father     Cancer Brother      Social History     Tobacco Use    Smoking status: Former     Types: Cigarettes     Quit date: 2006     Years since quittin.2    Smokeless tobacco: Never   Vaping Use    Vaping Use: Never used   Substance Use Topics    Alcohol use: Not Currently     Comment: occasional    Drug use: No     No Known Allergies  Current Outpatient Medications on File Prior to Encounter   Medication Sig Dispense Refill    carvedilol (COREG) 3.125 MG tablet Take 1 tablet by mouth 2 times daily (with meals) 60 tablet 3    escitalopram (LEXAPRO) 10 MG tablet Take 1 tablet by mouth daily      famotidine (PEPCID) 20 MG tablet Take 1 tablet by mouth 2 times daily      traMADol (ULTRAM) 50 MG tablet Take 1 tablet by mouth every 8 hours as needed.       oxyCODONE (ROXICODONE) 5 MG immediate release tablet Take 1 tablet by mouth every 8 hours as needed. gabapentin (NEURONTIN) 100 MG capsule Take 100 mg by mouth every 8 (eight) hours. magnesium oxide (MAG-OX) 400 MG tablet Take 400 mg by mouth daily      apixaban (ELIQUIS) 5 MG TABS tablet TAKE 1 TABLET TWICE DAILY 180 tablet 3    fluticasone (FLONASE) 50 MCG/ACT nasal spray 1 spray by Each Nostril route as needed for Rhinitis      SPIRIVA HANDIHALER 18 MCG inhalation capsule Inhale 18 mcg into the lungs daily        No current facility-administered medications on file prior to encounter. 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 ([address] - Primary (Chronic)       Pre Debridement Measurements:  Are located in the [place]  Documentation Flow Sheet  Post Debridement Measurements:  Wound/Ulcer Descriptions are Pre Debridement except measurements:    Incision 10/05/16 Abdomen Mid (Active)   Number of days: 2418       Incision 04/21/17 Abdomen Right;Left;Mid (Active)   Number of days: 2137       Wound 10/26/22 Leg Left;Lateral #1 (Active)   Wound Image   02/08/23 1419   Wound Etiology Non-Healing Surgical 02/01/23 1554   Dressing Status New dressing applied;Clean;Dry; Intact 02/22/23 1543   Wound Cleansed Cleansed with saline 02/22/23 1543   Dressing/Treatment ABD 02/22/23 1543   Offloading for Diabetic Foot Ulcers Offloading not required 02/15/23 1645   Wound Length (cm) 7.8 cm 02/22/23 1447   Wound Width (cm) 3 cm 02/22/23 1447   Wound Depth (cm) 0.1 cm 02/22/23 1447   Wound Surface Area (cm^2) 23.4 cm^2 02/22/23 1447   Change in Wound Size % (l*w) 81.01 02/22/23 1447   Wound Volume (cm^3) 2.34 cm^3 02/22/23 1447   Wound Healing % 96 02/22/23 1447   Post-Procedure Length (cm) 7.9 cm 02/22/23 1516   Post-Procedure Width (cm) 3.1 cm 02/22/23 1516   Post-Procedure Depth (cm) 0.1 cm 02/22/23 1516   Post-Procedure Surface Area (cm^2) 24.49 cm^2 02/22/23 1516   Post-Procedure Volume (cm^3) 2.449 cm^3 02/22/23 1516   Distance Tunneling (cm) 0 cm 02/15/23 1453   Tunneling Position ___ O'Clock 0 02/15/23 1453   Wound Assessment Bleeding;Fibrin;Pink/red 02/22/23 1447   Drainage Amount Moderate 02/22/23 1447   Drainage Description Serosanguinous 02/22/23 1447   Odor None 02/22/23 1447   Cherise-wound Assessment Dry/flaky; Intact 02/22/23 1447   Wound Thickness Description not for Pressure Injury Full thickness 02/08/23 1419   Number of days: 123          Procedure Note  Indications:  Based on my examination of this patient's wound(s)/ulcer(s) today, debridement is required to promote healing and evaluate the wound base. Performed by: Jessa Franklin MD    Consent obtained:  Yes    Time out taken:  Yes    Pain Control: Anesthetic  Anesthetic: 4% Lidocaine Liquid Topical     Debridement:Excisional Debridement    Using curette the wound(s)/ulcer(s) was/were sharply debrided down through and including the removal of epidermis, dermis, subcutaenous    Devitalized Tissue Debrided:  fibrin, biofilm, slough, and exudate to stimulate bleeding to promote healing, post debridement good bleeding base and wound edges noted    Wound/Ulcer #: 1    Percent of Wound/Ulcer Debrided: 90%    Total Surface Area Debrided:  20 sq cm     Estimated Blood Loss:  Minimal  Hemostasis Achieved:  by pressure    Procedural Pain:  3  / 10   Post Procedural Pain:  2 / 10     Response to treatment:  Well tolerated by patient.      Plan:   Treatment Note please see attached Discharge Instructions    Written patient dismissal instructions given to patient and signed by patient or POA. Discharge Instructions         Discharge condition: Stable     Assessment of pain at discharge:minimal     Anesthetic used: 4% lidocaine solution     Discharge to: Home     Left via:Private automobile     Accompanied by: family      ECF/HHA: [address] *Pad foot and reddened areas with abd pads      Dressing Orders:LEFT LATERAL LOWER LEG- cleanse with normal saline, apply drawtex , ABD pads , unna boot and coban . Change Monday- Wednesday (at [address])- Friday. Treatment Orders:Eat a diet high in protein and vitamin C. Take a multiple vitamin daily unless contraindicated. Elevate leg above the level of the heart as much as possible throughout the day. [address] [place] [address],[address] followup visit:1 week_____________________________  (Please note your next appointment above and if you are unable to keep, kindly give a 24 hour notice. Thank you.)     Physician signature:__________________________      If you experience any of the following, please call the [address] during business hours:     * Increase in Pain  * Temperature over 101  * Increase in drainage from your wound  * Drainage with a foul odor  * Bleeding  * Increase in swelling  * Need for compression bandage changes due to slippage, breakthrough drainage.       Electronically signed by Leonardo Krishnamurthy MD

## 2023-03-01 ENCOUNTER — HOSPITAL ENCOUNTER (OUTPATIENT)
Dept: WOUND CARE | Age: 78
Discharge: HOME | End: 2023-03-01
Payer: MEDICARE

## 2023-03-01 VITALS
RESPIRATION RATE: 16 BRPM | SYSTOLIC BLOOD PRESSURE: 129 MMHG | BODY MASS INDEX: 25.06 KG/M2 | DIASTOLIC BLOOD PRESSURE: 69 MMHG | HEART RATE: 84 BPM | WEIGHT: 137 LBS | TEMPERATURE: 97.2 F

## 2023-03-01 DIAGNOSIS — L97.923 NON-PRESSURE CHRONIC ULCER OF LEFT LOWER LEG WITH NECROSIS OF MUSCLE (HCC): Primary | ICD-10-CM

## 2023-03-01 PROCEDURE — 11042 DBRDMT SUBQ TIS 1ST 20SQCM/<: CPT

## 2023-03-01 RX ORDER — GENTAMICIN SULFATE 1 MG/G
OINTMENT TOPICAL ONCE
OUTPATIENT
Start: 2023-03-01 | End: 2023-03-01

## 2023-03-01 RX ORDER — CLOBETASOL PROPIONATE 0.5 MG/G
OINTMENT TOPICAL ONCE
OUTPATIENT
Start: 2023-03-01 | End: 2023-03-01

## 2023-03-01 RX ORDER — LIDOCAINE HYDROCHLORIDE 20 MG/ML
JELLY TOPICAL ONCE
OUTPATIENT
Start: 2023-03-01 | End: 2023-03-01

## 2023-03-01 RX ORDER — LIDOCAINE 50 MG/G
OINTMENT TOPICAL ONCE
OUTPATIENT
Start: 2023-03-01 | End: 2023-03-01

## 2023-03-01 RX ORDER — LIDOCAINE HYDROCHLORIDE 40 MG/ML
SOLUTION TOPICAL ONCE
OUTPATIENT
Start: 2023-03-01 | End: 2023-03-01

## 2023-03-01 RX ORDER — BACITRACIN, NEOMYCIN, POLYMYXIN B 400; 3.5; 5 [USP'U]/G; MG/G; [USP'U]/G
OINTMENT TOPICAL ONCE
OUTPATIENT
Start: 2023-03-01 | End: 2023-03-01

## 2023-03-01 RX ORDER — BETAMETHASONE DIPROPIONATE 0.05 %
OINTMENT (GRAM) TOPICAL ONCE
OUTPATIENT
Start: 2023-03-01 | End: 2023-03-01

## 2023-03-01 RX ORDER — LIDOCAINE HYDROCHLORIDE 40 MG/ML
SOLUTION TOPICAL ONCE
Status: COMPLETED | OUTPATIENT
Start: 2023-03-01 | End: 2023-03-01

## 2023-03-01 RX ORDER — GINSENG 100 MG
CAPSULE ORAL ONCE
OUTPATIENT
Start: 2023-03-01 | End: 2023-03-01

## 2023-03-01 RX ORDER — LIDOCAINE 40 MG/G
CREAM TOPICAL ONCE
OUTPATIENT
Start: 2023-03-01 | End: 2023-03-01

## 2023-03-01 RX ORDER — BACITRACIN ZINC AND POLYMYXIN B SULFATE 500; 1000 [USP'U]/G; [USP'U]/G
OINTMENT TOPICAL ONCE
OUTPATIENT
Start: 2023-03-01 | End: 2023-03-01

## 2023-03-01 RX ADMIN — LIDOCAINE HYDROCHLORIDE 10 ML: 40 SOLUTION TOPICAL at 14:12

## 2023-03-01 NOTE — DISCHARGE INSTRUCTIONS
Discharge condition: Stable     Assessment of pain at discharge:minimal     Anesthetic used: 4% lidocaine solution     Discharge to: Home     Left via:Private automobile     Accompanied by: family      ECF/HHA: [address] *Pad foot and reddened areas with abd pads      Dressing Orders:LEFT LATERAL LOWER LEG- cleanse with normal saline, apply drawtex , ABD pads , unna boot and coban . Change Monday- Wednesday (at [address] [place] [address] Avenue,[address])- Friday. Treatment Orders:Eat a diet high in protein and vitamin C. Take a multiple vitamin daily unless contraindicated. Elevate leg above the level of the heart as much as possible throughout the day. [address] [place] [address],[address] followup visit:1 week_____________________________  (Please note your next appointment above and if you are unable to keep, kindly give a 24 hour notice. Thank you.)     Physician signature:__________________________      If you experience any of the following, please call the [address] [place] [address] Road during business hours:     * Increase in Pain  * Temperature over 101  * Increase in drainage from your wound  * Drainage with a foul odor  * Bleeding  * Increase in swelling  * Need for compression bandage changes due to slippage, breakthrough drainage.

## 2023-03-01 NOTE — PROGRESS NOTES
Wound Healing Center Followup Visit Note    Referring Physician : Ranjeet Arcos MD  [address] [facility] RECORD NUMBER:  [id number]  AGE: 77 y.o. GENDER: female  : 1945  EPISODE DATE:  3/1/2023    Subjective:     Chief Complaint   Patient presents with    Wound Check     Left leg      HISTORY of PRESENT ILLNESS HPI   Hailey Allen is a 77 y.o. female who presents today in regards to follow up evaluation and treatment of wound/ulcer. That patient's past medical, family and social hx were reviewed and changes were made if present. History of Wound Context:  The patient has had a wound of left calf which was first noted approximately 2022. This has been treated local wound care. On their initial visit to the wound healing center, 22,  the patient has noted that the wound has been improving after seeing Dr Nik Senior last week. The patient has not had similar previous wounds in the past.      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 She underwent right segmental mandibulectomy, bilateral selective neck dissection of levels 1 through 4, open tracheostomy on  with L fibula resection and implant of fibular free flap in her jaw. Per pt report wound in left leg was never closed. She receive preop chemotherapy, immunotherapy. She is no longer receiving chemotherapy. Her lymph node resection was negative. Her left calf postop wound from where she had bone/free flap taken has been non healing and they have had issues with it since immediately postop per pt and family. They said [facility] surgeon is aware, recommended local wound care and was going to see them in 2023. Her daughter had asked to follow with me going forward. She has peg in place and receives nutrition via. She follows with Dr. Hager July in regards to CKD. Pt is on abx at time of initial visit. She was started on levaquin per [facility] who she had culture sent to. 11/2/22  She does have an area of muscle that doesn't appear to be viable  She may need surgical debridement in the future  Coban 2, aquac[employer] ag  She is already on levaquin from recent culture  No evidence of arterial occlusive disease  Labs ordered  Ok to cancel abis and pvrs  11/9/22  Wound slightly smaller  Cobangie ramon 2  Still may need surgical debridement in future  11/16/22  Wound smaller 109 (133)    11/23/22  Wound smaller 66  11/30/22  Wound smaller 63   Wound appearance tremendously improved  Area of depth dramatically improved  Muscle coverage improved  12/7/22  Wound 58  Continued improvement, and more muscle coverage  12/14/22  Wound 55  Improved  12/21/22  Wound 50  Muscle almost completely covered  12/28/22  Wound size stable 50, appearance improved  Change to drawtek  1/4/23  Wound size improved 44  1/11/23  Wound size 40 improved  Itching better with unna boot  1/18/23  Size decreased to 31  1/25/23  Size 31 but appearance better, appears smaller today   21/23  Smaller 20  2/8/23  Slightly larger 24  2/15/23  Smaller 20  2/22/23  Stable, measures slightly larger at 23   3/1/23  Stable, still measures 23    Wound/Ulcer Pain Timing/Severity: mild  Quality of pain: aching  Severity:  1 / 10   Modifying Factors: Pain worsens with debridement, dressing changes  Associated Signs/Symptoms: drainage edema, pain    Ulcer Identification:  Ulcer Type: non-healing surgical  Contributing Factors: edema, immunosuppression, anticoagulation therapy, and malnutrition    Diabetic/Pressure/Non Pressure Ulcers only:  Ulcer: Non-Pressure ulcer, muscle necrosis  Wound: Wound dehiscence        PAST MEDICAL HISTORY      Diagnosis Date    Anemia     Arthritis     Bowel obstruction ([facility] [address] 04/28/2016    Cancer (HCC)     oral/mandible    CHF (congestive heart failure) (HCC)     COPD (chronic obstructive pulmonary disease) (HCC)     Hyperlipidemia     Hypertension     LBBB (left bundle branch block)     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 ([facility] [address] 10/26/2022    Nonischemic cardiomyopathy (HCC)     Paroxysmal A-fib (HCC)      Past Surgical History:   Procedure Laterality Date    ABDOMEN SURGERY  10/05/2016    exporation of abdominal fascial wound dehiscence close, insertion TLC right IJ    CARDIAC DEFIBRILLATOR PLACEMENT Left 2007    CARDIAC DEFIBRILLATOR PLACEMENT  2018    BIV ICD GENT CHANGE (BSCI)    BONDS    CARPAL TUNNEL RELEASE      DIAGNOSTIC CARDIAC CATH LAB PROCEDURE      ENDOSCOPY, COLON, DIAGNOSTIC  2018    upper endo    HERNIA REPAIR      HYSTERECTOMY (CERVIX STATUS UNKNOWN)      ILEOSTOMY OR JEJUNOSTOMY      INCISIONAL HERNIA REPAIR  2017    LAPAROSCOPIC; WITH MESH    MANDIBLE SURGERY      OTHER SURGICAL HISTORY      ostomy placed having reversal done on 2016    OTHER SURGICAL HISTORY  2016    open reveral ileostomy    OTHER SURGICAL HISTORY  2009    BiV/ICD    OTHER SURGICAL HISTORY Left     vascularized fibular graft harvest [facility]      Family History   Problem Relation Age of Onset    Alzheimer's Disease Mother     Heart Disease Father     Cancer Father     Alzheimer's Disease Father     Cancer Brother      Social History     Tobacco Use    Smoking status: Former     Types: Cigarettes     Quit date: 2006     Years since quittin.2    Smokeless tobacco: Never   Vaping Use    Vaping Use: Never used   Substance Use Topics    Alcohol use: Not Currently     Comment: occasional    Drug use: No     No Known Allergies  Current Outpatient Medications on File Prior to Encounter   Medication Sig Dispense Refill    carvedilol (COREG) 3.125 MG tablet Take 1 tablet by mouth 2 times daily (with meals) 60 tablet 3    escitalopram (LEXAPRO) 10 MG tablet Take 1 tablet by mouth daily      famotidine (PEPCID) 20 MG tablet Take 1 tablet by mouth 2 times daily      traMADol (ULTRAM) 50 MG tablet Take 1 tablet by mouth every 8 hours as needed. oxyCODONE (ROXICODONE) 5 MG immediate release tablet Take 1 tablet by mouth every 8 hours as needed. gabapentin (NEURONTIN) 100 MG capsule Take 100 mg by mouth every 8 (eight) hours. magnesium oxide (MAG-OX) 400 MG tablet Take 400 mg by mouth daily      apixaban (ELIQUIS) 5 MG TABS tablet TAKE 1 TABLET TWICE DAILY 180 tablet 3    fluticasone (FLONASE) 50 MCG/ACT nasal spray 1 spray by Each Nostril route as needed for Rhinitis      SPIRIVA HANDIHALER 18 MCG inhalation capsule Inhale 18 mcg into the lungs daily        No current facility-administered medications on file prior to encounter. 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 ([facility] [address] - Primary    Relevant Orders    Initiate Outpatient Wound Care Protocol       Pre Debridement Measurements:  Are located in the [place]  Documentation Flow Sheet  Post Debridement Measurements:  Wound/Ulcer Descriptions are Pre Debridement except measurements:    Incision 10/05/16 Abdomen Mid (Active)   Number of days: 7181       Incision 04/21/17 Abdomen Right;Left;Mid (Active)   Number of days: 2140       Wound 10/26/22 Leg Left;Lateral #1 (Active)   Wound Image   02/08/23 1419   Wound Etiology Non-Healing Surgical 02/01/23 1554   Dressing Status New dressing applied;Clean;Dry; Intact 02/22/23 4493   Wound Cleansed Cleansed with saline 02/22/23 7220 Dressing/Treatment ABD 02/22/23 1543   Offloading for Diabetic Foot Ulcers Offloading not required 02/15/23 1645   Wound Length (cm) 8.4 cm 03/01/23 1408   Wound Width (cm) 2.7 cm 03/01/23 1408   Wound Depth (cm) 0.1 cm 03/01/23 1408   Wound Surface Area (cm^2) 22.68 cm^2 03/01/23 1408   Change in Wound Size % (l*w) 81.59 03/01/23 1408   Wound Volume (cm^3) 2.268 cm^3 03/01/23 1408   Wound Healing % 96 03/01/23 1408   Post-Procedure Length (cm) 8.5 cm 03/01/23 1431   Post-Procedure Width (cm) 2.8 cm 03/01/23 1431   Post-Procedure Depth (cm) 0.1 cm 03/01/23 1431   Post-Procedure Surface Area (cm^2) 23.8 cm^2 03/01/23 1431   Post-Procedure Volume (cm^3) 2.38 cm^3 03/01/23 1431   Distance Tunneling (cm) 0 cm 02/15/23 1453   Tunneling Position ___ O'Clock 0 02/15/23 1453   Wound Assessment Fibrin;Pale granulation tissue 03/01/23 1408   Drainage Amount Moderate 03/01/23 1408   Drainage Description Serosanguinous; Yellow 03/01/23 1408   Odor None 03/01/23 1408   Cherise-wound Assessment Blanchable erythema 03/01/23 1408   Wound Thickness Description not for Pressure Injury Full thickness 02/08/23 1419   Number of days: 126          Procedure Note  Indications:  Based on my examination of this patient's wound(s)/ulcer(s) today, debridement is required to promote healing and evaluate the wound base.     Performed by: AILEEN Linda CNP    Consent obtained:  Yes    Time out taken:  Yes    Pain Control: Anesthetic  Anesthetic: 4% Lidocaine Liquid Topical     Debridement:Excisional Debridement    Using curette the wound(s)/ulcer(s) was/were sharply debrided down through and including the removal of epidermis, dermis, subcutaenous    Devitalized Tissue Debrided:  fibrin, biofilm, slough, and exudate to stimulate bleeding to promote healing, post debridement good bleeding base and wound edges noted    Wound/Ulcer #: 1    Percent of Wound/Ulcer Debrided: 90%    Total Surface Area Debrided:  20 sq cm     Estimated Blood Loss: Minimal  Hemostasis Achieved:  by pressure    Procedural Pain:  3  / 10   Post Procedural Pain:  2 / 10     Response to treatment:  Well tolerated by patient. Plan:   Treatment Note please see attached Discharge Instructions    Written patient dismissal instructions given to patient and signed by patient or POA. Discharge Instructions         Discharge condition: Stable     Assessment of pain at discharge:minimal     Anesthetic used: 4% lidocaine solution     Discharge to: Home     Left via:Private automobile     Accompanied by: family      ECF/HHA: [address] *Pad foot and reddened areas with abd pads      Dressing Orders:LEFT LATERAL LOWER LEG- cleanse with normal saline, apply drawtex , ABD pads , unna boot and coban . Change Monday- Wednesday (at [facility])- Friday. Treatment Orders:Eat a diet high in protein and vitamin C. Take a multiple vitamin daily unless contraindicated. Elevate leg above the level of the heart as much as possible throughout the day. [facility] followup visit:1 week_____________________________  (Please note your next appointment above and if you are unable to keep, kindly give a 24 hour notice. Thank you.)     Physician signature:__________________________      If you experience any of the following, please call the [address] [place] [address] Road during business hours:     * Increase in Pain  * Temperature over 101  * Increase in drainage from your wound  * Drainage with a foul odor  * Bleeding  * Increase in swelling  * Need for compression bandage changes due to slippage, breakthrough drainage.       Electronically signed by AILEEN Linares - CNP

## 2023-03-07 NOTE — DISCHARGE INSTRUCTIONS
Discharge condition: Stable     Assessment of pain at discharge:minimal     Anesthetic used: 4% lidocaine solution     Discharge to: Home     Left via:Private automobile     Accompanied by: family      ECF/HHA: [address]- cleanse with normal saline, apply drawtex , ABD pads , unna boot and coban . Change Monday- Wednesday (at [facility])- Friday. Treatment Orders:Eat a diet high in protein and vitamin C. Take a multiple vitamin daily unless contraindicated. Elevate leg above the level of the heart as much as possible throughout the day. [facility] followup visit:1 week_____________________________  (Please note your next appointment above and if you are unable to keep, kindly give a 24 hour notice. Thank you.)     Physician signature:__________________________      If you experience any of the following, please call the [address] [place] [address] Road during business hours:     * Increase in Pain  * Temperature over 101  * Increase in drainage from your wound  * Drainage with a foul odor  * Bleeding  * Increase in swelling  * Need for compression bandage changes due to slippage, breakthrough drainage.

## 2023-03-08 ENCOUNTER — HOSPITAL ENCOUNTER (OUTPATIENT)
Dept: WOUND CARE | Age: 78
Discharge: HOME | End: 2023-03-08
Payer: MEDICARE

## 2023-03-08 VITALS
HEART RATE: 81 BPM | DIASTOLIC BLOOD PRESSURE: 42 MMHG | SYSTOLIC BLOOD PRESSURE: 107 MMHG | TEMPERATURE: 97.3 F | RESPIRATION RATE: 16 BRPM | BODY MASS INDEX: 25.06 KG/M2 | WEIGHT: 137 LBS

## 2023-03-08 DIAGNOSIS — L97.923 NON-PRESSURE CHRONIC ULCER OF LEFT LOWER LEG WITH NECROSIS OF MUSCLE (HCC): Primary | ICD-10-CM

## 2023-03-08 PROCEDURE — 11042 DBRDMT SUBQ TIS 1ST 20SQCM/<: CPT

## 2023-03-08 RX ORDER — LIDOCAINE HYDROCHLORIDE 20 MG/ML
JELLY TOPICAL ONCE
OUTPATIENT
Start: 2023-03-08 | End: 2023-03-08

## 2023-03-08 RX ORDER — BACITRACIN, NEOMYCIN, POLYMYXIN B 400; 3.5; 5 [USP'U]/G; MG/G; [USP'U]/G
OINTMENT TOPICAL ONCE
OUTPATIENT
Start: 2023-03-08 | End: 2023-03-08

## 2023-03-08 RX ORDER — BACITRACIN ZINC AND POLYMYXIN B SULFATE 500; 1000 [USP'U]/G; [USP'U]/G
OINTMENT TOPICAL ONCE
OUTPATIENT
Start: 2023-03-08 | End: 2023-03-08

## 2023-03-08 RX ORDER — CLOBETASOL PROPIONATE 0.5 MG/G
OINTMENT TOPICAL ONCE
OUTPATIENT
Start: 2023-03-08 | End: 2023-03-08

## 2023-03-08 RX ORDER — BETAMETHASONE DIPROPIONATE 0.05 %
OINTMENT (GRAM) TOPICAL ONCE
OUTPATIENT
Start: 2023-03-08 | End: 2023-03-08

## 2023-03-08 RX ORDER — GINSENG 100 MG
CAPSULE ORAL ONCE
OUTPATIENT
Start: 2023-03-08 | End: 2023-03-08

## 2023-03-08 RX ORDER — GENTAMICIN SULFATE 1 MG/G
OINTMENT TOPICAL ONCE
OUTPATIENT
Start: 2023-03-08 | End: 2023-03-08

## 2023-03-08 RX ORDER — LIDOCAINE HYDROCHLORIDE 40 MG/ML
SOLUTION TOPICAL ONCE
OUTPATIENT
Start: 2023-03-08 | End: 2023-03-08

## 2023-03-08 RX ORDER — LIDOCAINE 50 MG/G
OINTMENT TOPICAL ONCE
OUTPATIENT
Start: 2023-03-08 | End: 2023-03-08

## 2023-03-08 RX ORDER — LIDOCAINE 40 MG/G
CREAM TOPICAL ONCE
OUTPATIENT
Start: 2023-03-08 | End: 2023-03-08

## 2023-03-08 RX ORDER — LIDOCAINE HYDROCHLORIDE 40 MG/ML
SOLUTION TOPICAL ONCE
Status: COMPLETED | OUTPATIENT
Start: 2023-03-08 | End: 2023-03-08

## 2023-03-08 RX ADMIN — LIDOCAINE HYDROCHLORIDE 10 ML: 40 SOLUTION TOPICAL at 14:32

## 2023-03-08 NOTE — PLAN OF CARE
Problem: Cognitive:  Goal: Knowledge of wound care  Description: Knowledge of wound care  Outcome: Progressing  Goal: Understands risk factors for wounds  Description: Understands risk factors for wounds  Outcome: Progressing     Problem: Wound:  Goal: Will show signs of wound healing; wound closure and no evidence of infection  Description: Will show signs of wound healing; wound closure and no evidence of infection  Outcome: Progressing     Problem: Compression therapy:  Goal: Will be free from complications associated with compression therapy  Description: Will be free from complications associated with compression therapy  Outcome: Adequate for Discharge

## 2023-03-08 NOTE — PROGRESS NOTES
Wound Healing Center Followup Visit Note    Referring Physician : Rickie Guzman MD  [address] [facility] RECORD NUMBER:  [id number]  AGE: 77 y.o. GENDER: female  : 1945  EPISODE DATE:  3/8/2023    Subjective:     Chief Complaint   Patient presents with    Wound Check     Left leg      HISTORY of PRESENT ILLNESS HPI   Montana Vasquez is a 77 y.o. female who presents today in regards to follow up evaluation and treatment of wound/ulcer. That patient's past medical, family and social hx were reviewed and changes were made if present. History of Wound Context:  The patient has had a wound of left calf which was first noted approximately 2022. This has been treated local wound care. On their initial visit to the wound healing center, 22,  the patient has noted that the wound has been improving after seeing Dr Antonette Lockwood last week. The patient has not had similar previous wounds in the past.      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 She underwent right segmental mandibulectomy, bilateral selective neck dissection of levels 1 through 4, open tracheostomy on  with L fibula resection and implant of fibular free flap in her jaw. Per pt report wound in left leg was never closed. She receive preop chemotherapy, immunotherapy. She is no longer receiving chemotherapy. Her lymph node resection was negative. Her left calf postop wound from where she had bone/free flap taken has been non healing and they have had issues with it since immediately postop per pt and family. They said [facility] surgeon is aware, recommended local wound care and was going to see them in 2023. Her daughter had asked to follow with me going forward. She has peg in place and receives nutrition via. She follows with Dr. Rene Krueger in regards to CKD. Pt is on abx at time of initial visit. She was started on levaquin per [facility] who she had culture sent to. 11/2/22  She does have an area of muscle that doesn't appear to be viable  She may need surgical debridement in the future  Coban 2, aquacell ag  She is already on levaquin from recent culture  No evidence of arterial occlusive disease  Labs ordered  Ok to cancel abis and pvrs  11/9/22  Wound slightly smaller  Cobangie ramon 2  Still may need surgical debridement in future  11/16/22  Wound smaller 109 (133)    11/23/22  Wound smaller 66  11/30/22  Wound smaller 63   Wound appearance tremendously improved  Area of depth dramatically improved  Muscle coverage improved  12/7/22  Wound 58  Continued improvement, and more muscle coverage  12/14/22  Wound 55  Improved  12/21/22  Wound 50  Muscle almost completely covered  12/28/22  Wound size stable 50, appearance improved  Change to drawtek  1/4/23  Wound size improved 44  1/11/23  Wound size 40 improved  Itching better with unna boot  1/18/23  Size decreased to 31  1/25/23  Size 31 but appearance better, appears smaller today   21/23  Smaller 20  2/8/23  Slightly larger 24  2/15/23  Smaller 20  2/22/23  Stable, measures slightly larger at 23   3/1/23  Stable, still measures 23  3/8/23  Smaller 18     Wound/Ulcer Pain Timing/Severity: mild  Quality of pain: aching  Severity:  1 / 10   Modifying Factors: Pain worsens with debridement, dressing changes  Associated Signs/Symptoms: drainage edema, pain    Ulcer Identification:  Ulcer Type: non-healing surgical  Contributing Factors: edema, immunosuppression, anticoagulation therapy, and malnutrition    Diabetic/Pressure/Non Pressure Ulcers only:  Ulcer: Non-Pressure ulcer, muscle necrosis  Wound: Wound dehiscence        PAST MEDICAL HISTORY      Diagnosis Date    Anemia     Arthritis     Bowel obstruction ([facility] [address] 04/28/2016    Cancer (HCC)     oral/mandible    CHF (congestive heart failure) (HCC)     COPD (chronic obstructive pulmonary disease) (HCC)     Hyperlipidemia     Hypertension     LBBB (left bundle branch block)     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 ([address] 10/26/2022    Nonischemic cardiomyopathy (HCC)     Paroxysmal A-fib (HCC)      Past Surgical History:   Procedure Laterality Date    ABDOMEN SURGERY  10/05/2016    exporation of abdominal fascial wound dehiscence close, insertion TLC right IJ    CARDIAC DEFIBRILLATOR PLACEMENT Left 2007    CARDIAC DEFIBRILLATOR PLACEMENT  2018    BIV ICD GENT CHANGE ([facility])    BONDS    CARPAL TUNNEL RELEASE      DIAGNOSTIC CARDIAC CATH LAB PROCEDURE      ENDOSCOPY, COLON, DIAGNOSTIC  2018    upper endo    HERNIA REPAIR      HYSTERECTOMY (CERVIX STATUS UNKNOWN)      ILEOSTOMY OR JEJUNOSTOMY      INCISIONAL HERNIA REPAIR  2017    LAPAROSCOPIC; WITH MESH    MANDIBLE SURGERY      OTHER SURGICAL HISTORY      ostomy placed having reversal done on 2016    OTHER SURGICAL HISTORY  2016    open reveral ileostomy    OTHER SURGICAL HISTORY  2009    BiV/ICD    OTHER SURGICAL HISTORY Left     vascularized fibular graft harvest [facility]      Family History   Problem Relation Age of Onset    Alzheimer's Disease Mother     Heart Disease Father     Cancer Father     Alzheimer's Disease Father     Cancer Brother      Social History     Tobacco Use    Smoking status: Former     Types: Cigarettes     Quit date: 2006     Years since quittin.2    Smokeless tobacco: Never   Vaping Use    Vaping Use: Never used   Substance Use Topics    Alcohol use: Not Currently     Comment: occasional    Drug use: No     No Known Allergies  Current Outpatient Medications on File Prior to Encounter   Medication Sig Dispense Refill    carvedilol (COREG) 3.125 MG tablet Take 1 tablet by mouth 2 times daily (with meals) 60 tablet 3    escitalopram (LEXAPRO) 10 MG tablet Take 1 tablet by mouth daily      famotidine (PEPCID) 20 MG tablet Take 1 tablet by mouth 2 times daily      traMADol (ULTRAM) 50 MG tablet Take 1 tablet by mouth every 8 hours as needed. oxyCODONE (ROXICODONE) 5 MG immediate release tablet Take 1 tablet by mouth every 8 hours as needed. gabapentin (NEURONTIN) 100 MG capsule Take 100 mg by mouth every 8 (eight) hours. magnesium oxide (MAG-OX) 400 MG tablet Take 400 mg by mouth daily      apixaban (ELIQUIS) 5 MG TABS tablet TAKE 1 TABLET TWICE DAILY 180 tablet 3    fluticasone (FLONASE) 50 MCG/ACT nasal spray 1 spray by Each Nostril route as needed for Rhinitis      SPIRIVA HANDIHALER 18 MCG inhalation capsule Inhale 18 mcg into the lungs daily        No current facility-administered medications on file prior to encounter.        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 ([address] - Primary    Relevant Orders    Initiate Outpatient Wound Care Protocol       Pre Debridement Measurements:  Are located in the [facility]  Documentation Flow Sheet  Post Debridement Measurements:  Wound/Ulcer Descriptions are Pre Debridement except measurements:    Incision 10/05/16 Abdomen Mid (Active)   Number of days: 9341       Incision 04/21/17 Abdomen Right;Left;Mid (Active)   Number of days: 2147       Wound 10/26/22 Leg Left;Lateral #1 (Active)   Wound Image   03/08/23 1429   Wound Etiology Non-Healing Surgical 02/01/23 1554   Dressing Status New dressing applied 03/01/23 1447   Wound Cleansed Cleansed with saline 03/01/23 1447   Dressing/Treatment ABD 03/01/23 1447   Offloading for Diabetic Foot Ulcers Offloading not required 03/01/23 1447   Wound Length (cm) 7.8 cm 03/08/23 1429   Wound Width (cm) 2.4 cm 03/08/23 1429   Wound Depth (cm) 0.1 cm 03/08/23 1429   Wound Surface Area (cm^2) 18.72 cm^2 03/08/23 1429   Change in Wound Size % (l*w) 84.81 03/08/23 1429   Wound Volume (cm^3) 1.872 cm^3 03/08/23 1429   Wound Healing % 97 03/08/23 1429   Post-Procedure Length (cm) 7.9 cm 03/08/23 1527   Post-Procedure Width (cm) 2.4 cm 03/08/23 1527   Post-Procedure Depth (cm) 0.1 cm 03/08/23 1527   Post-Procedure Surface Area (cm^2) 18.96 cm^2 03/08/23 1527   Post-Procedure Volume (cm^3) 1.896 cm^3 03/08/23 1527   Distance Tunneling (cm) 0 cm 02/15/23 1453   Tunneling Position ___ O'Clock 0 02/15/23 1453   Wound Assessment Granulation tissue;Fibrin 03/08/23 1429   Drainage Amount Moderate 03/08/23 1429   Drainage Description Green;Serosanguinous 03/08/23 1429   Odor None 03/08/23 1429   Cherise-wound Assessment Blanchable erythema 03/08/23 1429   Wound Thickness Description not for Pressure Injury Full thickness 02/08/23 1419   Number of days: 133          Procedure Note  Indications:  Based on my examination of this patient's wound(s)/ulcer(s) today, debridement is required to promote healing and evaluate the wound base.     Performed by: Vitor Miller MD    Consent obtained:  Yes    Time out taken:  Yes    Pain Control: Anesthetic  Anesthetic: 4% Lidocaine Liquid Topical     Debridement:Excisional Debridement    Using curette the wound(s)/ulcer(s) was/were sharply debrided down through and including the removal of epidermis, dermis, subcutaenous    Devitalized Tissue Debrided:  fibrin, biofilm, slough, and exudate to stimulate bleeding to promote healing, post debridement good bleeding base and wound edges noted    Wound/Ulcer #: 1    Percent of Wound/Ulcer Debrided: 90%    Total Surface Area Debrided:  18 sq cm     Estimated Blood Loss:  Minimal  Hemostasis Achieved:  by pressure    Procedural Pain:  3  / 10   Post Procedural Pain:  2 / 10     Response to treatment:  Well tolerated by patient. Plan:   Treatment Note please see attached Discharge Instructions    Written patient dismissal instructions given to patient and signed by patient or POA. Discharge Instructions         Discharge condition: Stable     Assessment of pain at discharge:minimal     Anesthetic used: 4% lidocaine solution     Discharge to: Home     Left via:Private automobile     Accompanied by: family      ECF/HHA: [address]- cleanse with normal saline, apply drawtex , ABD pads , unna boot and coban . Change Monday- Wednesday (at [facility])- Friday. Treatment Orders:Eat a diet high in protein and vitamin C. Take a multiple vitamin daily unless contraindicated. Elevate leg above the level of the heart as much as possible throughout the day. [facility] followup visit:1 week_____________________________  (Please note your next appointment above and if you are unable to keep, kindly give a 24 hour notice. Thank you.)     Physician signature:__________________________      If you experience any of the following, please call the [address] [place] [address] Road during business hours:     * Increase in Pain  * Temperature over 101  * Increase in drainage from your wound  * Drainage with a foul odor  * Bleeding  * Increase in swelling  * Need for compression bandage changes due to slippage, breakthrough drainage.       Electronically signed by Sherry Dalal MD

## 2023-03-15 ENCOUNTER — HOSPITAL ENCOUNTER (OUTPATIENT)
Dept: WOUND CARE | Age: 78
Discharge: HOME | End: 2023-03-15
Payer: MEDICARE

## 2023-03-15 VITALS
SYSTOLIC BLOOD PRESSURE: 125 MMHG | WEIGHT: 137 LBS | RESPIRATION RATE: 18 BRPM | DIASTOLIC BLOOD PRESSURE: 55 MMHG | BODY MASS INDEX: 25.21 KG/M2 | HEIGHT: 62 IN | TEMPERATURE: 98 F | HEART RATE: 88 BPM

## 2023-03-15 DIAGNOSIS — L97.923 NON-PRESSURE CHRONIC ULCER OF LEFT LOWER LEG WITH NECROSIS OF MUSCLE (HCC): Primary | ICD-10-CM

## 2023-03-15 PROCEDURE — 87205 SMEAR GRAM STAIN: CPT

## 2023-03-15 PROCEDURE — 87077 CULTURE AEROBIC IDENTIFY: CPT

## 2023-03-15 PROCEDURE — 87070 CULTURE OTHR SPECIMN AEROBIC: CPT

## 2023-03-15 PROCEDURE — 87186 SC STD MICRODIL/AGAR DIL: CPT

## 2023-03-15 PROCEDURE — 87075 CULTR BACTERIA EXCEPT BLOOD: CPT

## 2023-03-15 PROCEDURE — 11042 DBRDMT SUBQ TIS 1ST 20SQCM/<: CPT

## 2023-03-15 RX ORDER — CLOBETASOL PROPIONATE 0.5 MG/G
OINTMENT TOPICAL ONCE
OUTPATIENT
Start: 2023-03-15 | End: 2023-03-15

## 2023-03-15 RX ORDER — LIDOCAINE HYDROCHLORIDE 20 MG/ML
JELLY TOPICAL ONCE
OUTPATIENT
Start: 2023-03-15 | End: 2023-03-15

## 2023-03-15 RX ORDER — GINSENG 100 MG
CAPSULE ORAL ONCE
OUTPATIENT
Start: 2023-03-15 | End: 2023-03-15

## 2023-03-15 RX ORDER — GENTAMICIN SULFATE 1 MG/G
OINTMENT TOPICAL ONCE
OUTPATIENT
Start: 2023-03-15 | End: 2023-03-15

## 2023-03-15 RX ORDER — LIDOCAINE HYDROCHLORIDE 40 MG/ML
SOLUTION TOPICAL ONCE
Status: COMPLETED | OUTPATIENT
Start: 2023-03-15 | End: 2023-03-15

## 2023-03-15 RX ORDER — LIDOCAINE HYDROCHLORIDE 40 MG/ML
SOLUTION TOPICAL ONCE
OUTPATIENT
Start: 2023-03-15 | End: 2023-03-15

## 2023-03-15 RX ORDER — BACITRACIN ZINC AND POLYMYXIN B SULFATE 500; 1000 [USP'U]/G; [USP'U]/G
OINTMENT TOPICAL ONCE
OUTPATIENT
Start: 2023-03-15 | End: 2023-03-15

## 2023-03-15 RX ORDER — LIDOCAINE 50 MG/G
OINTMENT TOPICAL ONCE
OUTPATIENT
Start: 2023-03-15 | End: 2023-03-15

## 2023-03-15 RX ORDER — BETAMETHASONE DIPROPIONATE 0.05 %
OINTMENT (GRAM) TOPICAL ONCE
OUTPATIENT
Start: 2023-03-15 | End: 2023-03-15

## 2023-03-15 RX ORDER — LIDOCAINE 40 MG/G
CREAM TOPICAL ONCE
OUTPATIENT
Start: 2023-03-15 | End: 2023-03-15

## 2023-03-15 RX ORDER — BACITRACIN, NEOMYCIN, POLYMYXIN B 400; 3.5; 5 [USP'U]/G; MG/G; [USP'U]/G
OINTMENT TOPICAL ONCE
OUTPATIENT
Start: 2023-03-15 | End: 2023-03-15

## 2023-03-15 RX ADMIN — LIDOCAINE HYDROCHLORIDE 10 ML: 40 SOLUTION TOPICAL at 14:53

## 2023-03-15 ASSESSMENT — PAIN DESCRIPTION - PAIN TYPE: TYPE: CHRONIC PAIN

## 2023-03-15 ASSESSMENT — PAIN - FUNCTIONAL ASSESSMENT: PAIN_FUNCTIONAL_ASSESSMENT: PREVENTS OR INTERFERES SOME ACTIVE ACTIVITIES AND ADLS

## 2023-03-15 ASSESSMENT — PAIN DESCRIPTION - FREQUENCY: FREQUENCY: CONTINUOUS

## 2023-03-15 ASSESSMENT — PAIN DESCRIPTION - ORIENTATION: ORIENTATION: LEFT

## 2023-03-15 ASSESSMENT — PAIN DESCRIPTION - DESCRIPTORS: DESCRIPTORS: ACHING

## 2023-03-15 ASSESSMENT — PAIN DESCRIPTION - ONSET: ONSET: ON-GOING

## 2023-03-15 ASSESSMENT — PAIN SCALES - GENERAL: PAINLEVEL_OUTOF10: 1

## 2023-03-15 ASSESSMENT — PAIN DESCRIPTION - LOCATION: LOCATION: LEG

## 2023-03-15 NOTE — DISCHARGE INSTRUCTIONS
Discharge condition: Stable     Assessment of pain at discharge:minimal     Anesthetic used: 4% lidocaine solution     Discharge to: Home     Left via:Private automobile     Accompanied by: family      ECF/HHA: [address]- cleanse with normal saline, apply drawtex , ABD pads , unna boot and coban . Change Monday- Wednesday (at [facility])- Friday. Treatment Orders:Eat a diet high in protein and vitamin C. Take a multiple vitamin daily unless contraindicated. Elevate leg above the level of the heart as much as possible throughout the day. C&S taken 3/15/23      [facility] followup visit:1 week_____________________________  (Please note your next appointment above and if you are unable to keep, kindly give a 24 hour notice. Thank you.)     Physician signature:__________________________      If you experience any of the following, please call the [address] [place] [address] Road during business hours:     * Increase in Pain  * Temperature over 101  * Increase in drainage from your wound  * Drainage with a foul odor  * Bleeding  * Increase in swelling  * Need for compression bandage changes due to slippage, breakthrough drainage.

## 2023-03-16 NOTE — PROGRESS NOTES
Wound Healing Center Followup Visit Note    Referring Physician : Roxanna Riley MD  [address] [facility] RECORD NUMBER:  [id number]  AGE: 77 y.o. GENDER: female  : 1945  EPISODE DATE:  3/15/2023    Subjective:     Chief Complaint   Patient presents with    Wound Check     Left leg      HISTORY of PRESENT ILLNESS HPI   Octavio Garcia is a 77 y.o. female who presents today in regards to follow up evaluation and treatment of wound/ulcer. That patient's past medical, family and social hx were reviewed and changes were made if present. History of Wound Context:  The patient has had a wound of left calf which was first noted approximately 2022. This has been treated local wound care. On their initial visit to the wound healing center, 22,  the patient has noted that the wound has been improving after seeing Dr Samina Ndiaye last week. The patient has not had similar previous wounds in the past.      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 She underwent right segmental mandibulectomy, bilateral selective neck dissection of levels 1 through 4, open tracheostomy on  with L fibula resection and implant of fibular free flap in her jaw. Per pt report wound in left leg was never closed. She receive preop chemotherapy, immunotherapy. She is no longer receiving chemotherapy. Her lymph node resection was negative. Her left calf postop wound from where she had bone/free flap taken has been non healing and they have had issues with it since immediately postop per pt and family. They said [facility] surgeon is aware, recommended local wound care and was going to see them in 2023. Her daughter had asked to follow with me going forward. She has peg in place and receives nutrition via. She follows with Dr. Fortino Andrea in regards to CKD. Pt is on abx at time of initial visit. She was started on levaquin per [facility] who she had culture sent to. 11/2/22  She does have an area of muscle that doesn't appear to be viable  She may need surgical debridement in the future  Coban 2, aquacell ag  She is already on levaquin from recent culture  No evidence of arterial occlusive disease  Labs ordered  Ok to cancel abis and pvrs  11/9/22  Wound slightly smaller  Cobangie ramon 2  Still may need surgical debridement in future  11/16/22  Wound smaller 109 (133)    11/23/22  Wound smaller 66  11/30/22  Wound smaller 63   Wound appearance tremendously improved  Area of depth dramatically improved  Muscle coverage improved  12/7/22  Wound 58  Continued improvement, and more muscle coverage  12/14/22  Wound 55  Improved  12/21/22  Wound 50  Muscle almost completely covered  12/28/22  Wound size stable 50, appearance improved  Change to drawtek  1/4/23  Wound size improved 44  1/11/23  Wound size 40 improved  Itching better with unna boot  1/18/23  Size decreased to 31  1/25/23  Size 31 but appearance better, appears smaller today   21/23  Smaller 20  2/8/23  Slightly larger 24  2/15/23  Smaller 20  2/22/23  Stable, measures slightly larger at 23   3/1/23  Stable, still measures 23  3/8/23  Smaller 18  3/15/23  Larger  Will check culture, consideration for advanced skin therapy     Wound/Ulcer Pain Timing/Severity: mild  Quality of pain: aching  Severity:  1 / 10   Modifying Factors: Pain worsens with debridement, dressing changes  Associated Signs/Symptoms: drainage edema, pain    Ulcer Identification:  Ulcer Type: non-healing surgical  Contributing Factors: edema, immunosuppression, anticoagulation therapy, and malnutrition    Diabetic/Pressure/Non Pressure Ulcers only:  Ulcer: Non-Pressure ulcer, muscle necrosis  Wound: Wound dehiscence        PAST MEDICAL HISTORY      Diagnosis Date    Anemia     Arthritis     Bowel obstruction ([facility] 04/28/2016    Cancer (HCC)     oral/mandible    CHF (congestive heart failure) (HCC)     COPD (chronic obstructive pulmonary disease) ([facility]     Hyperlipidemia     Hypertension     LBBB (left bundle branch block)     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 ([facility] 10/26/2022    Nonischemic cardiomyopathy (HCC)     Paroxysmal A-fib (HCC)      Past Surgical History:   Procedure Laterality Date    ABDOMEN SURGERY  10/05/2016    exporation of abdominal fascial wound dehiscence close, insertion TLC right IJ    CARDIAC DEFIBRILLATOR PLACEMENT Left 2007    CARDIAC DEFIBRILLATOR PLACEMENT  2018    BIV ICD GENT CHANGE (BSCI)    VAMSHI    CARPAL TUNNEL RELEASE      DIAGNOSTIC CARDIAC CATH LAB PROCEDURE      ENDOSCOPY, COLON, DIAGNOSTIC  2018    upper endo    HERNIA REPAIR      HYSTERECTOMY (CERVIX STATUS UNKNOWN)      ILEOSTOMY OR JEJUNOSTOMY      INCISIONAL HERNIA REPAIR  2017    LAPAROSCOPIC; WITH MESH    MANDIBLE SURGERY      OTHER SURGICAL HISTORY      ostomy placed having reversal done on 2016    OTHER SURGICAL HISTORY  2016    open reveral ileostomy    OTHER SURGICAL HISTORY  2009    BiV/ICD    OTHER SURGICAL HISTORY Left     vascularized fibular graft harvest [facility]      Family History   Problem Relation Age of Onset    Alzheimer's Disease Mother     Heart Disease Father     Cancer Father     Alzheimer's Disease Father     Cancer Brother      Social History     Tobacco Use    Smoking status: Former     Types: Cigarettes     Quit date: 2006     Years since quittin.2    Smokeless tobacco: Never   Vaping Use    Vaping Use: Never used   Substance Use Topics    Alcohol use: Not Currently     Comment: occasional    Drug use: No     No Known Allergies  Current Outpatient Medications on File Prior to Encounter   Medication Sig Dispense Refill    carvedilol (COREG) 3.125 MG tablet Take 1 tablet by mouth 2 times daily (with meals) 60 tablet 3    escitalopram (LEXAPRO) 10 MG tablet Take 1 tablet by mouth daily      famotidine (PEPCID) 20 MG tablet Take 1 tablet by mouth 2 times daily      traMADol (ULTRAM) 50 MG tablet Take 1 tablet by mouth every 8 hours as needed. oxyCODONE (ROXICODONE) 5 MG immediate release tablet Take 1 tablet by mouth every 8 hours as needed. gabapentin (NEURONTIN) 100 MG capsule Take 100 mg by mouth every 8 (eight) hours. magnesium oxide (MAG-OX) 400 MG tablet Take 400 mg by mouth daily      apixaban (ELIQUIS) 5 MG TABS tablet TAKE 1 TABLET TWICE DAILY 180 tablet 3    fluticasone (FLONASE) 50 MCG/ACT nasal spray 1 spray by Each Nostril route as needed for Rhinitis      SPIRIVA HANDIHALER 18 MCG inhalation capsule Inhale 18 mcg into the lungs daily        No current facility-administered medications on file prior to encounter.        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 ([address] - Primary    Relevant Orders    Initiate Outpatient Wound Care Protocol       Pre Debridement Measurements:  Are located in the [place]  Documentation Flow Sheet  Post Debridement Measurements:  Wound/Ulcer Descriptions are Pre Debridement except measurements:    Incision 10/05/16 Abdomen Mid (Active)   Number of days: 4821       Incision 04/21/17 Abdomen Right;Left;Mid (Active)   Number of days: 2154       Wound 10/26/22 Leg Left;Lateral #1 (Active)   Wound Image   03/08/23 1429   Wound Etiology Non-Healing Surgical 03/08/23 1622   Dressing Status New dressing applied 03/15/23 1600   Wound Cleansed Cleansed with saline 03/15/23 1600   Dressing/Treatment ABD 03/15/23 1600   Offloading for Diabetic Foot Ulcers Offloading not required 03/08/23 1622   Wound Length (cm) 9 cm 03/15/23 1452   Wound Width (cm) 2.4 cm 03/15/23 1452   Wound Depth (cm) 0.1 cm 03/15/23 1452   Wound Surface Area (cm^2) 21.6 cm^2 03/15/23 1452   Change in Wound Size % (l*w) 82.47 03/15/23 1452   Wound Volume (cm^3) 2.16 cm^3 03/15/23 1452   Wound Healing % 96 03/15/23 1452   Post-Procedure Length (cm) 9.1 cm 03/15/23 1536   Post-Procedure Width (cm) 2.4 cm 03/15/23 1536   Post-Procedure Depth (cm) 0.1 cm 03/15/23 1536   Post-Procedure Surface Area (cm^2) 21.84 cm^2 03/15/23 1536   Post-Procedure Volume (cm^3) 2.184 cm^3 03/15/23 1536   Distance Tunneling (cm) 0 cm 02/15/23 1453   Tunneling Position ___ O'Clock 0 02/15/23 1453   Wound Assessment Fibrin;Pink/red 03/15/23 1452   Drainage Amount Moderate 03/15/23 1452   Drainage Description Serosanguinous 03/15/23 1452   Odor None 03/15/23 1452   Cherise-wound Assessment Intact 03/15/23 1452   Wound Thickness Description not for Pressure Injury Full thickness 02/08/23 1419   Number of days: 140          Procedure Note  Indications:  Based on my examination of this patient's wound(s)/ulcer(s) today, debridement is required to promote healing and evaluate the wound base.     Performed by: Cassandra Mccormack MD    Consent obtained:  Yes    Time out taken:  Yes    Pain Control: Anesthetic  Anesthetic: 4% Lidocaine Liquid Topical     Debridement:Excisional Debridement    Using curette the wound(s)/ulcer(s) was/were sharply debrided down through and including the removal of epidermis, dermis, subcutaenous    Devitalized Tissue Debrided:  fibrin, biofilm, slough, and exudate to stimulate bleeding to promote healing, post debridement good bleeding base and wound edges noted    Wound/Ulcer #: 1    Percent of Wound/Ulcer Debrided: 90%    Total Surface Area Debrided:  20 sq cm     Estimated Blood Loss:  Minimal  Hemostasis Achieved:  by pressure    Procedural Pain:  3  / 10   Post Procedural Pain:  2 / 10     Response to treatment:  Well tolerated by patient. Plan:   Treatment Note please see attached Discharge Instructions    Written patient dismissal instructions given to patient and signed by patient or POA. Discharge Instructions         Discharge condition: Stable     Assessment of pain at discharge:minimal     Anesthetic used: 4% lidocaine solution     Discharge to: Home     Left via:Private automobile     Accompanied by: family      ECF/HHA: [address]- cleanse with normal saline, apply drawtex , ABD pads , unna boot and coban . Change Monday- Wednesday (at [facility])- Friday. Treatment Orders:Eat a diet high in protein and vitamin C. Take a multiple vitamin daily unless contraindicated. Elevate leg above the level of the heart as much as possible throughout the day. C&S taken 3/15/23      [facility] followup visit:1 week_____________________________  (Please note your next appointment above and if you are unable to keep, kindly give a 24 hour notice. Thank you.)     Physician signature:__________________________      If you experience any of the following, please call the [address] [place] [address] Road during business hours:     * Increase in Pain  * Temperature over 101  * Increase in drainage from your wound  * Drainage with a foul odor  * Bleeding  * Increase in swelling  * Need for compression bandage changes due to slippage, breakthrough drainage.          Electronically signed by Cherelle Hernández MD

## 2023-03-18 LAB — BACTERIA SPEC ANAEROBE CULT: NORMAL

## 2023-03-19 LAB
BACTERIA WND AEROBE CULT: ABNORMAL
GRAM STN SPEC: ABNORMAL
ORGANISM: ABNORMAL

## 2023-03-20 LAB
BACTERIA WND AEROBE CULT: ABNORMAL
GRAM STN SPEC: ABNORMAL
ORGANISM: ABNORMAL

## 2023-03-21 LAB — BACTERIA SPEC ANAEROBE CULT: NORMAL

## 2023-03-21 NOTE — DISCHARGE INSTRUCTIONS
Discharge condition: Stable     Assessment of pain at discharge:minimal     Anesthetic used: 4% lidocaine solution     Discharge to: Home     Left via:Private automobile     Accompanied by: family      ECF/HHA: [facility] [address]      Dressing Orders:LEFT LATERAL LOWER LEG- cleanse with normal saline, apply drawtex , ABD pads , COBAN 2 . Change Monday- Wednesday (at [facility])- Friday. Treatment Orders:Eat a diet high in protein and vitamin C. Take a multiple vitamin daily unless contraindicated. Elevate leg above the level of the heart as much as possible throughout the day. [facility] followup visit:1 week_____________________________  (Please note your next appointment above and if you are unable to keep, kindly give a 24 hour notice. Thank you.)     Physician signature:__________________________      If you experience any of the following, please call the [address] [place] [address] Road during business hours:     * Increase in Pain  * Temperature over 101  * Increase in drainage from your wound  * Drainage with a foul odor  * Bleeding  * Increase in swelling  * Need for compression bandage changes due to slippage, breakthrough drainage.

## 2023-03-22 ENCOUNTER — HOSPITAL ENCOUNTER (OUTPATIENT)
Dept: WOUND CARE | Age: 78
Discharge: HOME | End: 2023-03-22
Payer: MEDICARE

## 2023-03-22 VITALS
SYSTOLIC BLOOD PRESSURE: 124 MMHG | RESPIRATION RATE: 18 BRPM | BODY MASS INDEX: 25.21 KG/M2 | TEMPERATURE: 97.9 F | HEIGHT: 62 IN | HEART RATE: 88 BPM | DIASTOLIC BLOOD PRESSURE: 60 MMHG | WEIGHT: 137 LBS

## 2023-03-22 DIAGNOSIS — L97.923 NON-PRESSURE CHRONIC ULCER OF LEFT LOWER LEG WITH NECROSIS OF MUSCLE (HCC): Primary | ICD-10-CM

## 2023-03-22 PROCEDURE — 11042 DBRDMT SUBQ TIS 1ST 20SQCM/<: CPT

## 2023-03-22 RX ORDER — LIDOCAINE HYDROCHLORIDE 20 MG/ML
JELLY TOPICAL ONCE
OUTPATIENT
Start: 2023-03-22 | End: 2023-03-22

## 2023-03-22 RX ORDER — LIDOCAINE HYDROCHLORIDE 40 MG/ML
SOLUTION TOPICAL ONCE
OUTPATIENT
Start: 2023-03-22 | End: 2023-03-22

## 2023-03-22 RX ORDER — BACITRACIN, NEOMYCIN, POLYMYXIN B 400; 3.5; 5 [USP'U]/G; MG/G; [USP'U]/G
OINTMENT TOPICAL ONCE
OUTPATIENT
Start: 2023-03-22 | End: 2023-03-22

## 2023-03-22 RX ORDER — GINSENG 100 MG
CAPSULE ORAL ONCE
OUTPATIENT
Start: 2023-03-22 | End: 2023-03-22

## 2023-03-22 RX ORDER — BACITRACIN ZINC AND POLYMYXIN B SULFATE 500; 1000 [USP'U]/G; [USP'U]/G
OINTMENT TOPICAL ONCE
OUTPATIENT
Start: 2023-03-22 | End: 2023-03-22

## 2023-03-22 RX ORDER — LIDOCAINE HYDROCHLORIDE 40 MG/ML
SOLUTION TOPICAL ONCE
Status: COMPLETED | OUTPATIENT
Start: 2023-03-22 | End: 2023-03-22

## 2023-03-22 RX ORDER — LIDOCAINE 50 MG/G
OINTMENT TOPICAL ONCE
OUTPATIENT
Start: 2023-03-22 | End: 2023-03-22

## 2023-03-22 RX ORDER — CLOBETASOL PROPIONATE 0.5 MG/G
OINTMENT TOPICAL ONCE
OUTPATIENT
Start: 2023-03-22 | End: 2023-03-22

## 2023-03-22 RX ORDER — LIDOCAINE 40 MG/G
CREAM TOPICAL ONCE
OUTPATIENT
Start: 2023-03-22 | End: 2023-03-22

## 2023-03-22 RX ORDER — GENTAMICIN SULFATE 1 MG/G
OINTMENT TOPICAL ONCE
OUTPATIENT
Start: 2023-03-22 | End: 2023-03-22

## 2023-03-22 RX ORDER — BETAMETHASONE DIPROPIONATE 0.05 %
OINTMENT (GRAM) TOPICAL ONCE
OUTPATIENT
Start: 2023-03-22 | End: 2023-03-22

## 2023-03-22 RX ADMIN — LIDOCAINE HYDROCHLORIDE 5 ML: 40 SOLUTION TOPICAL at 14:23

## 2023-03-22 NOTE — PROGRESS NOTES
11/2/22  She does have an area of muscle that doesn't appear to be viable  She may need surgical debridement in the future  Coban 2, aquacell ag  She is already on levaquin from recent culture  No evidence of arterial occlusive disease  Labs ordered  Ok to cancel abis and pvrs  11/9/22  Wound slightly smaller  Coban angie Naranjo  Still may need surgical debridement in future  11/16/22  Wound smaller 109 (133)    11/23/22  Wound smaller 66  11/30/22  Wound smaller 63   Wound appearance tremendously improved  Area of depth dramatically improved  Muscle coverage improved  12/7/22  Wound 58  Continued improvement, and more muscle coverage  12/14/22  Wound 55  Improved  12/21/22  Wound 50  Muscle almost completely covered  12/28/22  Wound size stable 50, appearance improved  Change to drawtek  1/4/23  Wound size improved 44  1/11/23  Wound size 40 improved  Itching better with unna boot  1/18/23  Size decreased to 31  1/25/23  Size 31 but appearance better, appears smaller today   21/23  Smaller 20  2/8/23  Slightly larger 24  2/15/23  Smaller 20  2/22/23  Stable, measures slightly larger at 23   3/1/23  Stable, still measures 23  3/8/23  Smaller 18  3/15/23  Larger  Will check culture, consideration for advanced skin therapy  3/22/23  No significant growth on culture  Measuring larger, appearance improved  Advanced skin therapy application     Wound/Ulcer Pain Timing/Severity: mild  Quality of pain: aching  Severity:  1 / 10   Modifying Factors: Pain worsens with debridement, dressing changes  Associated Signs/Symptoms: drainage edema, pain    Ulcer Identification:  Ulcer Type: non-healing surgical  Contributing Factors: edema, immunosuppression, anticoagulation therapy, and malnutrition    Diabetic/Pressure/Non Pressure Ulcers only:  Ulcer: Non-Pressure ulcer, muscle necrosis  Wound: Wound dehiscence        PAST MEDICAL HISTORY      Diagnosis Date    Anemia     Arthritis     Bowel obstruction ([facility] [address] 04/28/2016

## 2023-03-22 NOTE — PLAN OF CARE
Problem: Wound:  Goal: Will show signs of wound healing; wound closure and no evidence of infection  Description: Will show signs of wound healing; wound closure and no evidence of infection  Outcome: Progressing     Problem: Cognitive:  Goal: Knowledge of wound care  Description: Knowledge of wound care  Outcome: Adequate for Discharge  Goal: Understands risk factors for wounds  Description: Understands risk factors for wounds  Outcome: Adequate for Discharge     Problem: Compression therapy:  Goal: Will be free from complications associated with compression therapy  Description: Will be free from complications associated with compression therapy  Outcome: Adequate for Discharge
Statement Selected

## 2023-03-28 NOTE — DISCHARGE INSTRUCTIONS
Discharge condition: Stable     Assessment of pain at discharge:minimal     Anesthetic used: 4% lidocaine solution     Discharge to: Home     Left via:Private automobile     Accompanied by: family      ECF/HHA: [facility]- cleanse with normal saline, apply drawtex , ABD pads , COBAN 2 . Change Monday- Wednesday (at [address] [place] [address],[address])- Friday. Treatment Orders:Eat a diet high in protein and vitamin C. Take a multiple vitamin daily unless contraindicated. Elevate leg above the level of the heart as much as possible throughout the day. [address] [place] [address],[address] followup visit:1 week_____________________________  (Please note your next appointment above and if you are unable to keep, kindly give a 24 hour notice. Thank you.)     Physician signature:__________________________      If you experience any of the following, please call the [facility] [address] during business hours:     * Increase in Pain  * Temperature over 101  * Increase in drainage from your wound  * Drainage with a foul odor  * Bleeding  * Increase in swelling  * Need for compression bandage changes due to slippage, breakthrough drainage.

## 2023-03-29 ENCOUNTER — HOSPITAL ENCOUNTER (OUTPATIENT)
Dept: WOUND CARE | Age: 78
Discharge: HOME | End: 2023-03-29
Payer: MEDICARE

## 2023-03-29 VITALS
TEMPERATURE: 96.2 F | BODY MASS INDEX: 25.21 KG/M2 | SYSTOLIC BLOOD PRESSURE: 105 MMHG | HEIGHT: 62 IN | HEART RATE: 80 BPM | WEIGHT: 137 LBS | RESPIRATION RATE: 18 BRPM | DIASTOLIC BLOOD PRESSURE: 45 MMHG

## 2023-03-29 DIAGNOSIS — L97.923 NON-PRESSURE CHRONIC ULCER OF LEFT LOWER LEG WITH NECROSIS OF MUSCLE (HCC): Primary | ICD-10-CM

## 2023-03-29 PROCEDURE — 11042 DBRDMT SUBQ TIS 1ST 20SQCM/<: CPT

## 2023-03-29 RX ORDER — GINSENG 100 MG
CAPSULE ORAL ONCE
OUTPATIENT
Start: 2023-03-29 | End: 2023-03-29

## 2023-03-29 RX ORDER — LIDOCAINE HYDROCHLORIDE 40 MG/ML
SOLUTION TOPICAL ONCE
OUTPATIENT
Start: 2023-03-29 | End: 2023-03-29

## 2023-03-29 RX ORDER — CLOBETASOL PROPIONATE 0.5 MG/G
OINTMENT TOPICAL ONCE
OUTPATIENT
Start: 2023-03-29 | End: 2023-03-29

## 2023-03-29 RX ORDER — LIDOCAINE HYDROCHLORIDE 20 MG/ML
JELLY TOPICAL ONCE
OUTPATIENT
Start: 2023-03-29 | End: 2023-03-29

## 2023-03-29 RX ORDER — GENTAMICIN SULFATE 1 MG/G
OINTMENT TOPICAL ONCE
OUTPATIENT
Start: 2023-03-29 | End: 2023-03-29

## 2023-03-29 RX ORDER — LIDOCAINE 50 MG/G
OINTMENT TOPICAL ONCE
OUTPATIENT
Start: 2023-03-29 | End: 2023-03-29

## 2023-03-29 RX ORDER — LIDOCAINE 40 MG/G
CREAM TOPICAL ONCE
OUTPATIENT
Start: 2023-03-29 | End: 2023-03-29

## 2023-03-29 RX ORDER — BACITRACIN, NEOMYCIN, POLYMYXIN B 400; 3.5; 5 [USP'U]/G; MG/G; [USP'U]/G
OINTMENT TOPICAL ONCE
OUTPATIENT
Start: 2023-03-29 | End: 2023-03-29

## 2023-03-29 RX ORDER — LIDOCAINE HYDROCHLORIDE 40 MG/ML
SOLUTION TOPICAL ONCE
Status: COMPLETED | OUTPATIENT
Start: 2023-03-29 | End: 2023-03-29

## 2023-03-29 RX ORDER — BACITRACIN ZINC AND POLYMYXIN B SULFATE 500; 1000 [USP'U]/G; [USP'U]/G
OINTMENT TOPICAL ONCE
OUTPATIENT
Start: 2023-03-29 | End: 2023-03-29

## 2023-03-29 RX ORDER — BETAMETHASONE DIPROPIONATE 0.05 %
OINTMENT (GRAM) TOPICAL ONCE
OUTPATIENT
Start: 2023-03-29 | End: 2023-03-29

## 2023-03-29 RX ADMIN — LIDOCAINE HYDROCHLORIDE 10 ML: 40 SOLUTION TOPICAL at 14:33

## 2023-03-29 NOTE — PROGRESS NOTES
mouth 2 times daily (with meals) 60 tablet 3    escitalopram (LEXAPRO) 10 MG tablet Take 1 tablet by mouth daily      famotidine (PEPCID) 20 MG tablet Take 1 tablet by mouth 2 times daily      traMADol (ULTRAM) 50 MG tablet Take 1 tablet by mouth every 8 hours as needed. oxyCODONE (ROXICODONE) 5 MG immediate release tablet Take 1 tablet by mouth every 8 hours as needed. gabapentin (NEURONTIN) 100 MG capsule Take 100 mg by mouth every 8 (eight) hours. magnesium oxide (MAG-OX) 400 MG tablet Take 400 mg by mouth daily      apixaban (ELIQUIS) 5 MG TABS tablet TAKE 1 TABLET TWICE DAILY 180 tablet 3    fluticasone (FLONASE) 50 MCG/ACT nasal spray 1 spray by Each Nostril route as needed for Rhinitis      SPIRIVA HANDIHALER 18 MCG inhalation capsule Inhale 18 mcg into the lungs daily        No current facility-administered medications on file prior to encounter.        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

## 2023-04-03 NOTE — DISCHARGE INSTRUCTIONS
Discharge condition: Stable     Assessment of pain at discharge:minimal     Anesthetic used: 4% lidocaine solution     Discharge to: Home     Left via:Private automobile     Accompanied by: family      [facility]: [facility]- cleanse with normal saline, apply drawtex , ABD pads , COBAN 2 . Change Monday- Wednesday (at [facility])- Friday. Treatment Orders:Eat a diet high in protein and vitamin C. Take a multiple vitamin daily unless contraindicated. Elevate leg above the level of the heart as much as possible throughout the day. [facility] followup visit:1 week_____________________________  (Please note your next appointment above and if you are unable to keep, kindly give a 24 hour notice. Thank you.)     Physician signature:__________________________      If you experience any of the following, please call the [address] [place] [address] Road during business hours:     * Increase in Pain  * Temperature over 101  * Increase in drainage from your wound  * Drainage with a foul odor  * Bleeding  * Increase in swelling  * Need for compression bandage changes due to slippage, breakthrough drainage.

## 2023-04-05 ENCOUNTER — HOSPITAL ENCOUNTER (OUTPATIENT)
Dept: WOUND CARE | Age: 78
Discharge: HOME | End: 2023-04-05
Payer: MEDICARE

## 2023-04-05 VITALS
HEIGHT: 62 IN | HEART RATE: 84 BPM | RESPIRATION RATE: 18 BRPM | WEIGHT: 137 LBS | SYSTOLIC BLOOD PRESSURE: 141 MMHG | BODY MASS INDEX: 25.21 KG/M2 | TEMPERATURE: 97.1 F | DIASTOLIC BLOOD PRESSURE: 50 MMHG

## 2023-04-05 DIAGNOSIS — L97.923 NON-PRESSURE CHRONIC ULCER OF LEFT LOWER LEG WITH NECROSIS OF MUSCLE (HCC): Primary | ICD-10-CM

## 2023-04-05 PROCEDURE — 11042 DBRDMT SUBQ TIS 1ST 20SQCM/<: CPT

## 2023-04-05 RX ORDER — LIDOCAINE 40 MG/G
CREAM TOPICAL ONCE
OUTPATIENT
Start: 2023-04-05 | End: 2023-04-05

## 2023-04-05 RX ORDER — GINSENG 100 MG
CAPSULE ORAL ONCE
OUTPATIENT
Start: 2023-04-05 | End: 2023-04-05

## 2023-04-05 RX ORDER — LIDOCAINE HYDROCHLORIDE 40 MG/ML
SOLUTION TOPICAL ONCE
Status: COMPLETED | OUTPATIENT
Start: 2023-04-05 | End: 2023-04-05

## 2023-04-05 RX ORDER — BACITRACIN ZINC AND POLYMYXIN B SULFATE 500; 1000 [USP'U]/G; [USP'U]/G
OINTMENT TOPICAL ONCE
OUTPATIENT
Start: 2023-04-05 | End: 2023-04-05

## 2023-04-05 RX ORDER — BACITRACIN, NEOMYCIN, POLYMYXIN B 400; 3.5; 5 [USP'U]/G; MG/G; [USP'U]/G
OINTMENT TOPICAL ONCE
OUTPATIENT
Start: 2023-04-05 | End: 2023-04-05

## 2023-04-05 RX ORDER — CLOBETASOL PROPIONATE 0.5 MG/G
OINTMENT TOPICAL ONCE
OUTPATIENT
Start: 2023-04-05 | End: 2023-04-05

## 2023-04-05 RX ORDER — LIDOCAINE HYDROCHLORIDE 40 MG/ML
SOLUTION TOPICAL ONCE
OUTPATIENT
Start: 2023-04-05 | End: 2023-04-05

## 2023-04-05 RX ORDER — LIDOCAINE 50 MG/G
OINTMENT TOPICAL ONCE
OUTPATIENT
Start: 2023-04-05 | End: 2023-04-05

## 2023-04-05 RX ORDER — BETAMETHASONE DIPROPIONATE 0.05 %
OINTMENT (GRAM) TOPICAL ONCE
OUTPATIENT
Start: 2023-04-05 | End: 2023-04-05

## 2023-04-05 RX ORDER — GENTAMICIN SULFATE 1 MG/G
OINTMENT TOPICAL ONCE
OUTPATIENT
Start: 2023-04-05 | End: 2023-04-05

## 2023-04-05 RX ORDER — LIDOCAINE HYDROCHLORIDE 20 MG/ML
JELLY TOPICAL ONCE
OUTPATIENT
Start: 2023-04-05 | End: 2023-04-05

## 2023-04-05 RX ADMIN — LIDOCAINE HYDROCHLORIDE 10 ML: 40 SOLUTION TOPICAL at 14:52

## 2023-04-05 ASSESSMENT — PAIN SCALES - GENERAL: PAINLEVEL_OUTOF10: 0

## 2023-04-05 NOTE — PROGRESS NOTES
carvedilol (COREG) 3.125 MG tablet Take 1 tablet by mouth 2 times daily (with meals) 60 tablet 3    escitalopram (LEXAPRO) 10 MG tablet Take 1 tablet by mouth daily      famotidine (PEPCID) 20 MG tablet Take 1 tablet by mouth 2 times daily      traMADol (ULTRAM) 50 MG tablet Take 1 tablet by mouth every 8 hours as needed. oxyCODONE (ROXICODONE) 5 MG immediate release tablet Take 1 tablet by mouth every 8 hours as needed. gabapentin (NEURONTIN) 100 MG capsule Take 1 capsule by mouth every 8 (eight) hours. magnesium oxide (MAG-OX) 400 MG tablet Take 1 tablet by mouth daily      apixaban (ELIQUIS) 5 MG TABS tablet TAKE 1 TABLET TWICE DAILY 180 tablet 3    fluticasone (FLONASE) 50 MCG/ACT nasal spray 1 spray by Each Nostril route as needed for Rhinitis      SPIRIVA HANDIHALER 18 MCG inhalation capsule Inhale 1 capsule into the lungs daily       No current facility-administered medications on file prior to encounter.        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

## 2023-04-05 NOTE — PLAN OF CARE
Problem: Cognitive:  Goal: Knowledge of wound care  Description: Knowledge of wound care  Outcome: Adequate for Discharge  Goal: Understands risk factors for wounds  Description: Understands risk factors for wounds  Outcome: Adequate for Discharge     Problem: Wound:  Goal: Will show signs of wound healing; wound closure and no evidence of infection  Description: Will show signs of wound healing; wound closure and no evidence of infection  Outcome: Adequate for Discharge     Problem: Compression therapy:  Goal: Will be free from complications associated with compression therapy  Description: Will be free from complications associated with compression therapy  Outcome: Adequate for Discharge

## 2023-04-19 ENCOUNTER — HOSPITAL ENCOUNTER (OUTPATIENT)
Dept: WOUND CARE | Age: 78
Discharge: HOME | End: 2023-04-19
Payer: MEDICARE

## 2023-04-19 VITALS
HEIGHT: 62 IN | DIASTOLIC BLOOD PRESSURE: 52 MMHG | WEIGHT: 115 LBS | TEMPERATURE: 97.6 F | BODY MASS INDEX: 21.16 KG/M2 | RESPIRATION RATE: 16 BRPM | HEART RATE: 81 BPM | SYSTOLIC BLOOD PRESSURE: 120 MMHG

## 2023-04-19 DIAGNOSIS — L97.923 NON-PRESSURE CHRONIC ULCER OF LEFT LOWER LEG WITH NECROSIS OF MUSCLE (HCC): Primary | ICD-10-CM

## 2023-04-19 PROCEDURE — 11042 DBRDMT SUBQ TIS 1ST 20SQCM/<: CPT

## 2023-04-19 RX ORDER — BETAMETHASONE DIPROPIONATE 0.05 %
OINTMENT (GRAM) TOPICAL ONCE
OUTPATIENT
Start: 2023-04-19 | End: 2023-04-19

## 2023-04-19 RX ORDER — BACITRACIN, NEOMYCIN, POLYMYXIN B 400; 3.5; 5 [USP'U]/G; MG/G; [USP'U]/G
OINTMENT TOPICAL ONCE
OUTPATIENT
Start: 2023-04-19 | End: 2023-04-19

## 2023-04-19 RX ORDER — LIDOCAINE HYDROCHLORIDE 40 MG/ML
SOLUTION TOPICAL ONCE
Status: COMPLETED | OUTPATIENT
Start: 2023-04-19 | End: 2023-04-19

## 2023-04-19 RX ORDER — LIDOCAINE HYDROCHLORIDE 20 MG/ML
JELLY TOPICAL ONCE
OUTPATIENT
Start: 2023-04-19 | End: 2023-04-19

## 2023-04-19 RX ORDER — CLOBETASOL PROPIONATE 0.5 MG/G
OINTMENT TOPICAL ONCE
OUTPATIENT
Start: 2023-04-19 | End: 2023-04-19

## 2023-04-19 RX ORDER — LIDOCAINE HYDROCHLORIDE 40 MG/ML
SOLUTION TOPICAL ONCE
OUTPATIENT
Start: 2023-04-19 | End: 2023-04-19

## 2023-04-19 RX ORDER — BACITRACIN ZINC AND POLYMYXIN B SULFATE 500; 1000 [USP'U]/G; [USP'U]/G
OINTMENT TOPICAL ONCE
OUTPATIENT
Start: 2023-04-19 | End: 2023-04-19

## 2023-04-19 RX ORDER — GINSENG 100 MG
CAPSULE ORAL ONCE
OUTPATIENT
Start: 2023-04-19 | End: 2023-04-19

## 2023-04-19 RX ORDER — GENTAMICIN SULFATE 1 MG/G
OINTMENT TOPICAL ONCE
OUTPATIENT
Start: 2023-04-19 | End: 2023-04-19

## 2023-04-19 RX ORDER — LIDOCAINE 50 MG/G
OINTMENT TOPICAL ONCE
OUTPATIENT
Start: 2023-04-19 | End: 2023-04-19

## 2023-04-19 RX ORDER — LIDOCAINE 40 MG/G
CREAM TOPICAL ONCE
OUTPATIENT
Start: 2023-04-19 | End: 2023-04-19

## 2023-04-19 RX ADMIN — LIDOCAINE HYDROCHLORIDE: 40 SOLUTION TOPICAL at 15:12

## 2023-04-19 NOTE — DISCHARGE INSTRUCTIONS
Discharge condition: Stable     Assessment of pain at discharge:minimal     Anesthetic used: 4% lidocaine solution     Discharge to: Home     Left via:Private automobile     Accompanied by: family      ECF[facility]HA: [facility]- cleanse with normal saline, apply drawtex , ABD pads , COBAN 2 . Change Monday- Wednesday (at [facility])- Friday. Treatment Orders:Eat a diet high in protein and vitamin C. Take a multiple vitamin daily unless contraindicated. Elevate leg above the level of the heart as much as possible throughout the day. [facility] followup visit:1 week_____________________________  (Please note your next appointment above and if you are unable to keep, kindly give a 24 hour notice. Thank you.)     Physician signature:__________________________      If you experience any of the following, please call the [address] [place] [address] Road during business hours:     * Increase in Pain  * Temperature over 101  * Increase in drainage from your wound  * Drainage with a foul odor  * Bleeding  * Increase in swelling  * Need for compression bandage changes due to slippage, breakthrough drainage.

## 2023-04-20 NOTE — PROGRESS NOTES
Wound Healing Center Followup Visit Note    Referring Physician : Kenan Katz MD  66 [facility] RECORD NUMBER:  [id number]  AGE: 77 y.o. GENDER: female  : 1945  EPISODE DATE:  2023    Subjective:     No chief complaint on file. HISTORY of PRESENT ILLNESS HPI   Mindy Burch is a 77 y.o. female who presents today in regards to follow up evaluation and treatment of wound/ulcer. That patient's past medical, family and social hx were reviewed and changes were made if present. History of Wound Context:  The patient has had a wound of left calf which was first noted approximately 2022. This has been treated local wound care. On their initial visit to the wound healing center, 22,  the patient has noted that the wound has been improving after seeing Dr Xiomy Mcgee last week. The patient has not had similar previous wounds in the past.      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 She underwent right segmental mandibulectomy, bilateral selective neck dissection of levels 1 through 4, open tracheostomy on  with L fibula resection and implant of fibular free flap in her jaw. Per pt report wound in left leg was never closed. She receive preop chemotherapy, immunotherapy. She is no longer receiving chemotherapy. Her lymph node resection was negative. Her left calf postop wound from where she had bone/free flap taken has been non healing and they have had issues with it since immediately postop per pt and family. They said [facility] surgeon is aware, recommended local wound care and was going to see them in 2023. Her daughter had asked to follow with me going forward. She has peg in place and receives nutrition via. She follows with Dr. Nehemias Aviles in regards to CKD. Pt is on abx at time of initial visit.  She was started on levaquin per [facility] who she had culture sent to.      22  She does have an area of muscle that

## 2023-04-26 ENCOUNTER — HOSPITAL ENCOUNTER (OUTPATIENT)
Dept: WOUND CARE | Age: 78
Discharge: HOME | End: 2023-04-26
Payer: MEDICARE

## 2023-04-26 VITALS
RESPIRATION RATE: 18 BRPM | BODY MASS INDEX: 21.16 KG/M2 | TEMPERATURE: 96.5 F | HEIGHT: 62 IN | SYSTOLIC BLOOD PRESSURE: 136 MMHG | WEIGHT: 115 LBS | DIASTOLIC BLOOD PRESSURE: 60 MMHG | HEART RATE: 80 BPM

## 2023-04-26 DIAGNOSIS — L97.923 NON-PRESSURE CHRONIC ULCER OF LEFT LOWER LEG WITH NECROSIS OF MUSCLE (HCC): Primary | ICD-10-CM

## 2023-04-26 PROCEDURE — 11042 DBRDMT SUBQ TIS 1ST 20SQCM/<: CPT

## 2023-04-26 PROCEDURE — 11042 DBRDMT SUBQ TIS 1ST 20SQCM/<: CPT | Performed by: SURGERY

## 2023-04-26 RX ORDER — BACITRACIN, NEOMYCIN, POLYMYXIN B 400; 3.5; 5 [USP'U]/G; MG/G; [USP'U]/G
OINTMENT TOPICAL ONCE
OUTPATIENT
Start: 2023-04-26 | End: 2023-04-26

## 2023-04-26 RX ORDER — BACITRACIN ZINC AND POLYMYXIN B SULFATE 500; 1000 [USP'U]/G; [USP'U]/G
OINTMENT TOPICAL ONCE
OUTPATIENT
Start: 2023-04-26 | End: 2023-04-26

## 2023-04-26 RX ORDER — BETAMETHASONE DIPROPIONATE 0.05 %
OINTMENT (GRAM) TOPICAL ONCE
OUTPATIENT
Start: 2023-04-26 | End: 2023-04-26

## 2023-04-26 RX ORDER — LIDOCAINE HYDROCHLORIDE 20 MG/ML
JELLY TOPICAL ONCE
OUTPATIENT
Start: 2023-04-26 | End: 2023-04-26

## 2023-04-26 RX ORDER — LIDOCAINE HYDROCHLORIDE 40 MG/ML
SOLUTION TOPICAL ONCE
Status: COMPLETED | OUTPATIENT
Start: 2023-04-26 | End: 2023-04-26

## 2023-04-26 RX ORDER — LIDOCAINE 50 MG/G
OINTMENT TOPICAL ONCE
OUTPATIENT
Start: 2023-04-26 | End: 2023-04-26

## 2023-04-26 RX ORDER — LIDOCAINE HYDROCHLORIDE 40 MG/ML
SOLUTION TOPICAL ONCE
OUTPATIENT
Start: 2023-04-26 | End: 2023-04-26

## 2023-04-26 RX ORDER — CLOBETASOL PROPIONATE 0.5 MG/G
OINTMENT TOPICAL ONCE
OUTPATIENT
Start: 2023-04-26 | End: 2023-04-26

## 2023-04-26 RX ORDER — LIDOCAINE 40 MG/G
CREAM TOPICAL ONCE
OUTPATIENT
Start: 2023-04-26 | End: 2023-04-26

## 2023-04-26 RX ORDER — GINSENG 100 MG
CAPSULE ORAL ONCE
OUTPATIENT
Start: 2023-04-26 | End: 2023-04-26

## 2023-04-26 RX ORDER — GENTAMICIN SULFATE 1 MG/G
OINTMENT TOPICAL ONCE
OUTPATIENT
Start: 2023-04-26 | End: 2023-04-26

## 2023-04-26 RX ADMIN — LIDOCAINE HYDROCHLORIDE 10 ML: 40 SOLUTION TOPICAL at 14:11

## 2023-04-26 ASSESSMENT — PAIN SCALES - GENERAL: PAINLEVEL_OUTOF10: 0

## 2023-04-26 NOTE — PROGRESS NOTES
wound(s)/ulcer(s) was/were sharply debrided down through and including the removal of epidermis, dermis, subcutaenous    Devitalized Tissue Debrided:  fibrin, biofilm, slough, and exudate to stimulate bleeding to promote healing, post debridement good bleeding base and wound edges noted    Wound/Ulcer #: 1    Percent of Wound/Ulcer Debrided: !00%    Total Surface Area Debrided:  6.93 sq cm     Estimated Blood Loss:  Minimal  Hemostasis Achieved:  by pressure    Procedural Pain:  3  / 10   Post Procedural Pain:  2 / 10     Response to treatment:  Well tolerated by patient. Plan:   Treatment Note please see attached Discharge Instructions    Written patient dismissal instructions given to patient and signed by patient or POA. Discharge Instructions         Discharge condition: Stable     Assessment of pain at discharge:minimal     Anesthetic used: 4% lidocaine solution     Discharge to: Home     Left via:Private automobile     Accompanied by: family      ECF/HHA: [place]- cleanse with normal saline, apply drawtex , ABD pads , COBAN 2 . [facility]- [facility] ([facility] Friday. Treatment Orders:Eat a diet high in protein and vitamin C. Take a multiple vitamin daily unless contraindicated. Elevate leg above the level of the heart as much as possible throughout the day. [facility] followup visit:1 week_____________________________  (Please note your next appointment above and if you are unable to keep, kindly give a 24 hour notice. Thank you.)     Physician signature:__________________________      If you experience any of the following, please call the [address] [place] [address] during business hours:     * Increase in Pain  * Temperature over 101  * Increase in drainage from your wound  * Drainage with a foul odor  * Bleeding  * Increase in swelling  * Need for compression bandage changes due to slippage, breakthrough drainage.

## 2023-04-26 NOTE — DISCHARGE INSTRUCTIONS
Discharge condition: Stable     Assessment of pain at discharge:minimal     Anesthetic used: 4% lidocaine solution     Discharge to: Home     Left via:Private automobile     Accompanied by: family      ECF/HHA: [facility]- cleanse with normal saline, apply drawtex , ABD pads , COBAN 2 . Change Monday- Wednesday (at [facility])- Friday. Treatment Orders:Eat a diet high in protein and vitamin C. Take a multiple vitamin daily unless contraindicated. Elevate leg above the level of the heart as much as possible throughout the day. [facility] followup visit:1 week_____________________________  (Please note your next appointment above and if you are unable to keep, kindly give a 24 hour notice. Thank you.)     Physician signature:__________________________      If you experience any of the following, please call the [address] [place] [address] Road during business hours:     * Increase in Pain  * Temperature over 101  * Increase in drainage from your wound  * Drainage with a foul odor  * Bleeding  * Increase in swelling  * Need for compression bandage changes due to slippage, breakthrough drainage.

## 2023-05-03 ENCOUNTER — HOSPITAL ENCOUNTER (OUTPATIENT)
Dept: WOUND CARE | Age: 78
Discharge: HOME | End: 2023-05-03
Payer: MEDICARE

## 2023-05-03 VITALS
HEART RATE: 80 BPM | WEIGHT: 115 LBS | RESPIRATION RATE: 18 BRPM | BODY MASS INDEX: 21.03 KG/M2 | DIASTOLIC BLOOD PRESSURE: 51 MMHG | TEMPERATURE: 96.2 F | SYSTOLIC BLOOD PRESSURE: 122 MMHG

## 2023-05-03 DIAGNOSIS — L97.923 NON-PRESSURE CHRONIC ULCER OF LEFT LOWER LEG WITH NECROSIS OF MUSCLE (HCC): Primary | ICD-10-CM

## 2023-05-03 PROCEDURE — 11042 DBRDMT SUBQ TIS 1ST 20SQCM/<: CPT

## 2023-05-03 RX ORDER — LIDOCAINE 40 MG/G
CREAM TOPICAL ONCE
OUTPATIENT
Start: 2023-05-03 | End: 2023-05-03

## 2023-05-03 RX ORDER — BETAMETHASONE DIPROPIONATE 0.05 %
OINTMENT (GRAM) TOPICAL ONCE
OUTPATIENT
Start: 2023-05-03 | End: 2023-05-03

## 2023-05-03 RX ORDER — CLOBETASOL PROPIONATE 0.5 MG/G
OINTMENT TOPICAL ONCE
OUTPATIENT
Start: 2023-05-03 | End: 2023-05-03

## 2023-05-03 RX ORDER — LIDOCAINE 50 MG/G
OINTMENT TOPICAL ONCE
OUTPATIENT
Start: 2023-05-03 | End: 2023-05-03

## 2023-05-03 RX ORDER — LIDOCAINE HYDROCHLORIDE 40 MG/ML
SOLUTION TOPICAL ONCE
Status: COMPLETED | OUTPATIENT
Start: 2023-05-03 | End: 2023-05-03

## 2023-05-03 RX ORDER — LIDOCAINE HYDROCHLORIDE 20 MG/ML
JELLY TOPICAL ONCE
OUTPATIENT
Start: 2023-05-03 | End: 2023-05-03

## 2023-05-03 RX ORDER — BACITRACIN ZINC AND POLYMYXIN B SULFATE 500; 1000 [USP'U]/G; [USP'U]/G
OINTMENT TOPICAL ONCE
OUTPATIENT
Start: 2023-05-03 | End: 2023-05-03

## 2023-05-03 RX ORDER — GENTAMICIN SULFATE 1 MG/G
OINTMENT TOPICAL ONCE
OUTPATIENT
Start: 2023-05-03 | End: 2023-05-03

## 2023-05-03 RX ORDER — LIDOCAINE HYDROCHLORIDE 40 MG/ML
SOLUTION TOPICAL ONCE
OUTPATIENT
Start: 2023-05-03 | End: 2023-05-03

## 2023-05-03 RX ORDER — BACITRACIN, NEOMYCIN, POLYMYXIN B 400; 3.5; 5 [USP'U]/G; MG/G; [USP'U]/G
OINTMENT TOPICAL ONCE
OUTPATIENT
Start: 2023-05-03 | End: 2023-05-03

## 2023-05-03 RX ORDER — GINSENG 100 MG
CAPSULE ORAL ONCE
OUTPATIENT
Start: 2023-05-03 | End: 2023-05-03

## 2023-05-03 RX ADMIN — LIDOCAINE HYDROCHLORIDE 10 ML: 40 SOLUTION TOPICAL at 14:35

## 2023-05-03 NOTE — DISCHARGE INSTRUCTIONS
Discharge condition: Stable     Assessment of pain at discharge:minimal     Anesthetic used: 4% lidocaine solution     Discharge to: Home     Left via:Private automobile     Accompanied by: family      [facility]- cleanse with normal saline, apply zinc to the bisi wound, apply drawtex , ABD pads , COBAN 2 . Change Monday- Wednesday (at [facility])- Friday. Treatment Orders:Eat a diet high in protein and vitamin C. Take a multiple vitamin daily unless contraindicated. Elevate leg above the level of the heart as much as possible throughout the day. [facility] followup visit:1 week_____________________________  (Please note your next appointment above and if you are unable to keep, kindly give a 24 hour notice. Thank you.)     Physician signature:__________________________      If you experience any of the following, please call the [address] [place] [address] Road during business hours:     * Increase in Pain  * Temperature over 101  * Increase in drainage from your wound  * Drainage with a foul odor  * Bleeding  * Increase in swelling  * Need for compression bandage changes due to slippage, breakthrough drainage.

## 2023-05-09 NOTE — PROGRESS NOTES
Cardiac Electrophysiology Outpatient Progress Note    Jesus Coe  1945  Date of Service: 5/10/23   Referring Provider/PCP: Davy Burkett MD   Cardiology: Maricruz Hinojosa MD  Electrophysiologist: Dante Adkins MD    SUBJECTIVE: Jesus Coe presents to cardiac electrophysiology clinic  today for follow up and management of CRT-D in situ and paroxysmal atrial fibrillation, The patient currently feels well and offers no complaints from a device POV. The device site looks well healed and free from infection or erosion. The patient denies any chest pain, dyspnea, palpitations, dizziness, syncope, orthopnea or paroxysmal nocturnal dyspnea. The patient continues to be followed remotely. She denies any recent admissions to the hospital for CHF symptoms. 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  BP: 125/60  HR: 85 bpm  Adequately controlled on present medication regimen  Plan: We encourage frequent physical activity for overall health benefit  Continue to monitor BP at home as directed.       Body mass index (BMI) of 20 to 24 05/10/2023    Bursitis of left hip 05/10/2023    History of skin graft 05/10/2023    Malignant neoplasm of head and neck ([address] 05/10/2023    Mitral valve prolapse 05/10/2023    Rectal hemorrhage 05/10/2023    Shortness of breath 05/10/2023    Oral cancer ([address] 11/22/2022    Chronic

## 2023-05-10 ENCOUNTER — OFFICE VISIT (OUTPATIENT)
Dept: NON INVASIVE DIAGNOSTICS | Age: 78
End: 2023-05-10
Payer: MEDICARE

## 2023-05-10 ENCOUNTER — HOSPITAL ENCOUNTER (OUTPATIENT)
Dept: WOUND CARE | Age: 78
Discharge: HOME | End: 2023-05-10
Payer: MEDICARE

## 2023-05-10 VITALS
HEART RATE: 80 BPM | SYSTOLIC BLOOD PRESSURE: 99 MMHG | DIASTOLIC BLOOD PRESSURE: 50 MMHG | RESPIRATION RATE: 18 BRPM | TEMPERATURE: 96.7 F

## 2023-05-10 VITALS
HEART RATE: 80 BPM | SYSTOLIC BLOOD PRESSURE: 118 MMHG | RESPIRATION RATE: 16 BRPM | BODY MASS INDEX: 21.9 KG/M2 | HEIGHT: 62 IN | WEIGHT: 119 LBS | DIASTOLIC BLOOD PRESSURE: 54 MMHG

## 2023-05-10 DIAGNOSIS — Z95.810 BIVENTRICULAR IMPLANTABLE CARDIOVERTER-DEFIBRILLATOR IN SITU: ICD-10-CM

## 2023-05-10 DIAGNOSIS — R00.2 PALPITATIONS: Primary | ICD-10-CM

## 2023-05-10 DIAGNOSIS — L97.923 NON-PRESSURE CHRONIC ULCER OF LEFT LOWER LEG WITH NECROSIS OF MUSCLE (HCC): Primary | ICD-10-CM

## 2023-05-10 PROBLEM — R80.9 PROTEINURIA: Status: ACTIVE | Noted: 2021-04-14

## 2023-05-10 PROBLEM — F32.A DEPRESSIVE DISORDER: Status: ACTIVE | Noted: 2022-07-25

## 2023-05-10 PROBLEM — I34.1 MITRAL VALVE PROLAPSE: Status: ACTIVE | Noted: 2023-05-10

## 2023-05-10 PROBLEM — M19.90 ARTHRITIS: Status: ACTIVE | Noted: 2023-05-10

## 2023-05-10 PROBLEM — E87.5 HYPERKALEMIA: Status: ACTIVE | Noted: 2020-09-02

## 2023-05-10 PROBLEM — I48.91 ATRIAL FIBRILLATION (HCC): Status: ACTIVE | Noted: 2023-05-10

## 2023-05-10 PROBLEM — I13.0 BENIGN HYPERTENSIVE HEART AND KIDNEY DISEASE WITH HEART FAILURE AND WITH CHRONIC KIDNEY DISEASE STAGE I THROUGH STAGE IV, OR UNSPECIFIED(404.11): Status: ACTIVE | Noted: 2023-05-10

## 2023-05-10 PROBLEM — C76.0 MALIGNANT NEOPLASM OF HEAD AND NECK (HCC): Status: ACTIVE | Noted: 2023-05-10

## 2023-05-10 PROBLEM — J44.9 CHRONIC OBSTRUCTIVE PULMONARY DISEASE (HCC): Status: ACTIVE | Noted: 2022-07-25

## 2023-05-10 PROBLEM — Z94.5 HISTORY OF SKIN GRAFT: Status: ACTIVE | Noted: 2023-05-10

## 2023-05-10 PROBLEM — M25.559 ARTHRALGIA OF HIP: Status: ACTIVE | Noted: 2023-05-10

## 2023-05-10 PROBLEM — C06.9 ORAL CANCER (HCC): Status: ACTIVE | Noted: 2022-11-22

## 2023-05-10 PROBLEM — K62.5 RECTAL HEMORRHAGE: Status: ACTIVE | Noted: 2023-05-10

## 2023-05-10 PROBLEM — R06.02 SHORTNESS OF BREATH: Status: ACTIVE | Noted: 2023-05-10

## 2023-05-10 PROBLEM — M70.72 BURSITIS OF LEFT HIP: Status: ACTIVE | Noted: 2023-05-10

## 2023-05-10 PROCEDURE — 93290 INTERROG DEV EVAL ICPMS IP: CPT | Performed by: INTERNAL MEDICINE

## 2023-05-10 PROCEDURE — G8420 CALC BMI NORM PARAMETERS: HCPCS | Performed by: INTERNAL MEDICINE

## 2023-05-10 PROCEDURE — 93284 PRGRMG EVAL IMPLANTABLE DFB: CPT | Performed by: INTERNAL MEDICINE

## 2023-05-10 PROCEDURE — 1123F ACP DISCUSS/DSCN MKR DOCD: CPT | Performed by: INTERNAL MEDICINE

## 2023-05-10 PROCEDURE — G8427 DOCREV CUR MEDS BY ELIG CLIN: HCPCS | Performed by: INTERNAL MEDICINE

## 2023-05-10 PROCEDURE — G8400 PT W/DXA NO RESULTS DOC: HCPCS | Performed by: INTERNAL MEDICINE

## 2023-05-10 PROCEDURE — 1036F TOBACCO NON-USER: CPT | Performed by: INTERNAL MEDICINE

## 2023-05-10 PROCEDURE — 1090F PRES/ABSN URINE INCON ASSESS: CPT | Performed by: INTERNAL MEDICINE

## 2023-05-10 PROCEDURE — 99215 OFFICE O/P EST HI 40 MIN: CPT | Performed by: INTERNAL MEDICINE

## 2023-05-10 RX ORDER — LIDOCAINE HYDROCHLORIDE 20 MG/ML
JELLY TOPICAL ONCE
OUTPATIENT
Start: 2023-05-10 | End: 2023-05-10

## 2023-05-10 RX ORDER — GINSENG 100 MG
CAPSULE ORAL ONCE
OUTPATIENT
Start: 2023-05-10 | End: 2023-05-10

## 2023-05-10 RX ORDER — ATORVASTATIN CALCIUM 40 MG/1
40 TABLET, FILM COATED ORAL DAILY
COMMUNITY

## 2023-05-10 RX ORDER — ALBUTEROL SULFATE 2.5 MG/3ML
SOLUTION RESPIRATORY (INHALATION)
COMMUNITY
Start: 2020-06-29

## 2023-05-10 RX ORDER — LIDOCAINE HYDROCHLORIDE 40 MG/ML
SOLUTION TOPICAL ONCE
Status: COMPLETED | OUTPATIENT
Start: 2023-05-10 | End: 2023-05-10

## 2023-05-10 RX ORDER — BACITRACIN ZINC AND POLYMYXIN B SULFATE 500; 1000 [USP'U]/G; [USP'U]/G
OINTMENT TOPICAL ONCE
OUTPATIENT
Start: 2023-05-10 | End: 2023-05-10

## 2023-05-10 RX ORDER — CLOBETASOL PROPIONATE 0.5 MG/G
OINTMENT TOPICAL ONCE
OUTPATIENT
Start: 2023-05-10 | End: 2023-05-10

## 2023-05-10 RX ORDER — LORATADINE 10 MG/1
10 TABLET ORAL DAILY
COMMUNITY

## 2023-05-10 RX ORDER — LIDOCAINE 40 MG/G
CREAM TOPICAL ONCE
OUTPATIENT
Start: 2023-05-10 | End: 2023-05-10

## 2023-05-10 RX ORDER — BETAMETHASONE DIPROPIONATE 0.05 %
OINTMENT (GRAM) TOPICAL ONCE
OUTPATIENT
Start: 2023-05-10 | End: 2023-05-10

## 2023-05-10 RX ORDER — LIDOCAINE 50 MG/G
OINTMENT TOPICAL ONCE
OUTPATIENT
Start: 2023-05-10 | End: 2023-05-10

## 2023-05-10 RX ORDER — BACITRACIN, NEOMYCIN, POLYMYXIN B 400; 3.5; 5 [USP'U]/G; MG/G; [USP'U]/G
OINTMENT TOPICAL ONCE
OUTPATIENT
Start: 2023-05-10 | End: 2023-05-10

## 2023-05-10 RX ORDER — PSEUDOEPHEDRINE HCL 30 MG
100 TABLET ORAL 2 TIMES DAILY PRN
COMMUNITY
Start: 2022-08-11

## 2023-05-10 RX ORDER — GENTAMICIN SULFATE 1 MG/G
OINTMENT TOPICAL ONCE
OUTPATIENT
Start: 2023-05-10 | End: 2023-05-10

## 2023-05-10 RX ORDER — LIDOCAINE HYDROCHLORIDE 40 MG/ML
SOLUTION TOPICAL ONCE
OUTPATIENT
Start: 2023-05-10 | End: 2023-05-10

## 2023-05-10 RX ADMIN — LIDOCAINE HYDROCHLORIDE 10 ML: 40 SOLUTION TOPICAL at 11:14

## 2023-05-10 NOTE — PROGRESS NOTES
NuShield Treatment Note    NAME:  Ramakrishna Mialn OF BIRTH:  1945  MEDICAL RECORD NUMBER:  [id number]  DATE:  5/10/2023    Goal:  Patient will receive safe and proper application of skin substitute. Patient will comply with caring for dressing, offloading and reporting complications. Expiration date checked immediately prior to use. Package intact prior to use and no damage noted. Nushield was removed from protective sterile packaging by provider and applied to prepared ulcer bed. NuShield applied with notch to Top Upper Right Corner. NuShield was applied to left leg wound and affixed with steri-strips by the provider. Applied nonadherent and drawtex (Secondary Dressing)    Patient/caregiver was instructed not to remove dressing and to keep it clean and dry. NuShield may be applied a total of 10 times per wound over a 12 week period. Additionally NuShield may only be used every 12 months per wound. Date of first application of NuShield for this current wound is May 10, 2023.       Application # 1 out of 10           Guidelines followed    Electronically signed by Dickson Whitt RN on 5/10/2023 at 12:08 PM

## 2023-05-16 NOTE — DISCHARGE INSTRUCTIONS
Discharge condition: Stable     Assessment of pain at discharge:minimal     Anesthetic used: 4% lidocaine solution     Discharge to: Home     Left via:Private automobile     Accompanied by: family      ECF/HHA: [address] [place]- cleanse with normal saline, apply zinc to the bisi wound, apply calcium alginate, ABD pads,& kerlix. Change daily. Apply spandigrip. Hold graft x1 week     Treatment Orders:Eat a diet high in protein and vitamin C. Take a multiple vitamin daily unless contraindicated. Elevate leg above the level of the heart as much as possible throughout the day. [address] [place] [address],[address] followup visit:1 week_____________________________  (Please note your next appointment above and if you are unable to keep, kindly give a 24 hour notice. Thank you.)     Physician signature:__________________________      If you experience any of the following, please call the [address] during business hours:     * Increase in Pain  * Temperature over 101  * Increase in drainage from your wound  * Drainage with a foul odor  * Bleeding  * Increase in swelling  * Need for compression bandage changes due to slippage, breakthrough drainage.

## 2023-05-17 ENCOUNTER — HOSPITAL ENCOUNTER (OUTPATIENT)
Dept: WOUND CARE | Age: 78
Discharge: HOME | End: 2023-05-17
Payer: MEDICARE

## 2023-05-17 VITALS
DIASTOLIC BLOOD PRESSURE: 60 MMHG | HEART RATE: 76 BPM | SYSTOLIC BLOOD PRESSURE: 124 MMHG | TEMPERATURE: 96.4 F | BODY MASS INDEX: 21.9 KG/M2 | WEIGHT: 119 LBS | HEIGHT: 62 IN | RESPIRATION RATE: 18 BRPM

## 2023-05-17 DIAGNOSIS — L97.923 NON-PRESSURE CHRONIC ULCER OF LEFT LOWER LEG WITH NECROSIS OF MUSCLE (HCC): Primary | ICD-10-CM

## 2023-05-17 PROCEDURE — 11042 DBRDMT SUBQ TIS 1ST 20SQCM/<: CPT

## 2023-05-17 PROCEDURE — 99213 OFFICE O/P EST LOW 20 MIN: CPT | Performed by: SURGERY

## 2023-05-17 RX ORDER — GENTAMICIN SULFATE 1 MG/G
OINTMENT TOPICAL ONCE
OUTPATIENT
Start: 2023-05-17 | End: 2023-05-17

## 2023-05-17 RX ORDER — LIDOCAINE HYDROCHLORIDE 40 MG/ML
SOLUTION TOPICAL ONCE
OUTPATIENT
Start: 2023-05-17 | End: 2023-05-17

## 2023-05-17 RX ORDER — BACITRACIN, NEOMYCIN, POLYMYXIN B 400; 3.5; 5 [USP'U]/G; MG/G; [USP'U]/G
OINTMENT TOPICAL ONCE
OUTPATIENT
Start: 2023-05-17 | End: 2023-05-17

## 2023-05-17 RX ORDER — LIDOCAINE HYDROCHLORIDE 20 MG/ML
JELLY TOPICAL ONCE
OUTPATIENT
Start: 2023-05-17 | End: 2023-05-17

## 2023-05-17 RX ORDER — CLOBETASOL PROPIONATE 0.5 MG/G
OINTMENT TOPICAL ONCE
OUTPATIENT
Start: 2023-05-17 | End: 2023-05-17

## 2023-05-17 RX ORDER — BACITRACIN ZINC AND POLYMYXIN B SULFATE 500; 1000 [USP'U]/G; [USP'U]/G
OINTMENT TOPICAL ONCE
OUTPATIENT
Start: 2023-05-17 | End: 2023-05-17

## 2023-05-17 RX ORDER — GINSENG 100 MG
CAPSULE ORAL ONCE
OUTPATIENT
Start: 2023-05-17 | End: 2023-05-17

## 2023-05-17 RX ORDER — LIDOCAINE HYDROCHLORIDE 40 MG/ML
SOLUTION TOPICAL ONCE
Status: COMPLETED | OUTPATIENT
Start: 2023-05-17 | End: 2023-05-17

## 2023-05-17 RX ORDER — LIDOCAINE 50 MG/G
OINTMENT TOPICAL ONCE
OUTPATIENT
Start: 2023-05-17 | End: 2023-05-17

## 2023-05-17 RX ORDER — BETAMETHASONE DIPROPIONATE 0.05 %
OINTMENT (GRAM) TOPICAL ONCE
OUTPATIENT
Start: 2023-05-17 | End: 2023-05-17

## 2023-05-17 RX ORDER — LIDOCAINE 40 MG/G
CREAM TOPICAL ONCE
OUTPATIENT
Start: 2023-05-17 | End: 2023-05-17

## 2023-05-17 RX ADMIN — LIDOCAINE HYDROCHLORIDE 10 ML: 40 SOLUTION TOPICAL at 14:24

## 2023-05-17 ASSESSMENT — PAIN SCALES - GENERAL: PAINLEVEL_OUTOF10: 0

## 2023-05-17 NOTE — PROGRESS NOTES
Wound Healing Center Followup Visit Note    Referring Physician : Felicitas Meyer MD  [address] [facility] RECORD NUMBER:  [id number]  AGE: 77 y.o. GENDER: female  : 1945  EPISODE DATE:  2023    Subjective:     Chief Complaint   Patient presents with    Wound Check     Left leg      HISTORY of PRESENT ILLNESS HPI   Jamaal Garza is a 77 y.o. female who presents today in regards to follow up evaluation and treatment of wound/ulcer. That patient's past medical, family and social hx were reviewed and changes were made if present. History of Wound Context:  The patient has had a wound of left calf which was first noted approximately 2022. This has been treated local wound care. On their initial visit to the wound healing center, 22,  the patient has noted that the wound has been improving after seeing Dr Cielo Leger last week. The patient has not had similar previous wounds in the past.      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 She underwent right segmental mandibulectomy, bilateral selective neck dissection of levels 1 through 4, open tracheostomy on  with L fibula resection and implant of fibular free flap in her jaw. Per pt report wound in left leg was never closed. She receive preop chemotherapy, immunotherapy. She is no longer receiving chemotherapy. Her lymph node resection was negative. Her left calf postop wound from where she had bone/free flap taken has been non healing and they have had issues with it since immediately postop per pt and family. They said [facility] surgeon is aware, recommended local wound care and was going to see them in 2023. Her daughter had asked to follow with me going forward. She has peg in place and receives nutrition via. She follows with Dr. Uriel Joyner in regards to CKD. Pt is on abx at time of initial visit. She was started on levaquin per [facility] who she had culture sent to.

## 2023-05-22 NOTE — DISCHARGE INSTRUCTIONS
Discharge condition: Stable     Assessment of pain at discharge:minimal     Anesthetic used: 4% lidocaine solution     Discharge to: Home     Left via:Private automobile     Accompanied by: family      ECF/HHA: [address]- cleanse with normal saline, apply zinc to the bisi wound, apply calcium alginate, ABD pads,& kerlix. Change daily. Apply spandigrip. Hold graft x1 week     Treatment Orders:Eat a diet high in protein and vitamin C. Take a multiple vitamin daily unless contraindicated. Elevate leg above the level of the heart as much as possible throughout the day. [address] [place] [address],[address] followup visit:1 week_____________________________  (Please note your next appointment above and if you are unable to keep, kindly give a 24 hour notice. Thank you.)     Physician signature:__________________________      If you experience any of the following, please call the [address] [place] [address] Road during business hours:     * Increase in Pain  * Temperature over 101  * Increase in drainage from your wound  * Drainage with a foul odor  * Bleeding  * Increase in swelling  * Need for compression bandage changes due to slippage, breakthrough drainage.

## 2023-05-24 ENCOUNTER — HOSPITAL ENCOUNTER (OUTPATIENT)
Dept: WOUND CARE | Age: 78
Discharge: HOME | End: 2023-05-24
Payer: MEDICARE

## 2023-05-24 VITALS
SYSTOLIC BLOOD PRESSURE: 118 MMHG | BODY MASS INDEX: 21.9 KG/M2 | DIASTOLIC BLOOD PRESSURE: 51 MMHG | RESPIRATION RATE: 18 BRPM | TEMPERATURE: 96.2 F | WEIGHT: 119 LBS | HEIGHT: 62 IN | HEART RATE: 80 BPM

## 2023-05-24 DIAGNOSIS — L97.923 NON-PRESSURE CHRONIC ULCER OF LEFT LOWER LEG WITH NECROSIS OF MUSCLE (HCC): Primary | ICD-10-CM

## 2023-05-24 PROCEDURE — 11042 DBRDMT SUBQ TIS 1ST 20SQCM/<: CPT

## 2023-05-24 PROCEDURE — 11042 DBRDMT SUBQ TIS 1ST 20SQCM/<: CPT | Performed by: SURGERY

## 2023-05-24 RX ORDER — GINSENG 100 MG
CAPSULE ORAL ONCE
OUTPATIENT
Start: 2023-05-24 | End: 2023-05-24

## 2023-05-24 RX ORDER — LIDOCAINE HYDROCHLORIDE 20 MG/ML
JELLY TOPICAL ONCE
OUTPATIENT
Start: 2023-05-24 | End: 2023-05-24

## 2023-05-24 RX ORDER — GENTAMICIN SULFATE 1 MG/G
OINTMENT TOPICAL ONCE
OUTPATIENT
Start: 2023-05-24 | End: 2023-05-24

## 2023-05-24 RX ORDER — LIDOCAINE 50 MG/G
OINTMENT TOPICAL ONCE
OUTPATIENT
Start: 2023-05-24 | End: 2023-05-24

## 2023-05-24 RX ORDER — LIDOCAINE HYDROCHLORIDE 40 MG/ML
SOLUTION TOPICAL ONCE
OUTPATIENT
Start: 2023-05-24 | End: 2023-05-24

## 2023-05-24 RX ORDER — BACITRACIN ZINC AND POLYMYXIN B SULFATE 500; 1000 [USP'U]/G; [USP'U]/G
OINTMENT TOPICAL ONCE
OUTPATIENT
Start: 2023-05-24 | End: 2023-05-24

## 2023-05-24 RX ORDER — LIDOCAINE 40 MG/G
CREAM TOPICAL ONCE
OUTPATIENT
Start: 2023-05-24 | End: 2023-05-24

## 2023-05-24 RX ORDER — BETAMETHASONE DIPROPIONATE 0.05 %
OINTMENT (GRAM) TOPICAL ONCE
OUTPATIENT
Start: 2023-05-24 | End: 2023-05-24

## 2023-05-24 RX ORDER — LIDOCAINE HYDROCHLORIDE 40 MG/ML
SOLUTION TOPICAL ONCE
Status: COMPLETED | OUTPATIENT
Start: 2023-05-24 | End: 2023-05-24

## 2023-05-24 RX ORDER — BACITRACIN, NEOMYCIN, POLYMYXIN B 400; 3.5; 5 [USP'U]/G; MG/G; [USP'U]/G
OINTMENT TOPICAL ONCE
OUTPATIENT
Start: 2023-05-24 | End: 2023-05-24

## 2023-05-24 RX ORDER — CLOBETASOL PROPIONATE 0.5 MG/G
OINTMENT TOPICAL ONCE
OUTPATIENT
Start: 2023-05-24 | End: 2023-05-24

## 2023-05-24 RX ADMIN — LIDOCAINE HYDROCHLORIDE 10 ML: 40 SOLUTION TOPICAL at 13:20

## 2023-05-24 ASSESSMENT — PAIN SCALES - GENERAL: PAINLEVEL_OUTOF10: 0

## 2023-05-24 NOTE — PLAN OF CARE
Problem: Wound:  Goal: Will show signs of wound healing; wound closure and no evidence of infection  Description: Will show signs of wound healing; wound closure and no evidence of infection  Outcome: Progressing     Problem: Cognitive:  Goal: Knowledge of wound care  Description: Knowledge of wound care  Outcome: Adequate for Discharge  Goal: Understands risk factors for wounds  Description: Understands risk factors for wounds  Outcome: Adequate for Discharge

## 2023-05-24 NOTE — PROGRESS NOTES
Wound Healing Center Followup Visit Note    Referring Physician : Suzy Gordon MD  66 [facility] RECORD NUMBER:  [id number]  AGE: 77 y.o. GENDER: female  : 1945  EPISODE DATE:  2023    Subjective:     Chief Complaint   Patient presents with    Wound Check     Left leg      HISTORY of PRESENT ILLNESS HPI   Eduard Kowalski is a 77 y.o. female who presents today in regards to follow up evaluation and treatment of wound/ulcer. That patient's past medical, family and social hx were reviewed and changes were made if present. History of Wound Context:  The patient has had a wound of left calf which was first noted approximately 2022. This has been treated local wound care. On their initial visit to the wound healing center, 22,  the patient has noted that the wound has been improving after seeing Dr Glenna Henderson last week. The patient has not had similar previous wounds in the past.      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 She underwent right segmental mandibulectomy, bilateral selective neck dissection of levels 1 through 4, open tracheostomy on  with L fibula resection and implant of fibular free flap in her jaw. Per pt report wound in left leg was never closed. She receive preop chemotherapy, immunotherapy. She is no longer receiving chemotherapy. Her lymph node resection was negative. Her left calf postop wound from where she had bone/free flap taken has been non healing and they have had issues with it since immediately postop per pt and family. They said [facility] surgeon is aware, recommended local wound care and was going to see them in 2023. Her daughter had asked to follow with me going forward. She has peg in place and receives nutrition via. She follows with Dr. Ty Conner in regards to CKD. Pt is on abx at time of initial visit. She was started on levaquin per [facility] who she had culture sent to.

## 2023-05-31 ENCOUNTER — HOSPITAL ENCOUNTER (OUTPATIENT)
Dept: WOUND CARE | Age: 78
Discharge: HOME | End: 2023-05-31
Payer: MEDICARE

## 2023-05-31 VITALS
HEART RATE: 80 BPM | TEMPERATURE: 97.7 F | SYSTOLIC BLOOD PRESSURE: 116 MMHG | RESPIRATION RATE: 16 BRPM | DIASTOLIC BLOOD PRESSURE: 56 MMHG

## 2023-05-31 DIAGNOSIS — L97.923 NON-PRESSURE CHRONIC ULCER OF LEFT LOWER LEG WITH NECROSIS OF MUSCLE (HCC): Primary | ICD-10-CM

## 2023-05-31 PROCEDURE — 11042 DBRDMT SUBQ TIS 1ST 20SQCM/<: CPT

## 2023-05-31 PROCEDURE — 15271 SKIN SUB GRAFT TRNK/ARM/LEG: CPT

## 2023-05-31 RX ORDER — GENTAMICIN SULFATE 1 MG/G
OINTMENT TOPICAL ONCE
OUTPATIENT
Start: 2023-05-31 | End: 2023-05-31

## 2023-05-31 RX ORDER — CLOBETASOL PROPIONATE 0.5 MG/G
OINTMENT TOPICAL ONCE
OUTPATIENT
Start: 2023-05-31 | End: 2023-05-31

## 2023-05-31 RX ORDER — BACITRACIN, NEOMYCIN, POLYMYXIN B 400; 3.5; 5 [USP'U]/G; MG/G; [USP'U]/G
OINTMENT TOPICAL ONCE
OUTPATIENT
Start: 2023-05-31 | End: 2023-05-31

## 2023-05-31 RX ORDER — LIDOCAINE HYDROCHLORIDE 20 MG/ML
JELLY TOPICAL ONCE
OUTPATIENT
Start: 2023-05-31 | End: 2023-05-31

## 2023-05-31 RX ORDER — GINSENG 100 MG
CAPSULE ORAL ONCE
OUTPATIENT
Start: 2023-05-31 | End: 2023-05-31

## 2023-05-31 RX ORDER — LIDOCAINE 40 MG/G
CREAM TOPICAL ONCE
OUTPATIENT
Start: 2023-05-31 | End: 2023-05-31

## 2023-05-31 RX ORDER — LIDOCAINE HYDROCHLORIDE 40 MG/ML
SOLUTION TOPICAL ONCE
Status: COMPLETED | OUTPATIENT
Start: 2023-05-31 | End: 2023-05-31

## 2023-05-31 RX ORDER — LIDOCAINE 50 MG/G
OINTMENT TOPICAL ONCE
OUTPATIENT
Start: 2023-05-31 | End: 2023-05-31

## 2023-05-31 RX ORDER — BACITRACIN ZINC AND POLYMYXIN B SULFATE 500; 1000 [USP'U]/G; [USP'U]/G
OINTMENT TOPICAL ONCE
OUTPATIENT
Start: 2023-05-31 | End: 2023-05-31

## 2023-05-31 RX ORDER — LIDOCAINE HYDROCHLORIDE 40 MG/ML
SOLUTION TOPICAL ONCE
OUTPATIENT
Start: 2023-05-31 | End: 2023-05-31

## 2023-05-31 RX ORDER — BETAMETHASONE DIPROPIONATE 0.05 %
OINTMENT (GRAM) TOPICAL ONCE
OUTPATIENT
Start: 2023-05-31 | End: 2023-05-31

## 2023-05-31 RX ADMIN — LIDOCAINE HYDROCHLORIDE 5 ML: 40 SOLUTION TOPICAL at 14:35

## 2023-05-31 NOTE — PLAN OF CARE
Problem: Wound:  Goal: Will show signs of wound healing; wound closure and no evidence of infection  Description: Will show signs of wound healing; wound closure and no evidence of infection  Outcome: Progressing     Problem: Cognitive:  Goal: Knowledge of wound care  Description: Knowledge of wound care  Outcome: Progressing     Problem: Wound:  Goal: Will show signs of wound healing; wound closure and no evidence of infection  Description: Will show signs of wound healing; wound closure and no evidence of infection  Outcome: Progressing     Problem: Cognitive:  Goal: Knowledge of wound care  Description: Knowledge of wound care  Outcome: Adequate for Discharge  Goal: Understands risk factors for wounds  Description: Understands risk factors for wounds  Outcome: Adequate for Discharge

## 2023-05-31 NOTE — PROGRESS NOTES
Wound Healing Center Followup Visit Note    Referring Physician : Ilan Levy MD  66 [facility] RECORD NUMBER:  [id number]  AGE: 77 y.o. GENDER: female  : 1945  EPISODE DATE:  2023    Subjective:     Chief Complaint   Patient presents with    Wound Check     Left leg        HISTORY of PRESENT ILLNESS HPI   Mayra Benavides is a 77 y.o. female who presents today in regards to follow up evaluation and treatment of wound/ulcer. That patient's past medical, family and social hx were reviewed and changes were made if present. History of Wound Context:  The patient has had a wound of left calf which was first noted approximately 2022. This has been treated local wound care. On their initial visit to the wound healing center, 22,  the patient has noted that the wound has been improving after seeing Dr Catracho Zhang last week. The patient has not had similar previous wounds in the past.      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 She underwent right segmental mandibulectomy, bilateral selective neck dissection of levels 1 through 4, open tracheostomy on  with L fibula resection and implant of fibular free flap in her jaw. Per pt report wound in left leg was never closed. She receive preop chemotherapy, immunotherapy. She is no longer receiving chemotherapy. Her lymph node resection was negative. Her left calf postop wound from where she had bone/free flap taken has been non healing and they have had issues with it since immediately postop per pt and family. They said [facility] surgeon is aware, recommended local wound care and was going to see them in 2023. Her daughter had asked to follow with me going forward. She has peg in place and receives nutrition via. She follows with Dr. Arleth Luna in regards to CKD. Pt is on abx at time of initial visit. She was started on levaquin per [facility] who she had culture sent to.

## 2023-05-31 NOTE — PROGRESS NOTES
NuShield Treatment Note    NAME:  Elvira Orozco OF BIRTH:  1945  MEDICAL RECORD NUMBER:  [id number]  DATE:  5/31/2023    Goal:  Patient will receive safe and proper application of skin substitute. Patient will comply with caring for dressing, offloading and reporting complications. Expiration date checked immediately prior to use. Package intact prior to use and no damage noted. Nushield was removed from protective sterile packaging by provider and applied to prepared ulcer bed. NuShield applied with notch to Top Upper Right Corner. NuShield was applied to left leg wound and affixed with steri-strips by the provider. Applied nonadherent and alginate, DSD (Secondary Dressing)    Patient/caregiver was instructed not to remove dressing and to keep it clean and dry. NuShield may be applied a total of 10 times per wound over a 12 week period. Additionally NuShield may only be used every 12 months per wound. Date of first application of NuShield for this current wound is May 10, 2023.       Application # 2 out of 10           Guidelines followed    Electronically signed by Kelley Kim RN on 5/31/2023 at 3:26 PM

## 2023-06-06 NOTE — DISCHARGE INSTRUCTIONS
Visit Discharge/Physician Orders    Discharge condition: Stable    Assessment of pain at discharge: yes    Anesthetic used: lido 4%    Discharge to: Home    Left via:Private automobile    Accompanied by: accompanied by child    ECF/HHA: [facility]- Apply zinc to the bisi wound, apply calcium alginate, ABD pads,& kerlix. Change daily. Apply spandigrip. DO NOT REMOVE GRAFT, change dressing only down to steristrips. [facility] Graft #3 placed    Treatment Orders: Eat a diet high in protein and vitamin C. Take a multiple vitamin daily unless contraindicated. Elevate leg above heart as much as possible    [address] [place] [address],[address] followup visit ______1 week_______________________  (Please note your next appointment above and if you are unable to keep, kindly give a 24 hour notice. Thank you.)    Physician signature:__________________________      If you experience any of the following, please call the [employer] during business hours:    * Increase in Pain  * Temperature over 101  * Increase in drainage from your wound  * Drainage with a foul odor  * Bleeding  * Increase in swelling  * Need for compression bandage changes due to slippage, breakthrough drainage. If you need medical attention outside of the business hours of the [employer] please contact your PCP or go to the nearest emergency room.

## 2023-06-07 ENCOUNTER — HOSPITAL ENCOUNTER (OUTPATIENT)
Dept: WOUND CARE | Age: 78
Discharge: HOME | End: 2023-06-07
Payer: MEDICARE

## 2023-06-07 VITALS
WEIGHT: 114 LBS | HEART RATE: 82 BPM | DIASTOLIC BLOOD PRESSURE: 88 MMHG | TEMPERATURE: 44.4 F | SYSTOLIC BLOOD PRESSURE: 132 MMHG | HEIGHT: 62 IN | BODY MASS INDEX: 20.98 KG/M2

## 2023-06-07 DIAGNOSIS — L97.923 NON-PRESSURE CHRONIC ULCER OF LEFT LOWER LEG WITH NECROSIS OF MUSCLE (HCC): Primary | ICD-10-CM

## 2023-06-07 PROCEDURE — 15271 SKIN SUB GRAFT TRNK/ARM/LEG: CPT

## 2023-06-07 RX ORDER — GENTAMICIN SULFATE 1 MG/G
OINTMENT TOPICAL ONCE
OUTPATIENT
Start: 2023-06-07 | End: 2023-06-07

## 2023-06-07 RX ORDER — CLOBETASOL PROPIONATE 0.5 MG/G
OINTMENT TOPICAL ONCE
OUTPATIENT
Start: 2023-06-07 | End: 2023-06-07

## 2023-06-07 RX ORDER — GINSENG 100 MG
CAPSULE ORAL ONCE
OUTPATIENT
Start: 2023-06-07 | End: 2023-06-07

## 2023-06-07 RX ORDER — LIDOCAINE 50 MG/G
OINTMENT TOPICAL ONCE
OUTPATIENT
Start: 2023-06-07 | End: 2023-06-07

## 2023-06-07 RX ORDER — BETAMETHASONE DIPROPIONATE 0.05 %
OINTMENT (GRAM) TOPICAL ONCE
OUTPATIENT
Start: 2023-06-07 | End: 2023-06-07

## 2023-06-07 RX ORDER — IBUPROFEN 200 MG
TABLET ORAL ONCE
OUTPATIENT
Start: 2023-06-07 | End: 2023-06-07

## 2023-06-07 RX ORDER — LIDOCAINE HYDROCHLORIDE 40 MG/ML
SOLUTION TOPICAL ONCE
OUTPATIENT
Start: 2023-06-07 | End: 2023-06-07

## 2023-06-07 RX ORDER — BACITRACIN ZINC AND POLYMYXIN B SULFATE 500; 1000 [USP'U]/G; [USP'U]/G
OINTMENT TOPICAL ONCE
OUTPATIENT
Start: 2023-06-07 | End: 2023-06-07

## 2023-06-07 RX ORDER — LIDOCAINE HYDROCHLORIDE 20 MG/ML
JELLY TOPICAL ONCE
OUTPATIENT
Start: 2023-06-07 | End: 2023-06-07

## 2023-06-07 RX ORDER — LIDOCAINE 40 MG/G
CREAM TOPICAL ONCE
OUTPATIENT
Start: 2023-06-07 | End: 2023-06-07

## 2023-06-07 RX ORDER — LIDOCAINE HYDROCHLORIDE 40 MG/ML
SOLUTION TOPICAL ONCE
Status: COMPLETED | OUTPATIENT
Start: 2023-06-07 | End: 2023-06-07

## 2023-06-07 RX ADMIN — LIDOCAINE HYDROCHLORIDE: 40 SOLUTION TOPICAL at 14:25

## 2023-06-07 NOTE — PROGRESS NOTES
NuShield Treatment Note    NAME:  Eduin Tyson OF BIRTH:  1945  MEDICAL RECORD NUMBER:  [id number]  DATE:  6/7/2023    Goal:  Patient will receive safe and proper application of skin substitute. Patient will comply with caring for dressing, offloading and reporting complications. Expiration date checked immediately prior to use. Package intact prior to use and no damage noted. Nushield was removed from protective sterile packaging by provider and applied to prepared ulcer bed. NuShield applied with notch to Top Upper Right Corner. NuShield was applied to left leg and affixed with steri-strips by the provider. Applied nonadherent and calcium alginate (Secondary Dressing)    Patient/caregiver was instructed not to remove dressing and to keep it clean and dry. NuShield may be applied a total of 10 times per wound over a 12 week period. Additionally NuShield may only be used every 12 months per wound. Date of first application of NuShield for this current wound is May 10, 2023.       Application # 3 out of 10           Guidelines followed    Electronically signed by Richard Mcclure RN on 6/7/2023 at 2:53 PM

## 2023-06-09 NOTE — PROGRESS NOTES
Wound Healing Center Followup Visit Note    Referring Physician : Pamela Perry MD  [address] [facility] RECORD NUMBER:  [id number]  AGE: 77 y.o. GENDER: female  : 1945  EPISODE DATE:  2023    Subjective:     Chief Complaint   Patient presents with    Wound Check     Left leg       HISTORY of PRESENT ILLNESS HPI   Curt Alexander is a 77 y.o. female who presents today in regards to follow up evaluation and treatment of wound/ulcer. That patient's past medical, family and social hx were reviewed and changes were made if present. History of Wound Context:  The patient has had a wound of left calf which was first noted approximately 2022. This has been treated local wound care. On their initial visit to the wound healing center, 22,  the patient has noted that the wound has been improving after seeing Dr Freida Kaminski last week. The patient has not had similar previous wounds in the past.      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 She underwent right segmental mandibulectomy, bilateral selective neck dissection of levels 1 through 4, open tracheostomy on  with L fibula resection and implant of fibular free flap in her jaw. Per pt report wound in left leg was never closed. She receive preop chemotherapy, immunotherapy. She is no longer receiving chemotherapy. Her lymph node resection was negative. Her left calf postop wound from where she had bone/free flap taken has been non healing and they have had issues with it since immediately postop per pt and family. They said [facility] surgeon is aware, recommended local wound care and was going to see them in 2023. Her daughter had asked to follow with me going forward. She has peg in place and receives nutrition via. She follows with Dr. Jazmin Long in regards to CKD. Pt is on abx at time of initial visit. She was started on levaquin per [facility] who she had culture sent to.

## 2023-06-20 NOTE — DISCHARGE INSTRUCTIONS
Visit Discharge/Physician Orders     Discharge condition: Stable     Assessment of pain at discharge: yes     Anesthetic used: lido 4%     Discharge to: Home     Left via:Private automobile     Accompanied by: accompanied by child     ECF/HHA: [facility]- Cleanse with saline, Apply zinc to the bisi wound, apply calcium alginate, ABD pads,& kerlix. Change daily. Apply spandigrip. [facility] Graft #3 6/7- hold graft this week     Treatment Orders: Eat a diet high in protein and vitamin C. Take a multiple vitamin daily unless contraindicated. Elevate leg above heart as much as possible     [address] [place] [address],[address] followup visit ______1 week_______________________  (Please note your next appointment above and if you are unable to keep, kindly give a 24 hour notice. Thank you.)     Physician signature:__________________________        If you experience any of the following, please call the [facility] during business hours:     * Increase in Pain  * Temperature over 101  * Increase in drainage from your wound  * Drainage with a foul odor  * Bleeding  * Increase in swelling  * Need for compression bandage changes due to slippage, breakthrough drainage. If you need medical attention outside of the business hours of the [facility] please contact your PCP or go to the nearest emergency room.

## 2023-06-21 ENCOUNTER — HOSPITAL ENCOUNTER (OUTPATIENT)
Dept: WOUND CARE | Age: 78
Discharge: HOME | End: 2023-06-21
Payer: MEDICARE

## 2023-06-21 VITALS
TEMPERATURE: 96.9 F | WEIGHT: 114 LBS | SYSTOLIC BLOOD PRESSURE: 100 MMHG | DIASTOLIC BLOOD PRESSURE: 50 MMHG | HEART RATE: 80 BPM | BODY MASS INDEX: 20.98 KG/M2 | HEIGHT: 62 IN | RESPIRATION RATE: 18 BRPM

## 2023-06-21 DIAGNOSIS — L97.923 NON-PRESSURE CHRONIC ULCER OF LEFT LOWER LEG WITH NECROSIS OF MUSCLE (HCC): Primary | ICD-10-CM

## 2023-06-21 PROCEDURE — 11042 DBRDMT SUBQ TIS 1ST 20SQCM/<: CPT

## 2023-06-21 RX ORDER — LIDOCAINE HYDROCHLORIDE 40 MG/ML
SOLUTION TOPICAL ONCE
OUTPATIENT
Start: 2023-06-21 | End: 2023-06-21

## 2023-06-21 RX ORDER — GENTAMICIN SULFATE 1 MG/G
OINTMENT TOPICAL ONCE
OUTPATIENT
Start: 2023-06-21 | End: 2023-06-21

## 2023-06-21 RX ORDER — BETAMETHASONE DIPROPIONATE 0.05 %
OINTMENT (GRAM) TOPICAL ONCE
OUTPATIENT
Start: 2023-06-21 | End: 2023-06-21

## 2023-06-21 RX ORDER — LIDOCAINE 50 MG/G
OINTMENT TOPICAL ONCE
OUTPATIENT
Start: 2023-06-21 | End: 2023-06-21

## 2023-06-21 RX ORDER — LIDOCAINE HYDROCHLORIDE 20 MG/ML
JELLY TOPICAL ONCE
OUTPATIENT
Start: 2023-06-21 | End: 2023-06-21

## 2023-06-21 RX ORDER — GINSENG 100 MG
CAPSULE ORAL ONCE
OUTPATIENT
Start: 2023-06-21 | End: 2023-06-21

## 2023-06-21 RX ORDER — CLOBETASOL PROPIONATE 0.5 MG/G
OINTMENT TOPICAL ONCE
OUTPATIENT
Start: 2023-06-21 | End: 2023-06-21

## 2023-06-21 RX ORDER — LIDOCAINE HYDROCHLORIDE 40 MG/ML
SOLUTION TOPICAL ONCE
Status: COMPLETED | OUTPATIENT
Start: 2023-06-21 | End: 2023-06-21

## 2023-06-21 RX ORDER — LIDOCAINE 40 MG/G
CREAM TOPICAL ONCE
OUTPATIENT
Start: 2023-06-21 | End: 2023-06-21

## 2023-06-21 RX ORDER — BACITRACIN ZINC AND POLYMYXIN B SULFATE 500; 1000 [USP'U]/G; [USP'U]/G
OINTMENT TOPICAL ONCE
OUTPATIENT
Start: 2023-06-21 | End: 2023-06-21

## 2023-06-21 RX ORDER — IBUPROFEN 200 MG
TABLET ORAL ONCE
OUTPATIENT
Start: 2023-06-21 | End: 2023-06-21

## 2023-06-21 RX ADMIN — LIDOCAINE HYDROCHLORIDE 10 ML: 40 SOLUTION TOPICAL at 14:02

## 2023-06-21 ASSESSMENT — PAIN SCALES - GENERAL: PAINLEVEL_OUTOF10: 0

## 2023-06-21 NOTE — PROGRESS NOTES
Wound Healing Center Followup Visit Note    Referring Physician : Elinor Medina MD  [address] [facility] RECORD NUMBER:  [id number]  AGE: 77 y.o. GENDER: female  : 1945  EPISODE DATE:  2023    Subjective:     Chief Complaint   Patient presents with    Wound Check     Left leg      HISTORY of PRESENT ILLNESS JAMAAL Lindquist is a 77 y.o. female who presents today in regards to follow up evaluation and treatment of wound/ulcer. That patient's past medical, family and social hx were reviewed and changes were made if present. History of Wound Context:  The patient has had a wound of left calf which was first noted approximately 2022. This has been treated local wound care. On their initial visit to the wound healing center, 22,  the patient has noted that the wound has been improving after seeing Dr Zachary Mcnamara last week. The patient has not had similar previous wounds in the past.      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 She underwent right segmental mandibulectomy, bilateral selective neck dissection of levels 1 through 4, open tracheostomy on  with L fibula resection and implant of fibular free flap in her jaw. Per pt report wound in left leg was never closed. She receive preop chemotherapy, immunotherapy. She is no longer receiving chemotherapy. Her lymph node resection was negative. Her left calf postop wound from where she had bone/free flap taken has been non healing and they have had issues with it since immediately postop per pt and family. They said [facility] surgeon is aware, recommended local wound care and was going to see them in 2023. Her daughter had asked to follow with me going forward. She has peg in place and receives nutrition via. She follows with Dr. Fermin Mosquera in regards to CKD. Pt is on abx at time of initial visit. She was started on levaquin per [facility] who she had culture sent to.

## 2023-06-28 ENCOUNTER — HOSPITAL ENCOUNTER (OUTPATIENT)
Dept: WOUND CARE | Age: 78
Discharge: HOME | End: 2023-06-28
Payer: MEDICARE

## 2023-06-28 VITALS
SYSTOLIC BLOOD PRESSURE: 124 MMHG | TEMPERATURE: 97.2 F | HEART RATE: 80 BPM | DIASTOLIC BLOOD PRESSURE: 59 MMHG | RESPIRATION RATE: 16 BRPM

## 2023-06-28 DIAGNOSIS — L97.923 NON-PRESSURE CHRONIC ULCER OF LEFT LOWER LEG WITH NECROSIS OF MUSCLE (HCC): Primary | ICD-10-CM

## 2023-06-28 PROCEDURE — 11042 DBRDMT SUBQ TIS 1ST 20SQCM/<: CPT

## 2023-06-28 RX ORDER — IBUPROFEN 200 MG
TABLET ORAL ONCE
OUTPATIENT
Start: 2023-06-28 | End: 2023-06-28

## 2023-06-28 RX ORDER — LIDOCAINE HYDROCHLORIDE 40 MG/ML
SOLUTION TOPICAL ONCE
Status: COMPLETED | OUTPATIENT
Start: 2023-06-28 | End: 2023-06-28

## 2023-06-28 RX ORDER — LIDOCAINE HYDROCHLORIDE 20 MG/ML
JELLY TOPICAL ONCE
OUTPATIENT
Start: 2023-06-28 | End: 2023-06-28

## 2023-06-28 RX ORDER — BETAMETHASONE DIPROPIONATE 0.05 %
OINTMENT (GRAM) TOPICAL ONCE
OUTPATIENT
Start: 2023-06-28 | End: 2023-06-28

## 2023-06-28 RX ORDER — LIDOCAINE 50 MG/G
OINTMENT TOPICAL ONCE
OUTPATIENT
Start: 2023-06-28 | End: 2023-06-28

## 2023-06-28 RX ORDER — GENTAMICIN SULFATE 1 MG/G
OINTMENT TOPICAL ONCE
OUTPATIENT
Start: 2023-06-28 | End: 2023-06-28

## 2023-06-28 RX ORDER — LIDOCAINE 40 MG/G
CREAM TOPICAL ONCE
OUTPATIENT
Start: 2023-06-28 | End: 2023-06-28

## 2023-06-28 RX ORDER — BACITRACIN ZINC AND POLYMYXIN B SULFATE 500; 1000 [USP'U]/G; [USP'U]/G
OINTMENT TOPICAL ONCE
OUTPATIENT
Start: 2023-06-28 | End: 2023-06-28

## 2023-06-28 RX ORDER — CLOBETASOL PROPIONATE 0.5 MG/G
OINTMENT TOPICAL ONCE
OUTPATIENT
Start: 2023-06-28 | End: 2023-06-28

## 2023-06-28 RX ORDER — LIDOCAINE HYDROCHLORIDE 40 MG/ML
SOLUTION TOPICAL ONCE
OUTPATIENT
Start: 2023-06-28 | End: 2023-06-28

## 2023-06-28 RX ORDER — GINSENG 100 MG
CAPSULE ORAL ONCE
OUTPATIENT
Start: 2023-06-28 | End: 2023-06-28

## 2023-06-28 RX ADMIN — LIDOCAINE HYDROCHLORIDE 10 ML: 40 SOLUTION TOPICAL at 14:27

## 2023-07-05 ENCOUNTER — HOSPITAL ENCOUNTER (OUTPATIENT)
Dept: WOUND CARE | Age: 78
Discharge: HOME | End: 2023-07-05
Payer: MEDICARE

## 2023-07-05 VITALS
DIASTOLIC BLOOD PRESSURE: 52 MMHG | SYSTOLIC BLOOD PRESSURE: 115 MMHG | HEART RATE: 80 BPM | RESPIRATION RATE: 16 BRPM | TEMPERATURE: 96.7 F

## 2023-07-05 DIAGNOSIS — L97.222 LOWER LIMB ULCER, CALF, LEFT, WITH FAT LAYER EXPOSED (HCC): ICD-10-CM

## 2023-07-05 DIAGNOSIS — L97.923 NON-PRESSURE CHRONIC ULCER OF LEFT LOWER LEG WITH NECROSIS OF MUSCLE (HCC): Primary | ICD-10-CM

## 2023-07-05 PROBLEM — R53.1 WEAKNESS: Status: RESOLVED | Noted: 2022-11-03 | Resolved: 2023-07-05

## 2023-07-05 PROBLEM — E44.0 MODERATE PROTEIN-CALORIE MALNUTRITION (HCC): Chronic | Status: RESOLVED | Noted: 2022-11-06 | Resolved: 2023-07-05

## 2023-07-05 PROBLEM — E87.5 HYPERKALEMIA: Status: RESOLVED | Noted: 2020-09-02 | Resolved: 2023-07-05

## 2023-07-05 PROCEDURE — 11042 DBRDMT SUBQ TIS 1ST 20SQCM/<: CPT

## 2023-07-05 RX ORDER — LIDOCAINE HYDROCHLORIDE 20 MG/ML
JELLY TOPICAL ONCE
OUTPATIENT
Start: 2023-07-05 | End: 2023-07-05

## 2023-07-05 RX ORDER — GENTAMICIN SULFATE 1 MG/G
OINTMENT TOPICAL ONCE
OUTPATIENT
Start: 2023-07-05 | End: 2023-07-05

## 2023-07-05 RX ORDER — LIDOCAINE HYDROCHLORIDE 40 MG/ML
SOLUTION TOPICAL ONCE
OUTPATIENT
Start: 2023-07-05 | End: 2023-07-05

## 2023-07-05 RX ORDER — BACITRACIN ZINC AND POLYMYXIN B SULFATE 500; 1000 [USP'U]/G; [USP'U]/G
OINTMENT TOPICAL ONCE
OUTPATIENT
Start: 2023-07-05 | End: 2023-07-05

## 2023-07-05 RX ORDER — LIDOCAINE 50 MG/G
OINTMENT TOPICAL ONCE
OUTPATIENT
Start: 2023-07-05 | End: 2023-07-05

## 2023-07-05 RX ORDER — IBUPROFEN 200 MG
TABLET ORAL ONCE
OUTPATIENT
Start: 2023-07-05 | End: 2023-07-05

## 2023-07-05 RX ORDER — LIDOCAINE HYDROCHLORIDE 40 MG/ML
SOLUTION TOPICAL ONCE
Status: COMPLETED | OUTPATIENT
Start: 2023-07-05 | End: 2023-07-05

## 2023-07-05 RX ORDER — BETAMETHASONE DIPROPIONATE 0.05 %
OINTMENT (GRAM) TOPICAL ONCE
OUTPATIENT
Start: 2023-07-05 | End: 2023-07-05

## 2023-07-05 RX ORDER — CLOBETASOL PROPIONATE 0.5 MG/G
OINTMENT TOPICAL ONCE
OUTPATIENT
Start: 2023-07-05 | End: 2023-07-05

## 2023-07-05 RX ORDER — GINSENG 100 MG
CAPSULE ORAL ONCE
OUTPATIENT
Start: 2023-07-05 | End: 2023-07-05

## 2023-07-05 RX ORDER — LIDOCAINE 40 MG/G
CREAM TOPICAL ONCE
OUTPATIENT
Start: 2023-07-05 | End: 2023-07-05

## 2023-07-05 RX ADMIN — LIDOCAINE HYDROCHLORIDE 10 ML: 40 SOLUTION TOPICAL at 14:46

## 2023-07-05 NOTE — PROGRESS NOTES
next week  5/31/23  Skin much improved  Wound smaller  Application of nushield 4x4   6/7/23  Wound improved  Application of nushield 4x4  6/14/23  Wound stable  Alginate daily  [place] Skin continues to be improved  6/21/23  Improved  6/28/23  Much improved  7/5/2023  Wound, lateral aspect left calf, clean, fat layer    Wound/Ulcer Pain Timing/Severity: mild  Quality of pain: aching  Severity:  1 / 10   Modifying Factors: Pain worsens with debridement, dressing changes  Associated Signs/Symptoms: drainage edema, pain    Ulcer Identification:  Ulcer Type: non-healing surgical  Contributing Factors: edema, immunosuppression, anticoagulation therapy, and malnutrition    Diabetic/Pressure/Non Pressure Ulcers only:  Ulcer: Non-Pressure ulcer, muscle necrosis  Wound: Wound dehiscence        PAST MEDICAL HISTORY      Diagnosis Date    Anemia     Arthritis     Bowel obstruction (HCC) 04/28/2016    Cancer (HCC)     oral/mandible    CHF (congestive heart failure) (HCC)     COPD (chronic obstructive pulmonary disease) (HCC)     Hyperlipidemia     Hypertension     LBBB (left bundle branch block)     Lower limb ulcer, calf, left, with fat layer exposed ([address]) 7/5/2023    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 ([address]) 10/26/2022    Nonischemic cardiomyopathy ([address])     Paroxysmal A-fib ([address])      Past Surgical History:   Procedure Laterality Date    ABDOMEN SURGERY  10/05/2016    exporation of abdominal fascial wound dehiscence close, insertion TLC right IJ    CARDIAC DEFIBRILLATOR PLACEMENT Left 05/23/2007    CARDIAC DEFIBRILLATOR PLACEMENT  04/17/2018    BIV ICD GENT CHANGE (BSCI)    VAMSHI    CARPAL TUNNEL RELEASE      DIAGNOSTIC CARDIAC CATH LAB PROCEDURE      ENDOSCOPY, COLON, DIAGNOSTIC  01/26/2018    upper endo    HERNIA REPAIR      HYSTERECTOMY (CERVIX STATUS UNKNOWN)      ILEOSTOMY OR JEJUNOSTOMY      INCISIONAL HERNIA REPAIR  04/21/2017    LAPAROSCOPIC; WITH MESH    MANDIBLE SURGERY      OTHER

## 2023-07-05 NOTE — DISCHARGE INSTRUCTIONS
Visit Discharge/Physician Orders     Discharge condition: Stable     Assessment of pain at discharge: yes     Anesthetic used: lido 4%     Discharge to: Home     Left via:Private automobile     Accompanied by: accompanied by child     ECF/HHA: [facility]- Cleanse with saline, Apply zinc to the bisi wound, apply calcium alginate, ABD pads,& kerlix. Change daily. Apply spandigrip. [facility] Graft #3 6/7- hold grafts     Treatment Orders: Eat a diet high in protein and vitamin C. Take a multiple vitamin daily unless contraindicated. Elevate leg above heart as much as possible     [address] [place] [address] followup visit ______1 week_______________________  (Please note your next appointment above and if you are unable to keep, kindly give a 24 hour notice. Thank you.)     Physician signature:__________________________        If you experience any of the following, please call the [facility] during business hours:     * Increase in Pain  * Temperature over 101  * Increase in drainage from your wound  * Drainage with a foul odor  * Bleeding  * Increase in swelling  * Need for compression bandage changes due to slippage, breakthrough drainage. If you need medical attention outside of the business hours of the [address] [place] [address] please contact your PCP or go to the nearest emergency room.

## 2023-07-10 NOTE — DISCHARGE INSTRUCTIONS
Visit Discharge/Physician Orders     Discharge condition: Stable     Assessment of pain at discharge: yes     Anesthetic used: lido 4%     Discharge to: Home     Left via:Private automobile     Accompanied by: accompanied by child     ECF/HHA: [address] [facility]- Cleanse with saline, Apply zinc to the bisi wound, apply calcium alginate, ABD pads,& kerlix. Change daily. Apply spandigrip. [facility] Graft #3 6/7- hold grafts     Treatment Orders: Eat a diet high in protein and vitamin C. Take a multiple vitamin daily unless contraindicated. Elevate leg above heart as much as possible     [facility] followup visit ______1 week_______________________  (Please note your next appointment above and if you are unable to keep, kindly give a 24 hour notice. Thank you.)     Physician signature:__________________________        If you experience any of the following, please call the [facility] during business hours:     * Increase in Pain  * Temperature over 101  * Increase in drainage from your wound  * Drainage with a foul odor  * Bleeding  * Increase in swelling  * Need for compression bandage changes due to slippage, breakthrough drainage. If you need medical attention outside of the business hours of the [facility] please contact your PCP or go to the nearest emergency room.

## 2023-07-12 ENCOUNTER — HOSPITAL ENCOUNTER (OUTPATIENT)
Dept: WOUND CARE | Age: 78
Discharge: HOME | End: 2023-07-12
Payer: MEDICARE

## 2023-07-12 VITALS
WEIGHT: 114 LBS | BODY MASS INDEX: 20.98 KG/M2 | TEMPERATURE: 96.8 F | HEIGHT: 62 IN | DIASTOLIC BLOOD PRESSURE: 46 MMHG | RESPIRATION RATE: 18 BRPM | SYSTOLIC BLOOD PRESSURE: 99 MMHG | HEART RATE: 83 BPM

## 2023-07-12 DIAGNOSIS — L97.923 NON-PRESSURE CHRONIC ULCER OF LEFT LOWER LEG WITH NECROSIS OF MUSCLE (HCC): Primary | ICD-10-CM

## 2023-07-12 PROCEDURE — 11042 DBRDMT SUBQ TIS 1ST 20SQCM/<: CPT

## 2023-07-12 RX ORDER — LIDOCAINE HYDROCHLORIDE 40 MG/ML
SOLUTION TOPICAL ONCE
Status: COMPLETED | OUTPATIENT
Start: 2023-07-12 | End: 2023-07-12

## 2023-07-12 RX ORDER — BETAMETHASONE DIPROPIONATE 0.05 %
OINTMENT (GRAM) TOPICAL ONCE
OUTPATIENT
Start: 2023-07-12 | End: 2023-07-12

## 2023-07-12 RX ORDER — CLOBETASOL PROPIONATE 0.5 MG/G
OINTMENT TOPICAL ONCE
OUTPATIENT
Start: 2023-07-12 | End: 2023-07-12

## 2023-07-12 RX ORDER — BACITRACIN ZINC AND POLYMYXIN B SULFATE 500; 1000 [USP'U]/G; [USP'U]/G
OINTMENT TOPICAL ONCE
OUTPATIENT
Start: 2023-07-12 | End: 2023-07-12

## 2023-07-12 RX ORDER — GENTAMICIN SULFATE 1 MG/G
OINTMENT TOPICAL ONCE
OUTPATIENT
Start: 2023-07-12 | End: 2023-07-12

## 2023-07-12 RX ORDER — GINSENG 100 MG
CAPSULE ORAL ONCE
OUTPATIENT
Start: 2023-07-12 | End: 2023-07-12

## 2023-07-12 RX ORDER — LIDOCAINE HYDROCHLORIDE 20 MG/ML
JELLY TOPICAL ONCE
OUTPATIENT
Start: 2023-07-12 | End: 2023-07-12

## 2023-07-12 RX ORDER — LIDOCAINE 40 MG/G
CREAM TOPICAL ONCE
OUTPATIENT
Start: 2023-07-12 | End: 2023-07-12

## 2023-07-12 RX ORDER — IBUPROFEN 200 MG
TABLET ORAL ONCE
OUTPATIENT
Start: 2023-07-12 | End: 2023-07-12

## 2023-07-12 RX ORDER — LIDOCAINE HYDROCHLORIDE 40 MG/ML
SOLUTION TOPICAL ONCE
OUTPATIENT
Start: 2023-07-12 | End: 2023-07-12

## 2023-07-12 RX ORDER — LIDOCAINE 50 MG/G
OINTMENT TOPICAL ONCE
OUTPATIENT
Start: 2023-07-12 | End: 2023-07-12

## 2023-07-12 RX ADMIN — LIDOCAINE HYDROCHLORIDE 10 ML: 40 SOLUTION TOPICAL at 14:02

## 2023-07-12 ASSESSMENT — PAIN SCALES - GENERAL: PAINLEVEL_OUTOF10: 0

## 2023-07-12 NOTE — PLAN OF CARE
Problem: Wound:  Goal: Will show signs of wound healing; wound closure and no evidence of infection  Description: Will show signs of wound healing; wound closure and no evidence of infection  Outcome: Progressing     Problem: Cognitive:  Goal: Knowledge of wound care  Description: Knowledge of wound care  Outcome: Adequate for Discharge  Goal: Understands risk factors for wounds  Description: Understands risk factors for wounds  Outcome: Adequate for Discharge 24

## 2023-07-13 NOTE — PROGRESS NOTES
1434   Post-Procedure Volume (cm^3) 0.594 cm^3 07/12/23 1434   Distance Tunneling (cm) 0 cm 02/15/23 1453   Tunneling Position ___ O'Clock 0 02/15/23 1453   Wound Assessment Pink/red;Fibrin 07/12/23 1400   Drainage Amount Small 07/12/23 1400   Drainage Description Serosanguinous; Yellow 07/12/23 1400   Odor None 07/12/23 1400   Cherise-wound Assessment Blanchable erythema 07/12/23 1400   Margins Attached edges 07/05/23 1441   Wound Thickness Description not for Pressure Injury Full thickness 07/05/23 1441   Number of days: 259          Procedure Note  Indications:  Based on my examination of this patient's wound(s)/ulcer(s) today, debridement is required to promote healing and evaluate the wound base. Performed by: Bayron Zapata MD    Consent obtained:  Yes    Time out taken:  Yes    Pain Control: Anesthetic  Anesthetic: 4% Lidocaine Liquid Topical     Debridement:Excisional Debridement    Using curette the wound(s)/ulcer(s) was/were sharply debrided down through and including the removal of epidermis, dermis, and subcutaneous tissue. Devitalized Tissue Debrided:  fibrin, biofilm, slough, and exudate to stimulate bleeding to promote healing, post debridement good bleeding base and wound edges noted    Wound/Ulcer #: 1    Percent of Wound/Ulcer Debrided: 100%    Total Surface Area Debrided: 2.97  sq cm     Estimated Blood Loss:  Minimal  Hemostasis Achieved:  by pressure    Procedural Pain:  4  / 10   Post Procedural Pain:  3 / 10     Response to treatment:  Well tolerated by patient. .skinPlan:   Treatment Note please see attached Discharge Instructions    Written patient dismissal instructions given to patient and signed by patient or POA.          Discharge Instructions         Visit Discharge/Physician Orders     Discharge condition: Stable     Assessment of pain at discharge: yes     Anesthetic used: lido 4%     Discharge to: Home     Left via:Private automobile     Accompanied by:

## 2023-07-18 NOTE — DISCHARGE INSTRUCTIONS
Visit Discharge/Physician Orders     Discharge condition: Stable     Assessment of pain at discharge: yes     Anesthetic used: lido 4%     Discharge to: Home     Left via:Private automobile     Accompanied by: accompanied by child     ECF/HHA: [address] [facility]- Cleanse with saline, Apply zinc to the bisi wound, apply calcium alginate, ABD pads,& kerlix. Change daily. Apply spandagrip. On in am and off in pm. Elevate legs as much as possible above level of the heart. [facility] Graft #3 6/7- hold grafts     Treatment Orders: Eat a diet high in protein and vitamin C. Take a multiple vitamin daily unless contraindicated. Elevate leg above heart as much as possible     [address] [place] [address] followup visit :1 week (in pm)________________________________  (Please note your next appointment above and if you are unable to keep, kindly give a 24 hour notice. Thank you.)     Physician signature:__________________________      If you experience any of the following, please call the [facility] during business hours:     * Increase in Pain  * Temperature over 101  * Increase in drainage from your wound  * Drainage with a foul odor  * Bleeding  * Increase in swelling  * Need for compression bandage changes due to slippage, breakthrough drainage. If you need medical attention outside of the business hours of the [facility] please contact your PCP or go to the nearest emergency room.

## 2023-07-19 ENCOUNTER — HOSPITAL ENCOUNTER (OUTPATIENT)
Dept: WOUND CARE | Age: 78
Discharge: HOME | End: 2023-07-19
Payer: MEDICARE

## 2023-07-19 VITALS
DIASTOLIC BLOOD PRESSURE: 56 MMHG | TEMPERATURE: 96.5 F | WEIGHT: 114 LBS | SYSTOLIC BLOOD PRESSURE: 98 MMHG | BODY MASS INDEX: 20.98 KG/M2 | HEIGHT: 62 IN | RESPIRATION RATE: 18 BRPM | HEART RATE: 81 BPM

## 2023-07-19 DIAGNOSIS — L97.923 NON-PRESSURE CHRONIC ULCER OF LEFT LOWER LEG WITH NECROSIS OF MUSCLE (HCC): Primary | ICD-10-CM

## 2023-07-19 PROCEDURE — 97597 DBRDMT OPN WND 1ST 20 CM/<: CPT

## 2023-07-19 RX ORDER — GENTAMICIN SULFATE 1 MG/G
OINTMENT TOPICAL ONCE
OUTPATIENT
Start: 2023-07-19 | End: 2023-07-19

## 2023-07-19 RX ORDER — LIDOCAINE 40 MG/G
CREAM TOPICAL ONCE
OUTPATIENT
Start: 2023-07-19 | End: 2023-07-19

## 2023-07-19 RX ORDER — IBUPROFEN 200 MG
TABLET ORAL ONCE
OUTPATIENT
Start: 2023-07-19 | End: 2023-07-19

## 2023-07-19 RX ORDER — GINSENG 100 MG
CAPSULE ORAL ONCE
OUTPATIENT
Start: 2023-07-19 | End: 2023-07-19

## 2023-07-19 RX ORDER — BACITRACIN ZINC AND POLYMYXIN B SULFATE 500; 1000 [USP'U]/G; [USP'U]/G
OINTMENT TOPICAL ONCE
OUTPATIENT
Start: 2023-07-19 | End: 2023-07-19

## 2023-07-19 RX ORDER — CLOBETASOL PROPIONATE 0.5 MG/G
OINTMENT TOPICAL ONCE
OUTPATIENT
Start: 2023-07-19 | End: 2023-07-19

## 2023-07-19 RX ORDER — LIDOCAINE HYDROCHLORIDE 20 MG/ML
JELLY TOPICAL ONCE
OUTPATIENT
Start: 2023-07-19 | End: 2023-07-19

## 2023-07-19 RX ORDER — LIDOCAINE 50 MG/G
OINTMENT TOPICAL ONCE
OUTPATIENT
Start: 2023-07-19 | End: 2023-07-19

## 2023-07-19 RX ORDER — BETAMETHASONE DIPROPIONATE 0.05 %
OINTMENT (GRAM) TOPICAL ONCE
OUTPATIENT
Start: 2023-07-19 | End: 2023-07-19

## 2023-07-19 RX ORDER — LIDOCAINE HYDROCHLORIDE 40 MG/ML
SOLUTION TOPICAL ONCE
Status: COMPLETED | OUTPATIENT
Start: 2023-07-19 | End: 2023-07-19

## 2023-07-19 RX ORDER — LIDOCAINE HYDROCHLORIDE 40 MG/ML
SOLUTION TOPICAL ONCE
OUTPATIENT
Start: 2023-07-19 | End: 2023-07-19

## 2023-07-19 RX ADMIN — LIDOCAINE HYDROCHLORIDE 5 ML: 40 SOLUTION TOPICAL at 11:07

## 2023-07-19 NOTE — PROGRESS NOTES
Wound Healing Center Followup Visit Note    Referring Physician : Isidro Barkley MD  [address] RECORD NUMBER:  [id number]  AGE: 77 y.o. GENDER: female  : 1945  EPISODE DATE:  2023    Subjective:     Chief Complaint   Patient presents with    Wound Check     Left leg      HISTORY of PRESENT ILLNESS HPI   Summer Mcdonald is a 77 y.o. female who presents today in regards to follow up evaluation and treatment of wound/ulcer. That patient's past medical, family and social hx were reviewed and changes were made if present. History of Wound Context:  The patient has had a wound of left calf which was first noted approximately 2022. This has been treated local wound care. On their initial visit to the wound healing center, 22,  the patient has noted that the wound has been improving after seeing Dr Bernie Voss last week. The patient has not had similar previous wounds in the past.      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 She underwent right segmental mandibulectomy, bilateral selective neck dissection of levels 1 through 4, open tracheostomy on  with L fibula resection and implant of fibular free flap in her jaw. Per pt report wound in left leg was never closed. She receive preop chemotherapy, immunotherapy. She is no longer receiving chemotherapy. Her lymph node resection was negative. Her left calf postop wound from where she had bone/free flap taken has been non healing and they have had issues with it since immediately postop per pt and family. They said [facility] surgeon is aware, recommended local wound care and was going to see them in 2023. Her daughter had asked to follow with me going forward. She has peg in place and receives nutrition via. She follows with Dr. Jm Bruno in regards to CKD. Pt is on abx at time of initial visit. She was started on levaquin per [facility] who she had culture sent to.

## 2023-07-19 NOTE — PLAN OF CARE
Problem: Chronic Conditions and Co-morbidities  Goal: Patient's chronic conditions and co-morbidity symptoms are monitored and maintained or improved  7/19/2023 1146 by Rashad Soto RN  Outcome: Progressing  7/19/2023 1143 by Rashad Soto RN  Outcome: Progressing     Problem: Wound:  Goal: Will show signs of wound healing; wound closure and no evidence of infection  Description: Will show signs of wound healing; wound closure and no evidence of infection  7/19/2023 1146 by Rashad Soto RN  Outcome: Progressing  7/19/2023 1143 by Rashad Soto RN  Outcome: Progressing     Problem: Cognitive:  Goal: Knowledge of wound care  Description: Knowledge of wound care  Outcome: Progressing  Goal: Understands risk factors for wounds  Description: Understands risk factors for wounds  Outcome: Progressing     Problem: Wound:  Goal: Will show signs of wound healing; wound closure and no evidence of infection  Description: Will show signs of wound healing; wound closure and no evidence of infection  Outcome: Progressing     Problem: Compression therapy:  Goal: Will be free from complications associated with compression therapy  Description: Will be free from complications associated with compression therapy  Outcome: Progressing

## 2023-07-25 NOTE — DISCHARGE INSTRUCTIONS
Visit Discharge/Physician Orders     Discharge condition: Stable     Assessment of pain at discharge: yes     Anesthetic used: lido 4%     Discharge to: Home     Left via:Private automobile     Accompanied by: accompanied by child     ECF/HHA: [address] [facility]- Cleanse with saline, Apply zinc to the bisi wound, apply calcium alginate, ABD pads,& kerlix. Change every other day. Apply spandagrip. On in am and off in pm. Elevate legs as much as possible above level of the heart. [facility] Graft #3 6/7- hold grafts     Treatment Orders: Eat a diet high in protein and vitamin C. Take a multiple vitamin daily unless contraindicated. Elevate leg above heart as much as possible     [facility] followup visit :1 week (in pm)________________________________  (Please note your next appointment above and if you are unable to keep, kindly give a 24 hour notice. Thank you.)     Physician signature:__________________________      If you experience any of the following, please call the [facility] during business hours:     * Increase in Pain  * Temperature over 101  * Increase in drainage from your wound  * Drainage with a foul odor  * Bleeding  * Increase in swelling  * Need for compression bandage changes due to slippage, breakthrough drainage. If you need medical attention outside of the business hours of the [facility] please contact your PCP or go to the nearest emergency room.

## 2023-07-26 ENCOUNTER — HOSPITAL ENCOUNTER (OUTPATIENT)
Dept: WOUND CARE | Age: 78
Discharge: HOME | End: 2023-07-26
Payer: MEDICARE

## 2023-07-26 VITALS
SYSTOLIC BLOOD PRESSURE: 100 MMHG | DIASTOLIC BLOOD PRESSURE: 54 MMHG | HEIGHT: 62 IN | HEART RATE: 82 BPM | BODY MASS INDEX: 20.8 KG/M2 | WEIGHT: 113 LBS | RESPIRATION RATE: 18 BRPM | TEMPERATURE: 98.4 F

## 2023-07-26 DIAGNOSIS — L97.923 NON-PRESSURE CHRONIC ULCER OF LEFT LOWER LEG WITH NECROSIS OF MUSCLE (HCC): Primary | ICD-10-CM

## 2023-07-26 PROCEDURE — 97597 DBRDMT OPN WND 1ST 20 CM/<: CPT | Performed by: SURGERY

## 2023-07-26 PROCEDURE — 97597 DBRDMT OPN WND 1ST 20 CM/<: CPT

## 2023-07-26 RX ORDER — LIDOCAINE 40 MG/G
CREAM TOPICAL ONCE
OUTPATIENT
Start: 2023-07-26 | End: 2023-07-26

## 2023-07-26 RX ORDER — LIDOCAINE HYDROCHLORIDE 20 MG/ML
JELLY TOPICAL ONCE
OUTPATIENT
Start: 2023-07-26 | End: 2023-07-26

## 2023-07-26 RX ORDER — IBUPROFEN 200 MG
TABLET ORAL ONCE
OUTPATIENT
Start: 2023-07-26 | End: 2023-07-26

## 2023-07-26 RX ORDER — BETAMETHASONE DIPROPIONATE 0.05 %
OINTMENT (GRAM) TOPICAL ONCE
OUTPATIENT
Start: 2023-07-26 | End: 2023-07-26

## 2023-07-26 RX ORDER — LIDOCAINE HYDROCHLORIDE 40 MG/ML
SOLUTION TOPICAL ONCE
Status: COMPLETED | OUTPATIENT
Start: 2023-07-26 | End: 2023-07-26

## 2023-07-26 RX ORDER — BACITRACIN ZINC AND POLYMYXIN B SULFATE 500; 1000 [USP'U]/G; [USP'U]/G
OINTMENT TOPICAL ONCE
OUTPATIENT
Start: 2023-07-26 | End: 2023-07-26

## 2023-07-26 RX ORDER — LIDOCAINE HYDROCHLORIDE 40 MG/ML
SOLUTION TOPICAL ONCE
OUTPATIENT
Start: 2023-07-26 | End: 2023-07-26

## 2023-07-26 RX ORDER — GINSENG 100 MG
CAPSULE ORAL ONCE
OUTPATIENT
Start: 2023-07-26 | End: 2023-07-26

## 2023-07-26 RX ORDER — LIDOCAINE 50 MG/G
OINTMENT TOPICAL ONCE
OUTPATIENT
Start: 2023-07-26 | End: 2023-07-26

## 2023-07-26 RX ORDER — GENTAMICIN SULFATE 1 MG/G
OINTMENT TOPICAL ONCE
OUTPATIENT
Start: 2023-07-26 | End: 2023-07-26

## 2023-07-26 RX ORDER — CLOBETASOL PROPIONATE 0.5 MG/G
OINTMENT TOPICAL ONCE
OUTPATIENT
Start: 2023-07-26 | End: 2023-07-26

## 2023-07-26 RX ADMIN — LIDOCAINE HYDROCHLORIDE 4 ML: 40 SOLUTION TOPICAL at 14:52

## 2023-07-26 ASSESSMENT — PAIN SCALES - GENERAL: PAINLEVEL_OUTOF10: 0

## 2023-07-26 NOTE — PROGRESS NOTES
11/2/22  She does have an area of muscle that doesn't appear to be viable  She may need surgical debridement in the future  Coban 2, aquacell ag  She is already on levaquin from recent culture  No evidence of arterial occlusive disease  Labs ordered  Ok to cancel abis and pvrs  11/9/22  Wound slightly smaller  Coban 2, aquacell ag  Still may need surgical debridement in future  11/16/22  Wound smaller 109 (133)    11/23/22  Wound smaller 66  11/30/22  Wound smaller 63   Wound appearance tremendously improved  Area of depth dramatically improved  Muscle coverage improved  12/7/22  Wound 58  Continued improvement, and more muscle coverage  12/14/22  Wound 55  Improved  12/21/22  Wound 50  Muscle almost completely covered  12/28/22  Wound size stable 50, appearance improved  Change to drawtek  1/4/23  Wound size improved 44  1/11/23  Wound size 40 improved  Itching better with unna boot  1/18/23  Size decreased to 31  1/25/23  Size 31 but appearance better, appears smaller today   21/23  Smaller 20  2/8/23  Slightly larger 24  2/15/23  Smaller 20  2/22/23  Stable, measures slightly larger at 23   3/1/23  Stable, still measures 23  3/8/23  Smaller 18  3/15/23  Larger  Will check culture, consideration for advanced skin therapy  3/22/23  No significant growth on culture  Measuring larger, appearance improved  Advanced skin therapy application  8/97/57  Slightly smaller  Will plan on nushield next week likely  4/5/23  Much improved  Hold on nushield  4/12/2023  Calf wound, clean and granulating  4/19/23  Appearance much improved  4/26/23  Much improved, smaller  5/3/23  Larger  Will graft next week if not improved  5/10/23  Stable in size  Application of nushield 2x4 cm  5/17/23  Wound itself improved but surrounding area more of an issue  Wrap slid down   Will hold on wrap  Tubigrip, alginate, zinc to periwound  5/24/23  Skin much better  Wound overall smaller - block measurement makes it seem larger  Graft likely

## 2023-07-26 NOTE — PLAN OF CARE
Problem: Chronic Conditions and Co-morbidities  Goal: Patient's chronic conditions and co-morbidity symptoms are monitored and maintained or improved  Outcome: Progressing     Problem: Cognitive:  Goal: Knowledge of wound care  Description: Knowledge of wound care  Outcome: Progressing  Goal: Understands risk factors for wounds  Description: Understands risk factors for wounds  Outcome: Progressing     Problem: Wound:  Goal: Will show signs of wound healing; wound closure and no evidence of infection  Description: Will show signs of wound healing; wound closure and no evidence of infection  Outcome: Progressing     Problem: Cognitive:  Goal: Knowledge of wound care  Description: Knowledge of wound care  Outcome: Progressing  Goal: Understands risk factors for wounds  Description: Understands risk factors for wounds  Outcome: Progressing     Problem: Wound:  Goal: Will show signs of wound healing; wound closure and no evidence of infection  Description: Will show signs of wound healing; wound closure and no evidence of infection  Outcome: Progressing     Problem: Compression therapy:  Goal: Will be free from complications associated with compression therapy  Description: Will be free from complications associated with compression therapy  Outcome: Progressing

## 2023-07-31 NOTE — DISCHARGE INSTRUCTIONS
Visit Discharge/Physician Orders     Discharge condition: Stable     Assessment of pain at discharge: yes     Anesthetic used: lido 4%     Discharge to: Home     Left via:Private automobile     Accompanied by: accompanied by child     ECF/HHA: [address] [facility]- Cleanse with saline, Apply zinc to the bisi wound, apply calcium alginate, ABD pads,& kerlix. Change every other day. Apply spandagrip. On in am and off in pm. Elevate legs as much as possible above level of the heart. [facility] Graft #3 6/7- hold grafts     Treatment Orders: Eat a diet high in protein and vitamin C. Take a multiple vitamin daily unless contraindicated. Elevate leg above heart as much as possible     [facility] followup visit :1 week (in pm)________________________________  (Please note your next appointment above and if you are unable to keep, kindly give a 24 hour notice. Thank you.)     Physician signature:__________________________      If you experience any of the following, please call the [facility] during business hours:     * Increase in Pain  * Temperature over 101  * Increase in drainage from your wound  * Drainage with a foul odor  * Bleeding  * Increase in swelling  * Need for compression bandage changes due to slippage, breakthrough drainage. If you need medical attention outside of the business hours of the [facility] please contact your PCP or go to the nearest emergency room.

## 2023-08-02 ENCOUNTER — HOSPITAL ENCOUNTER (OUTPATIENT)
Dept: WOUND CARE | Age: 78
Discharge: HOME | End: 2023-08-02
Payer: MEDICARE

## 2023-08-02 VITALS
TEMPERATURE: 96.7 F | RESPIRATION RATE: 18 BRPM | HEART RATE: 80 BPM | SYSTOLIC BLOOD PRESSURE: 94 MMHG | DIASTOLIC BLOOD PRESSURE: 54 MMHG

## 2023-08-02 DIAGNOSIS — L97.923 NON-PRESSURE CHRONIC ULCER OF LEFT LOWER LEG WITH NECROSIS OF MUSCLE (HCC): Primary | ICD-10-CM

## 2023-08-02 PROCEDURE — 97597 DBRDMT OPN WND 1ST 20 CM/<: CPT

## 2023-08-02 RX ORDER — CLOBETASOL PROPIONATE 0.5 MG/G
OINTMENT TOPICAL ONCE
OUTPATIENT
Start: 2023-08-02 | End: 2023-08-02

## 2023-08-02 RX ORDER — LIDOCAINE HYDROCHLORIDE 40 MG/ML
SOLUTION TOPICAL ONCE
Status: COMPLETED | OUTPATIENT
Start: 2023-08-02 | End: 2023-08-02

## 2023-08-02 RX ORDER — IBUPROFEN 200 MG
TABLET ORAL ONCE
OUTPATIENT
Start: 2023-08-02 | End: 2023-08-02

## 2023-08-02 RX ORDER — LIDOCAINE HYDROCHLORIDE 40 MG/ML
SOLUTION TOPICAL ONCE
OUTPATIENT
Start: 2023-08-02 | End: 2023-08-02

## 2023-08-02 RX ORDER — BETAMETHASONE DIPROPIONATE 0.05 %
OINTMENT (GRAM) TOPICAL ONCE
OUTPATIENT
Start: 2023-08-02 | End: 2023-08-02

## 2023-08-02 RX ORDER — LIDOCAINE 50 MG/G
OINTMENT TOPICAL ONCE
OUTPATIENT
Start: 2023-08-02 | End: 2023-08-02

## 2023-08-02 RX ORDER — LIDOCAINE HYDROCHLORIDE 20 MG/ML
JELLY TOPICAL ONCE
OUTPATIENT
Start: 2023-08-02 | End: 2023-08-02

## 2023-08-02 RX ORDER — GINSENG 100 MG
CAPSULE ORAL ONCE
OUTPATIENT
Start: 2023-08-02 | End: 2023-08-02

## 2023-08-02 RX ORDER — LIDOCAINE 40 MG/G
CREAM TOPICAL ONCE
OUTPATIENT
Start: 2023-08-02 | End: 2023-08-02

## 2023-08-02 RX ORDER — BACITRACIN ZINC AND POLYMYXIN B SULFATE 500; 1000 [USP'U]/G; [USP'U]/G
OINTMENT TOPICAL ONCE
OUTPATIENT
Start: 2023-08-02 | End: 2023-08-02

## 2023-08-02 RX ORDER — GENTAMICIN SULFATE 1 MG/G
OINTMENT TOPICAL ONCE
OUTPATIENT
Start: 2023-08-02 | End: 2023-08-02

## 2023-08-02 RX ADMIN — LIDOCAINE HYDROCHLORIDE 5 ML: 40 SOLUTION TOPICAL at 14:32

## 2023-08-07 NOTE — DISCHARGE INSTRUCTIONS
Visit Discharge/Physician Orders     Discharge condition: Stable     Assessment of pain at discharge: yes     Anesthetic used: lido 4%     Discharge to: Home     Left via:Private automobile     Accompanied by: accompanied by child     ECF/HHA: [address] [facility]- keep area clean and moisturized  Apply spandagrip. On in am and off in pm. Elevate legs as much as possible above level of the heart. Treatment Orders: Eat a diet high in protein and vitamin C. Take a multiple vitamin daily unless contraindicated. Elevate leg above heart as much as possible     [address] [place] [address] followup visit : as needed_______________________________  (Please note your next appointment above and if you are unable to keep, kindly give a 24 hour notice. Thank you.)     Physician signature:__________________________      If you experience any of the following, please call the [address] [place] [address] during business hours:     * Increase in Pain  * Temperature over 101  * Increase in drainage from your wound  * Drainage with a foul odor  * Bleeding  * Increase in swelling  * Need for compression bandage changes due to slippage, breakthrough drainage. If you need medical attention outside of the business hours of the [address] [place] [address] please contact your PCP or go to the nearest emergency room.

## 2023-08-09 ENCOUNTER — HOSPITAL ENCOUNTER (OUTPATIENT)
Dept: WOUND CARE | Age: 78
Discharge: HOME | End: 2023-08-09
Payer: MEDICARE

## 2023-08-09 VITALS
RESPIRATION RATE: 18 BRPM | BODY MASS INDEX: 20.8 KG/M2 | HEART RATE: 80 BPM | TEMPERATURE: 96.7 F | DIASTOLIC BLOOD PRESSURE: 50 MMHG | HEIGHT: 62 IN | WEIGHT: 113 LBS | SYSTOLIC BLOOD PRESSURE: 103 MMHG

## 2023-08-09 DIAGNOSIS — L97.222 LOWER LIMB ULCER, CALF, LEFT, WITH FAT LAYER EXPOSED (HCC): ICD-10-CM

## 2023-08-09 DIAGNOSIS — L97.923 NON-PRESSURE CHRONIC ULCER OF LEFT LOWER LEG WITH NECROSIS OF MUSCLE (HCC): Primary | ICD-10-CM

## 2023-08-09 PROCEDURE — 99212 OFFICE O/P EST SF 10 MIN: CPT

## 2023-08-09 PROCEDURE — 99212 OFFICE O/P EST SF 10 MIN: CPT | Performed by: SURGERY

## 2023-08-09 RX ORDER — LIDOCAINE HYDROCHLORIDE 40 MG/ML
SOLUTION TOPICAL ONCE
Status: CANCELLED | OUTPATIENT
Start: 2023-08-09 | End: 2023-08-09

## 2023-08-09 RX ORDER — IBUPROFEN 200 MG
TABLET ORAL ONCE
Status: CANCELLED | OUTPATIENT
Start: 2023-08-09 | End: 2023-08-09

## 2023-08-09 RX ORDER — LIDOCAINE HYDROCHLORIDE 20 MG/ML
JELLY TOPICAL ONCE
Status: CANCELLED | OUTPATIENT
Start: 2023-08-09 | End: 2023-08-09

## 2023-08-09 RX ORDER — GINSENG 100 MG
CAPSULE ORAL ONCE
Status: CANCELLED | OUTPATIENT
Start: 2023-08-09 | End: 2023-08-09

## 2023-08-09 RX ORDER — GENTAMICIN SULFATE 1 MG/G
OINTMENT TOPICAL ONCE
Status: CANCELLED | OUTPATIENT
Start: 2023-08-09 | End: 2023-08-09

## 2023-08-09 RX ORDER — LIDOCAINE 50 MG/G
OINTMENT TOPICAL ONCE
Status: CANCELLED | OUTPATIENT
Start: 2023-08-09 | End: 2023-08-09

## 2023-08-09 RX ORDER — BETAMETHASONE DIPROPIONATE 0.05 %
OINTMENT (GRAM) TOPICAL ONCE
Status: CANCELLED | OUTPATIENT
Start: 2023-08-09 | End: 2023-08-09

## 2023-08-09 RX ORDER — LIDOCAINE 40 MG/G
CREAM TOPICAL ONCE
Status: CANCELLED | OUTPATIENT
Start: 2023-08-09 | End: 2023-08-09

## 2023-08-09 RX ORDER — BACITRACIN ZINC AND POLYMYXIN B SULFATE 500; 1000 [USP'U]/G; [USP'U]/G
OINTMENT TOPICAL ONCE
Status: CANCELLED | OUTPATIENT
Start: 2023-08-09 | End: 2023-08-09

## 2023-08-09 RX ORDER — CLOBETASOL PROPIONATE 0.5 MG/G
OINTMENT TOPICAL ONCE
Status: CANCELLED | OUTPATIENT
Start: 2023-08-09 | End: 2023-08-09

## 2023-08-09 ASSESSMENT — PAIN SCALES - GENERAL: PAINLEVEL_OUTOF10: 0

## 2023-08-09 NOTE — PLAN OF CARE
Problem: Chronic Conditions and Co-morbidities  Goal: Patient's chronic conditions and co-morbidity symptoms are monitored and maintained or improved  Outcome: Completed     Problem: Pain  Goal: Verbalizes/displays adequate comfort level or baseline comfort level  Outcome: Completed     Problem: Cognitive:  Goal: Knowledge of wound care  Description: Knowledge of wound care  Outcome: Completed  Goal: Understands risk factors for wounds  Description: Understands risk factors for wounds  Outcome: Completed     Problem: Wound:  Goal: Will show signs of wound healing; wound closure and no evidence of infection  Description: Will show signs of wound healing; wound closure and no evidence of infection  Outcome: Completed     Problem: Cognitive:  Goal: Knowledge of wound care  Description: Knowledge of wound care  Outcome: Completed  Goal: Understands risk factors for wounds  Description: Understands risk factors for wounds  Outcome: Completed     Problem: Wound:  Goal: Will show signs of wound healing; wound closure and no evidence of infection  Description: Will show signs of wound healing; wound closure and no evidence of infection  Outcome: Completed     Problem: Compression therapy:  Goal: Will be free from complications associated with compression therapy  Description: Will be free from complications associated with compression therapy  Outcome: Completed

## 2023-08-09 NOTE — PROGRESS NOTES
as much as possible above level of the heart. [facility] Graft #3 6/7- hold grafts     Treatment Orders: Eat a diet high in protein and vitamin C. Take a multiple vitamin daily unless contraindicated. Elevate leg above heart as much as possible     [facility] followup visit :1 week (in pm)________________________________  (Please note your next appointment above and if you are unable to keep, kindly give a 24 hour notice. Thank you.)     Physician signature:__________________________      If you experience any of the following, please call the [facility] during business hours:     * Increase in Pain  * Temperature over 101  * Increase in drainage from your wound  * Drainage with a foul odor  * Bleeding  * Increase in swelling  * Need for compression bandage changes due to slippage, breakthrough drainage. If you need medical attention outside of the business hours of the [facility] please contact your PCP or go to the nearest emergency room.                Electronically signed by MD Bro Gupta MD

## 2023-08-19 PROCEDURE — G2066 INTER DEVC REMOTE 30D: HCPCS | Performed by: INTERNAL MEDICINE

## 2023-08-19 PROCEDURE — 93297 REM INTERROG DEV EVAL ICPMS: CPT | Performed by: INTERNAL MEDICINE

## 2023-08-20 PROCEDURE — 93296 REM INTERROG EVL PM/IDS: CPT | Performed by: INTERNAL MEDICINE

## 2023-08-20 PROCEDURE — 93295 DEV INTERROG REMOTE 1/2/MLT: CPT | Performed by: INTERNAL MEDICINE

## 2023-09-11 NOTE — DISCHARGE INSTRUCTIONS
Visit Discharge/Physician Orders     Discharge condition: Stable     Assessment of pain at discharge: yes     Anesthetic used: lido 4%     Discharge to: Home     Left via:Private automobile     Accompanied by: accompanied by child     ECF/HHA: florin     Dressing Orders:LEFT LATERAL LOWER LEG- Cleanse with saline, apply calcium alginate, cover and secure with dry dressing. Change daily. Apply spandagrip. On in am and off in pm. Elevate legs as much as possible above level of the heart. Treatment Orders: Eat a diet high in protein and vitamin C. Take a multiple vitamin daily unless contraindicated. Elevate leg above heart as much as possible    C&S taken 9/13/23     [address] [place] [address] followup visit :  2 weeks______________________  (Please note your next appointment above and if you are unable to keep, kindly give a 24 hour notice. Thank you.)     Physician signature:__________________________      If you experience any of the following, please call the [facility] during business hours:     * Increase in Pain  * Temperature over 101  * Increase in drainage from your wound  * Drainage with a foul odor  * Bleeding  * Increase in swelling  * Need for compression bandage changes due to slippage, breakthrough drainage. If you need medical attention outside of the business hours of the [facility] please contact your PCP or go to the nearest emergency room.

## 2023-09-13 ENCOUNTER — HOSPITAL ENCOUNTER (OUTPATIENT)
Dept: WOUND CARE | Age: 78
Discharge: HOME | End: 2023-09-13
Payer: MEDICARE

## 2023-09-13 VITALS
RESPIRATION RATE: 18 BRPM | SYSTOLIC BLOOD PRESSURE: 127 MMHG | DIASTOLIC BLOOD PRESSURE: 47 MMHG | TEMPERATURE: 97.1 F | HEART RATE: 80 BPM

## 2023-09-13 PROBLEM — L97.922 NON-PRESSURE CHRONIC ULCER OF LEFT LOWER LEG WITH FAT LAYER EXPOSED (HCC): Chronic | Status: ACTIVE | Noted: 2022-10-26

## 2023-09-13 PROCEDURE — 87070 CULTURE OTHR SPECIMN AEROBIC: CPT

## 2023-09-13 PROCEDURE — 87075 CULTR BACTERIA EXCEPT BLOOD: CPT

## 2023-09-13 PROCEDURE — 87205 SMEAR GRAM STAIN: CPT

## 2023-09-13 PROCEDURE — 86403 PARTICLE AGGLUT ANTBDY SCRN: CPT

## 2023-09-13 RX ORDER — LIDOCAINE 40 MG/G
CREAM TOPICAL ONCE
OUTPATIENT
Start: 2023-09-13 | End: 2023-09-13

## 2023-09-13 RX ORDER — SODIUM CHLOR/HYPOCHLOROUS ACID 0.033 %
SOLUTION, IRRIGATION IRRIGATION ONCE
OUTPATIENT
Start: 2023-09-13 | End: 2023-09-13

## 2023-09-13 RX ORDER — LIDOCAINE HYDROCHLORIDE 40 MG/ML
SOLUTION TOPICAL ONCE
Status: COMPLETED | OUTPATIENT
Start: 2023-09-13 | End: 2023-09-13

## 2023-09-13 RX ORDER — LIDOCAINE HYDROCHLORIDE 20 MG/ML
JELLY TOPICAL ONCE
OUTPATIENT
Start: 2023-09-13 | End: 2023-09-13

## 2023-09-13 RX ORDER — LIDOCAINE 50 MG/G
OINTMENT TOPICAL ONCE
OUTPATIENT
Start: 2023-09-13 | End: 2023-09-13

## 2023-09-13 RX ORDER — IBUPROFEN 200 MG
TABLET ORAL ONCE
OUTPATIENT
Start: 2023-09-13 | End: 2023-09-13

## 2023-09-13 RX ORDER — LIDOCAINE HYDROCHLORIDE 40 MG/ML
SOLUTION TOPICAL ONCE
OUTPATIENT
Start: 2023-09-13 | End: 2023-09-13

## 2023-09-13 RX ORDER — GENTAMICIN SULFATE 1 MG/G
OINTMENT TOPICAL ONCE
OUTPATIENT
Start: 2023-09-13 | End: 2023-09-13

## 2023-09-13 RX ORDER — BACITRACIN ZINC 500 [USP'U]/G
OINTMENT TOPICAL ONCE
OUTPATIENT
Start: 2023-09-13 | End: 2023-09-13

## 2023-09-13 RX ORDER — BETAMETHASONE DIPROPIONATE 0.05 %
OINTMENT (GRAM) TOPICAL ONCE
OUTPATIENT
Start: 2023-09-13 | End: 2023-09-13

## 2023-09-13 RX ORDER — BACITRACIN ZINC AND POLYMYXIN B SULFATE 500; 1000 [USP'U]/G; [USP'U]/G
OINTMENT TOPICAL ONCE
OUTPATIENT
Start: 2023-09-13 | End: 2023-09-13

## 2023-09-13 RX ORDER — CLOBETASOL PROPIONATE 0.5 MG/G
OINTMENT TOPICAL ONCE
OUTPATIENT
Start: 2023-09-13 | End: 2023-09-13

## 2023-09-13 RX ADMIN — LIDOCAINE HYDROCHLORIDE 5 ML: 40 SOLUTION TOPICAL at 14:49

## 2023-09-14 NOTE — PROGRESS NOTES
[address] [address] [address]:     [facility] [address] [facility] [address] [facility], [address] [facility]  p: [phone] f: [phone]     Ordering Center:     [address] [place] [address] 2020 [facility] [address]  [phone]  WOUND CARE Dept: [address] [facility] Rosa Maria ZXINSD [phone]    Patient Information:      Debby Live  [address] [facility],  [address]  [place] [address]   I have attempted without success to contact this patient by phone for Preadmission Testing phone assessment. Message left for pt to return call. : 1945  AGE: 77 y.o. GENDER: female   EPISODE DATE: 2023    Insurance:      PRIMARY INSURANCE:  Plan: MEDICARE PART A AND B  Coverage: MEDICARE  Effective Date: 2009  Group Number: [unfilled]  Subscriber Number: [id number] - (Medicare)    Payer/Plan Subscr  Sex Relation Sub. Ins. ID Effective Group Num   1. MEDICARE - ZEBopalllpeter Live 1945 Female Self [id number]                                    [address] [address]   2.  Esau Galvan 1945 Female Self [id number]                                   [address] [address]       Patient Wound Information:      Problem List Items Addressed This Visit    None      WOUNDS REQUIRING DRESSING SUPPLIES:     Incision 10/05/16 Abdomen Mid (Active)   Number of days: 8829       Incision 17 Abdomen Right;Left;Mid (Active)   Number of days: 2336       Wound 23 # 1 leg left lower lateral (Active)   Wound Etiology Skin Tear 23 1446   Wound Length (cm) 1.8 cm 23 1446   Wound Width (cm) 0.6 cm 23 1446   Wound Depth (cm) 0.1 cm 23 1446   Wound Surface Area (cm^2) 1.08 cm^2 23 1446   Wound Volume (cm^3) 0.108 cm^3 23 1446   Wound Assessment Fibrin;Pink/red 23 1446   Drainage Amount Moderate (25-50%) 23 1446   Drainage Description Serosanguinous 23 1446   Odor None 23 1446   Cherise-wound Assessment Intact 23
and evaluate the wound base. Performed by: Genesis Jarrell MD    Consent obtained:  Yes    Time out taken:  Yes    Pain Control: Anesthetic  Anesthetic: 4% Lidocaine Liquid Topical     Debridement NonExcisional Debridement    Using curette the wound(s)/ulcer(s) was/were sharply debrided down through and including the removal of epidermis, dermis    Devitalized Tissue Debrided:  fibrin, biofilm, slough, and exudate to stimulate bleeding to promote healing, post debridement good bleeding base and wound edges noted    Wound/Ulcer #: 1    Percent of Wound/Ulcer Debrided: 100%    Total Surface Area Debrided: 1.5  sq cm     Estimated Blood Loss:  Minimal  Hemostasis Achieved:  by pressure    Procedural Pain:  2  / 10   Post Procedural Pain:  1 / 10     Response to treatment:  Well tolerated by patient. .skinPlan:   Treatment Note please see attached Discharge Instructions    Written patient dismissal instructions given to patient and signed by patient or POA. Discharge Instructions         Visit Discharge/Physician Orders     Discharge condition: Stable     Assessment of pain at discharge: yes     Anesthetic used: lido 4%     Discharge to: Home     Left via:Private automobile     Accompanied by: accompanied by child     ECF/HHA: florin     Dressing Orders:LEFT LATERAL LOWER LEG- Cleanse with saline, apply calcium alginate, cover and secure with dry dressing. Change daily. Apply spandagrip. On in am and off in pm. Elevate legs as much as possible above level of the heart. Treatment Orders: Eat a diet high in protein and vitamin C. Take a multiple vitamin daily unless contraindicated. Elevate leg above heart as much as possible    C&S taken 9/13/23     [facility] followup visit :  2 weeks______________________  (Please note your next appointment above and if you are unable to keep, kindly give a 24 hour notice.  Thank you.)     Physician signature:__________________________      If you experience

## 2023-09-15 LAB
MICROORGANISM SPEC CULT: ABNORMAL
MICROORGANISM SPEC CULT: NORMAL
MICROORGANISM/AGENT SPEC: ABNORMAL
SPECIMEN DESCRIPTION: ABNORMAL
SPECIMEN DESCRIPTION: NORMAL

## 2023-09-27 ENCOUNTER — HOSPITAL ENCOUNTER (OUTPATIENT)
Dept: WOUND CARE | Age: 78
Discharge: HOME | End: 2023-09-27
Payer: MEDICARE

## 2023-09-27 VITALS
RESPIRATION RATE: 18 BRPM | TEMPERATURE: 97.7 F | HEART RATE: 72 BPM | DIASTOLIC BLOOD PRESSURE: 62 MMHG | SYSTOLIC BLOOD PRESSURE: 114 MMHG

## 2023-09-27 DIAGNOSIS — L97.922 NON-PRESSURE CHRONIC ULCER OF LEFT LOWER LEG WITH FAT LAYER EXPOSED (HCC): Primary | Chronic | ICD-10-CM

## 2023-09-27 PROCEDURE — 11042 DBRDMT SUBQ TIS 1ST 20SQCM/<: CPT

## 2023-09-27 RX ORDER — BETAMETHASONE DIPROPIONATE 0.05 %
OINTMENT (GRAM) TOPICAL ONCE
OUTPATIENT
Start: 2023-09-27 | End: 2023-09-27

## 2023-09-27 RX ORDER — IBUPROFEN 200 MG
TABLET ORAL ONCE
OUTPATIENT
Start: 2023-09-27 | End: 2023-09-27

## 2023-09-27 RX ORDER — LIDOCAINE HYDROCHLORIDE 40 MG/ML
SOLUTION TOPICAL ONCE
Status: COMPLETED | OUTPATIENT
Start: 2023-09-27 | End: 2023-09-27

## 2023-09-27 RX ORDER — LIDOCAINE 50 MG/G
OINTMENT TOPICAL ONCE
OUTPATIENT
Start: 2023-09-27 | End: 2023-09-27

## 2023-09-27 RX ORDER — GENTAMICIN SULFATE 1 MG/G
OINTMENT TOPICAL ONCE
OUTPATIENT
Start: 2023-09-27 | End: 2023-09-27

## 2023-09-27 RX ORDER — LIDOCAINE HYDROCHLORIDE 20 MG/ML
JELLY TOPICAL ONCE
OUTPATIENT
Start: 2023-09-27 | End: 2023-09-27

## 2023-09-27 RX ORDER — BACITRACIN ZINC 500 [USP'U]/G
OINTMENT TOPICAL ONCE
OUTPATIENT
Start: 2023-09-27 | End: 2023-09-27

## 2023-09-27 RX ORDER — LIDOCAINE HYDROCHLORIDE 40 MG/ML
SOLUTION TOPICAL ONCE
OUTPATIENT
Start: 2023-09-27 | End: 2023-09-27

## 2023-09-27 RX ORDER — BACITRACIN ZINC AND POLYMYXIN B SULFATE 500; 1000 [USP'U]/G; [USP'U]/G
OINTMENT TOPICAL ONCE
OUTPATIENT
Start: 2023-09-27 | End: 2023-09-27

## 2023-09-27 RX ORDER — TRIAMCINOLONE ACETONIDE 1 MG/G
OINTMENT TOPICAL ONCE
OUTPATIENT
Start: 2023-09-27 | End: 2023-09-27

## 2023-09-27 RX ORDER — LIDOCAINE 40 MG/G
CREAM TOPICAL ONCE
OUTPATIENT
Start: 2023-09-27 | End: 2023-09-27

## 2023-09-27 RX ORDER — CLOBETASOL PROPIONATE 0.5 MG/G
OINTMENT TOPICAL ONCE
OUTPATIENT
Start: 2023-09-27 | End: 2023-09-27

## 2023-09-27 RX ORDER — SODIUM CHLOR/HYPOCHLOROUS ACID 0.033 %
SOLUTION, IRRIGATION IRRIGATION ONCE
OUTPATIENT
Start: 2023-09-27 | End: 2023-09-27

## 2023-09-27 RX ADMIN — LIDOCAINE HYDROCHLORIDE 5 ML: 40 SOLUTION TOPICAL at 14:02

## 2023-09-27 NOTE — DISCHARGE INSTRUCTIONS
Discharge condition: Stable     Assessment of pain at discharge:minimal     Anesthetic used: 4% lidocaine solution     Discharge to: Home     Left via:Private automobile     Accompanied by: family      ECF/HHA: [address]- cleanse with normal saline, pack loosely with calcium alginate ag (also loosely pack area tunneling at 12 of 2.3 cm), ABD pads and coban 2. Change Monday- Wednesday (at [facility])- Friday. Treatment Orders:Eat a diet high in protein and vitamin C. Take a multiple vitamin daily unless contraindicated. Elevate leg above the level of the heart as much as possible throughout the day. [facility] followup visit:1 week_____________________________  (Please note your next appointment above and if you are unable to keep, kindly give a 24 hour notice. Thank you.)     Physician signature:__________________________      If you experience any of the following, please call the [address] [place] [address] [facility] during business hours:     * Increase in Pain  * Temperature over 101  * Increase in drainage from your wound  * Drainage with a foul odor  * Bleeding  * Increase in swelling  * Need for compression bandage changes due to slippage, breakthrough drainage. If you need medical attention outside of the business hours of the [facility] please contact your PCP or go to the nearest emergency room. 53 yo male w/ PMH seizure disorder (reports last seizure was about 1 month ago), esophagitis and Virgen syndrome, excessive ETOH use (last use 2017), bipolar disorder, Intellectual delay, (pt is a resident of [facility] due to h/o substance use and psych disorder).  h/o colon adenoma,  who is due for his 1-year colonoscopy surveillance. Last colon 9/22/2022 showing two 5 to 8 mm polyps in transverse colon.  s/p colon polypectomy via colonoscopy, presents to [facility] today for colonoscopy on 10/12/23 with Dr. Mora.

## 2023-09-27 NOTE — DISCHARGE INSTRUCTIONS
Visit Discharge/Physician Orders     Discharge condition: Stable     Assessment of pain at discharge: yes     Anesthetic used: lido 4%     Discharge to: Home     Left via:Private automobile     Accompanied by: accompanied by child     ECF/HHA: florin     Dressing Orders:LEFT LATERAL LOWER LEG- Cleanse with saline, apply calcium alginate, cover and secure with dry dressing. Change daily. Apply spandagrip. On in am and off in pm. Elevate legs as much as possible above level of the heart. Treatment Orders: Eat a diet high in protein and vitamin C. Take a multiple vitamin daily unless contraindicated. Elevate leg above heart as much as possible     [address] [place] [address] followup visit :  2 weeks______________________  (Please note your next appointment above and if you are unable to keep, kindly give a 24 hour notice. Thank you.)     Physician signature:__________________________      If you experience any of the following, please call the [facility] during business hours:     * Increase in Pain  * Temperature over 101  * Increase in drainage from your wound  * Drainage with a foul odor  * Bleeding  * Increase in swelling  * Need for compression bandage changes due to slippage, breakthrough drainage. If you need medical attention outside of the business hours of the [facility] please contact your PCP or go to the nearest emergency room.

## 2023-10-06 ENCOUNTER — HOSPITAL ENCOUNTER (OUTPATIENT)
Dept: INFUSION THERAPY | Age: 78
Setting detail: INFUSION SERIES
Discharge: HOME | End: 2023-10-06
Payer: MEDICARE

## 2023-10-06 VITALS
SYSTOLIC BLOOD PRESSURE: 102 MMHG | RESPIRATION RATE: 16 BRPM | HEART RATE: 79 BPM | OXYGEN SATURATION: 94 % | DIASTOLIC BLOOD PRESSURE: 49 MMHG | TEMPERATURE: 97.6 F

## 2023-10-06 DIAGNOSIS — E86.0 DEHYDRATION: Primary | ICD-10-CM

## 2023-10-06 PROCEDURE — 96360 HYDRATION IV INFUSION INIT: CPT

## 2023-10-06 PROCEDURE — 2580000003 HC RX 258: Performed by: INTERNAL MEDICINE

## 2023-10-06 PROCEDURE — 96361 HYDRATE IV INFUSION ADD-ON: CPT

## 2023-10-06 RX ORDER — SODIUM CHLORIDE 0.9 % (FLUSH) 0.9 %
5-40 SYRINGE (ML) INJECTION PRN
Status: DISCONTINUED | OUTPATIENT
Start: 2023-10-06 | End: 2023-10-07 | Stop reason: HOSPADM

## 2023-10-06 RX ORDER — 0.9 % SODIUM CHLORIDE 0.9 %
1000 INTRAVENOUS SOLUTION INTRAVENOUS ONCE
Status: CANCELLED | OUTPATIENT
Start: 2023-10-06 | End: 2023-10-06

## 2023-10-06 RX ORDER — 0.9 % SODIUM CHLORIDE 0.9 %
1000 INTRAVENOUS SOLUTION INTRAVENOUS ONCE
Status: COMPLETED | OUTPATIENT
Start: 2023-10-06 | End: 2023-10-06

## 2023-10-06 RX ORDER — SODIUM CHLORIDE 0.9 % (FLUSH) 0.9 %
10 SYRINGE (ML) INJECTION PRN
Status: CANCELLED | OUTPATIENT
Start: 2023-10-06

## 2023-10-06 RX ORDER — FAMOTIDINE 20 MG/1
20 TABLET, FILM COATED ORAL 2 TIMES DAILY
COMMUNITY

## 2023-10-06 RX ORDER — SODIUM CHLORIDE 0.9 % (FLUSH) 0.9 %
5-40 SYRINGE (ML) INJECTION PRN
Status: CANCELLED | OUTPATIENT
Start: 2023-10-06

## 2023-10-06 RX ADMIN — SODIUM CHLORIDE 1000 ML: 9 INJECTION, SOLUTION INTRAVENOUS at 10:10

## 2023-10-06 RX ADMIN — SODIUM CHLORIDE, PRESERVATIVE FREE 10 ML: 5 INJECTION INTRAVENOUS at 10:08

## 2023-10-06 NOTE — FLOWSHEET NOTE
Patient tolerated infusion well. Patient alert and oriented x3. No distress noted. Vital signs stable. Patient denies any new or worsening pain. Educated patient on possible side effects and treatment of medication. Patient verbalized understanding. Offered patient education and/or discharge material. Patient  declined. Patient denies any needs. All questions answered. D/C in stable condition.

## 2023-10-09 ENCOUNTER — HOSPITAL ENCOUNTER (OUTPATIENT)
Dept: INFUSION THERAPY | Age: 78
Setting detail: INFUSION SERIES
Discharge: HOME | End: 2023-10-09
Payer: MEDICARE

## 2023-10-09 VITALS
HEART RATE: 83 BPM | RESPIRATION RATE: 12 BRPM | DIASTOLIC BLOOD PRESSURE: 58 MMHG | SYSTOLIC BLOOD PRESSURE: 105 MMHG | TEMPERATURE: 97.8 F | OXYGEN SATURATION: 100 %

## 2023-10-09 DIAGNOSIS — E86.0 DEHYDRATION: Primary | ICD-10-CM

## 2023-10-09 PROCEDURE — 96360 HYDRATION IV INFUSION INIT: CPT

## 2023-10-09 PROCEDURE — 96361 HYDRATE IV INFUSION ADD-ON: CPT

## 2023-10-09 PROCEDURE — 2580000003 HC RX 258: Performed by: INTERNAL MEDICINE

## 2023-10-09 PROCEDURE — 2580000003 HC RX 258: Performed by: EMERGENCY MEDICINE

## 2023-10-09 RX ORDER — SODIUM CHLORIDE 0.9 % (FLUSH) 0.9 %
10 SYRINGE (ML) INJECTION PRN
Status: DISCONTINUED | OUTPATIENT
Start: 2023-10-09 | End: 2023-10-10 | Stop reason: HOSPADM

## 2023-10-09 RX ORDER — METRONIDAZOLE 500 MG/1
500 TABLET ORAL 3 TIMES DAILY
COMMUNITY
Start: 2023-10-03

## 2023-10-09 RX ORDER — SODIUM CHLORIDE 0.9 % (FLUSH) 0.9 %
10 SYRINGE (ML) INJECTION PRN
Status: CANCELLED | OUTPATIENT
Start: 2023-10-09

## 2023-10-09 RX ORDER — 0.9 % SODIUM CHLORIDE 0.9 %
1000 INTRAVENOUS SOLUTION INTRAVENOUS ONCE
Status: CANCELLED | OUTPATIENT
Start: 2023-10-09 | End: 2023-10-09

## 2023-10-09 RX ORDER — 0.9 % SODIUM CHLORIDE 0.9 %
1000 INTRAVENOUS SOLUTION INTRAVENOUS ONCE
Status: COMPLETED | OUTPATIENT
Start: 2023-10-09 | End: 2023-10-09

## 2023-10-09 RX ORDER — LEVOFLOXACIN 750 MG/1
750 TABLET ORAL DAILY
COMMUNITY
Start: 2023-10-03

## 2023-10-09 RX ADMIN — SODIUM CHLORIDE 1000 ML: 9 INJECTION, SOLUTION INTRAVENOUS at 09:19

## 2023-10-09 RX ADMIN — SODIUM CHLORIDE, PRESERVATIVE FREE 10 ML: 5 INJECTION INTRAVENOUS at 09:18

## 2023-10-09 NOTE — DISCHARGE INSTRUCTIONS
Visit Discharge/Physician Orders     Discharge condition: Stable     Assessment of pain at discharge: yes     Anesthetic used: lido 4%     Discharge to: Home     Left via:Private automobile     Accompanied by: accompanied by child     ECF/HHA: [facility]     Dressing Orders:LEFT LATERAL LOWER LEG- Cleanse with saline, apply calcium alginate, cover and secure with dry dressing. Change daily. Apply spandagrip. On in am and off in pm. Elevate legs as much as possible above level of the heart. Treatment Orders: Eat a diet high in protein and vitamin C. Take a multiple vitamin daily unless contraindicated. Elevate leg above heart as much as possible     [address] [place] [address] followup visit :  2 weeks______________________  (Please note your next appointment above and if you are unable to keep, kindly give a 24 hour notice. Thank you.)     Physician signature:__________________________      If you experience any of the following, please call the [facility] during business hours:     * Increase in Pain  * Temperature over 101  * Increase in drainage from your wound  * Drainage with a foul odor  * Bleeding  * Increase in swelling  * Need for compression bandage changes due to slippage, breakthrough drainage. If you need medical attention outside of the business hours of the [facility] please contact your PCP or go to the nearest emergency room.

## 2023-10-09 NOTE — PROGRESS NOTES
Patient tolerated normal saline infusion well. Patient alert and oriented x3. No distress noted. Vital signs stable. Patient denies any new or worsening pain. Offered patient education and/or discharge material. Patient declined. Patient denies any needs. All questions answered. D/C in stable condition with her son.

## 2023-10-11 ENCOUNTER — HOSPITAL ENCOUNTER (OUTPATIENT)
Dept: INFUSION THERAPY | Age: 78
Setting detail: INFUSION SERIES
Discharge: HOME | End: 2023-10-11
Payer: MEDICARE

## 2023-10-11 ENCOUNTER — HOSPITAL ENCOUNTER (OUTPATIENT)
Dept: WOUND CARE | Age: 78
Discharge: HOME | End: 2023-10-11

## 2023-10-11 VITALS
RESPIRATION RATE: 16 BRPM | TEMPERATURE: 97 F | HEART RATE: 88 BPM | SYSTOLIC BLOOD PRESSURE: 127 MMHG | OXYGEN SATURATION: 100 % | DIASTOLIC BLOOD PRESSURE: 54 MMHG

## 2023-10-11 DIAGNOSIS — E86.0 DEHYDRATION: Primary | ICD-10-CM

## 2023-10-11 LAB
ANION GAP SERPL CALCULATED.3IONS-SCNC: 8 MMOL/L (ref 7–16)
BUN SERPL-MCNC: 21 MG/DL (ref 6–23)
CALCIUM SERPL-MCNC: 9 MG/DL (ref 8.6–10.2)
CHLORIDE SERPL-SCNC: 103 MMOL/L (ref 98–107)
CO2 SERPL-SCNC: 23 MMOL/L (ref 22–29)
CREAT SERPL-MCNC: 1.1 MG/DL (ref 0.5–1)
ERYTHROCYTE [DISTWIDTH] IN BLOOD BY AUTOMATED COUNT: 13.8 % (ref 11.5–15)
GFR SERPL CREATININE-BSD FRML MDRD: 50 ML/MIN/1.73M2
GLUCOSE SERPL-MCNC: 85 MG/DL (ref 74–99)
HCT VFR BLD AUTO: 29.8 % (ref 34–48)
HGB BLD-MCNC: 9.4 G/DL (ref 11.5–15.5)
MCH RBC QN AUTO: 29.8 PG (ref 26–35)
MCHC RBC AUTO-ENTMCNC: 31.5 G/DL (ref 32–34.5)
MCV RBC AUTO: 94.6 FL (ref 80–99.9)
PLATELET # BLD AUTO: 155 K/UL (ref 130–450)
PMV BLD AUTO: 11.2 FL (ref 7–12)
POTASSIUM SERPL-SCNC: 4.7 MMOL/L (ref 3.5–5)
RBC # BLD AUTO: 3.15 M/UL (ref 3.5–5.5)
SODIUM SERPL-SCNC: 134 MMOL/L (ref 132–146)
WBC OTHER # BLD: 5.6 K/UL (ref 4.5–11.5)

## 2023-10-11 PROCEDURE — 96361 HYDRATE IV INFUSION ADD-ON: CPT

## 2023-10-11 PROCEDURE — 2580000003 HC RX 258: Performed by: EMERGENCY MEDICINE

## 2023-10-11 PROCEDURE — 96360 HYDRATION IV INFUSION INIT: CPT

## 2023-10-11 PROCEDURE — 2580000003 HC RX 258: Performed by: INTERNAL MEDICINE

## 2023-10-11 PROCEDURE — 36415 COLL VENOUS BLD VENIPUNCTURE: CPT

## 2023-10-11 PROCEDURE — 80048 BASIC METABOLIC PNL TOTAL CA: CPT

## 2023-10-11 PROCEDURE — 85027 COMPLETE CBC AUTOMATED: CPT

## 2023-10-11 RX ORDER — SODIUM CHLORIDE 0.9 % (FLUSH) 0.9 %
10 SYRINGE (ML) INJECTION PRN
OUTPATIENT
Start: 2023-10-11

## 2023-10-11 RX ORDER — SODIUM CHLORIDE 0.9 % (FLUSH) 0.9 %
10 SYRINGE (ML) INJECTION PRN
Status: DISCONTINUED | OUTPATIENT
Start: 2023-10-11 | End: 2023-10-12 | Stop reason: HOSPADM

## 2023-10-11 RX ORDER — 0.9 % SODIUM CHLORIDE 0.9 %
1000 INTRAVENOUS SOLUTION INTRAVENOUS ONCE
Status: COMPLETED | OUTPATIENT
Start: 2023-10-11 | End: 2023-10-11

## 2023-10-11 RX ORDER — 0.9 % SODIUM CHLORIDE 0.9 %
1000 INTRAVENOUS SOLUTION INTRAVENOUS ONCE
Status: CANCELLED | OUTPATIENT
Start: 2023-10-11 | End: 2023-10-11

## 2023-10-11 RX ADMIN — SODIUM CHLORIDE, PRESERVATIVE FREE 10 ML: 5 INJECTION INTRAVENOUS at 10:23

## 2023-10-11 RX ADMIN — SODIUM CHLORIDE 1000 ML: 9 INJECTION, SOLUTION INTRAVENOUS at 10:21

## 2023-10-11 NOTE — PROGRESS NOTES
Patient tolerated normal saline infusion well. Patient alert and oriented x3. No distress noted. Vital signs stable. Pt remained on the unit to wait for the IV team to return & draw post infusion lab work. Specimens sent to lab. Patient denies any new or worsening pain. Offered patient education and/or discharge material. Patient declined. Patient denies any needs. All questions answered. D/C in stable condition with her son.

## 2023-10-16 NOTE — DISCHARGE INSTRUCTIONS
Visit Discharge/Physician Orders     Discharge condition: Stable     Assessment of pain at discharge: none      Anesthetic used: none     Discharge to: Home     Left via:Private automobile     Accompanied by: accompanied by child     ECF/HHA: [facility]     Dressing Orders:LEFT LATERAL LOWER LEG- apply aquaphor twice a day   Apply spandagrip. On in am and off in pm. Elevate legs as much as possible above level of the heart. Treatment Orders: Eat a diet high in protein and vitamin C. Take a multiple vitamin daily unless contraindicated. Elevate leg above heart as much as possible     [facility] followup visit :  Call as needed ___________________  (Please note your next appointment above and if you are unable to keep, kindly give a 24 hour notice. Thank you.)     Physician signature:__________________________      If you experience any of the following, please call the [facility] during business hours:     * Increase in Pain  * Temperature over 101  * Increase in drainage from your wound  * Drainage with a foul odor  * Bleeding  * Increase in swelling  * Need for compression bandage changes due to slippage, breakthrough drainage. If you need medical attention outside of the business hours of the [facility] please contact your PCP or go to the nearest emergency room.

## 2023-10-18 ENCOUNTER — HOSPITAL ENCOUNTER (OUTPATIENT)
Dept: WOUND CARE | Age: 78
Discharge: HOME | End: 2023-10-18
Attending: SURGERY
Payer: MEDICARE

## 2023-10-18 VITALS
SYSTOLIC BLOOD PRESSURE: 125 MMHG | BODY MASS INDEX: 21.71 KG/M2 | WEIGHT: 118 LBS | HEART RATE: 88 BPM | HEIGHT: 62 IN | DIASTOLIC BLOOD PRESSURE: 52 MMHG | TEMPERATURE: 97.5 F

## 2023-10-18 DIAGNOSIS — L97.922 NON-PRESSURE CHRONIC ULCER OF LEFT LOWER LEG WITH FAT LAYER EXPOSED (HCC): Primary | ICD-10-CM

## 2023-10-18 DIAGNOSIS — L97.221 LOWER LIMB ULCER, CALF, LEFT, LIMITED TO BREAKDOWN OF SKIN (HCC): ICD-10-CM

## 2023-10-18 PROCEDURE — 99212 OFFICE O/P EST SF 10 MIN: CPT | Performed by: SURGERY

## 2023-10-18 PROCEDURE — 99211 OFF/OP EST MAY X REQ PHY/QHP: CPT

## 2023-10-18 RX ORDER — TRIAMCINOLONE ACETONIDE 1 MG/G
OINTMENT TOPICAL ONCE
Status: CANCELLED | OUTPATIENT
Start: 2023-10-18 | End: 2023-10-18

## 2023-10-18 RX ORDER — LIDOCAINE 50 MG/G
OINTMENT TOPICAL ONCE
Status: CANCELLED | OUTPATIENT
Start: 2023-10-18 | End: 2023-10-18

## 2023-10-18 RX ORDER — IBUPROFEN 200 MG
TABLET ORAL ONCE
Status: CANCELLED | OUTPATIENT
Start: 2023-10-18 | End: 2023-10-18

## 2023-10-18 RX ORDER — BACITRACIN ZINC AND POLYMYXIN B SULFATE 500; 1000 [USP'U]/G; [USP'U]/G
OINTMENT TOPICAL ONCE
Status: CANCELLED | OUTPATIENT
Start: 2023-10-18 | End: 2023-10-18

## 2023-10-18 RX ORDER — LIDOCAINE HYDROCHLORIDE 20 MG/ML
JELLY TOPICAL ONCE
Status: CANCELLED | OUTPATIENT
Start: 2023-10-18 | End: 2023-10-18

## 2023-10-18 RX ORDER — BETAMETHASONE DIPROPIONATE 0.05 %
OINTMENT (GRAM) TOPICAL ONCE
Status: CANCELLED | OUTPATIENT
Start: 2023-10-18 | End: 2023-10-18

## 2023-10-18 RX ORDER — BACITRACIN ZINC 500 [USP'U]/G
OINTMENT TOPICAL ONCE
Status: CANCELLED | OUTPATIENT
Start: 2023-10-18 | End: 2023-10-18

## 2023-10-18 RX ORDER — CLOBETASOL PROPIONATE 0.5 MG/G
OINTMENT TOPICAL ONCE
Status: CANCELLED | OUTPATIENT
Start: 2023-10-18 | End: 2023-10-18

## 2023-10-18 RX ORDER — GENTAMICIN SULFATE 1 MG/G
OINTMENT TOPICAL ONCE
Status: CANCELLED | OUTPATIENT
Start: 2023-10-18 | End: 2023-10-18

## 2023-10-18 RX ORDER — LIDOCAINE HYDROCHLORIDE 40 MG/ML
SOLUTION TOPICAL ONCE
Status: CANCELLED | OUTPATIENT
Start: 2023-10-18 | End: 2023-10-18

## 2023-10-18 RX ORDER — SODIUM CHLOR/HYPOCHLOROUS ACID 0.033 %
SOLUTION, IRRIGATION IRRIGATION ONCE
Status: CANCELLED | OUTPATIENT
Start: 2023-10-18 | End: 2023-10-18

## 2023-10-18 RX ORDER — LIDOCAINE HYDROCHLORIDE 40 MG/ML
SOLUTION TOPICAL ONCE
Status: COMPLETED | OUTPATIENT
Start: 2023-10-18 | End: 2023-10-18

## 2023-10-18 RX ORDER — LIDOCAINE 40 MG/G
CREAM TOPICAL ONCE
Status: CANCELLED | OUTPATIENT
Start: 2023-10-18 | End: 2023-10-18

## 2023-10-18 RX ADMIN — LIDOCAINE HYDROCHLORIDE 5 ML: 40 SOLUTION TOPICAL at 14:03

## 2023-10-18 NOTE — PLAN OF CARE
Problem: Chronic Conditions and Co-morbidities  Goal: Patient's chronic conditions and co-morbidity symptoms are monitored and maintained or improved  10/18/2023 1624 by Nata Reveles RN  Outcome: Adequate for Discharge  10/18/2023 1406 by Nata Reveles RN  Outcome: Progressing     Problem: Cognitive:  Goal: Knowledge of wound care  Description: Knowledge of wound care  10/18/2023 1624 by Nata Reveles RN  Outcome: Adequate for Discharge  10/18/2023 1406 by Nata Reveles RN  Outcome: Progressing  Goal: Understands risk factors for wounds  Description: Understands risk factors for wounds  10/18/2023 1624 by Nata Reveles RN  Outcome: Adequate for Discharge  10/18/2023 1406 by Nata Reveles RN  Outcome: Progressing     Problem: Wound:  Goal: Will show signs of wound healing; wound closure and no evidence of infection  Description: Will show signs of wound healing; wound closure and no evidence of infection  10/18/2023 1624 by Nata Reveles RN  Outcome: Adequate for Discharge  10/18/2023 1406 by Nata Reveles RN  Outcome: Progressing

## 2023-10-18 NOTE — PROGRESS NOTES
Dressing/Treatment ABD;Dry dressing;Alginate 09/27/23 1435   Wound Length (cm) 0 cm 10/18/23 1404   Wound Width (cm) 0 cm 10/18/23 1404   Wound Depth (cm) 0 cm 10/18/23 1404   Wound Surface Area (cm^2) 0 cm^2 10/18/23 1404   Change in Wound Size % (l*w) 100 10/18/23 1404   Wound Volume (cm^3) 0 cm^3 10/18/23 1404   Wound Healing % 100 10/18/23 1404   Post-Procedure Length (cm) 0 cm 10/18/23 1429   Post-Procedure Width (cm) 0 cm 10/18/23 1429   Post-Procedure Depth (cm) 0 cm 10/18/23 1429   Post-Procedure Surface Area (cm^2) 0 cm^2 10/18/23 1429   Post-Procedure Volume (cm^3) 0 cm^3 10/18/23 1429   Wound Assessment Dry 10/18/23 1404   Drainage Amount None (dry) 10/18/23 1404   Drainage Description Serous 09/27/23 1400   Odor None 10/18/23 1404   Cherise-wound Assessment Intact 09/27/23 1400   Margins Attached edges 09/13/23 1446   Number of days: 34          Procedure Note  Indications:  Based on my examination of this patient's wound(s)/ulcer(s) today, debridement is required to promote healing and evaluate the wound base. Performed by: Elena Joyner MD    Consent obtained:  Yes    Time out taken:  Yes    Pain Control: Anesthetic  Anesthetic: 4% Lidocaine Liquid Topical     Debridement: Not done, wound healed      Response to treatment:  Well tolerated by patient. .skinPlan:   Treatment Note please see attached Discharge Instructions    Written patient dismissal instructions given to patient and signed by patient or POA. Discharge Instructions          Visit Discharge/Physician Orders     Discharge condition: Stable     Assessment of pain at discharge: none      Anesthetic used: none     Discharge to: Home     Left via:Private automobile     Accompanied by: accompanied by child     ECF/HHA: [facility]     Dressing Orders:LEFT LATERAL LOWER LEG- apply aquaphor twice a day   Apply spandagrip. On in am and off in pm. Elevate legs as much as possible above level of the heart.       Treatment Orders: Eat

## 2023-11-18 PROCEDURE — G2066 INTER DEVC REMOTE 30D: HCPCS | Performed by: INTERNAL MEDICINE

## 2023-11-18 PROCEDURE — 93297 REM INTERROG DEV EVAL ICPMS: CPT | Performed by: INTERNAL MEDICINE

## 2023-11-19 PROCEDURE — 93295 DEV INTERROG REMOTE 1/2/MLT: CPT | Performed by: INTERNAL MEDICINE

## 2023-11-19 PROCEDURE — 93296 REM INTERROG EVL PM/IDS: CPT | Performed by: INTERNAL MEDICINE

## 2023-12-25 ENCOUNTER — HOSPITAL ENCOUNTER (EMERGENCY)
Age: 78
Discharge: HOME | End: 2023-12-25
Attending: EMERGENCY MEDICINE
Payer: MEDICARE

## 2023-12-25 ENCOUNTER — APPOINTMENT (OUTPATIENT)
Dept: GENERAL RADIOLOGY | Age: 78
End: 2023-12-25
Payer: MEDICARE

## 2023-12-25 VITALS
TEMPERATURE: 98.2 F | SYSTOLIC BLOOD PRESSURE: 129 MMHG | OXYGEN SATURATION: 98 % | RESPIRATION RATE: 16 BRPM | DIASTOLIC BLOOD PRESSURE: 63 MMHG | WEIGHT: 114 LBS | HEART RATE: 80 BPM | HEIGHT: 62 IN | BODY MASS INDEX: 20.98 KG/M2

## 2023-12-25 DIAGNOSIS — K94.23 MALFUNCTION OF GASTROSTOMY TUBE (HCC): Primary | ICD-10-CM

## 2023-12-25 PROCEDURE — 43762 RPLC GTUBE NO REVJ TRC: CPT

## 2023-12-25 PROCEDURE — 74018 RADEX ABDOMEN 1 VIEW: CPT

## 2023-12-25 PROCEDURE — 6360000004 HC RX CONTRAST MEDICATION: Performed by: EMERGENCY MEDICINE

## 2023-12-25 PROCEDURE — 99283 EMERGENCY DEPT VISIT LOW MDM: CPT

## 2023-12-25 PROCEDURE — 96374 THER/PROPH/DIAG INJ IV PUSH: CPT

## 2023-12-25 RX ADMIN — DIATRIZOATE MEGLUMINE AND DIATRIZOATE SODIUM 60 ML: 600; 100 SOLUTION ORAL; RECTAL at 01:44

## 2024-04-01 NOTE — FLOWSHEET NOTE
Patient tolerated infusion well. Patient alert and oriented x3. No distress noted. Vital signs stable. Patient denies any new or worsening pain. Educated patient on possible side effects and treatment of medication. Patient verbalized understanding. Offered patient education and/or discharge material. Patient  declined. Patient denies any needs. All questions answered. D/C in stable condition. Topical Metronidazole Counseling: Metronidazole is a topical antibiotic medication. You may experience burning, stinging, redness, or allergic reactions.  Please call our office if you develop any problems from using this medication.

## 2024-07-03 ENCOUNTER — APPOINTMENT (OUTPATIENT)
Dept: CT IMAGING | Age: 79
End: 2024-07-03
Payer: MEDICARE

## 2024-07-03 ENCOUNTER — HOSPITAL ENCOUNTER (INPATIENT)
Age: 79
LOS: 1 days | Discharge: ANOTHER HOSPITAL | End: 2024-07-04
Attending: EMERGENCY MEDICINE | Admitting: INTERNAL MEDICINE
Payer: MEDICARE

## 2024-07-03 ENCOUNTER — APPOINTMENT (OUTPATIENT)
Dept: GENERAL RADIOLOGY | Age: 79
End: 2024-07-03
Payer: MEDICARE

## 2024-07-03 DIAGNOSIS — N17.9 AKI (ACUTE KIDNEY INJURY) (HCC): ICD-10-CM

## 2024-07-03 DIAGNOSIS — K81.0 ACUTE CHOLECYSTITIS: Primary | ICD-10-CM

## 2024-07-03 LAB
ALBUMIN SERPL-MCNC: 3.7 G/DL (ref 3.5–5.2)
ALP SERPL-CCNC: 129 U/L (ref 35–104)
ALT SERPL-CCNC: 16 U/L (ref 0–32)
ANION GAP SERPL CALCULATED.3IONS-SCNC: 12 MMOL/L (ref 7–16)
AST SERPL-CCNC: 21 U/L (ref 0–31)
BACTERIA URNS QL MICRO: ABNORMAL
BASOPHILS # BLD: 0 K/UL (ref 0–0.2)
BASOPHILS NFR BLD: 0 % (ref 0–2)
BILIRUB SERPL-MCNC: 0.7 MG/DL (ref 0–1.2)
BILIRUB UR QL STRIP: NEGATIVE
BUN SERPL-MCNC: 33 MG/DL (ref 6–23)
CALCIUM SERPL-MCNC: 9.7 MG/DL (ref 8.6–10.2)
CASTS #/AREA URNS LPF: ABNORMAL /LPF
CHLORIDE SERPL-SCNC: 100 MMOL/L (ref 98–107)
CLARITY UR: CLEAR
CO2 SERPL-SCNC: 26 MMOL/L (ref 22–29)
COLOR UR: YELLOW
CREAT SERPL-MCNC: 1.7 MG/DL (ref 0.5–1)
EOSINOPHIL # BLD: 0 K/UL (ref 0.05–0.5)
EOSINOPHILS RELATIVE PERCENT: 0 % (ref 0–6)
ERYTHROCYTE [DISTWIDTH] IN BLOOD BY AUTOMATED COUNT: 14.4 % (ref 11.5–15)
GFR, ESTIMATED: 30 ML/MIN/1.73M2
GLUCOSE SERPL-MCNC: 120 MG/DL (ref 74–99)
GLUCOSE UR STRIP-MCNC: NEGATIVE MG/DL
HCT VFR BLD AUTO: 37.8 % (ref 34–48)
HGB BLD-MCNC: 12 G/DL (ref 11.5–15.5)
HGB UR QL STRIP.AUTO: NEGATIVE
KETONES UR STRIP-MCNC: NEGATIVE MG/DL
LACTATE BLDV-SCNC: 1.3 MMOL/L (ref 0.5–1.9)
LEUKOCYTE ESTERASE UR QL STRIP: ABNORMAL
LYMPHOCYTES NFR BLD: 0.47 K/UL (ref 1.5–4)
LYMPHOCYTES RELATIVE PERCENT: 3 % (ref 20–42)
MCH RBC QN AUTO: 30.5 PG (ref 26–35)
MCHC RBC AUTO-ENTMCNC: 31.7 G/DL (ref 32–34.5)
MCV RBC AUTO: 96.2 FL (ref 80–99.9)
MONOCYTES NFR BLD: 1.56 K/UL (ref 0.1–0.95)
MONOCYTES NFR BLD: 9 % (ref 2–12)
NEUTROPHILS NFR BLD: 89 % (ref 43–80)
NEUTS SEG NFR BLD: 15.58 K/UL (ref 1.8–7.3)
NITRITE UR QL STRIP: NEGATIVE
NUCLEATED RED BLOOD CELLS: 4 PER 100 WBC
PH UR STRIP: 5.5 [PH] (ref 5–9)
PLATELET # BLD AUTO: 178 K/UL (ref 130–450)
PMV BLD AUTO: 11.4 FL (ref 7–12)
POTASSIUM SERPL-SCNC: 4.4 MMOL/L (ref 3.5–5)
PROT SERPL-MCNC: 7.4 G/DL (ref 6.4–8.3)
PROT UR STRIP-MCNC: 30 MG/DL
RBC # BLD AUTO: 3.93 M/UL (ref 3.5–5.5)
RBC # BLD: ABNORMAL 10*6/UL
RBC #/AREA URNS HPF: ABNORMAL /HPF
SODIUM SERPL-SCNC: 138 MMOL/L (ref 132–146)
SP GR UR STRIP: 1.02 (ref 1–1.03)
TROPONIN I SERPL HS-MCNC: 31 NG/L (ref 0–9)
UROBILINOGEN UR STRIP-ACNC: 0.2 EU/DL (ref 0–1)
WBC #/AREA URNS HPF: ABNORMAL /HPF
WBC OTHER # BLD: 17.6 K/UL (ref 4.5–11.5)

## 2024-07-03 PROCEDURE — 84484 ASSAY OF TROPONIN QUANT: CPT

## 2024-07-03 PROCEDURE — 1200000000 HC SEMI PRIVATE

## 2024-07-03 PROCEDURE — 6360000002 HC RX W HCPCS

## 2024-07-03 PROCEDURE — 6360000004 HC RX CONTRAST MEDICATION: Performed by: RADIOLOGY

## 2024-07-03 PROCEDURE — 83605 ASSAY OF LACTIC ACID: CPT

## 2024-07-03 PROCEDURE — 96374 THER/PROPH/DIAG INJ IV PUSH: CPT

## 2024-07-03 PROCEDURE — 81001 URINALYSIS AUTO W/SCOPE: CPT

## 2024-07-03 PROCEDURE — 74177 CT ABD & PELVIS W/CONTRAST: CPT

## 2024-07-03 PROCEDURE — 87040 BLOOD CULTURE FOR BACTERIA: CPT

## 2024-07-03 PROCEDURE — 2580000003 HC RX 258

## 2024-07-03 PROCEDURE — 71045 X-RAY EXAM CHEST 1 VIEW: CPT

## 2024-07-03 PROCEDURE — 93005 ELECTROCARDIOGRAM TRACING: CPT

## 2024-07-03 PROCEDURE — 85025 COMPLETE CBC W/AUTO DIFF WBC: CPT

## 2024-07-03 PROCEDURE — 80053 COMPREHEN METABOLIC PANEL: CPT

## 2024-07-03 PROCEDURE — 87086 URINE CULTURE/COLONY COUNT: CPT

## 2024-07-03 PROCEDURE — 96375 TX/PRO/DX INJ NEW DRUG ADDON: CPT

## 2024-07-03 PROCEDURE — 99285 EMERGENCY DEPT VISIT HI MDM: CPT

## 2024-07-03 RX ORDER — FAMOTIDINE 20 MG/1
10 TABLET, FILM COATED ORAL DAILY
Status: DISCONTINUED | OUTPATIENT
Start: 2024-07-03 | End: 2024-07-04 | Stop reason: HOSPADM

## 2024-07-03 RX ORDER — KETOCONAZOLE 20 MG/G
CREAM TOPICAL
COMMUNITY
Start: 2024-05-01 | End: 2024-07-03 | Stop reason: ALTCHOICE

## 2024-07-03 RX ORDER — CARVEDILOL 6.25 MG/1
3.12 TABLET ORAL 2 TIMES DAILY WITH MEALS
Status: DISCONTINUED | OUTPATIENT
Start: 2024-07-03 | End: 2024-07-04 | Stop reason: HOSPADM

## 2024-07-03 RX ORDER — ACETAMINOPHEN 650 MG/1
650 SUPPOSITORY RECTAL EVERY 6 HOURS PRN
Status: DISCONTINUED | OUTPATIENT
Start: 2024-07-03 | End: 2024-07-04 | Stop reason: HOSPADM

## 2024-07-03 RX ORDER — ESCITALOPRAM OXALATE 10 MG/1
20 TABLET ORAL DAILY
Status: DISCONTINUED | OUTPATIENT
Start: 2024-07-04 | End: 2024-07-04 | Stop reason: HOSPADM

## 2024-07-03 RX ORDER — GABAPENTIN 100 MG/1
100 CAPSULE ORAL EVERY 8 HOURS
Status: DISCONTINUED | OUTPATIENT
Start: 2024-07-03 | End: 2024-07-04 | Stop reason: HOSPADM

## 2024-07-03 RX ORDER — MORPHINE SULFATE 2 MG/ML
2 INJECTION, SOLUTION INTRAMUSCULAR; INTRAVENOUS EVERY 4 HOURS PRN
Status: DISCONTINUED | OUTPATIENT
Start: 2024-07-03 | End: 2024-07-04 | Stop reason: HOSPADM

## 2024-07-03 RX ORDER — ONDANSETRON 4 MG/1
4 TABLET, ORALLY DISINTEGRATING ORAL EVERY 8 HOURS PRN
Status: DISCONTINUED | OUTPATIENT
Start: 2024-07-03 | End: 2024-07-04 | Stop reason: HOSPADM

## 2024-07-03 RX ORDER — ENOXAPARIN SODIUM 100 MG/ML
30 INJECTION SUBCUTANEOUS DAILY
Status: DISCONTINUED | OUTPATIENT
Start: 2024-07-04 | End: 2024-07-04 | Stop reason: HOSPADM

## 2024-07-03 RX ORDER — SODIUM CHLORIDE 9 MG/ML
INJECTION, SOLUTION INTRAVENOUS PRN
Status: DISCONTINUED | OUTPATIENT
Start: 2024-07-03 | End: 2024-07-04 | Stop reason: HOSPADM

## 2024-07-03 RX ORDER — SODIUM CHLORIDE 0.9 % (FLUSH) 0.9 %
10 SYRINGE (ML) INJECTION EVERY 12 HOURS SCHEDULED
Status: DISCONTINUED | OUTPATIENT
Start: 2024-07-03 | End: 2024-07-04 | Stop reason: HOSPADM

## 2024-07-03 RX ORDER — LANOLIN ALCOHOL/MO/W.PET/CERES
3 CREAM (GRAM) TOPICAL NIGHTLY PRN
Status: DISCONTINUED | OUTPATIENT
Start: 2024-07-03 | End: 2024-07-04 | Stop reason: HOSPADM

## 2024-07-03 RX ORDER — METOPROLOL SUCCINATE 25 MG/1
12.5 TABLET, EXTENDED RELEASE ORAL DAILY
COMMUNITY
Start: 2024-06-28

## 2024-07-03 RX ORDER — LANOLIN ALCOHOL/MO/W.PET/CERES
400 CREAM (GRAM) TOPICAL DAILY
Status: DISCONTINUED | OUTPATIENT
Start: 2024-07-03 | End: 2024-07-04 | Stop reason: HOSPADM

## 2024-07-03 RX ORDER — LIDOCAINE HYDROCHLORIDE 30 MG/G
CREAM TOPICAL
COMMUNITY
Start: 2024-05-08 | End: 2024-07-03 | Stop reason: ALTCHOICE

## 2024-07-03 RX ORDER — ACETAMINOPHEN 325 MG/1
650 TABLET ORAL EVERY 6 HOURS PRN
Status: DISCONTINUED | OUTPATIENT
Start: 2024-07-03 | End: 2024-07-04 | Stop reason: HOSPADM

## 2024-07-03 RX ORDER — 0.9 % SODIUM CHLORIDE 0.9 %
1000 INTRAVENOUS SOLUTION INTRAVENOUS ONCE
Status: COMPLETED | OUTPATIENT
Start: 2024-07-03 | End: 2024-07-03

## 2024-07-03 RX ORDER — SODIUM CHLORIDE 0.9 % (FLUSH) 0.9 %
10 SYRINGE (ML) INJECTION PRN
Status: DISCONTINUED | OUTPATIENT
Start: 2024-07-03 | End: 2024-07-04 | Stop reason: HOSPADM

## 2024-07-03 RX ORDER — SENNOSIDES A AND B 8.6 MG/1
1 TABLET, FILM COATED ORAL DAILY PRN
Status: DISCONTINUED | OUTPATIENT
Start: 2024-07-03 | End: 2024-07-04 | Stop reason: HOSPADM

## 2024-07-03 RX ORDER — ONDANSETRON 2 MG/ML
4 INJECTION INTRAMUSCULAR; INTRAVENOUS ONCE
Status: COMPLETED | OUTPATIENT
Start: 2024-07-03 | End: 2024-07-03

## 2024-07-03 RX ORDER — VALACYCLOVIR HYDROCHLORIDE 500 MG/1
1000 TABLET, FILM COATED ORAL 2 TIMES DAILY
COMMUNITY
Start: 2024-05-08 | End: 2024-07-03 | Stop reason: ALTCHOICE

## 2024-07-03 RX ORDER — SODIUM CHLORIDE, SODIUM LACTATE, POTASSIUM CHLORIDE, CALCIUM CHLORIDE 600; 310; 30; 20 MG/100ML; MG/100ML; MG/100ML; MG/100ML
INJECTION, SOLUTION INTRAVENOUS CONTINUOUS
Status: DISCONTINUED | OUTPATIENT
Start: 2024-07-03 | End: 2024-07-04 | Stop reason: HOSPADM

## 2024-07-03 RX ORDER — CETIRIZINE HYDROCHLORIDE 5 MG/1
5 TABLET ORAL DAILY
COMMUNITY

## 2024-07-03 RX ORDER — FENTANYL CITRATE 50 UG/ML
25 INJECTION, SOLUTION INTRAMUSCULAR; INTRAVENOUS ONCE
Status: COMPLETED | OUTPATIENT
Start: 2024-07-03 | End: 2024-07-03

## 2024-07-03 RX ORDER — ONDANSETRON 2 MG/ML
4 INJECTION INTRAMUSCULAR; INTRAVENOUS EVERY 6 HOURS PRN
Status: DISCONTINUED | OUTPATIENT
Start: 2024-07-03 | End: 2024-07-04 | Stop reason: HOSPADM

## 2024-07-03 RX ADMIN — SODIUM CHLORIDE 1000 ML: 9 INJECTION, SOLUTION INTRAVENOUS at 10:40

## 2024-07-03 RX ADMIN — IOPAMIDOL 75 ML: 755 INJECTION, SOLUTION INTRAVENOUS at 14:21

## 2024-07-03 RX ADMIN — SODIUM CHLORIDE 1000 ML: 9 INJECTION, SOLUTION INTRAVENOUS at 13:06

## 2024-07-03 RX ADMIN — FENTANYL CITRATE 25 MCG: 50 INJECTION INTRAMUSCULAR; INTRAVENOUS at 16:00

## 2024-07-03 RX ADMIN — PIPERACILLIN AND TAZOBACTAM 4500 MG: 4; .5 INJECTION, POWDER, LYOPHILIZED, FOR SOLUTION INTRAVENOUS at 15:59

## 2024-07-03 RX ADMIN — ONDANSETRON 4 MG: 2 INJECTION INTRAMUSCULAR; INTRAVENOUS at 10:39

## 2024-07-03 ASSESSMENT — PAIN SCALES - GENERAL: PAINLEVEL_OUTOF10: 4

## 2024-07-03 ASSESSMENT — PAIN DESCRIPTION - ORIENTATION: ORIENTATION: RIGHT;LOWER

## 2024-07-03 ASSESSMENT — LIFESTYLE VARIABLES
HOW MANY STANDARD DRINKS CONTAINING ALCOHOL DO YOU HAVE ON A TYPICAL DAY: PATIENT DOES NOT DRINK
HOW OFTEN DO YOU HAVE A DRINK CONTAINING ALCOHOL: NEVER

## 2024-07-03 ASSESSMENT — PAIN DESCRIPTION - LOCATION: LOCATION: ABDOMEN

## 2024-07-03 ASSESSMENT — PAIN - FUNCTIONAL ASSESSMENT: PAIN_FUNCTIONAL_ASSESSMENT: 0-10

## 2024-07-03 NOTE — ED NOTES
Pt family states pt drops to 83% while sleeping and then rebounds to 94%>. Family would like pt on 2L O2. Pt does wear oxygen at night at home.

## 2024-07-03 NOTE — PROGRESS NOTES
Database initiated pharmacy and medications verified with the patient and son at bedside. Patient is A&O comes in from home alone. She has a cane and a walker but doesn't use them she has fallen recently. She has a Peg tube and gets 2 mario HN 3 carton bolous/day;; free water 200ml/feeding. She takes her medications whole in applesauce by mouth. She can have regular liquids PO also.

## 2024-07-03 NOTE — PROGRESS NOTES
1858 Called access center for patient transfer to [facility] ED to ED  1930 Dr. Gayle is the accepting physician, PAS ETA 2134

## 2024-07-03 NOTE — ED PROVIDER NOTES
[facility] EMERGENCY DEPARTMENT  EMERGENCY DEPARTMENT ENCOUNTER        Pt Name: Gui Banks  MRN: [id number]  Birthdate 1945  Date of evaluation: 7/3/2024  Provider: Heladio Chen MD  PCP: Dmitry Benson MD  Note Started: 3:04 PM EDT 7/3/24    CHIEF COMPLAINT       Chief Complaint   Patient presents with    Abdominal Pain     Abdominal pain, nausea/vomiting starting Friday night.  Fever since yesterday afternoon.      Fever    Nausea    Emesis       HISTORY OF PRESENT ILLNESS: 1 or more Elements   History From: Patient    Limitations to history : None  Social Determinants : None    Gui Banks is a 79 y.o. female past medical history of arthritis, cancer, CHF, CKD, COPD, hyperlipidemia, hypertension who presents to the emergency department complaints of fever, nausea, emesis, and abdominal pain.  Patient had a tooth extraction couple days ago, and patient started presenting with right sided abdominal pain.  Patient states that symptoms have been ongoing on for the past couple days have been worsening.  Patient has a PEG tube in place this patient is unable to eat due to her waiting for a teeth implants.  Patient states that nothing seems to be making symptoms better or worse.  Patient denies any headache, chest pain, shortness of breath, hematuria, diarrhea, constipation, bloody stools.     Nursing Notes were all reviewed and agreed with or any disagreements were addressed in the HPI.    ROS:   Pertinent positives and negatives are stated within HPI, all other systems reviewed and are negative.      --------------------------------------------- PAST HISTORY ---------------------------------------------  Past Medical History:  has a past medical history of Anemia, Arthritis, Bowel obstruction (HCC), Cancer (HCC), CHF (congestive heart failure) (HCC), CKD (chronic kidney disease), COPD (chronic obstructive pulmonary disease) (HCC), Hyperlipidemia,  (vial-mate) (0 mg IntraVENous Stopped 7/3/24 1954)   fentaNYL (SUBLIMAZE) injection 25 mcg (25 mcg IntraVENous Given 7/3/24 1600)       New Prescriptions    No medications on file         Counseling:   The emergency provider has spoken with the patient and discussed today’s results, in addition to providing specific details for the plan of care and counseling regarding the diagnosis and prognosis.  Questions are answered at this time and they are agreeable with the plan.       --------------------------------- IMPRESSION AND DISPOSITION ---------------------------------    IMPRESSION  1. Acute cholecystitis    2. ADÁN (acute kidney injury) ([facility])        DISPOSITION  Disposition: Transfer to [facility]   Patient condition is stable        NOTE: This report was transcribed using voice recognition software. Every effort was made to ensure accuracy; however, inadvertent computerized transcription errors may be present

## 2024-07-03 NOTE — PROGRESS NOTES
Discussed case with IR attending at [facility].  If patient can get transferred downtown soon, he will perform percutaneous cholecystostomy tube tonight.  I discussed case with ED attending. She will initiate transfer. I attempted to call admitting physician (Dr. Goodwin) to update him, but I was unable to reach him.     Electronically signed by Rosi Figueroa MD on 7/3/2024 at 5:06 PM

## 2024-07-03 NOTE — CONSULTS
General Surgery   Consult Note      Patient's Name/Date of Birth: Gui Banks / 1945    Date: July 3, 2024     PCP: Dmitry Benson MD     Chief Complaint: Abdominal pain    HPI:   Gui Banks is a 79 y.o. female who presents for evaluation of abdominal pain. Timing is constant, radiation to RUQ, alleviated by nothing and started Friday and severity is 10/10.  Patient reportedly has had abdominal pain on and off for the past couple months.  She developed acute, sudden onset, and constant abdominal pain on Friday.  She does not know if it is associated with food.  This pain feels similar to the attacks she has been having the past couple months.  Patient is on Eliquis with reported last dose Monday.    Patient has had multiple abdominal surgeries including oratory laparotomy with right hemicolectomy and end ileostomy for ischemic bowel.  She subsequently underwent ileostomy reversal.  She had fascial dehiscence after that surgery and underwent takeback with placement of retention sutures.  She also currently has history of head and neck cancer for which she underwent jaw reconstruction.    Patient currently with soft pressures and intermittently tachycardic.  CTAP demonstrating emphysematous cholecystitis with gallbladder wall thickening as well as pericholecystic edema and fluid.  CR 1.7, LA 1.3, LFTs grossly normal, WBC 17.6.      Patient Active Problem List   Diagnosis    Cardiomyopathy, nonischemic ([facility])    Bundle branch block, left    Biventricular implantable cardioverter-defibrillator in situ    Hyperlipidemia    SIRS (systemic inflammatory response syndrome) ([facility])    History of ischemic colitis    Incarcerated ventral hernia    Chronic kidney disease, stage III (moderate) ([facility])    Anemia of chronic renal failure    Implantable cardioverter-defibrillator (ICD) at end of device life    Non-pressure chronic ulcer of left lower leg with fat layer exposed ([facility])    Arthralgia of hip    Arthritis    Atrial  available in the EMR including CT abd/pel from 7/3/24. My assessment is emphysematous cholecystitis.    Assessment:  Gui Banks is a 79 y.o. 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    Patient Active Problem List   Diagnosis    Cardiomyopathy, nonischemic (HCC)    Bundle branch block, left    Biventricular implantable cardioverter-defibrillator in situ    Hyperlipidemia    SIRS (systemic inflammatory response syndrome) (HCC)    History of ischemic colitis    Incarcerated ventral hernia    Chronic kidney disease, stage III (moderate) (HCC)    Anemia of chronic renal failure    Implantable cardioverter-defibrillator (ICD) at end of device life    Non-pressure chronic ulcer of left lower leg with fat layer exposed (HCC)    Arthralgia of hip    Arthritis    Atrial fibrillation (HCC)    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  IVF  Continue Zosyn  Pain/nausea control.  Patient will need stat percutaneous cholecystostomy tube placement    Patient will be transferred to [facility] for procedure.  I discussed the case with both the ER attending as well as the IR attending.  Transfer will be initiated by the ER attending.  Attempted to update the patient's admitting physician, but I was unable to reach him.    Discussed with Dr. Guthrie.    Rosi Figueroa MD  PGY-4General Surgery Resident    The patient was seen and

## 2024-07-03 NOTE — CARE COORDINATION
Internal Medicine On-call Care Coordination Note    I was called by the ED physician because they recommended admission for this patient and I cover their PCP.  The history as I understand it after discussion with the ED physician is as follows:    The patient presented with abdominal pain  In the ED they found acute cholecystitis  She recently had her tooth extracted    I placed admission orders.  Including:    Acute cholecystitis:   CT abd/pel noted  General surgery consultation  IV ATB's  IVF  IV Morphine for pain      ADÁN on CKD3:   Follow BMP  Baseline sCr ~ 1.1 to 1.6  IVF    H/o afib and nonischemic cardiomyopathy:  ICD in place  Telemetry  Cardio consult  Hold [facility] in case surgery is needed    Dr. Goodwin or his coverage will see the patient tomorrow for H&P.    Electronically signed by Brandon Goodwin DO on 7/3/2024 at 4:03 PM

## 2024-07-03 NOTE — PROGRESS NOTES
Radiology Procedure Waiver   Name: Gui Banks  : 1945  MRN: [id number]    Date:  7/3/24    Time: 12:50 PM EDT    Benefits of immediately proceeding with Radiology exam(s) without pre-testing outweigh the risks or are not indicated as specified below and therefore the following is/are being waived:    [] Pregnancy test   [] Patients LMP on-time and regular.   [] Patient had Tubal Ligation or has other Contraception Device.   [] Patient  is Menopausal or Premenarcheal.    [] Patient had Full or Partial Hysterectomy.    [] Protocol for Iodine allergy    [] MRI Questionnaire     [x] BUN/Creatinine   [] Patient age w/no hx of renal dysfunction.   [] Patient on Dialysis.   [] Recent Normal Labs.  Electronically signed by Heladio Chen MD on 7/3/24 at 12:50 PM EDT

## 2024-07-03 NOTE — ED NOTES
Per son:  Pt lives alone currently, son notes cognitive decline.   Pt has her final tooth extracted last Thursday. Since then has had abd pain- RLQ, + emesis, -nausea, hypotension. Feeding tube- non compliance due to vanity. Hx. Sepsis and ischemic bowel, ileostomy and reversal. Fibula replaced partial lower jaw bone. Stage 4 oral cancer, 1 round chemo and jaw bone removal, no cancer free.   Daily intake is only around 900 calories, mostly pepsi by mouth, won't eat. No meds since Monday night.

## 2024-07-04 ENCOUNTER — HOSPITAL ENCOUNTER (INPATIENT)
Age: 79
LOS: 4 days | Discharge: NURSING, REHAB OR LONG-TERM CARE | DRG: 872 | End: 2024-07-08
Attending: STUDENT IN AN ORGANIZED HEALTH CARE EDUCATION/TRAINING PROGRAM | Admitting: STUDENT IN AN ORGANIZED HEALTH CARE EDUCATION/TRAINING PROGRAM
Payer: MEDICARE

## 2024-07-04 VITALS
DIASTOLIC BLOOD PRESSURE: 69 MMHG | SYSTOLIC BLOOD PRESSURE: 110 MMHG | TEMPERATURE: 98.2 F | BODY MASS INDEX: 22.45 KG/M2 | OXYGEN SATURATION: 98 % | RESPIRATION RATE: 16 BRPM | HEIGHT: 62 IN | WEIGHT: 122 LBS | HEART RATE: 94 BPM

## 2024-07-04 DIAGNOSIS — K81.0 ACUTE EMPHYSEMATOUS CHOLECYSTITIS: ICD-10-CM

## 2024-07-04 DIAGNOSIS — K81.0 ACUTE CHOLECYSTITIS: Primary | ICD-10-CM

## 2024-07-04 LAB
ABO + RH BLD: NORMAL
ALBUMIN SERPL-MCNC: 2.9 G/DL (ref 3.5–5.2)
ALP SERPL-CCNC: 113 U/L (ref 35–104)
ALT SERPL-CCNC: 13 U/L (ref 0–32)
ANION GAP SERPL CALCULATED.3IONS-SCNC: 14 MMOL/L (ref 7–16)
ARM BAND NUMBER: NORMAL
AST SERPL-CCNC: 18 U/L (ref 0–31)
BASOPHILS # BLD: 0 K/UL (ref 0–0.2)
BASOPHILS NFR BLD: 0 % (ref 0–2)
BILIRUB DIRECT SERPL-MCNC: 0.3 MG/DL (ref 0–0.3)
BILIRUB INDIRECT SERPL-MCNC: 0.2 MG/DL (ref 0–1)
BILIRUB SERPL-MCNC: 0.5 MG/DL (ref 0–1.2)
BLOOD BANK SAMPLE EXPIRATION: NORMAL
BLOOD GROUP ANTIBODIES SERPL: NEGATIVE
BUN SERPL-MCNC: 27 MG/DL (ref 6–23)
CALCIUM SERPL-MCNC: 8.6 MG/DL (ref 8.6–10.2)
CHLORIDE SERPL-SCNC: 101 MMOL/L (ref 98–107)
CO2 SERPL-SCNC: 21 MMOL/L (ref 22–29)
CREAT SERPL-MCNC: 1.5 MG/DL (ref 0.5–1)
EKG ATRIAL RATE: 89 BPM
EKG P AXIS: 45 DEGREES
EKG P-R INTERVAL: 110 MS
EKG Q-T INTERVAL: 358 MS
EKG QRS DURATION: 110 MS
EKG QTC CALCULATION (BAZETT): 435 MS
EKG R AXIS: -85 DEGREES
EKG T AXIS: 97 DEGREES
EKG VENTRICULAR RATE: 89 BPM
EOSINOPHIL # BLD: 0 K/UL (ref 0.05–0.5)
EOSINOPHILS RELATIVE PERCENT: 0 % (ref 0–6)
ERYTHROCYTE [DISTWIDTH] IN BLOOD BY AUTOMATED COUNT: 14.6 % (ref 11.5–15)
GFR, ESTIMATED: 36 ML/MIN/1.73M2
GLUCOSE SERPL-MCNC: 69 MG/DL (ref 74–99)
HCT VFR BLD AUTO: 31.1 % (ref 34–48)
HGB BLD-MCNC: 10 G/DL (ref 11.5–15.5)
INR PPP: 1.4
LACTATE BLDV-SCNC: 0.8 MMOL/L (ref 0.5–2.2)
LIPASE SERPL-CCNC: 33 U/L (ref 13–60)
LYMPHOCYTES NFR BLD: 0.71 K/UL (ref 1.5–4)
LYMPHOCYTES RELATIVE PERCENT: 4 % (ref 20–42)
MAGNESIUM SERPL-MCNC: 1.7 MG/DL (ref 1.6–2.6)
MCH RBC QN AUTO: 31.5 PG (ref 26–35)
MCHC RBC AUTO-ENTMCNC: 32.2 G/DL (ref 32–34.5)
MCV RBC AUTO: 98.1 FL (ref 80–99.9)
MICROORGANISM SPEC CULT: ABNORMAL
MONOCYTES NFR BLD: 1 K/UL (ref 0.1–0.95)
MONOCYTES NFR BLD: 6 % (ref 2–12)
NEUTROPHILS NFR BLD: 90 % (ref 43–80)
NEUTS SEG NFR BLD: 14.69 K/UL (ref 1.8–7.3)
PLATELET # BLD AUTO: 165 K/UL (ref 130–450)
PMV BLD AUTO: 11.1 FL (ref 7–12)
POTASSIUM SERPL-SCNC: 4.6 MMOL/L (ref 3.5–5)
PROT SERPL-MCNC: 6.3 G/DL (ref 6.4–8.3)
PROTHROMBIN TIME: 14.8 SEC (ref 9.3–12.4)
RBC # BLD AUTO: 3.17 M/UL (ref 3.5–5.5)
RBC # BLD: ABNORMAL 10*6/UL
SERVICE CMNT-IMP: ABNORMAL
SODIUM SERPL-SCNC: 136 MMOL/L (ref 132–146)
SPECIMEN DESCRIPTION: ABNORMAL
WBC OTHER # BLD: 16.4 K/UL (ref 4.5–11.5)

## 2024-07-04 PROCEDURE — 86901 BLOOD TYPING SEROLOGIC RH(D): CPT

## 2024-07-04 PROCEDURE — 2580000003 HC RX 258

## 2024-07-04 PROCEDURE — 86900 BLOOD TYPING SEROLOGIC ABO: CPT

## 2024-07-04 PROCEDURE — 83690 ASSAY OF LIPASE: CPT

## 2024-07-04 PROCEDURE — 6360000002 HC RX W HCPCS

## 2024-07-04 PROCEDURE — 83605 ASSAY OF LACTIC ACID: CPT

## 2024-07-04 PROCEDURE — 83735 ASSAY OF MAGNESIUM: CPT

## 2024-07-04 PROCEDURE — 99285 EMERGENCY DEPT VISIT HI MDM: CPT

## 2024-07-04 PROCEDURE — 85610 PROTHROMBIN TIME: CPT

## 2024-07-04 PROCEDURE — 93010 ELECTROCARDIOGRAM REPORT: CPT | Performed by: INTERNAL MEDICINE

## 2024-07-04 PROCEDURE — 85025 COMPLETE CBC W/AUTO DIFF WBC: CPT

## 2024-07-04 PROCEDURE — 82248 BILIRUBIN DIRECT: CPT

## 2024-07-04 PROCEDURE — 86850 RBC ANTIBODY SCREEN: CPT

## 2024-07-04 PROCEDURE — 2060000000 HC ICU INTERMEDIATE R&B

## 2024-07-04 PROCEDURE — 6370000000 HC RX 637 (ALT 250 FOR IP)

## 2024-07-04 PROCEDURE — 80053 COMPREHEN METABOLIC PANEL: CPT

## 2024-07-04 RX ORDER — MORPHINE SULFATE 2 MG/ML
2 INJECTION, SOLUTION INTRAMUSCULAR; INTRAVENOUS EVERY 4 HOURS PRN
Status: DISCONTINUED | OUTPATIENT
Start: 2024-07-04 | End: 2024-07-04

## 2024-07-04 RX ORDER — GABAPENTIN 100 MG/1
100 CAPSULE ORAL 2 TIMES DAILY
Status: DISCONTINUED | OUTPATIENT
Start: 2024-07-04 | End: 2024-07-08 | Stop reason: HOSPADM

## 2024-07-04 RX ORDER — UBIDECARENONE 75 MG
100 CAPSULE ORAL DAILY
Status: DISCONTINUED | OUTPATIENT
Start: 2024-07-04 | End: 2024-07-08 | Stop reason: HOSPADM

## 2024-07-04 RX ORDER — ONDANSETRON 2 MG/ML
4 INJECTION INTRAMUSCULAR; INTRAVENOUS ONCE
Status: COMPLETED | OUTPATIENT
Start: 2024-07-04 | End: 2024-07-04

## 2024-07-04 RX ORDER — LANOLIN ALCOHOL/MO/W.PET/CERES
400 CREAM (GRAM) TOPICAL DAILY
Status: DISCONTINUED | OUTPATIENT
Start: 2024-07-04 | End: 2024-07-08 | Stop reason: HOSPADM

## 2024-07-04 RX ORDER — MORPHINE SULFATE 2 MG/ML
1 INJECTION, SOLUTION INTRAMUSCULAR; INTRAVENOUS EVERY 4 HOURS PRN
Status: ACTIVE | OUTPATIENT
Start: 2024-07-04 | End: 2024-07-05

## 2024-07-04 RX ORDER — ACETAMINOPHEN 650 MG/1
650 SUPPOSITORY RECTAL EVERY 6 HOURS PRN
Status: DISCONTINUED | OUTPATIENT
Start: 2024-07-04 | End: 2024-07-08 | Stop reason: HOSPADM

## 2024-07-04 RX ORDER — SODIUM CHLORIDE 9 MG/ML
INJECTION, SOLUTION INTRAVENOUS CONTINUOUS
Status: DISCONTINUED | OUTPATIENT
Start: 2024-07-04 | End: 2024-07-05

## 2024-07-04 RX ORDER — ESCITALOPRAM OXALATE 10 MG/1
20 TABLET ORAL DAILY
Status: DISCONTINUED | OUTPATIENT
Start: 2024-07-04 | End: 2024-07-08 | Stop reason: HOSPADM

## 2024-07-04 RX ORDER — SODIUM CHLORIDE 9 MG/ML
INJECTION, SOLUTION INTRAVENOUS PRN
Status: DISCONTINUED | OUTPATIENT
Start: 2024-07-04 | End: 2024-07-08 | Stop reason: HOSPADM

## 2024-07-04 RX ORDER — ONDANSETRON 2 MG/ML
4 INJECTION INTRAMUSCULAR; INTRAVENOUS EVERY 6 HOURS PRN
Status: DISCONTINUED | OUTPATIENT
Start: 2024-07-04 | End: 2024-07-08 | Stop reason: HOSPADM

## 2024-07-04 RX ORDER — FENTANYL CITRATE 50 UG/ML
25 INJECTION, SOLUTION INTRAMUSCULAR; INTRAVENOUS ONCE
Status: COMPLETED | OUTPATIENT
Start: 2024-07-04 | End: 2024-07-04

## 2024-07-04 RX ORDER — ACETAMINOPHEN 325 MG/1
650 TABLET ORAL EVERY 6 HOURS PRN
Status: DISCONTINUED | OUTPATIENT
Start: 2024-07-04 | End: 2024-07-08 | Stop reason: HOSPADM

## 2024-07-04 RX ORDER — CETIRIZINE HYDROCHLORIDE 10 MG/1
5 TABLET ORAL DAILY
Status: DISCONTINUED | OUTPATIENT
Start: 2024-07-04 | End: 2024-07-08 | Stop reason: HOSPADM

## 2024-07-04 RX ORDER — SODIUM CHLORIDE 0.9 % (FLUSH) 0.9 %
10 SYRINGE (ML) INJECTION PRN
Status: DISCONTINUED | OUTPATIENT
Start: 2024-07-04 | End: 2024-07-08 | Stop reason: HOSPADM

## 2024-07-04 RX ORDER — ONDANSETRON 4 MG/1
4 TABLET, ORALLY DISINTEGRATING ORAL EVERY 8 HOURS PRN
Status: DISCONTINUED | OUTPATIENT
Start: 2024-07-04 | End: 2024-07-08 | Stop reason: HOSPADM

## 2024-07-04 RX ORDER — FAMOTIDINE 20 MG/1
20 TABLET, FILM COATED ORAL DAILY
Status: DISCONTINUED | OUTPATIENT
Start: 2024-07-04 | End: 2024-07-08 | Stop reason: HOSPADM

## 2024-07-04 RX ORDER — METOPROLOL SUCCINATE 25 MG/1
12.5 TABLET, EXTENDED RELEASE ORAL DAILY
Status: DISCONTINUED | OUTPATIENT
Start: 2024-07-04 | End: 2024-07-08 | Stop reason: HOSPADM

## 2024-07-04 RX ORDER — SODIUM CHLORIDE 0.9 % (FLUSH) 0.9 %
10 SYRINGE (ML) INJECTION EVERY 12 HOURS SCHEDULED
Status: DISCONTINUED | OUTPATIENT
Start: 2024-07-04 | End: 2024-07-08 | Stop reason: HOSPADM

## 2024-07-04 RX ORDER — SENNOSIDES A AND B 8.6 MG/1
1 TABLET, FILM COATED ORAL DAILY PRN
Status: DISCONTINUED | OUTPATIENT
Start: 2024-07-04 | End: 2024-07-08 | Stop reason: HOSPADM

## 2024-07-04 RX ADMIN — ESCITALOPRAM OXALATE 20 MG: 10 TABLET ORAL at 09:16

## 2024-07-04 RX ADMIN — GABAPENTIN 100 MG: 100 CAPSULE ORAL at 20:20

## 2024-07-04 RX ADMIN — PIPERACILLIN AND TAZOBACTAM 3375 MG: 3; .375 INJECTION, POWDER, LYOPHILIZED, FOR SOLUTION INTRAVENOUS at 03:04

## 2024-07-04 RX ADMIN — CETIRIZINE HYDROCHLORIDE 5 MG: 10 TABLET, FILM COATED ORAL at 09:16

## 2024-07-04 RX ADMIN — GABAPENTIN 100 MG: 100 CAPSULE ORAL at 09:16

## 2024-07-04 RX ADMIN — PIPERACILLIN AND TAZOBACTAM 3375 MG: 3; .375 INJECTION, POWDER, LYOPHILIZED, FOR SOLUTION INTRAVENOUS at 18:09

## 2024-07-04 RX ADMIN — SODIUM CHLORIDE, PRESERVATIVE FREE 10 ML: 5 INJECTION INTRAVENOUS at 20:20

## 2024-07-04 RX ADMIN — PIPERACILLIN AND TAZOBACTAM 3375 MG: 3; .375 INJECTION, POWDER, LYOPHILIZED, FOR SOLUTION INTRAVENOUS at 09:36

## 2024-07-04 RX ADMIN — FENTANYL CITRATE 25 MCG: 50 INJECTION INTRAMUSCULAR; INTRAVENOUS at 03:02

## 2024-07-04 RX ADMIN — FAMOTIDINE 20 MG: 20 TABLET, FILM COATED ORAL at 09:16

## 2024-07-04 RX ADMIN — METOPROLOL SUCCINATE 12.5 MG: 25 TABLET, EXTENDED RELEASE ORAL at 09:16

## 2024-07-04 RX ADMIN — SODIUM CHLORIDE, PRESERVATIVE FREE 10 ML: 5 INJECTION INTRAVENOUS at 07:19

## 2024-07-04 RX ADMIN — ONDANSETRON 4 MG: 2 INJECTION INTRAMUSCULAR; INTRAVENOUS at 03:02

## 2024-07-04 RX ADMIN — SODIUM CHLORIDE: 9 INJECTION, SOLUTION INTRAVENOUS at 07:22

## 2024-07-04 RX ADMIN — ACETAMINOPHEN 650 MG: 325 TABLET ORAL at 18:12

## 2024-07-04 RX ADMIN — Medication 400 MG: at 09:16

## 2024-07-04 RX ADMIN — VITAM B12 100 MCG: 100 TAB at 09:16

## 2024-07-04 ASSESSMENT — PAIN SCALES - GENERAL
PAINLEVEL_OUTOF10: 0
PAINLEVEL_OUTOF10: 5

## 2024-07-04 ASSESSMENT — PAIN DESCRIPTION - DESCRIPTORS: DESCRIPTORS: DISCOMFORT

## 2024-07-04 ASSESSMENT — PAIN DESCRIPTION - ORIENTATION: ORIENTATION: LOWER;RIGHT

## 2024-07-04 ASSESSMENT — PAIN DESCRIPTION - LOCATION: LOCATION: ABDOMEN

## 2024-07-04 NOTE — ED PROVIDER NOTES
(Dtwo0Srd) (3,375 mg IntraVENous New Bag 7/4/24 0936)   apixaban (ELIQUIS) tablet 2.5 mg ( Oral Automatically Held 7/7/24 2100)   cetirizine (ZYRTEC) tablet 5 mg (5 mg Oral Given 7/4/24 0916)   vitamin B-12 (CYANOCOBALAMIN) tablet 100 mcg (100 mcg Oral Given 7/4/24 0916)   escitalopram (LEXAPRO) tablet 20 mg (20 mg Oral Given 7/4/24 0916)   famotidine (PEPCID) tablet 20 mg (20 mg Oral Given 7/4/24 0916)   gabapentin (NEURONTIN) capsule 100 mg (100 mg Oral Given 7/4/24 0916)   magnesium oxide (MAG-OX) tablet 400 mg (400 mg Oral Given 7/4/24 0916)   metoprolol succinate (TOPROL XL) extended release tablet 12.5 mg (12.5 mg Oral Given 7/4/24 0916)   sodium chloride flush 0.9 % injection 10 mL (10 mLs IntraVENous Given 7/4/24 0719)   sodium chloride flush 0.9 % injection 10 mL (has no administration in time range)   0.9 % sodium chloride infusion (has no administration in time range)   ondansetron (ZOFRAN-ODT) disintegrating tablet 4 mg (has no administration in time range)     Or   ondansetron (ZOFRAN) injection 4 mg (has no administration in time range)   senna (SENOKOT) tablet 8.6 mg (has no administration in time range)   acetaminophen (TYLENOL) tablet 650 mg (has no administration in time range)     Or   acetaminophen (TYLENOL) suppository 650 mg (has no administration in time range)   0.9 % sodium chloride infusion ( IntraVENous New Bag 7/4/24 0722)   morphine (PF) injection 2 mg (has no administration in time range)   fentaNYL (SUBLIMAZE) injection 25 mcg (25 mcg IntraVENous Given 7/4/24 0302)   ondansetron (ZOFRAN) injection 4 mg (4 mg IntraVENous Given 7/4/24 0302)         Is this patient to be included in the SEP-1 core measure due to severe sepsis or septic shock? No   Exclusion criteria - the patient is NOT to be included for SEP-1 Core Measure due to:  May have criteria for sepsis, but does not meet criteria for severe sepsis or septic shock        Medical Decision Making/Differential Diagnosis:    CC/HPI

## 2024-07-04 NOTE — CONSULTS
General Surgery   Consult Note      Patient's Name/Date of Birth: Gui Banks / 1945    Date: July 4, 2024     PCP: Dmitry Benson MD     Chief Complaint: Abdominal pain    HPI:   Gui Banks is a 79 y.o. female who presents for evaluation of abdominal pain. Timing is constant, radiation to RUQ, alleviated by nothing and started Friday and severity is 10/10.  Patient reportedly has had abdominal pain on and off for the past couple months.  She developed acute, sudden onset, and constant abdominal pain on Friday.  She does not know if it is associated with food.  This pain feels similar to the attacks she has been having the past couple months.  Patient is on Eliquis with reported last dose Monday.    Patient has had multiple abdominal surgeries including oratory laparotomy with right hemicolectomy and end ileostomy for ischemic bowel.  She subsequently underwent ileostomy reversal.  She had fascial dehiscence after that surgery and underwent takeback with placement of retention sutures.  She also currently has history of head and neck cancer for which she underwent jaw reconstruction.    Patient currently with soft pressures and intermittently tachycardic.  CTAP demonstrating emphysematous cholecystitis with gallbladder wall thickening as well as pericholecystic edema and fluid.  CR 1.7, LA 1.3, LFTs grossly normal, WBC 17.6.    The patient was then transferred to [facility] for IR evaluation percutaneous cholecystostomy tube placement    Repeat labs w/ leukocytosis 16.3, hemoglobin 10.       Patient Active Problem List   Diagnosis    Cardiomyopathy, nonischemic (HCC)    Bundle branch block, left    Biventricular implantable cardioverter-defibrillator in situ    Hyperlipidemia    SIRS (systemic inflammatory response syndrome) ([facility])    History of ischemic colitis    Incarcerated ventral hernia    Chronic kidney disease, stage III (moderate) ([facility])    Anemia of chronic renal failure    Implantable

## 2024-07-04 NOTE — H&P
problem list by Vicki Lares, Pharm. D 1/6/2018 10:41 AM based on transcribed order from Dr. Pitts        Incarcerated ventral hernia 04/25/2017    SIRS (systemic inflammatory response syndrome) (HCC) 05/26/2016    History of ischemic colitis 05/26/2016    Cardiomyopathy, nonischemic (HCC) 09/15/2011     A. H/O ejection fraction of approximately 30% diagnosed in December 2006.  B. NYHA Class III congestive heart failure  C. Status post 2-D echocardiogram 3/16/07: LVEF 25-30% with a left ventricular end diastolic diameter of 6.0 cm.    D. S/P 12/26/06: Severe LVH with an ejection fraction of 20%.  Normal coronary anatomy      Bundle branch block, left 09/15/2011     A. Chronic        Biventricular implantable cardioverter-defibrillator in situ 09/15/2011     A. Original Guidant Contak Renewal 3 RF, implanted 5/23/07  B. Atrial lead, Guidant model #4136, serial # [id number]  C. Right ventricular lead, [employer]onic model #6949, serial # [id number]  D. Left ventricular lead, Guidant model # 4542, serial # [id number]  E. S/P RV lead explantation with implantation of a new Guidant RV ICD lead, as well as a [employer] COGNIS Bi-V ICD 1/7/09      Hyperlipidemia 09/15/2011       Plan  Acute cholecystitis   Gen surge   Transferred from [facility] to [facility] for IR perc tube   Continue Zosyn     Recently diagnosed orthostatic hypotension  Did not start on metoprolol so will not order  Fall pre cautions     Multiple falls at home recently   PT OT once improved   May need placement     Acinetobacter baumannii wound left leg 3/15/2023  SP ICD placement   A fib on eliquis   HFrEF ischemic cardiomyopathy     PT/OT  Home medications to be reconciled and confirmed prior to being ordered  DVT prophylaxis   Code Status Full   Discharge plan: TBD pending clinical improvement     Carmelo Bernard MD  Internal medicine   7/4/2024  9:01 AM    I can be reached through [employer].    NOTE:  This report was transcribed using voice recognition software.

## 2024-07-05 ENCOUNTER — APPOINTMENT (OUTPATIENT)
Dept: CT IMAGING | Age: 79
DRG: 872 | End: 2024-07-05
Payer: MEDICARE

## 2024-07-05 LAB
ALBUMIN SERPL-MCNC: 3.1 G/DL (ref 3.5–5.2)
ALP SERPL-CCNC: 149 U/L (ref 35–104)
ALT SERPL-CCNC: 22 U/L (ref 0–32)
ANION GAP SERPL CALCULATED.3IONS-SCNC: 10 MMOL/L (ref 7–16)
AST SERPL-CCNC: 31 U/L (ref 0–31)
BASOPHILS # BLD: 0.02 K/UL (ref 0–0.2)
BASOPHILS NFR BLD: 0 % (ref 0–2)
BILIRUB SERPL-MCNC: 0.6 MG/DL (ref 0–1.2)
BUN SERPL-MCNC: 26 MG/DL (ref 6–23)
CALCIUM SERPL-MCNC: 9 MG/DL (ref 8.6–10.2)
CHLORIDE SERPL-SCNC: 109 MMOL/L (ref 98–107)
CO2 SERPL-SCNC: 23 MMOL/L (ref 22–29)
CREAT SERPL-MCNC: 1.4 MG/DL (ref 0.5–1)
EOSINOPHIL # BLD: 0.14 K/UL (ref 0.05–0.5)
EOSINOPHILS RELATIVE PERCENT: 1 % (ref 0–6)
ERYTHROCYTE [DISTWIDTH] IN BLOOD BY AUTOMATED COUNT: 14.9 % (ref 11.5–15)
GFR, ESTIMATED: 37 ML/MIN/1.73M2
GLUCOSE SERPL-MCNC: 95 MG/DL (ref 74–99)
HCT VFR BLD AUTO: 32.6 % (ref 34–48)
HGB BLD-MCNC: 10.2 G/DL (ref 11.5–15.5)
IMM GRANULOCYTES # BLD AUTO: 0.07 K/UL (ref 0–0.58)
IMM GRANULOCYTES NFR BLD: 1 % (ref 0–5)
LYMPHOCYTES NFR BLD: 0.52 K/UL (ref 1.5–4)
LYMPHOCYTES RELATIVE PERCENT: 5 % (ref 20–42)
MCH RBC QN AUTO: 31.9 PG (ref 26–35)
MCHC RBC AUTO-ENTMCNC: 31.3 G/DL (ref 32–34.5)
MCV RBC AUTO: 101.9 FL (ref 80–99.9)
MONOCYTES NFR BLD: 1.02 K/UL (ref 0.1–0.95)
MONOCYTES NFR BLD: 9 % (ref 2–12)
NEUTROPHILS NFR BLD: 84 % (ref 43–80)
NEUTS SEG NFR BLD: 9.19 K/UL (ref 1.8–7.3)
PLATELET # BLD AUTO: 180 K/UL (ref 130–450)
PMV BLD AUTO: 11.9 FL (ref 7–12)
POTASSIUM SERPL-SCNC: 4.4 MMOL/L (ref 3.5–5)
PROT SERPL-MCNC: 6.4 G/DL (ref 6.4–8.3)
RBC # BLD AUTO: 3.2 M/UL (ref 3.5–5.5)
RBC # BLD: ABNORMAL 10*6/UL
SODIUM SERPL-SCNC: 142 MMOL/L (ref 132–146)
WBC OTHER # BLD: 11 K/UL (ref 4.5–11.5)

## 2024-07-05 PROCEDURE — 87205 SMEAR GRAM STAIN: CPT

## 2024-07-05 PROCEDURE — 87015 SPECIMEN INFECT AGNT CONCNTJ: CPT

## 2024-07-05 PROCEDURE — 6370000000 HC RX 637 (ALT 250 FOR IP)

## 2024-07-05 PROCEDURE — 6370000000 HC RX 637 (ALT 250 FOR IP): Performed by: STUDENT IN AN ORGANIZED HEALTH CARE EDUCATION/TRAINING PROGRAM

## 2024-07-05 PROCEDURE — 97530 THERAPEUTIC ACTIVITIES: CPT

## 2024-07-05 PROCEDURE — 87075 CULTR BACTERIA EXCEPT BLOOD: CPT

## 2024-07-05 PROCEDURE — 85025 COMPLETE CBC W/AUTO DIFF WBC: CPT

## 2024-07-05 PROCEDURE — 97165 OT EVAL LOW COMPLEX 30 MIN: CPT

## 2024-07-05 PROCEDURE — 80053 COMPREHEN METABOLIC PANEL: CPT

## 2024-07-05 PROCEDURE — 2580000003 HC RX 258

## 2024-07-05 PROCEDURE — 6360000002 HC RX W HCPCS

## 2024-07-05 PROCEDURE — 87102 FUNGUS ISOLATION CULTURE: CPT

## 2024-07-05 PROCEDURE — 87070 CULTURE OTHR SPECIMN AEROBIC: CPT

## 2024-07-05 PROCEDURE — 0F9430Z DRAINAGE OF GALLBLADDER WITH DRAINAGE DEVICE, PERCUTANEOUS APPROACH: ICD-10-PCS | Performed by: STUDENT IN AN ORGANIZED HEALTH CARE EDUCATION/TRAINING PROGRAM

## 2024-07-05 PROCEDURE — 97161 PT EVAL LOW COMPLEX 20 MIN: CPT

## 2024-07-05 PROCEDURE — 47532 INJECTION FOR CHOLANGIOGRAM: CPT

## 2024-07-05 PROCEDURE — 36415 COLL VENOUS BLD VENIPUNCTURE: CPT

## 2024-07-05 PROCEDURE — 6360000002 HC RX W HCPCS: Performed by: RADIOLOGY

## 2024-07-05 PROCEDURE — 2709999900 CT PTC NEW ACCESS

## 2024-07-05 PROCEDURE — 2060000000 HC ICU INTERMEDIATE R&B

## 2024-07-05 PROCEDURE — 97535 SELF CARE MNGMENT TRAINING: CPT

## 2024-07-05 RX ORDER — DIPHENHYDRAMINE HYDROCHLORIDE 50 MG/ML
INJECTION INTRAMUSCULAR; INTRAVENOUS PRN
Status: COMPLETED | OUTPATIENT
Start: 2024-07-05 | End: 2024-07-05

## 2024-07-05 RX ORDER — OXYCODONE HYDROCHLORIDE AND ACETAMINOPHEN 5; 325 MG/1; MG/1
1 TABLET ORAL EVERY 6 HOURS PRN
Status: DISCONTINUED | OUTPATIENT
Start: 2024-07-05 | End: 2024-07-08 | Stop reason: HOSPADM

## 2024-07-05 RX ORDER — FENTANYL CITRATE 50 UG/ML
INJECTION, SOLUTION INTRAMUSCULAR; INTRAVENOUS PRN
Status: COMPLETED | OUTPATIENT
Start: 2024-07-05 | End: 2024-07-05

## 2024-07-05 RX ORDER — MIDAZOLAM HYDROCHLORIDE 2 MG/2ML
INJECTION, SOLUTION INTRAMUSCULAR; INTRAVENOUS PRN
Status: COMPLETED | OUTPATIENT
Start: 2024-07-05 | End: 2024-07-05

## 2024-07-05 RX ADMIN — VITAM B12 100 MCG: 100 TAB at 12:42

## 2024-07-05 RX ADMIN — FAMOTIDINE 20 MG: 20 TABLET, FILM COATED ORAL at 12:47

## 2024-07-05 RX ADMIN — PIPERACILLIN AND TAZOBACTAM 3375 MG: 3; .375 INJECTION, POWDER, LYOPHILIZED, FOR SOLUTION INTRAVENOUS at 01:33

## 2024-07-05 RX ADMIN — PIPERACILLIN AND TAZOBACTAM 3375 MG: 3; .375 INJECTION, POWDER, LYOPHILIZED, FOR SOLUTION INTRAVENOUS at 08:25

## 2024-07-05 RX ADMIN — CETIRIZINE HYDROCHLORIDE 5 MG: 10 TABLET, FILM COATED ORAL at 12:43

## 2024-07-05 RX ADMIN — MIDAZOLAM HYDROCHLORIDE 0.5 MG: 1 INJECTION, SOLUTION INTRAMUSCULAR; INTRAVENOUS at 09:32

## 2024-07-05 RX ADMIN — GABAPENTIN 100 MG: 100 CAPSULE ORAL at 20:30

## 2024-07-05 RX ADMIN — DIPHENHYDRAMINE HYDROCHLORIDE 25 MG: 50 INJECTION, SOLUTION INTRAMUSCULAR; INTRAVENOUS at 09:34

## 2024-07-05 RX ADMIN — GABAPENTIN 100 MG: 100 CAPSULE ORAL at 12:43

## 2024-07-05 RX ADMIN — SODIUM CHLORIDE, PRESERVATIVE FREE 10 ML: 5 INJECTION INTRAVENOUS at 12:53

## 2024-07-05 RX ADMIN — Medication 400 MG: at 12:43

## 2024-07-05 RX ADMIN — FENTANYL CITRATE 25 MCG: 50 INJECTION, SOLUTION INTRAMUSCULAR; INTRAVENOUS at 09:31

## 2024-07-05 RX ADMIN — OXYCODONE HYDROCHLORIDE AND ACETAMINOPHEN 1 TABLET: 5; 325 TABLET ORAL at 14:25

## 2024-07-05 RX ADMIN — SODIUM CHLORIDE: 9 INJECTION, SOLUTION INTRAVENOUS at 01:32

## 2024-07-05 RX ADMIN — ESCITALOPRAM OXALATE 20 MG: 10 TABLET ORAL at 12:47

## 2024-07-05 ASSESSMENT — PAIN SCALES - GENERAL
PAINLEVEL_OUTOF10: 0
PAINLEVEL_OUTOF10: 2
PAINLEVEL_OUTOF10: 0

## 2024-07-05 ASSESSMENT — PAIN DESCRIPTION - DESCRIPTORS: DESCRIPTORS: ACHING;SORE;PRESSURE

## 2024-07-05 ASSESSMENT — PAIN DESCRIPTION - ORIENTATION: ORIENTATION: RIGHT

## 2024-07-05 ASSESSMENT — PAIN DESCRIPTION - LOCATION: LOCATION: ABDOMEN

## 2024-07-05 NOTE — PROCEDURES
PROCEDURE NOTE  Date: 7/5/2024   Name: Gui Banks  YOB: 1945    Procedures    Patient arrived to radiology department for cholecystostomy tube placement Allergies, medications, H&P and fasting instructions reviewed. Vital signs taken. IV flushed and prn adapter attached. Procedural instructions given, questions answered, understanding expressed and consent signed. No questions at this time

## 2024-07-05 NOTE — PROCEDURES
0958 Patient returned from keith tube placement. Report received from SNEHA Oliver. Dressing checked, clean, dry, and intact. Patient stable. No s/s of complications noted or reported. Vitals will be checked q 15min, see flow sheets. Last medications given at 0932. Patient will recover for at least 30 minutes.     1022 Site was checked with every set of vitals. Site clean dry and intact. Patient in stable condition, no s/s of complications noted or reported. Patient taken back to inpatient room via Transport.

## 2024-07-05 NOTE — PROCEDURES
PROCEDURE NOTE  Date: 7/5/2024   Name: Gui Banks  YOB: 1945    Procedures: Gemma tube placement    Spoke with bedside RN, patient is able to sign consent and is NPO. Will send for patient when schedule allows.

## 2024-07-06 LAB
ALBUMIN SERPL-MCNC: 2.7 G/DL (ref 3.5–5.2)
ALP SERPL-CCNC: 175 U/L (ref 35–104)
ALT SERPL-CCNC: 20 U/L (ref 0–32)
ANION GAP SERPL CALCULATED.3IONS-SCNC: 12 MMOL/L (ref 7–16)
AST SERPL-CCNC: 24 U/L (ref 0–31)
BASOPHILS # BLD: 0.01 K/UL (ref 0–0.2)
BASOPHILS NFR BLD: 0 % (ref 0–2)
BILIRUB SERPL-MCNC: 0.2 MG/DL (ref 0–1.2)
BUN SERPL-MCNC: 26 MG/DL (ref 6–23)
CALCIUM SERPL-MCNC: 8.7 MG/DL (ref 8.6–10.2)
CHLORIDE SERPL-SCNC: 106 MMOL/L (ref 98–107)
CO2 SERPL-SCNC: 20 MMOL/L (ref 22–29)
CREAT SERPL-MCNC: 1.4 MG/DL (ref 0.5–1)
EOSINOPHIL # BLD: 0.18 K/UL (ref 0.05–0.5)
EOSINOPHILS RELATIVE PERCENT: 3 % (ref 0–6)
ERYTHROCYTE [DISTWIDTH] IN BLOOD BY AUTOMATED COUNT: 15.4 % (ref 11.5–15)
GFR, ESTIMATED: 38 ML/MIN/1.73M2
GLUCOSE SERPL-MCNC: 93 MG/DL (ref 74–99)
HCT VFR BLD AUTO: 31.7 % (ref 34–48)
HGB BLD-MCNC: 9.6 G/DL (ref 11.5–15.5)
IMM GRANULOCYTES # BLD AUTO: 0.07 K/UL (ref 0–0.58)
IMM GRANULOCYTES NFR BLD: 1 % (ref 0–5)
LYMPHOCYTES NFR BLD: 0.66 K/UL (ref 1.5–4)
LYMPHOCYTES RELATIVE PERCENT: 10 % (ref 20–42)
MCH RBC QN AUTO: 30.9 PG (ref 26–35)
MCHC RBC AUTO-ENTMCNC: 30.3 G/DL (ref 32–34.5)
MCV RBC AUTO: 101.9 FL (ref 80–99.9)
MONOCYTES NFR BLD: 0.71 K/UL (ref 0.1–0.95)
MONOCYTES NFR BLD: 11 % (ref 2–12)
NEUTROPHILS NFR BLD: 75 % (ref 43–80)
NEUTS SEG NFR BLD: 4.88 K/UL (ref 1.8–7.3)
PLATELET # BLD AUTO: 204 K/UL (ref 130–450)
PMV BLD AUTO: 11.7 FL (ref 7–12)
POTASSIUM SERPL-SCNC: 4.4 MMOL/L (ref 3.5–5)
PROT SERPL-MCNC: 6.1 G/DL (ref 6.4–8.3)
RBC # BLD AUTO: 3.11 M/UL (ref 3.5–5.5)
SODIUM SERPL-SCNC: 138 MMOL/L (ref 132–146)
WBC OTHER # BLD: 6.5 K/UL (ref 4.5–11.5)

## 2024-07-06 PROCEDURE — 80053 COMPREHEN METABOLIC PANEL: CPT

## 2024-07-06 PROCEDURE — 85025 COMPLETE CBC W/AUTO DIFF WBC: CPT

## 2024-07-06 PROCEDURE — 6370000000 HC RX 637 (ALT 250 FOR IP)

## 2024-07-06 PROCEDURE — 2060000000 HC ICU INTERMEDIATE R&B

## 2024-07-06 PROCEDURE — 2580000003 HC RX 258

## 2024-07-06 PROCEDURE — 6370000000 HC RX 637 (ALT 250 FOR IP): Performed by: STUDENT IN AN ORGANIZED HEALTH CARE EDUCATION/TRAINING PROGRAM

## 2024-07-06 PROCEDURE — 36415 COLL VENOUS BLD VENIPUNCTURE: CPT

## 2024-07-06 PROCEDURE — 6360000002 HC RX W HCPCS

## 2024-07-06 RX ADMIN — GABAPENTIN 100 MG: 100 CAPSULE ORAL at 10:08

## 2024-07-06 RX ADMIN — VITAM B12 100 MCG: 100 TAB at 10:08

## 2024-07-06 RX ADMIN — PIPERACILLIN AND TAZOBACTAM 3375 MG: 3; .375 INJECTION, POWDER, LYOPHILIZED, FOR SOLUTION INTRAVENOUS at 00:35

## 2024-07-06 RX ADMIN — GABAPENTIN 100 MG: 100 CAPSULE ORAL at 22:02

## 2024-07-06 RX ADMIN — APIXABAN 2.5 MG: 2.5 TABLET, FILM COATED ORAL at 10:08

## 2024-07-06 RX ADMIN — OXYCODONE HYDROCHLORIDE AND ACETAMINOPHEN 1 TABLET: 5; 325 TABLET ORAL at 11:05

## 2024-07-06 RX ADMIN — SODIUM CHLORIDE, PRESERVATIVE FREE 10 ML: 5 INJECTION INTRAVENOUS at 10:08

## 2024-07-06 RX ADMIN — APIXABAN 2.5 MG: 2.5 TABLET, FILM COATED ORAL at 22:02

## 2024-07-06 RX ADMIN — Medication 400 MG: at 10:08

## 2024-07-06 RX ADMIN — ESCITALOPRAM OXALATE 20 MG: 10 TABLET ORAL at 10:08

## 2024-07-06 RX ADMIN — OXYCODONE HYDROCHLORIDE AND ACETAMINOPHEN 1 TABLET: 5; 325 TABLET ORAL at 00:31

## 2024-07-06 RX ADMIN — FAMOTIDINE 20 MG: 20 TABLET, FILM COATED ORAL at 10:08

## 2024-07-06 RX ADMIN — OXYCODONE HYDROCHLORIDE AND ACETAMINOPHEN 1 TABLET: 5; 325 TABLET ORAL at 22:05

## 2024-07-06 RX ADMIN — PIPERACILLIN AND TAZOBACTAM 3375 MG: 3; .375 INJECTION, POWDER, LYOPHILIZED, FOR SOLUTION INTRAVENOUS at 10:07

## 2024-07-06 RX ADMIN — PIPERACILLIN AND TAZOBACTAM 3375 MG: 3; .375 INJECTION, POWDER, LYOPHILIZED, FOR SOLUTION INTRAVENOUS at 18:45

## 2024-07-06 RX ADMIN — CETIRIZINE HYDROCHLORIDE 5 MG: 10 TABLET, FILM COATED ORAL at 10:09

## 2024-07-06 ASSESSMENT — PAIN SCALES - GENERAL
PAINLEVEL_OUTOF10: 1
PAINLEVEL_OUTOF10: 0
PAINLEVEL_OUTOF10: 1
PAINLEVEL_OUTOF10: 7
PAINLEVEL_OUTOF10: 7
PAINLEVEL_OUTOF10: 0
PAINLEVEL_OUTOF10: 7
PAINLEVEL_OUTOF10: 2

## 2024-07-06 ASSESSMENT — PAIN - FUNCTIONAL ASSESSMENT: PAIN_FUNCTIONAL_ASSESSMENT: ACTIVITIES ARE NOT PREVENTED

## 2024-07-06 ASSESSMENT — PAIN DESCRIPTION - ONSET: ONSET: ON-GOING

## 2024-07-06 ASSESSMENT — PAIN DESCRIPTION - LOCATION
LOCATION: ABDOMEN

## 2024-07-06 ASSESSMENT — PAIN DESCRIPTION - DESCRIPTORS
DESCRIPTORS: ACHING;CRAMPING
DESCRIPTORS: ACHING;DULL;JABBING

## 2024-07-06 ASSESSMENT — PAIN SCALES - WONG BAKER: WONGBAKER_NUMERICALRESPONSE: HURTS A LITTLE BIT

## 2024-07-06 ASSESSMENT — PAIN DESCRIPTION - ORIENTATION: ORIENTATION: RIGHT;LOWER

## 2024-07-06 ASSESSMENT — PAIN DESCRIPTION - PAIN TYPE: TYPE: SURGICAL PAIN

## 2024-07-06 ASSESSMENT — PAIN DESCRIPTION - FREQUENCY: FREQUENCY: INTERMITTENT

## 2024-07-06 NOTE — BRIEF OP NOTE
Brief Postoperative Note      Patient: Gui Banks  YOB: 1945  MRN: [id number]    Date of Procedure: 7/5/2024    Acute emphysematous cholecystitis    Post-Op Diagnosis: Same       CT guided percutaneous cholecystostomy  MERLINE Miner    Assistant:  * No surgical staff found *    Anesthesia: * No anesthesia type entered *    Estimated Blood Loss (mL): Minimal    Complications: None    Specimens:   * No specimens in log *    Implants:  * No implants in log *      Drains:   Closed/Suction Drain RUQ Bag (Active)   Site Description Clean, dry & intact 07/05/24 2030   Dressing Status Clean, dry & intact 07/05/24 2030   Drainage Appearance Serosanguinous 07/05/24 2030   Drain Status Open to gravity drainage 07/05/24 2030   Output (ml) 30 ml 07/05/24 2030       Gastrostomy/Enterostomy/Jejunostomy Tube LLQ (Active)   J Port Status Bolus 07/05/24 2328   G Port Status Clamped 07/05/24 2328   Surrounding Skin Clean, dry & intact 07/05/24 2328   Dressing Status Clean, dry & intact;New drainage noted 07/05/24 2328   Dressing Type Split gauze 07/05/24 2328   Tube Feeding Intake (mL) 40 ml 07/05/24 2328   Tube Feeding Supplement Amount (mL) 0 mL 07/05/24 0327   Free Water/Flush (mL) 60 mL 07/05/24 2328   Output (mL) 0 ml 07/05/24 2328       Findings:  Infection Present At Time Of Surgery (PATOS) (choose all levels that have infection present):  - Deep Infection (muscle/fascia) present as evidenced by pus  Other Findings: over 145 ccs of pus removed from the gallbladder 8 Fr tube placed    Electronically signed by MAYNOR Miner MD on 7/6/2024 at 12:40 AM

## 2024-07-06 NOTE — PRE SEDATION
Sedation Pre-Procedure Note    Patient Name: Gui Banks   YOB: 1945  Room/Bed: 7424/7424-A  Medical Record Number: [id number]  Date: 7/6/2024   Time: 12:39 AM       Indication:  acute cholecystitis    Consent: I have discussed with the patient and/or the patient representative the indication, alternatives, and the possible risks and/or complications of the planned procedure and the anesthesia methods. The patient and/or patient representative appear to understand and agree to proceed.    Vital Signs:   Vitals:    07/06/24 0031   BP:    Pulse:    Resp: 18   Temp:    SpO2:        Past Medical History:   has a past medical history of Anemia, Arthritis, Bowel obstruction ([facility]), Cancer ([facility]), CHF (congestive heart failure) ([facility]), CKD (chronic kidney disease), COPD (chronic obstructive pulmonary disease) ([facility]), Hyperlipidemia, Hypertension, LBBB (left bundle branch block), Lower limb ulcer, calf, left, limited to breakdown of skin (HCC), Lower limb ulcer, calf, left, with fat layer exposed (HCC), Necrosis (HCC), Non-pressure chronic ulcer of left lower leg with fat layer exposed (HCC), 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    Past Surgical History:   has a past surgical history that includes Diagnostic Cardiac Cath Lab Procedure; Hysterectomy; Carpal tunnel release; Cardiac defibrillator placement (Left, 05/23/2007); Jejunostomy; other surgical history; other surgical history (09/30/2016); other surgical history (01/07/2009); Abdomen surgery (10/05/2016); Incisional hernia repair (04/21/2017); Endoscopy, colon, diagnostic (01/26/2018); hernia repair; Cardiac defibrillator placement (04/17/2018); other surgical history (Left); Mandible surgery; and Leg Surgery (Left, 09/06/2022).    Medications:   Scheduled Meds:    piperacillin-tazobactam  3,375 mg IntraVENous Q8H    [Held by provider] apixaban  2.5 mg Oral BID

## 2024-07-07 LAB
ALBUMIN SERPL-MCNC: 2.6 G/DL (ref 3.5–5.2)
ALP SERPL-CCNC: 140 U/L (ref 35–104)
ALT SERPL-CCNC: 16 U/L (ref 0–32)
ANION GAP SERPL CALCULATED.3IONS-SCNC: 7 MMOL/L (ref 7–16)
AST SERPL-CCNC: 17 U/L (ref 0–31)
BASOPHILS # BLD: 0.02 K/UL (ref 0–0.2)
BASOPHILS NFR BLD: 0 % (ref 0–2)
BILIRUB SERPL-MCNC: 0.3 MG/DL (ref 0–1.2)
BUN SERPL-MCNC: 19 MG/DL (ref 6–23)
CALCIUM SERPL-MCNC: 8.7 MG/DL (ref 8.6–10.2)
CHLORIDE SERPL-SCNC: 108 MMOL/L (ref 98–107)
CO2 SERPL-SCNC: 23 MMOL/L (ref 22–29)
CREAT SERPL-MCNC: 1.2 MG/DL (ref 0.5–1)
EOSINOPHIL # BLD: 0.19 K/UL (ref 0.05–0.5)
EOSINOPHILS RELATIVE PERCENT: 3 % (ref 0–6)
ERYTHROCYTE [DISTWIDTH] IN BLOOD BY AUTOMATED COUNT: 15.2 % (ref 11.5–15)
GFR, ESTIMATED: 45 ML/MIN/1.73M2
GLUCOSE SERPL-MCNC: 73 MG/DL (ref 74–99)
HCT VFR BLD AUTO: 33 % (ref 34–48)
HGB BLD-MCNC: 9.9 G/DL (ref 11.5–15.5)
IMM GRANULOCYTES # BLD AUTO: 0.08 K/UL (ref 0–0.58)
IMM GRANULOCYTES NFR BLD: 1 % (ref 0–5)
LYMPHOCYTES NFR BLD: 0.79 K/UL (ref 1.5–4)
LYMPHOCYTES RELATIVE PERCENT: 13 % (ref 20–42)
MCH RBC QN AUTO: 30.7 PG (ref 26–35)
MCHC RBC AUTO-ENTMCNC: 30 G/DL (ref 32–34.5)
MCV RBC AUTO: 102.5 FL (ref 80–99.9)
MICROORGANISM SPEC CULT: ABNORMAL
MICROORGANISM SPEC CULT: ABNORMAL
MICROORGANISM SPEC CULT: NORMAL
MICROORGANISM/AGENT SPEC: ABNORMAL
MONOCYTES NFR BLD: 0.6 K/UL (ref 0.1–0.95)
MONOCYTES NFR BLD: 10 % (ref 2–12)
NEUTROPHILS NFR BLD: 72 % (ref 43–80)
NEUTS SEG NFR BLD: 4.4 K/UL (ref 1.8–7.3)
PLATELET # BLD AUTO: 238 K/UL (ref 130–450)
PMV BLD AUTO: 11.4 FL (ref 7–12)
POTASSIUM SERPL-SCNC: 4.3 MMOL/L (ref 3.5–5)
PROT SERPL-MCNC: 6 G/DL (ref 6.4–8.3)
RBC # BLD AUTO: 3.22 M/UL (ref 3.5–5.5)
SERVICE CMNT-IMP: ABNORMAL
SERVICE CMNT-IMP: NORMAL
SODIUM SERPL-SCNC: 138 MMOL/L (ref 132–146)
SPECIMEN DESCRIPTION: ABNORMAL
SPECIMEN DESCRIPTION: NORMAL
WBC OTHER # BLD: 6.1 K/UL (ref 4.5–11.5)

## 2024-07-07 PROCEDURE — 2060000000 HC ICU INTERMEDIATE R&B

## 2024-07-07 PROCEDURE — 85025 COMPLETE CBC W/AUTO DIFF WBC: CPT

## 2024-07-07 PROCEDURE — 2580000003 HC RX 258

## 2024-07-07 PROCEDURE — 6370000000 HC RX 637 (ALT 250 FOR IP): Performed by: STUDENT IN AN ORGANIZED HEALTH CARE EDUCATION/TRAINING PROGRAM

## 2024-07-07 PROCEDURE — 80053 COMPREHEN METABOLIC PANEL: CPT

## 2024-07-07 PROCEDURE — 36415 COLL VENOUS BLD VENIPUNCTURE: CPT

## 2024-07-07 PROCEDURE — 6370000000 HC RX 637 (ALT 250 FOR IP)

## 2024-07-07 PROCEDURE — 6360000002 HC RX W HCPCS

## 2024-07-07 RX ADMIN — SODIUM CHLORIDE, PRESERVATIVE FREE 10 ML: 5 INJECTION INTRAVENOUS at 20:41

## 2024-07-07 RX ADMIN — VITAM B12 100 MCG: 100 TAB at 08:25

## 2024-07-07 RX ADMIN — GABAPENTIN 100 MG: 100 CAPSULE ORAL at 08:25

## 2024-07-07 RX ADMIN — ESCITALOPRAM OXALATE 20 MG: 10 TABLET ORAL at 08:25

## 2024-07-07 RX ADMIN — APIXABAN 2.5 MG: 2.5 TABLET, FILM COATED ORAL at 08:25

## 2024-07-07 RX ADMIN — Medication 400 MG: at 08:25

## 2024-07-07 RX ADMIN — SODIUM CHLORIDE, PRESERVATIVE FREE 10 ML: 5 INJECTION INTRAVENOUS at 08:26

## 2024-07-07 RX ADMIN — PIPERACILLIN AND TAZOBACTAM 3375 MG: 3; .375 INJECTION, POWDER, LYOPHILIZED, FOR SOLUTION INTRAVENOUS at 15:42

## 2024-07-07 RX ADMIN — OXYCODONE HYDROCHLORIDE AND ACETAMINOPHEN 1 TABLET: 5; 325 TABLET ORAL at 12:27

## 2024-07-07 RX ADMIN — APIXABAN 2.5 MG: 2.5 TABLET, FILM COATED ORAL at 20:39

## 2024-07-07 RX ADMIN — GABAPENTIN 100 MG: 100 CAPSULE ORAL at 20:39

## 2024-07-07 RX ADMIN — CETIRIZINE HYDROCHLORIDE 5 MG: 10 TABLET, FILM COATED ORAL at 08:25

## 2024-07-07 RX ADMIN — FAMOTIDINE 20 MG: 20 TABLET, FILM COATED ORAL at 08:25

## 2024-07-07 RX ADMIN — PIPERACILLIN AND TAZOBACTAM 3375 MG: 3; .375 INJECTION, POWDER, LYOPHILIZED, FOR SOLUTION INTRAVENOUS at 02:57

## 2024-07-07 ASSESSMENT — PAIN SCALES - WONG BAKER
WONGBAKER_NUMERICALRESPONSE: NO HURT

## 2024-07-07 ASSESSMENT — PAIN DESCRIPTION - LOCATION: LOCATION: ABDOMEN

## 2024-07-07 ASSESSMENT — PAIN SCALES - GENERAL
PAINLEVEL_OUTOF10: 0
PAINLEVEL_OUTOF10: 7
PAINLEVEL_OUTOF10: 0
PAINLEVEL_OUTOF10: 0

## 2024-07-07 ASSESSMENT — PAIN DESCRIPTION - DESCRIPTORS: DESCRIPTORS: ACHING;DISCOMFORT;DULL

## 2024-07-07 ASSESSMENT — PAIN DESCRIPTION - ORIENTATION: ORIENTATION: RIGHT

## 2024-07-07 NOTE — CONSULTS
CONSULTATION NOTE :ID     Patient - Gui Banks,  Age - 79 y.o.    - 1945      Room Number - 7424/7424-A   MRN -  [id number]   [id number]  Date of Admission -  2024  1:32 AM  Patient's PCP: Dmitry Benson MD     Requesting Physician: Carmelo Bernard MD    REASON FOR CONSULTATION     HISTORY OF PRESENT ILLNESS   This is a very pleasant 79 y.o. female who was admitted to the hospital with a chief complaints of   Chief Complaint   Patient presents with    Emesis     Transfer from [place] for acute cholecystitis. +N/V. Fever      ID was consulted 24 atbx for choly   Pt was in ER on 7/3 for abd pain n/emesis  Admitted with acute cholecystitis   Transferred to [facility] on .   S/p CT guided percutaneous cholecystostomy   Current antibiotics  piperacillin-tazobactam (ZOSYN) 3,375 mg in sodium chloride 0.9 % 50 mL IVPB ([id number]), Q8H     Pt is awake and alert has no c/o f/c/n/v/d takes po   Has TF also   Has drain in biliary color   Afebrile RA  2L wbc6.1 cr1.4->1.2   Fluid drain cx gpc chains/gpc/gnr alpha hemolytic strep    PAST MEDICAL AND SURGICAL HISTORY     Past Medical History:   Diagnosis Date    Anemia     Arthritis     Bowel obstruction (HCC) 2016    Cancer (HCC)     oral/mandible    CHF (congestive heart failure) (HCC)     CKD (chronic kidney disease)     COPD (chronic obstructive pulmonary disease) (HCC)     Hyperlipidemia     Hypertension     LBBB (left bundle branch block)     Lower limb ulcer, calf, left, limited to breakdown of skin (HCC) 10/18/2023    Lower limb ulcer, calf, left, with fat layer exposed (HCC) 2023    Necrosis (HCC)     necrosis of right mandible    Non-pressure chronic ulcer of left lower leg with fat layer exposed (HCC) 10/26/2022    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

## 2024-07-08 VITALS
DIASTOLIC BLOOD PRESSURE: 58 MMHG | SYSTOLIC BLOOD PRESSURE: 120 MMHG | TEMPERATURE: 98.1 F | BODY MASS INDEX: 23.21 KG/M2 | HEIGHT: 62 IN | RESPIRATION RATE: 18 BRPM | WEIGHT: 126.1 LBS | HEART RATE: 80 BPM | OXYGEN SATURATION: 96 %

## 2024-07-08 LAB
ALBUMIN SERPL-MCNC: 2.7 G/DL (ref 3.5–5.2)
ALP SERPL-CCNC: 147 U/L (ref 35–104)
ALT SERPL-CCNC: 15 U/L (ref 0–32)
ANION GAP SERPL CALCULATED.3IONS-SCNC: 10 MMOL/L (ref 7–16)
AST SERPL-CCNC: 15 U/L (ref 0–31)
BASOPHILS # BLD: 0 K/UL (ref 0–0.2)
BASOPHILS NFR BLD: 0 % (ref 0–2)
BILIRUB SERPL-MCNC: 0.3 MG/DL (ref 0–1.2)
BUN SERPL-MCNC: 14 MG/DL (ref 6–23)
CALCIUM SERPL-MCNC: 8.8 MG/DL (ref 8.6–10.2)
CHLORIDE SERPL-SCNC: 110 MMOL/L (ref 98–107)
CO2 SERPL-SCNC: 23 MMOL/L (ref 22–29)
CREAT SERPL-MCNC: 1 MG/DL (ref 0.5–1)
EOSINOPHIL # BLD: 0.33 K/UL (ref 0.05–0.5)
EOSINOPHILS RELATIVE PERCENT: 4 % (ref 0–6)
ERYTHROCYTE [DISTWIDTH] IN BLOOD BY AUTOMATED COUNT: 15.2 % (ref 11.5–15)
GFR, ESTIMATED: 54 ML/MIN/1.73M2
GLUCOSE SERPL-MCNC: 74 MG/DL (ref 74–99)
HCT VFR BLD AUTO: 33.7 % (ref 34–48)
HGB BLD-MCNC: 9.6 G/DL (ref 11.5–15.5)
LYMPHOCYTES NFR BLD: 0.79 K/UL (ref 1.5–4)
LYMPHOCYTES RELATIVE PERCENT: 11 % (ref 20–42)
MCH RBC QN AUTO: 30.9 PG (ref 26–35)
MCHC RBC AUTO-ENTMCNC: 28.5 G/DL (ref 32–34.5)
MCV RBC AUTO: 108.4 FL (ref 80–99.9)
METAMYELOCYTES ABSOLUTE COUNT: 0.13 K/UL (ref 0–0.12)
METAMYELOCYTES: 2 % (ref 0–1)
MICROORGANISM SPEC CULT: NORMAL
MICROORGANISM SPEC CULT: NORMAL
MONOCYTES NFR BLD: 0.72 K/UL (ref 0.1–0.95)
MONOCYTES NFR BLD: 10 % (ref 2–12)
NEUTROPHILS NFR BLD: 74 % (ref 43–80)
NEUTS SEG NFR BLD: 5.53 K/UL (ref 1.8–7.3)
PLATELET # BLD AUTO: 264 K/UL (ref 130–450)
PMV BLD AUTO: 11.5 FL (ref 7–12)
POTASSIUM SERPL-SCNC: 4.6 MMOL/L (ref 3.5–5)
PROT SERPL-MCNC: 6 G/DL (ref 6.4–8.3)
RBC # BLD AUTO: 3.11 M/UL (ref 3.5–5.5)
RBC # BLD: ABNORMAL 10*6/UL
SERVICE CMNT-IMP: NORMAL
SERVICE CMNT-IMP: NORMAL
SODIUM SERPL-SCNC: 143 MMOL/L (ref 132–146)
SPECIMEN DESCRIPTION: NORMAL
SPECIMEN DESCRIPTION: NORMAL
WBC OTHER # BLD: 7.5 K/UL (ref 4.5–11.5)

## 2024-07-08 PROCEDURE — 85025 COMPLETE CBC W/AUTO DIFF WBC: CPT

## 2024-07-08 PROCEDURE — 6370000000 HC RX 637 (ALT 250 FOR IP)

## 2024-07-08 PROCEDURE — 36415 COLL VENOUS BLD VENIPUNCTURE: CPT

## 2024-07-08 PROCEDURE — 97530 THERAPEUTIC ACTIVITIES: CPT

## 2024-07-08 PROCEDURE — 80053 COMPREHEN METABOLIC PANEL: CPT

## 2024-07-08 PROCEDURE — 2580000003 HC RX 258

## 2024-07-08 PROCEDURE — 6360000002 HC RX W HCPCS

## 2024-07-08 PROCEDURE — 97535 SELF CARE MNGMENT TRAINING: CPT

## 2024-07-08 RX ORDER — AMOXICILLIN AND CLAVULANATE POTASSIUM 562.5; 437.5; 62.5 MG/1; MG/1; MG/1
2 TABLET, MULTILAYER, EXTENDED RELEASE ORAL 2 TIMES DAILY
Qty: 28 TABLET | Refills: 0 | Status: SHIPPED | OUTPATIENT
Start: 2024-07-08 | End: 2024-07-15

## 2024-07-08 RX ORDER — OXYCODONE HYDROCHLORIDE AND ACETAMINOPHEN 5; 325 MG/1; MG/1
1 TABLET ORAL EVERY 6 HOURS PRN
Qty: 12 TABLET | Refills: 0 | Status: SHIPPED | OUTPATIENT
Start: 2024-07-08 | End: 2024-07-11

## 2024-07-08 RX ADMIN — FAMOTIDINE 20 MG: 20 TABLET, FILM COATED ORAL at 08:13

## 2024-07-08 RX ADMIN — VITAM B12 100 MCG: 100 TAB at 08:13

## 2024-07-08 RX ADMIN — ESCITALOPRAM OXALATE 20 MG: 10 TABLET ORAL at 08:13

## 2024-07-08 RX ADMIN — APIXABAN 2.5 MG: 2.5 TABLET, FILM COATED ORAL at 08:13

## 2024-07-08 RX ADMIN — PIPERACILLIN AND TAZOBACTAM 3375 MG: 3; .375 INJECTION, POWDER, LYOPHILIZED, FOR SOLUTION INTRAVENOUS at 00:57

## 2024-07-08 RX ADMIN — GABAPENTIN 100 MG: 100 CAPSULE ORAL at 08:13

## 2024-07-08 RX ADMIN — SODIUM CHLORIDE, PRESERVATIVE FREE 10 ML: 5 INJECTION INTRAVENOUS at 08:34

## 2024-07-08 RX ADMIN — Medication 400 MG: at 08:13

## 2024-07-08 RX ADMIN — CETIRIZINE HYDROCHLORIDE 5 MG: 10 TABLET, FILM COATED ORAL at 08:13

## 2024-07-08 RX ADMIN — PIPERACILLIN AND TAZOBACTAM 3375 MG: 3; .375 INJECTION, POWDER, LYOPHILIZED, FOR SOLUTION INTRAVENOUS at 08:33

## 2024-07-08 ASSESSMENT — PAIN SCALES - WONG BAKER
WONGBAKER_NUMERICALRESPONSE: NO HURT

## 2024-07-08 ASSESSMENT — PAIN SCALES - GENERAL
PAINLEVEL_OUTOF10: 0

## 2024-07-08 NOTE — PLAN OF CARE
Problem: Safety - Adult  Goal: Free from fall injury  Recent Flowsheet Documentation  Taken 7/7/2024 2025 by Nataliya Guzman RN  Free From Fall Injury: Instruct family/caregiver on patient safety     Problem: ABCDS Injury Assessment  Goal: Absence of physical injury  Recent Flowsheet Documentation  Taken 7/7/2024 2025 by Nataliya Guzman RN  Absence of Physical Injury: Implement safety measures based on patient assessment

## 2024-07-08 NOTE — DISCHARGE INSTR - COC
Continuity of Care Form    Patient Name: Gui Banks   :  1945  MRN:  [id number]    Admit date:  2024  Discharge date: 24      Code Status Order: Full Code   Advance Directives:     Admitting Physician:  Carmelo Bernard MD  PCP: Dmitry Benson MD    Discharging Nurse: DALE Dunn RN  Discharging Hospital Unit/Room#: 7424/7424-A  Discharging Unit Phone Number: [phone]    Emergency Contact:   Extended Emergency Contact Information  Primary Emergency Contact: Merari Thibodeaux   [facility]  Home Phone: [phone]  Mobile Phone: [phone]  Relation: Child  Secondary Emergency Contact: Fortino Banks  Address: [address] [place] [address]  Home Phone: [phone]  Mobile Phone: [phone]  Relation: Child    Past Surgical History:  Past Surgical History:   Procedure Laterality Date    ABDOMEN SURGERY  10/05/2016    exporation of abdominal fascial wound dehiscence close, insertion TLC right IJ    CARDIAC DEFIBRILLATOR PLACEMENT Left 2007    CARDIAC DEFIBRILLATOR PLACEMENT  2018    BIV ICD GENT CHANGE (BSCI)    VAMSHI    CARPAL TUNNEL RELEASE      CT P[facility] NEW ACCESS  2024    CT PTC NEW ACCESS 2024 [facility] CT    DIAGNOSTIC CARDIAC CATH LAB PROCEDURE      ENDOSCOPY, COLON, DIAGNOSTIC  2018    upper endo    HERNIA REPAIR      HYSTERECTOMY (CERVIX STATUS UNKNOWN)      ILEOSTOMY OR JEJUNOSTOMY      INCISIONAL HERNIA REPAIR  2017    LAPAROSCOPIC; WITH MESH    LEG SURGERY Left 2022    fibular flap    MANDIBLE SURGERY      OTHER SURGICAL HISTORY      ostomy placed having reversal done on 2016    OTHER SURGICAL HISTORY  2016    open reveral ileostomy    OTHER SURGICAL HISTORY  2009    BiV/ICD    OTHER SURGICAL HISTORY Left     vascularized fibular graft harvest [facility]        Immunization History:   Immunization History   Administered Date(s) Administered    COVID-19, MODERNA BLUE border, Primary or

## 2024-07-08 NOTE — CARE COORDINATION
Internal Medicine On-call Care Coordination Note    I was called by the ED physician because they recommended admission for this patient and we cover their PCP.  The history as I understand it after discussion with the ED physician is as follows:    The patient presented to [facility] with acute cholecystitis from [facility] ED. Originally presented with abdominal pain and found she had acute cholecystitis.   Evaluated by Gen Surgery at [facility] whom recommended for percutaneous cholecystostomy tube with IR therefore Gen Surgery recommended transfer downtown  ED placed IR consult    I placed admission orders.  Including:    Continued abx  IVF  PRN pain management  Hold Damian Bernard or his coverage will see the patient today for H&P.    Electronically signed by AILEEN Tinoco CNP on 7/4/2024 at 2:23 AM    
Met with pt and children at bedside, pt lives alone in a one story home with 3 steps with railings on both sides. Pt has a walker, walk in shower with shower seat, and O2 for night time through [facility]. Pt has pegtube, [email or web address] Dr. Benson is PCP and goes to [facility] on [address] for pharmacy needs. Plan is for pt to return home, with kids to transport. PT 15/14, did not ambulate d/t low BP. Discussed this with family, per family pt was to go to [facility] for outpatient, and now would like [facility]. Referral made to [facility], await assessment. They are to call daughter to set up initial visit. Will follow for discharge needs, will need [facility] orders for cpa, med compliance, and PT/OT  3:30pm received call from Evita from [facility], they will follow, they can not accept over weekend, they want to re-eval on Monday for safety at home and pt's needs, updated son in room.Jes Javed, MSW, LSW    
lives with:   Type of Home:    Primary Care Giver: Self  Patient Support Systems include: Children, Family Members   Current Financial resources:    Current community resources:    Current services prior to admission:              Current DME:              Type of Home Care services:       ADLS  Prior functional level: Assistance with the following:, Bathing, Dressing, Shopping, Housework, Cooking  Current functional level: Assistance with the following:, Bathing, Dressing, Toileting, Cooking, Housework, Shopping, Mobility    PT AM-PAC: 15 /24  OT AM-PAC: 18 /24    Family can provide assistance at DC: No  Would you like Case Management to discuss the discharge plan with any other family members/significant others, and if so, who? Yes (son and daughter)  Plans to Return to Present Housing: No  Other Identified Issues/Barriers to RETURNING to current housing: weak, unable to stand on own, therapy is needed  Potential Assistance needed at discharge:              Potential DME:    Patient expects to discharge to:    Plan for transportation at discharge:      Financial    Payor: MEDICARE / Plan: MEDICARE PART A AND B / Product Type: *No Product type* /     Does insurance require precert for SNF: No    Potential assistance Purchasing Medications:    Meds-to-Beds request: Yes      [id number] - [place], [place] - [address] [place] [address] - P [phone] - F [phone]  [address] [place] [id number]  Phone: [phone] Fax: [phone]      Notes:    Factors facilitating achievement of predicted outcomes: Family support    Barriers to discharge: Limited safety awareness, Decreased motivation, Decreased endurance, and Long standing deficits    Additional Case Management Notes: pt would like home, not doing well with therapy, will need [facility]    The Plan for Transition of Care is related to the following treatment goals of Acute cholecystitis [K81.0]    IF APPLICABLE: The Patient and/or patient

## 2024-07-08 NOTE — DISCHARGE INSTRUCTIONS
Drain Care  What is the drain for?  Your drain is to stop fluid from building up under the skin.  It also helps your incision to heal.  Your doctor will take the drain out when there is less and less fluid coming out.    What supplies do I need?  A cup for the fluid to be emptied into and to measure the fluid.  The chart to record the amount of fluid (if your surgeon tells you to do so).  4” x 4” gauze  Tape   Safety pin  Antibiotic ointment (available over the counter)    Drain care:  Wash you hands before you change the dressing or empty the drain. This is important to prevent infection.  Change the dressing to the drain tube site daily.  Apply a small amount of antibiotic ointment to the skin at the drain tube site with each dressing change.    How do I empty the drain?  Hold the plastic bulb in an upright position with one hand and take the cap off with the other hand.  With the bulb in one hand and the cup in the other, turn the bulb over and place the end into the cup.  Squeeze gently and empty the fluid into the cup.  Squeeze the bulb tightly with one hand (to flatten it as much as possible) and recap it with the other hand.  This starts the suction again inside of the bulb.  Do not squeeze the bulb if the cap is on.  Record the amount of drainage if your physician has asked you to do so.  Discard the drainage into the toilet.  Rinse the cup so it is clean and ready to be used again the next time.  Wash your hands.  Re-pin the tape tab of the drain tube back to your clothing.  The bulb should always be lower than your incision.  This will prevent drainage from flowing back into the tubing and the incision.    How often do I need to empty my drain?  Usually you empty the drain when it is half full.  Most find they need to empty the drain 3 times a day- when you wake up in the morning, mid day, and before bed.  If the bulb fills up more than this, you may need to empty the drain more often.    How much

## 2024-07-08 NOTE — PLAN OF CARE
Problem: Safety - Adult  Goal: Free from fall injury  Outcome: Progressing  Flowsheets (Taken 7/7/2024 2025)  Free From Fall Injury: Instruct family/caregiver on patient safety     Problem: Pain  Goal: Verbalizes/displays adequate comfort level or baseline comfort level  Outcome: Progressing     Problem: ABCDS Injury Assessment  Goal: Absence of physical injury  Recent Flowsheet Documentation  Taken 7/7/2024 2025 by Nataliya Guzman RN  Absence of Physical Injury: Implement safety measures based on patient assessment     Problem: Discharge Planning  Goal: Discharge to home or other facility with appropriate resources  Outcome: Progressing

## 2024-07-09 NOTE — DISCHARGE SUMMARY
Decision Support Exception - unselect if not a suspected or confirmed emergency medical condition->Emergency Medical Condition (MA) FINDINGS: Lower Chest: The lung bases are grossly clear. Organs: Liver is homogeneous in appearance.  There is some periportal edema. Abnormal appearance of the gallbladder.  There is stones in the gallbladder with abnormal wall thickening and pericholecystic fluid.  There is dilatation identified of the cystic duct.  The 6 6 duct appears to contain stones or air.  There is extensive surrounding fluid and stranding in the right upper quadrant.  Findings all suggesting an acute cholecystitis.  Pancreas is homogeneous in appearance.  No evidence of extrahepatic biliary ductal dilatation.  The pancreatic duct is normal in caliber.  The spleen is unremarkable.  Both adrenal glands are within normal limits.  Symmetric appearance of the kidneys.  Several small nonobstructing stones identified within the kidneys bilaterally.  No evidence of distension of the renal collecting systems or ureters. GI/Bowel: The stomach is unremarkable in appearance.  Gastrostomy tube within the stomach.  The small bowel is within normal limits.  No mucosal abnormality.  Some stool seen scattered diffusely throughout the colon. Fluid in the colon to suggest clinical presentation of diarrhea. Diverticulosis with no evidence of diverticulitis. Pelvis: The bladder is decompressed.  Streak artifact obscuring image detail the pelvis due to bilateral hip arthroplasty.  The uterus has been surgically removed.  Minimal fluid identified in the pelvis. Peritoneum/Retroperitoneum: No abdominal retroperitoneal lymphadenopathy.  No free fluid or free air.  No abnormal mass or fluid collections identified. No pelvic adenopathy. Bones/Soft Tissues: Bony structures reveal hardware identified in the hips bilaterally.  Degenerative changes seen within the sacroiliac joints bilaterally.  There are multilevel degenerative changes

## 2024-07-09 NOTE — PROGRESS NOTES
Physician Progress Note      PATIENT:               RONNIE MAGANA  [facility] #:                  [id number]  :                       1945  ADMIT DATE:       2024 1:32 AM  DISCH DATE:        2024 4:05 PM  RESPONDING  PROVIDER #:        Carmelo Romero MD          QUERY TEXT:    Pt admitted with acute cholecystitis. Pt noted to have WBC of 16.4 and HR up   to 101 in ED. If possible, please document in the progress notes and discharge   summary if you are evaluating and /or treating any of the following:  The medical record reflects the following:    Risk Factors: Acute cholecystitis, age 79  Clinical Indicators: WBC of 16.4 on arrival and HR of  in ED  Treatment: IV Zosyn, blood cultures, ID consult    Thank you,  La Ambriz RN, BSN, CDIS  Clinical Documentation Integrity  [email or web address]  Options provided:  -- Sepsis due to acute cholecystitis, present on admission  -- Acute cholecystitis without Sepsis  -- Other - I will add my own diagnosis  -- Disagree - Not applicable / Not valid  -- Disagree - Clinically unable to determine / Unknown  -- Refer to Clinical Documentation Reviewer    PROVIDER RESPONSE TEXT:    This patient has sepsis due to acute cholecystitis which was present on   admission.    Query created by: La Ambriz on 2024 10:29 AM      Electronically signed by:  Carmelo Romero MD 2024 4:40 PM          
Comprehensive Nutrition Assessment    Type and Reason for Visit:  Initial, Positive Nutrition Screen for home tube feeding    Nutrition Recommendations/Plan:     Modify Tube Feeding-  Unclear why renal TF is ordered? no renal issues at this time K WNL & no Phos draws    Rec Peptide Based for optimal GI tolerance.   Bolus 240cc  Vital AF 1.2 5x/day.   Water flushes 80cc water with each feed (can split 40 ml before/ after)  Provides 1200 ml tv, 1440 kcals, 90 gm pro, 973 ml free water, 1373 ml total water     If tolerance a concern given recent GI procedure,  Recommend continuous regimen of Peptide Based @ 50 ml/hr  Water flushes 100 ml q 6 hr  Provides 1200 ml tv, 1440 kcals, 90 gm pro, 973 ml free water, 1373 ml total water    PO diet as able- edentulous awaiting dental implants         Malnutrition Assessment:  Malnutrition Status:  Moderate malnutrition (07/06/24 1320)    Context:  Chronic Illness     Findings of the 6 clinical characteristics of malnutrition:  Energy Intake:  75% or less estimated energy requirements for 1 month or longer  Weight Loss:  Unable to assess (wt fluctuations from outside encounters)     Body Fat Loss:   (Moderate) Orbital   Muscle Mass Loss:   (Moderate) Temples (temporalis), Clavicles (pectoralis & deltoids), Thigh (quadriceps), Calf (gastrocnemius)  Fluid Accumulation:  No significant fluid accumulation     Strength:  Not Performed    Nutrition Assessment:    Pt transferred from [facility] for GI eval 2/2 abd pain found to have Acute cholecystitis now s/p keith tube placement. 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 Pt meets criteria for Moderate Malnutriton. Reviewed [facility] recent oncology note from 6/3/24 stating pt consumes liquids/ pureed soft foods d/t awaiting dental implants. Noted majorty of nutrition via PEG. Will provide updated TF recs & monitor.    Nutrition Related Findings:    Pt alert, fluid bal WNL, no 
Department of Internal Medicine  Infectious Diseases   Progress  Note      C/C : Acute cholecystitis     Pt denies fever or chills  Denies abdomen pain   Wen nausea or vomiting   Afebrile       Current Facility-Administered Medications   Medication Dose Route Frequency Provider Last Rate Last Admin    oxyCODONE-acetaminophen (PERCOCET) 5-325 MG per tablet 1 tablet  1 tablet Oral Q6H PRN Carmelo Bernard MD   1 tablet at 07/07/24 1227    piperacillin-tazobactam (ZOSYN) 3,375 mg in sodium chloride 0.9 % 50 mL IVPB ([id number])  3,375 mg IntraVENous Q8H Dale Christine DO 12.5 mL/hr at 07/08/24 0833 3,375 mg at 07/08/24 0833    apixaban (ELIQUIS) tablet 2.5 mg  2.5 mg Oral BID Monica Gann APRN - CNP   2.5 mg at 07/08/24 0813    cetirizine (ZYRTEC) tablet 5 mg  5 mg Oral Daily Monica Gann APRN - CNP   5 mg at 07/08/24 0813    vitamin B-12 (CYANOCOBALAMIN) tablet 100 mcg  100 mcg Oral Daily Monica Gann APRN - CNP   100 mcg at 07/08/24 0813    escitalopram (LEXAPRO) tablet 20 mg  20 mg Oral Daily Monica Gann APRN - CNP   20 mg at 07/08/24 0813    famotidine (PEPCID) tablet 20 mg  20 mg Oral Daily Monica Gann APRN - CNP   20 mg at 07/08/24 0813    gabapentin (NEURONTIN) capsule 100 mg  100 mg Oral BID Monica Gann APRN - CNP   100 mg at 07/08/24 0813    magnesium oxide (MAG-OX) tablet 400 mg  400 mg Oral Daily Monica Gann APRN - CNP   400 mg at 07/08/24 0813    [Held by provider] metoprolol succinate (TOPROL XL) extended release tablet 12.5 mg  12.5 mg Oral Daily Monica Gann APRN - CNP   12.5 mg at 07/04/24 0916    sodium chloride flush 0.9 % injection 10 mL  10 mL IntraVENous 2 times per day Monica Gann APRN - CNP   10 mL at 07/08/24 0834    sodium chloride flush 0.9 % injection 10 mL  10 mL IntraVENous PRN Monica Gann APRN - CNP        0.9 % sodium chloride infusion   IntraVENous PRN Monica Gann APRN - CNP        ondansetron (ZOFRAN-ODT) disintegrating tablet 4 mg  4 mg Oral Q8H PRN 
Fluid walked down to lab. Reference number [id number]  
General Surgery   Daily Progress Note      Patient's Name/Date of Birth: Gui Banks / 1945    Date: July 5, 2024     Patient Active Problem List   Diagnosis    Cardiomyopathy, nonischemic (HCC)    Bundle branch block, left    Biventricular implantable cardioverter-defibrillator in situ    Hyperlipidemia    SIRS (systemic inflammatory response syndrome) (HCC)    History of ischemic colitis    Incarcerated ventral hernia    Chronic kidney disease, stage III (moderate) (HCC)    Anemia of chronic renal failure    Implantable cardioverter-defibrillator (ICD) at end of device life    Non-pressure chronic ulcer of left lower leg with fat layer exposed (HCC)    Arthralgia of hip    Arthritis    Atrial fibrillation (HCC)    Benign hypertensive heart and kidney disease with heart failure and with chronic kidney disease stage I through stage IV, or unspecified(404.11)    Body mass index (BMI) of 20 to 24    Bursitis of left hip    Chronic obstructive pulmonary disease (HCC)    Depressive disorder    History of skin graft    Malignant neoplasm of head and neck (HCC)    Mitral valve prolapse    Oral cancer (HCC)    Proteinuria    Rectal hemorrhage    Shortness of breath    Lower limb ulcer, calf, left, with fat layer exposed (HCC)    Dehydration    Lower limb ulcer, calf, left, limited to breakdown of skin (HCC)    Acute cholecystitis       Subjective: Feeling ok overall. Some abdominal pain. No N/V. No acute events overnight.     Objective:  BP (!) 94/51   Pulse 66   Temp 97.4 °F (36.3 °C) (Temporal)   Resp 18   Ht 1.575 m (5' 2\")   Wt 57.2 kg (126 lb 1.7 oz)   LMP  (LMP Unknown)   SpO2 98%   BMI 23.06 kg/m²   Labs:  Recent Labs     07/03/24  1030 07/04/24  0230 07/05/24  0549   WBC 17.6* 16.4* 11.0   HGB 12.0 10.0* 10.2*   HCT 37.8 31.1* 32.6*     Recent Labs     07/03/24  1030 07/04/24  0230    136   K 4.4 4.6    101   CO2 26 21*   BUN 33* 27*   CREATININE 1.7* 1.5*     Recent Labs     07/03/24  1030 
General Surgery   Daily Progress Note      Patient's Name/Date of Birth: Gui Banks / 1945    Date: July 6, 2024     Patient Active Problem List   Diagnosis    Cardiomyopathy, nonischemic (HCC)    Bundle branch block, left    Biventricular implantable cardioverter-defibrillator in situ    Hyperlipidemia    SIRS (systemic inflammatory response syndrome) (HCC)    History of ischemic colitis    Incarcerated ventral hernia    Chronic kidney disease, stage III (moderate) (HCC)    Anemia of chronic renal failure    Implantable cardioverter-defibrillator (ICD) at end of device life    Non-pressure chronic ulcer of left lower leg with fat layer exposed (HCC)    Arthralgia of hip    Arthritis    Atrial fibrillation (HCC)    Benign hypertensive heart and kidney disease with heart failure and with chronic kidney disease stage I through stage IV, or unspecified(404.11)    Body mass index (BMI) of 20 to 24    Bursitis of left hip    Chronic obstructive pulmonary disease (HCC)    Depressive disorder    History of skin graft    Malignant neoplasm of head and neck (HCC)    Mitral valve prolapse    Oral cancer (HCC)    Proteinuria    Rectal hemorrhage    Shortness of breath    Lower limb ulcer, calf, left, with fat layer exposed (HCC)    Dehydration    Lower limb ulcer, calf, left, limited to breakdown of skin (HCC)    Acute cholecystitis       Subjective: Feeling ok overall. No abdominal pain. No N/V. No acute events overnight.     Objective:  BP (!) 121/54   Pulse 94   Temp 98 °F (36.7 °C) (Temporal)   Resp 18   Ht 1.575 m (5' 2\")   Wt 57.2 kg (126 lb 1.7 oz)   LMP  (LMP Unknown)   SpO2 94%   BMI 23.06 kg/m²   Labs:  Recent Labs     07/04/24  0230 07/05/24  0549 07/06/24  0622   WBC 16.4* 11.0 6.5   HGB 10.0* 10.2* 9.6*   HCT 31.1* 32.6* 31.7*     Recent Labs     07/04/24  0230 07/05/24  0549 07/06/24  0622    142 138   K 4.6 4.4 4.4    109* 106   CO2 21* 23 20*   BUN 27* 26* 26*   CREATININE 1.5* 1.4* 
General Surgery   Daily Progress Note      Patient's Name/Date of Birth: Gui Banks / 1945    Date: July 7, 2024     Patient Active Problem List   Diagnosis    Cardiomyopathy, nonischemic (HCC)    Bundle branch block, left    Biventricular implantable cardioverter-defibrillator in situ    Hyperlipidemia    SIRS (systemic inflammatory response syndrome) (HCC)    History of ischemic colitis    Incarcerated ventral hernia    Chronic kidney disease, stage III (moderate) (HCC)    Anemia of chronic renal failure    Implantable cardioverter-defibrillator (ICD) at end of device life    Non-pressure chronic ulcer of left lower leg with fat layer exposed (HCC)    Moderate protein-calorie malnutrition (HCC)    Arthralgia of hip    Arthritis    Atrial fibrillation (HCC)    Benign hypertensive heart and kidney disease with heart failure and with chronic kidney disease stage I through stage IV, or unspecified(404.11)    Body mass index (BMI) of 20 to 24    Bursitis of left hip    Chronic obstructive pulmonary disease (HCC)    Depressive disorder    History of skin graft    Malignant neoplasm of head and neck (HCC)    Mitral valve prolapse    Oral cancer (HCC)    Proteinuria    Rectal hemorrhage    Shortness of breath    Lower limb ulcer, calf, left, with fat layer exposed (HCC)    Dehydration    Lower limb ulcer, calf, left, limited to breakdown of skin (HCC)    Acute cholecystitis       Subjective: Feeling ok overall. No abdominal pain. No N/V. No acute events overnight.     Objective:  BP (!) 122/58   Pulse 80   Temp 97.1 °F (36.2 °C) (Temporal)   Resp 16   Ht 1.575 m (5' 2\")   Wt 57.2 kg (126 lb 1.7 oz)   LMP  (LMP Unknown)   SpO2 95%   BMI 23.06 kg/m²   Labs:  Recent Labs     07/05/24  0549 07/06/24 0622 07/07/24 0527   WBC 11.0 6.5 6.1   HGB 10.2* 9.6* 9.9*   HCT 32.6* 31.7* 33.0*       Recent Labs     07/05/24  0549 07/06/24 0622 07/07/24 0527    138 138   K 4.4 4.4 4.3   * 106 108*   CO2 23 
General Surgery   Daily Progress Note      Patient's Name/Date of Birth: Gui Banks / 1945    Date: July 8, 2024     Patient Active Problem List   Diagnosis    Cardiomyopathy, nonischemic (HCC)    Bundle branch block, left    Biventricular implantable cardioverter-defibrillator in situ    Hyperlipidemia    SIRS (systemic inflammatory response syndrome) (HCC)    History of ischemic colitis    Incarcerated ventral hernia    Chronic kidney disease, stage III (moderate) (HCC)    Anemia of chronic renal failure    Implantable cardioverter-defibrillator (ICD) at end of device life    Non-pressure chronic ulcer of left lower leg with fat layer exposed (HCC)    Moderate protein-calorie malnutrition (HCC)    Arthralgia of hip    Arthritis    Atrial fibrillation (HCC)    Benign hypertensive heart and kidney disease with heart failure and with chronic kidney disease stage I through stage IV, or unspecified(404.11)    Body mass index (BMI) of 20 to 24    Bursitis of left hip    Chronic obstructive pulmonary disease (HCC)    Depressive disorder    History of skin graft    Malignant neoplasm of head and neck (HCC)    Mitral valve prolapse    Oral cancer (HCC)    Proteinuria    Rectal hemorrhage    Shortness of breath    Lower limb ulcer, calf, left, with fat layer exposed (HCC)    Dehydration    Lower limb ulcer, calf, left, limited to breakdown of skin (HCC)    Acute cholecystitis       Subjective: Pt is resting comfortably. AF, VS. NO acute events overnight.     Objective:  BP (!) 142/65   Pulse 80   Temp 97.8 °F (36.6 °C) (Oral)   Resp 19   Ht 1.575 m (5' 2\")   Wt 57.2 kg (126 lb 1.7 oz)   LMP  (LMP Unknown)   SpO2 99%   BMI 23.06 kg/m²   Labs:  Recent Labs     07/06/24 0622 07/07/24 0527   WBC 6.5 6.1   HGB 9.6* 9.9*   HCT 31.7* 33.0*       Recent Labs     07/06/24 0622 07/07/24 0527    138   K 4.4 4.3    108*   CO2 20* 23   BUN 26* 19   CREATININE 1.4* 1.2*       Recent Labs     07/06/24 0622 
Internal Medicine Progress Note    Patient's name: Gui Banks  : 1945  Chief complaints (on day of admission): Emesis (Transfer from [place] for acute cholecystitis. +N/V. Fever )  Admission date: 2024  Date of service: 2024   Room: [address]  Primary care physician: Dmitry Benson MD  Reason for visit: Follow-up for emesis     Subjective  Gui was seen and examined at bedside     Doing very well  No new issues at this time   Had perc tube placed today   Bcx 7/3 negative so far  WBC resolved  Continue on fluids for now look to stop tomorrow   Otherwise home when improved and pending surgery and PT OT recs     Review of Systems  There are no new complaints of chest pain, shortness of breath, abdominal pain, nausea, vomiting, diarrhea, constipation unless otherwise mentioned above.     Hospital Medications  Current Facility-Administered Medications   Medication Dose Route Frequency Provider Last Rate Last Admin    piperacillin-tazobactam (ZOSYN) 3,375 mg in sodium chloride 0.9 % 50 mL IVPB ([id number])  3,375 mg IntraVENous Q8H Dale Christine DO 12.5 mL/hr at 24 0825 3,375 mg at 24 0825    [Held by provider] apixaban (ELIQUIS) tablet 2.5 mg  2.5 mg Oral BID Monica Gann APRN - CNP        cetirizine (ZYRTEC) tablet 5 mg  5 mg Oral Daily Monica Gann APRN - CNP   5 mg at 24 0916    vitamin B-12 (CYANOCOBALAMIN) tablet 100 mcg  100 mcg Oral Daily Monica Gann APRN - CNP   100 mcg at 24 0916    escitalopram (LEXAPRO) tablet 20 mg  20 mg Oral Daily Monica Gann APRN - CNP   20 mg at 24 0916    famotidine (PEPCID) tablet 20 mg  20 mg Oral Daily Monica Gann APRN - CNP   20 mg at 24 0916    gabapentin (NEURONTIN) capsule 100 mg  100 mg Oral BID Monica Gann APRN - CNP   100 mg at 24    magnesium oxide (MAG-OX) tablet 400 mg  400 mg Oral Daily Monica Gann APRN - CNP   400 mg at 24 0916    [Held by provider] metoprolol succinate (TOPROL XL) 
Internal Medicine Progress Note    Patient's name: Gui Banks  : 1945  Chief complaints (on day of admission): Emesis (Transfer from [place] for acute cholecystitis. +N/V. Fever )  Admission date: 2024  Date of service: 2024   Room: [address]  Primary care physician: Dmitry Benson MD  Reason for visit: Follow-up for emesis     Subjective  Gui was seen and examined at bedside     Doing very well  No new issues at this time   Had perc tube placed   Bcx 7/3 negative so far  WBC resolved  Continue on fluids for now look to stop soon  Otherwise home when improved and pending surgery and PT OT recs     Review of Systems  There are no new complaints of chest pain, shortness of breath, abdominal pain, nausea, vomiting, diarrhea, constipation unless otherwise mentioned above.     Hospital Medications  Current Facility-Administered Medications   Medication Dose Route Frequency Provider Last Rate Last Admin    oxyCODONE-acetaminophen (PERCOCET) 5-325 MG per tablet 1 tablet  1 tablet Oral Q6H PRN Carmelo Bernard MD   1 tablet at 24 1105    piperacillin-tazobactam (ZOSYN) 3,375 mg in sodium chloride 0.9 % 50 mL IVPB ([id number])  3,375 mg IntraVENous Q8H Dale Christine, DO   Stopped at 24 1430    apixaban (ELIQUIS) tablet 2.5 mg  2.5 mg Oral BID Monica Gann APRN - CNP   2.5 mg at 24 1008    cetirizine (ZYRTEC) tablet 5 mg  5 mg Oral Daily Monica Gann APRN - CNP   5 mg at 24 1009    vitamin B-12 (CYANOCOBALAMIN) tablet 100 mcg  100 mcg Oral Daily Monica Gann, APRN - CNP   100 mcg at 24 1008    escitalopram (LEXAPRO) tablet 20 mg  20 mg Oral Daily Monica Gann APRN - CNP   20 mg at 24 1008    famotidine (PEPCID) tablet 20 mg  20 mg Oral Daily Monica Gann APRN - CNP   20 mg at 24 1008    gabapentin (NEURONTIN) capsule 100 mg  100 mg Oral BID Monica Gann APRN - CNP   100 mg at 24 1008    magnesium oxide (MAG-OX) tablet 400 mg  400 mg Oral Daily 
Internal Medicine Progress Note    Patient's name: Gui Banks  : 1945  Chief complaints (on day of admission): Emesis (Transfer from [place] for acute cholecystitis. +N/V. Fever )  Admission date: 2024  Date of service: 2024   Room: [address]  Primary care physician: Dmitry Benson MD  Reason for visit: Follow-up for emesis     Subjective  Gui was seen and examined at bedside     Doing y well  No new issues at this time   Had perc tube placed   Bcx 7/3 negative so far  WBC resolved  Continue on fluids for now look to stop soon  Otherwise home when improved and pending surgery and PT OT recs     Review of Systems  There are no new complaints of chest pain, shortness of breath, abdominal pain, nausea, vomiting, diarrhea, constipation unless otherwise mentioned above.     Hospital Medications  Current Facility-Administered Medications   Medication Dose Route Frequency Provider Last Rate Last Admin    oxyCODONE-acetaminophen (PERCOCET) 5-325 MG per tablet 1 tablet  1 tablet Oral Q6H PRN Carmelo Bernard MD   1 tablet at 24 1227    piperacillin-tazobactam (ZOSYN) 3,375 mg in sodium chloride 0.9 % 50 mL IVPB ([id number])  3,375 mg IntraVENous Q8H Dale Christine, DO   Stopped at 24 1124    apixaban (ELIQUIS) tablet 2.5 mg  2.5 mg Oral BID Monica Gann APRN - CNP   2.5 mg at 24 0825    cetirizine (ZYRTEC) tablet 5 mg  5 mg Oral Daily Monica Gann APRN - CNP   5 mg at 24 0825    vitamin B-12 (CYANOCOBALAMIN) tablet 100 mcg  100 mcg Oral Daily SelMonica casper APRN - CNP   100 mcg at 24 0825    escitalopram (LEXAPRO) tablet 20 mg  20 mg Oral Daily Monica Gann APRN - CNP   20 mg at 24 0825    famotidine (PEPCID) tablet 20 mg  20 mg Oral Daily Monica Gann APRN - CNP   20 mg at 24 0825    gabapentin (NEURONTIN) capsule 100 mg  100 mg Oral BID Monica Gann APRN - CNP   100 mg at 24 0825    magnesium oxide (MAG-OX) tablet 400 mg  400 mg Oral Daily 
Internal Medicine Progress Note    Patient's name: Gui Banks  : 1945  Chief complaints (on day of admission): Emesis (Transfer from [place] for acute cholecystitis. +N/V. Fever )  Admission date: 2024  Date of service: 2024   Room: [address]  Primary care physician: Dmitry Benson MD  Reason for visit: Follow-up for emesis     Subjective  Gui was seen and examined at bedside     Doing well today no new issues   Patient NAD in bed   Spoke with CM SW   Spoke with son at length   Probably best to consider [facility] if HHC not an option   Answered all questions   Await final ID rec on abx   Ok for DC from my end     Review of Systems  There are no new complaints of chest pain, shortness of breath, abdominal pain, nausea, vomiting, diarrhea, constipation unless otherwise mentioned above.     Hospital Medications  Current Facility-Administered Medications   Medication Dose Route Frequency Provider Last Rate Last Admin    oxyCODONE-acetaminophen (PERCOCET) 5-325 MG per tablet 1 tablet  1 tablet Oral Q6H PRN Carmelo Bernard MD   1 tablet at 24 1227    piperacillin-tazobactam (ZOSYN) 3,375 mg in sodium chloride 0.9 % 50 mL IVPB ([id number])  3,375 mg IntraVENous Q8H Dale Christine DO 12.5 mL/hr at 24 0833 3,375 mg at 24 0833    apixaban (ELIQUIS) tablet 2.5 mg  2.5 mg Oral BID Monica Gann APRN - CNP   2.5 mg at 24    cetirizine (ZYRTEC) tablet 5 mg  5 mg Oral Daily Monica Gann APRN - CNP   5 mg at 24    vitamin B-12 (CYANOCOBALAMIN) tablet 100 mcg  100 mcg Oral Daily Monica Gann APRN - CNP   100 mcg at 24    escitalopram (LEXAPRO) tablet 20 mg  20 mg Oral Daily Monica Gann APRN - CNP   20 mg at 24 08    famotidine (PEPCID) tablet 20 mg  20 mg Oral Daily Monica Gann APRN - CNP   20 mg at 24 0813    gabapentin (NEURONTIN) capsule 100 mg  100 mg Oral BID Monica Gnan APRN - CNP   100 mg at 24 0813    magnesium oxide (MAG-OX) 
Messaged [facility] to notify that IR was not here today and therefore cannot have the drain placed today, per Dr Bay pt ok to have drain placed tomorrow and ok for CLD today.Orders placed    Electronically signed by Shelbi Ho RN on 7/4/2024 at 12:08 PM    
New ID consult notified via answering service.  
Physical Therapy  Physical Therapy Initial Assessment     Name: Gui Banks  : 1945  MRN: [id number]      Date of Service: 2024    Evaluating PT:  Paula Taylor PT, DPT [id number]    Room #:  7424/7424-A  Diagnosis:  Acute cholecystitis [K81.0]  PMHx/PSHx:    Past Medical History:   Diagnosis Date    Anemia     Arthritis     Bowel obstruction (HCC) 2016    Cancer (HCC)     oral/mandible    CHF (congestive heart failure) (HCC)     CKD (chronic kidney disease)     COPD (chronic obstructive pulmonary disease) (HCC)     Hyperlipidemia     Hypertension     LBBB (left bundle branch block)     Lower limb ulcer, calf, left, limited to breakdown of skin ([facility]) 10/18/2023    Lower limb ulcer, calf, left, with fat layer exposed ([facility]) 2023    Necrosis ([facility])     necrosis of right mandible    Non-pressure chronic ulcer of left lower leg with fat layer exposed ([facility]) 10/26/2022    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  Bed is on 1st floor and bath is on 1st floor.  Pt ambulated with WW recently, but no AD prior PTA. Daughter reports 3-4 falls in the last 6 weeks with recent (last week) diagnosis of orthostatic hypotension.     OBJECTIVE:   Initial Evaluation  Date: 24 Treatment Short Term/ Long Term   Goals   AM-PAC 6 Clicks 15/24     Was pt agreeable to Eval/treatment? yes     Does pt have pain? No c/o pain     Bed Mobility  Rolling: SBA  Supine to sit: SBA  Sit to supine: SBA  Scooting: SBA  Rolling: Independent   Supine to sit: Independent .  Sit to supine:  Independent   Scooting: Independent    Transfers Sit to stand: SBA  Stand to sit: 
Physical Therapy  Physical Therapy Treatment    Name: Gui Banks  : 1945  MRN: [id number]      Date of Service: 2024    Evaluating PT:  Paula Taylor PT, DPT [id number]    Room #:  7424/7424-A  Diagnosis:  Acute cholecystitis [K81.0]  PMHx/PSHx:    Past Medical History:   Diagnosis Date    Anemia     Arthritis     Bowel obstruction (HCC) 2016    Cancer (HCC)     oral/mandible    CHF (congestive heart failure) (HCC)     CKD (chronic kidney disease)     COPD (chronic obstructive pulmonary disease) (HCC)     Hyperlipidemia     Hypertension     LBBB (left bundle branch block)     Lower limb ulcer, calf, left, limited to breakdown of skin (HCC) 10/18/2023    Lower limb ulcer, calf, left, with fat layer exposed (HCC) 2023    Necrosis ([facility])     necrosis of right mandible    Non-pressure chronic ulcer of left lower leg with fat layer exposed ([facility]) 10/26/2022    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  Bed is on 1st floor and bath is on 1st floor.  Pt ambulated with WW recently, but no AD prior PTA. Daughter reports 3-4 falls in the last 6 weeks with recent (last week) diagnosis of orthostatic hypotension.     OBJECTIVE:   Initial Evaluation  Date: 24 Treatment  24 Short Term/ Long Term   Goals   AM-PAC 6 Clicks 15/24 16/24    Was pt agreeable to Eval/treatment? yes yes    Does pt have pain? No c/o pain No c/o pain    Bed Mobility  Rolling: SBA  Supine to sit: SBA  Sit to supine: SBA  Scooting: SBA Rolling: SBA  Supine to sit: SBA  Sit to supine: SBA  Scooting: SBA Rolling: Independent   Supine to sit: Independent .  Sit to 
Report given to bedside nurse regarding procedure. All questions answered at this time.  
  LB Dressing Minimal Assist   To don briefs seated EOB  Modified [place]    Bathing Minimal Assist  Simulated seated EOB  Modified [place]    Toileting Stand by Assist   Modified [place]    Bed Mobility  Log Roll: Stand by Assist   Supine to sit: Stand by Assist   Sit to supine: Stand by Assist   Supine to sit: Independent   Sit to supine: Independent    Functional Transfers Sit to stand:Stand by Assist   Stand to sit:Stand by Assist   Stand pivot: NT   Commode: NT   Sit to stand: Modified [place]   Stand to sit: Modified [place]   Stand pivot:  Modified [place]   Commode:  Modified [place]    Functional Mobility NT   Deferred 2/2 hypotension.  Modified [place] w/ use of Appropriate AD   Balance Sitting:     Static - Supervision      Dynamic - Stand by Assist   Standing: Stand by Assist w/ ww  Sitting:     Static:  Independent      Dynamic:  Independent   Standing:  Modified [place]    Activity Tolerance Fair- tolerance w/ light activity - limited 2/2 +orthostatic hypotension.  Good tolerance w/ light to mod activity   Visual/  Perceptual Glasses: Yes  WFL     Safety Fair+  Good  during ADL completion   Vitals BP supine 93/60, 80 HR.   BP seated EOB 95/52, 83 HR.   BP standing 68/34, 115 HR.   Pt asymptomatic.  Unable to obtain SpO2 reading on catia digits & ear lobe.  RN made aware.       Hand Dominance Right   AROM (PROM) Strength Additional Info:  Goal: (PRN)   RUE  WFL 4-/5 grossly tested good  and wfl FMC/dexterity noted during ADL tasks   Improve overall RUE strength WFL for participation in functional tasks       LUE WFL 4-/5 grossly tested Good  and wfl FMC/dexterity noted during ADL tasks   Improve overall LUE strength WFL for participation in functional tasks     Hearing: WFL  Sensation: No c/o numbness or tingling  Tone: WFL  Edema: Unremarkable    Comment: Cleared by RN to see pt. Upon arrival patient supine in bed and agreeable to OT session w/ 
Independent    Grooming Stand by Assist seated  CGA  Standing at sink simulated tasks, seated as needed for energy conservation  Modified [place]    UB Dressing Stand by Assist   To tie gown posteriorly seated EOB  Set up  Managing gown in back, assist only needed with lines  Modified [place]    LB Dressing Minimal Assist   To don briefs seated EOB CGA/Min A  Simulated pants, pt able to cross over legs to doff/el socks  Discussed modified techniques to use for safety, sitting down to thread legs into pants, then standing to pull over hips   Modified [place]    Bathing Minimal Assist  Simulated seated EOB  Min A  Simulated tasks Modified [place]    Toileting Stand by Assist   SBA/CGA  Using BSC, simulated, limited due to lines  Modified [place]    Bed Mobility  Log Roll: Stand by Assist   Supine to sit: Stand by Assist   Sit to supine: Stand by Assist   SBA  Supine to Sit  Supine to sit: Independent   Sit to supine: Independent    Functional Transfers Sit to stand:Stand by Assist   Stand to sit:Stand by Assist   Stand pivot: NT   Commode: NT   SBA  Sit < > Stand  Stand pivot with walker    Min A  HHA due to unsteady Sit to stand: Modified [place]   Stand to sit: Modified [place]   Stand pivot:  Modified [place]   Commode:  Modified [place]    Functional Mobility NT   Deferred 2/2 hypotension.  SBA with walker    Min A without walker  With 2 LOB noted in room distances only due to isolation  Modified [place] w/ use of Appropriate AD   Balance Sitting:     Static - Supervision      Dynamic - Stand by Assist   Standing: Stand by Assist w/ ww  Sitting: Supervision  Standing: SBA Sitting:     Static:  Independent      Dynamic:  Independent   Standing:  Modified [place]    Activity Tolerance Fair- tolerance w/ light activity - limited 2/2 +orthostatic hypotension.  Fair- Good tolerance w/ light to mod activity   Visual/  Perceptual Glasses: Yes  WFL       Safety

## 2024-07-11 LAB
MICROORGANISM SPEC CULT: NORMAL
MICROORGANISM/AGENT SPEC: NORMAL
SERVICE CMNT-IMP: NORMAL
SPECIMEN DESCRIPTION: NORMAL

## 2024-07-31 ENCOUNTER — HOSPITAL ENCOUNTER (OUTPATIENT)
Dept: INTERVENTIONAL RADIOLOGY/VASCULAR | Age: 79
Discharge: HOME | End: 2024-08-02
Payer: MEDICARE

## 2024-07-31 VITALS
HEART RATE: 80 BPM | TEMPERATURE: 97.8 F | SYSTOLIC BLOOD PRESSURE: 121 MMHG | DIASTOLIC BLOOD PRESSURE: 52 MMHG | RESPIRATION RATE: 18 BRPM | OXYGEN SATURATION: 99 %

## 2024-07-31 DIAGNOSIS — K81.9 CHOLECYSTITIS, UNSPECIFIED: ICD-10-CM

## 2024-07-31 PROCEDURE — 7100000011 HC PHASE II RECOVERY - ADDTL 15 MIN

## 2024-07-31 PROCEDURE — 47531 INJECTION FOR CHOLANGIOGRAM: CPT

## 2024-07-31 PROCEDURE — 36415 COLL VENOUS BLD VENIPUNCTURE: CPT

## 2024-07-31 PROCEDURE — 7100000010 HC PHASE II RECOVERY - FIRST 15 MIN

## 2024-07-31 PROCEDURE — 87205 SMEAR GRAM STAIN: CPT

## 2024-07-31 PROCEDURE — 87070 CULTURE OTHR SPECIMN AEROBIC: CPT

## 2024-07-31 PROCEDURE — 87077 CULTURE AEROBIC IDENTIFY: CPT

## 2024-07-31 PROCEDURE — 47536 EXCHANGE BILIARY DRG CATH: CPT

## 2024-07-31 PROCEDURE — 2709999900 IR INJ PERC CHOLA EXIST ACCESS W IMAGING

## 2024-07-31 PROCEDURE — 6360000004 HC RX CONTRAST MEDICATION: Performed by: RADIOLOGY

## 2024-07-31 PROCEDURE — 6360000002 HC RX W HCPCS: Performed by: RADIOLOGY

## 2024-07-31 RX ORDER — MIDAZOLAM HYDROCHLORIDE 2 MG/2ML
INJECTION, SOLUTION INTRAMUSCULAR; INTRAVENOUS PRN
Status: COMPLETED | OUTPATIENT
Start: 2024-07-31 | End: 2024-07-31

## 2024-07-31 RX ORDER — FENTANYL CITRATE 50 UG/ML
INJECTION, SOLUTION INTRAMUSCULAR; INTRAVENOUS PRN
Status: COMPLETED | OUTPATIENT
Start: 2024-07-31 | End: 2024-07-31

## 2024-07-31 RX ORDER — DIPHENHYDRAMINE HYDROCHLORIDE 50 MG/ML
INJECTION INTRAMUSCULAR; INTRAVENOUS PRN
Status: COMPLETED | OUTPATIENT
Start: 2024-07-31 | End: 2024-07-31

## 2024-07-31 RX ADMIN — MIDAZOLAM HYDROCHLORIDE 0.5 MG: 1 INJECTION, SOLUTION INTRAMUSCULAR; INTRAVENOUS at 10:33

## 2024-07-31 RX ADMIN — DIPHENHYDRAMINE HYDROCHLORIDE 25 MG: 50 INJECTION, SOLUTION INTRAMUSCULAR; INTRAVENOUS at 10:35

## 2024-07-31 RX ADMIN — FENTANYL CITRATE 25 MCG: 50 INJECTION, SOLUTION INTRAMUSCULAR; INTRAVENOUS at 10:34

## 2024-07-31 RX ADMIN — IOPAMIDOL 5 ML: 755 INJECTION, SOLUTION INTRAVENOUS at 10:54

## 2024-07-31 NOTE — PROCEDURES
1100 Patient returned from drain exchange and upsize. Report received from SNEHA Arroyo. Dressing to checked, clean, dry, and intact. Patient stable. No s/s of complications noted or reported. Vitals will be checked q 15min, see flow sheets. Last medications given at 1034. Patient will recover for at least 60 minutes after sedation medications.     1145 Patient eating and drinking well with no s/s of complications noted or reported.    1200 Site was checked with every set of vitals. Site clean dry and intact. Discharge papers reviewed with patient and questions answered. Patient taken to door via wheelchair. Patient in stable condition, no s/s of complications noted or reported.

## 2024-07-31 NOTE — PRE SEDATION
fentanNYL, diphenhydrAMINE  Home Meds:   Prior to Admission medications    Medication Sig Start Date End Date Taking? Authorizing Provider   metoprolol succinate (TOPROL XL) 25 MG extended release tablet Take 0.5 tablets by mouth daily Has not started this yet as of 7/3/24 6/28/24   Cameron Martin MD   cetirizine (ZYRTEC) 5 MG tablet Take 1 tablet by mouth daily    Cameron Martin MD   cyanocobalamin 100 MCG tablet Take 1 tablet by mouth daily 8/12/22   Cameron Martin MD   escitalopram (LEXAPRO) 10 MG tablet Take 2 tablets by mouth daily 10/11/22   Cameron Martin MD   gabapentin (NEURONTIN) 100 MG capsule Take 1 capsule by mouth in the morning and at bedtime.    Cameron Martin MD   magnesium oxide (MAG-OX) 400 MG tablet Take 1 tablet by mouth daily    Cameron Martin MD   apixaban (ELIQUIS) 5 MG TABS tablet TAKE 1 TABLET TWICE DAILY  Patient taking differently: Take 0.5 tablets by mouth 2 times daily TAKE 1 TABLET TWICE DAILY    Patient states its been lowered to 2.5 7/3/24 5/14/21   Samara Perez MD     Coumadin Use Last 7 Days:  no  Antiplatelet drug therapy use last 7 days: no  Other anticoagulant use last 7 days: no  Additional Medication Information:  n/a      Pre-Sedation Documentation and Exam:   I have reviewed the patient's history and review of systems.    Mallampati Airway Assessment:  Mallampati Class II - (soft palate, fauces & uvula are visible)    Prior History of Anesthesia Complications:   none    ASA Classification:  Class 3 - A patient with severe systemic disease that limits activity but is not incapacitating    Sedation/ Anesthesia Plan:   intravenous sedation    Medications Planned:   fentanyl intravenously    Patient is an appropriate candidate for plan of sedation: yes    Electronically signed by Jian Miner MD on 7/31/2024 at 10:50 AM

## 2024-07-31 NOTE — PROGRESS NOTES
Patient arrived via to Radiology department for Gemma Tube Exchange. Allergies, home medications, H&P and fasting instructions reviewed with patient. Vital signs taken. 24g IV placed, IV flushed and prn adapter attached. Procedural instructions given, questions answered, understanding expressed and consent signed. Patient given fluoroscopy education, no questions at this time.

## 2024-07-31 NOTE — BRIEF OP NOTE
Brief Postoperative Note      Patient: Gui Banks  YOB: 1945  MRN: [id number]    Date of Procedure: 7/31/2024    Chronic cholecystitis and cholelithiasis    Post-Op Diagnosis: Same       Cholecystogram and tube upsize to 12 Fr.    M. Kristofer    Assistant:  * No surgical staff found *    Anesthesia: *Moderate * sedation    Estimated Blood Loss (mL): Minimal    Complications: None    Specimens:   Gallbladder bile    Implants:  * No implants in log *      Drains:   Gastrostomy/Enterostomy/Jejunostomy Tube LLQ (Active)   J Port Status Bolus 07/07/24 1926   G Port Status Clamped 07/08/24 0801   Surrounding Skin Clean, dry & intact 07/08/24 0801   Dressing Status Clean, dry & intact 07/08/24 0801   Dressing Type Split gauze 07/08/24 0801   G-Tube Care Completed Yes 07/07/24 1926   Tube Feeding Renal Formula 07/07/24 1926   Tube Feeding Intake (mL) 240 ml 07/08/24 1200   Tube Feeding Supplement Amount (mL) 0 mL 07/08/24 0350   Free Water/Flush (mL) 60 mL 07/08/24 1200   Output (mL) 0 ml 07/08/24 0350       [REMOVED] Closed/Suction Drain RUQ Bag (Removed)   Site Description Clean, dry & intact 07/08/24 0801   Dressing Status Clean, dry & intact 07/08/24 0801   Drainage Appearance Green 07/08/24 0801   Drain Status Open to gravity drainage 07/08/24 0801   Output (ml) 5 ml 07/08/24 0350       Findings:  Infection Present At Time Of Surgery (PATOS) (choose all levels that have infection present):  - Superficial Infection (skin/subcutaneous) present as evidenced by pus  Other Findings: Pus removed from gallbladder. Multiple sones in GB and CBD.  CBD is patent    Electronically signed by Jian Miner MD on 7/31/2024 at 10:51 AM

## 2024-08-04 LAB
MICROORGANISM SPEC CULT: ABNORMAL
MICROORGANISM SPEC CULT: NORMAL
MICROORGANISM/AGENT SPEC: ABNORMAL
MICROORGANISM/AGENT SPEC: NORMAL
SERVICE CMNT-IMP: ABNORMAL
SERVICE CMNT-IMP: NORMAL
SPECIMEN DESCRIPTION: ABNORMAL
SPECIMEN DESCRIPTION: NORMAL

## 2024-08-05 LAB
MICROORGANISM SPEC CULT: ABNORMAL
MICROORGANISM/AGENT SPEC: ABNORMAL
SERVICE CMNT-IMP: ABNORMAL
SPECIMEN DESCRIPTION: ABNORMAL

## 2024-08-12 ENCOUNTER — TELEPHONE (OUTPATIENT)
Dept: GENERAL RADIOLOGY | Age: 79
End: 2024-08-12

## 2024-08-12 NOTE — TELEPHONE ENCOUNTER
PER PT'S DAUGHTER AMBER PT IS IN [facility] AND THEY ARE REMOVING THE GALLBLADDER

## 2025-01-03 DIAGNOSIS — C41.1 MALIGNANT NEOPLASM OF MANDIBLE (HCC): Primary | ICD-10-CM

## 2025-01-03 RX ORDER — SODIUM CHLORIDE 0.9 % (FLUSH) 0.9 %
5-40 SYRINGE (ML) INJECTION PRN
OUTPATIENT
Start: 2025-01-06

## 2025-01-03 RX ORDER — HEPARIN 100 UNIT/ML
500 SYRINGE INTRAVENOUS PRN
OUTPATIENT
Start: 2025-01-06

## 2025-01-03 RX ORDER — 0.9 % SODIUM CHLORIDE 0.9 %
1000 INTRAVENOUS SOLUTION INTRAVENOUS ONCE
OUTPATIENT
Start: 2025-01-06 | End: 2025-01-06

## 2025-01-06 ENCOUNTER — HOSPITAL ENCOUNTER (OUTPATIENT)
Dept: INFUSION THERAPY | Age: 80
Setting detail: INFUSION SERIES
Discharge: HOME | End: 2025-01-06
Payer: MEDICARE

## 2025-01-06 VITALS
WEIGHT: 127 LBS | OXYGEN SATURATION: 100 % | TEMPERATURE: 96.6 F | HEIGHT: 62 IN | DIASTOLIC BLOOD PRESSURE: 68 MMHG | BODY MASS INDEX: 23.37 KG/M2 | HEART RATE: 75 BPM | SYSTOLIC BLOOD PRESSURE: 143 MMHG | RESPIRATION RATE: 16 BRPM

## 2025-01-06 DIAGNOSIS — E86.0 DEHYDRATION: ICD-10-CM

## 2025-01-06 DIAGNOSIS — C41.1 MALIGNANT NEOPLASM OF MANDIBLE (HCC): Primary | ICD-10-CM

## 2025-01-06 PROCEDURE — 2500000003 HC RX 250 WO HCPCS: Performed by: STUDENT IN AN ORGANIZED HEALTH CARE EDUCATION/TRAINING PROGRAM

## 2025-01-06 PROCEDURE — 96360 HYDRATION IV INFUSION INIT: CPT

## 2025-01-06 PROCEDURE — 2580000003 HC RX 258: Performed by: STUDENT IN AN ORGANIZED HEALTH CARE EDUCATION/TRAINING PROGRAM

## 2025-01-06 RX ORDER — 0.9 % SODIUM CHLORIDE 0.9 %
1000 INTRAVENOUS SOLUTION INTRAVENOUS ONCE
Status: CANCELLED | OUTPATIENT
Start: 2025-01-06 | End: 2025-01-06

## 2025-01-06 RX ORDER — SODIUM CHLORIDE 0.9 % (FLUSH) 0.9 %
5-40 SYRINGE (ML) INJECTION PRN
Status: CANCELLED | OUTPATIENT
Start: 2025-01-06

## 2025-01-06 RX ORDER — SODIUM CHLORIDE 0.9 % (FLUSH) 0.9 %
5-40 SYRINGE (ML) INJECTION PRN
Status: DISCONTINUED | OUTPATIENT
Start: 2025-01-06 | End: 2025-01-07 | Stop reason: HOSPADM

## 2025-01-06 RX ORDER — HEPARIN 100 UNIT/ML
500 SYRINGE INTRAVENOUS PRN
Status: CANCELLED | OUTPATIENT
Start: 2025-01-06

## 2025-01-06 RX ORDER — 0.9 % SODIUM CHLORIDE 0.9 %
1000 INTRAVENOUS SOLUTION INTRAVENOUS ONCE
Status: COMPLETED | OUTPATIENT
Start: 2025-01-06 | End: 2025-01-06

## 2025-01-06 RX ADMIN — SODIUM CHLORIDE, PRESERVATIVE FREE 10 ML: 5 INJECTION INTRAVENOUS at 07:37

## 2025-01-06 RX ADMIN — SODIUM CHLORIDE 1000 ML: 9 INJECTION, SOLUTION INTRAVENOUS at 07:37

## 2025-01-06 ASSESSMENT — PAIN SCALES - GENERAL: PAINLEVEL_OUTOF10: 0

## 2025-01-06 NOTE — PROGRESS NOTES
Patient here for hydration therapy. Tolerated hydration well. After infusion verbalizes felt fine . Reviewed therapy plan, offered education material and/or discharge material, reviewed medication information and signs and symptoms  and educated on possible side effects, verbalizes good knowledge of current plan patient verbalizes understanding, and has no signs or symptoms to report at this time. Patient discharged. Patient alert and oriented x3.   No distress noted.   Vital signs stable.   Patient denies any new or worsening pain.  Patient denies any needs.  All questions answered. Declines copy of AVS.

## 2025-05-05 ENCOUNTER — HOSPITAL ENCOUNTER (INPATIENT)
Age: 80
LOS: 3 days | Discharge: HOME HEALTH | DRG: 392 | End: 2025-05-08
Attending: EMERGENCY MEDICINE | Admitting: INTERNAL MEDICINE
Payer: MEDICARE

## 2025-05-05 ENCOUNTER — APPOINTMENT (OUTPATIENT)
Dept: CT IMAGING | Age: 80
DRG: 392 | End: 2025-05-05
Payer: MEDICARE

## 2025-05-05 DIAGNOSIS — R19.7 NAUSEA VOMITING AND DIARRHEA: Primary | ICD-10-CM

## 2025-05-05 DIAGNOSIS — N17.9 ACUTE KIDNEY INJURY: ICD-10-CM

## 2025-05-05 DIAGNOSIS — R74.01 TRANSAMINITIS: ICD-10-CM

## 2025-05-05 DIAGNOSIS — R11.2 NAUSEA VOMITING AND DIARRHEA: Primary | ICD-10-CM

## 2025-05-05 DIAGNOSIS — E87.5 HYPERKALEMIA: ICD-10-CM

## 2025-05-05 LAB
ALBUMIN SERPL-MCNC: 3.7 G/DL (ref 3.5–5.2)
ALP SERPL-CCNC: 155 U/L (ref 35–104)
ALT SERPL-CCNC: 123 U/L (ref 0–32)
ANION GAP SERPL CALCULATED.3IONS-SCNC: 12 MMOL/L (ref 7–16)
ANION GAP SERPL CALCULATED.3IONS-SCNC: 12 MMOL/L (ref 7–16)
AST SERPL-CCNC: 105 U/L (ref 0–31)
BASOPHILS # BLD: 0 K/UL (ref 0–0.2)
BASOPHILS NFR BLD: 0 % (ref 0–2)
BILIRUB DIRECT SERPL-MCNC: <0.2 MG/DL (ref 0–0.3)
BILIRUB INDIRECT SERPL-MCNC: ABNORMAL MG/DL (ref 0–1)
BILIRUB SERPL-MCNC: 0.2 MG/DL (ref 0–1.2)
BUN SERPL-MCNC: 32 MG/DL (ref 6–23)
BUN SERPL-MCNC: 38 MG/DL (ref 6–23)
CALCIUM SERPL-MCNC: 8.9 MG/DL (ref 8.6–10.2)
CALCIUM SERPL-MCNC: 9 MG/DL (ref 8.6–10.2)
CHLORIDE SERPL-SCNC: 102 MMOL/L (ref 98–107)
CHLORIDE SERPL-SCNC: 103 MMOL/L (ref 98–107)
CO2 SERPL-SCNC: 19 MMOL/L (ref 22–29)
CO2 SERPL-SCNC: 22 MMOL/L (ref 22–29)
CREAT SERPL-MCNC: 2.3 MG/DL (ref 0.5–1)
CREAT SERPL-MCNC: 2.5 MG/DL (ref 0.5–1)
EOSINOPHIL # BLD: 0.13 K/UL (ref 0.05–0.5)
EOSINOPHILS RELATIVE PERCENT: 3 % (ref 0–6)
ERYTHROCYTE [DISTWIDTH] IN BLOOD BY AUTOMATED COUNT: 13.6 % (ref 11.5–15)
GFR, ESTIMATED: 18 ML/MIN/1.73M2
GFR, ESTIMATED: 20 ML/MIN/1.73M2
GLUCOSE SERPL-MCNC: 102 MG/DL (ref 74–99)
GLUCOSE SERPL-MCNC: 133 MG/DL (ref 74–99)
HCT VFR BLD AUTO: 35.3 % (ref 34–48)
HGB BLD-MCNC: 11.3 G/DL (ref 11.5–15.5)
LACTATE BLDV-SCNC: 1.9 MMOL/L (ref 0.5–2.2)
LIPASE SERPL-CCNC: 47 U/L (ref 13–60)
LYMPHOCYTES NFR BLD: 0.08 K/UL (ref 1.5–4)
LYMPHOCYTES RELATIVE PERCENT: 2 % (ref 20–42)
MCH RBC QN AUTO: 30.4 PG (ref 26–35)
MCHC RBC AUTO-ENTMCNC: 32 G/DL (ref 32–34.5)
MCV RBC AUTO: 94.9 FL (ref 80–99.9)
MONOCYTES NFR BLD: 0.04 K/UL (ref 0.1–0.95)
MONOCYTES NFR BLD: 1 % (ref 2–12)
NEUTROPHILS NFR BLD: 95 % (ref 43–80)
NEUTS SEG NFR BLD: 4.55 K/UL (ref 1.8–7.3)
PLATELET # BLD AUTO: 202 K/UL (ref 130–450)
PMV BLD AUTO: 11 FL (ref 7–12)
POTASSIUM SERPL-SCNC: 5.3 MMOL/L (ref 3.5–5)
POTASSIUM SERPL-SCNC: 5.8 MMOL/L (ref 3.5–5)
PROT SERPL-MCNC: 6.2 G/DL (ref 6.4–8.3)
RBC # BLD AUTO: 3.72 M/UL (ref 3.5–5.5)
RBC # BLD: ABNORMAL 10*6/UL
SODIUM SERPL-SCNC: 134 MMOL/L (ref 132–146)
SODIUM SERPL-SCNC: 136 MMOL/L (ref 132–146)
WBC OTHER # BLD: 4.8 K/UL (ref 4.5–11.5)

## 2025-05-05 PROCEDURE — 2580000003 HC RX 258: Performed by: PHYSICIAN ASSISTANT

## 2025-05-05 PROCEDURE — 85025 COMPLETE CBC W/AUTO DIFF WBC: CPT

## 2025-05-05 PROCEDURE — 96375 TX/PRO/DX INJ NEW DRUG ADDON: CPT

## 2025-05-05 PROCEDURE — 6370000000 HC RX 637 (ALT 250 FOR IP): Performed by: PHYSICIAN ASSISTANT

## 2025-05-05 PROCEDURE — 74177 CT ABD & PELVIS W/CONTRAST: CPT

## 2025-05-05 PROCEDURE — 96374 THER/PROPH/DIAG INJ IV PUSH: CPT

## 2025-05-05 PROCEDURE — 6370000000 HC RX 637 (ALT 250 FOR IP): Performed by: INTERNAL MEDICINE

## 2025-05-05 PROCEDURE — 6360000004 HC RX CONTRAST MEDICATION: Performed by: RADIOLOGY

## 2025-05-05 PROCEDURE — 83605 ASSAY OF LACTIC ACID: CPT

## 2025-05-05 PROCEDURE — 2060000000 HC ICU INTERMEDIATE R&B

## 2025-05-05 PROCEDURE — 83690 ASSAY OF LIPASE: CPT

## 2025-05-05 PROCEDURE — 99285 EMERGENCY DEPT VISIT HI MDM: CPT

## 2025-05-05 PROCEDURE — 80048 BASIC METABOLIC PNL TOTAL CA: CPT

## 2025-05-05 PROCEDURE — 6360000002 HC RX W HCPCS

## 2025-05-05 PROCEDURE — 80053 COMPREHEN METABOLIC PANEL: CPT

## 2025-05-05 PROCEDURE — 82248 BILIRUBIN DIRECT: CPT

## 2025-05-05 PROCEDURE — 96361 HYDRATE IV INFUSION ADD-ON: CPT

## 2025-05-05 PROCEDURE — 2580000003 HC RX 258

## 2025-05-05 RX ORDER — METOPROLOL SUCCINATE 25 MG/1
12.5 TABLET, EXTENDED RELEASE ORAL DAILY
Status: DISCONTINUED | OUTPATIENT
Start: 2025-05-06 | End: 2025-05-08 | Stop reason: HOSPADM

## 2025-05-05 RX ORDER — SODIUM CHLORIDE 0.9 % (FLUSH) 0.9 %
10 SYRINGE (ML) INJECTION PRN
Status: DISCONTINUED | OUTPATIENT
Start: 2025-05-05 | End: 2025-05-08 | Stop reason: HOSPADM

## 2025-05-05 RX ORDER — SODIUM CHLORIDE 0.9 % (FLUSH) 0.9 %
10 SYRINGE (ML) INJECTION EVERY 12 HOURS SCHEDULED
Status: DISCONTINUED | OUTPATIENT
Start: 2025-05-05 | End: 2025-05-08 | Stop reason: HOSPADM

## 2025-05-05 RX ORDER — SENNOSIDES A AND B 8.6 MG/1
1 TABLET, FILM COATED ORAL DAILY PRN
Status: DISCONTINUED | OUTPATIENT
Start: 2025-05-05 | End: 2025-05-08 | Stop reason: HOSPADM

## 2025-05-05 RX ORDER — ONDANSETRON 4 MG/1
4 TABLET, ORALLY DISINTEGRATING ORAL EVERY 8 HOURS PRN
Status: DISCONTINUED | OUTPATIENT
Start: 2025-05-05 | End: 2025-05-08 | Stop reason: HOSPADM

## 2025-05-05 RX ORDER — SODIUM CHLORIDE 9 MG/ML
INJECTION, SOLUTION INTRAVENOUS CONTINUOUS
Status: DISCONTINUED | OUTPATIENT
Start: 2025-05-05 | End: 2025-05-06

## 2025-05-05 RX ORDER — IOPAMIDOL 755 MG/ML
75 INJECTION, SOLUTION INTRAVASCULAR
Status: COMPLETED | OUTPATIENT
Start: 2025-05-05 | End: 2025-05-05

## 2025-05-05 RX ORDER — GABAPENTIN 100 MG/1
100 CAPSULE ORAL 2 TIMES DAILY
Status: DISCONTINUED | OUTPATIENT
Start: 2025-05-05 | End: 2025-05-08 | Stop reason: HOSPADM

## 2025-05-05 RX ORDER — CALCIUM GLUCONATE 94 MG/ML
1000 INJECTION, SOLUTION INTRAVENOUS ONCE
Status: COMPLETED | OUTPATIENT
Start: 2025-05-05 | End: 2025-05-05

## 2025-05-05 RX ORDER — SODIUM CHLORIDE 9 MG/ML
INJECTION, SOLUTION INTRAVENOUS CONTINUOUS
Status: DISCONTINUED | OUTPATIENT
Start: 2025-05-05 | End: 2025-05-05 | Stop reason: SDUPTHER

## 2025-05-05 RX ORDER — DIPHENHYDRAMINE HYDROCHLORIDE 50 MG/ML
25 INJECTION, SOLUTION INTRAMUSCULAR; INTRAVENOUS ONCE
Status: COMPLETED | OUTPATIENT
Start: 2025-05-05 | End: 2025-05-05

## 2025-05-05 RX ORDER — SODIUM CHLORIDE, SODIUM LACTATE, POTASSIUM CHLORIDE, AND CALCIUM CHLORIDE .6; .31; .03; .02 G/100ML; G/100ML; G/100ML; G/100ML
1000 INJECTION, SOLUTION INTRAVENOUS ONCE
Status: COMPLETED | OUTPATIENT
Start: 2025-05-05 | End: 2025-05-05

## 2025-05-05 RX ORDER — ACETAMINOPHEN 325 MG/1
650 TABLET ORAL EVERY 6 HOURS PRN
Status: DISCONTINUED | OUTPATIENT
Start: 2025-05-05 | End: 2025-05-08 | Stop reason: HOSPADM

## 2025-05-05 RX ORDER — 0.9 % SODIUM CHLORIDE 0.9 %
1000 INTRAVENOUS SOLUTION INTRAVENOUS ONCE
Status: COMPLETED | OUTPATIENT
Start: 2025-05-05 | End: 2025-05-05

## 2025-05-05 RX ORDER — ONDANSETRON 2 MG/ML
4 INJECTION INTRAMUSCULAR; INTRAVENOUS EVERY 6 HOURS PRN
Status: DISCONTINUED | OUTPATIENT
Start: 2025-05-05 | End: 2025-05-08 | Stop reason: HOSPADM

## 2025-05-05 RX ORDER — HEPARIN SODIUM 5000 [USP'U]/ML
5000 INJECTION, SOLUTION INTRAVENOUS; SUBCUTANEOUS EVERY 8 HOURS SCHEDULED
Status: DISCONTINUED | OUTPATIENT
Start: 2025-05-05 | End: 2025-05-05

## 2025-05-05 RX ORDER — SODIUM CHLORIDE 9 MG/ML
INJECTION, SOLUTION INTRAVENOUS PRN
Status: DISCONTINUED | OUTPATIENT
Start: 2025-05-05 | End: 2025-05-08 | Stop reason: HOSPADM

## 2025-05-05 RX ORDER — ESCITALOPRAM OXALATE 10 MG/1
20 TABLET ORAL DAILY
Status: DISCONTINUED | OUTPATIENT
Start: 2025-05-06 | End: 2025-05-08 | Stop reason: HOSPADM

## 2025-05-05 RX ORDER — ACETAMINOPHEN 650 MG/1
650 SUPPOSITORY RECTAL EVERY 6 HOURS PRN
Status: DISCONTINUED | OUTPATIENT
Start: 2025-05-05 | End: 2025-05-08 | Stop reason: HOSPADM

## 2025-05-05 RX ORDER — ONDANSETRON 2 MG/ML
4 INJECTION INTRAMUSCULAR; INTRAVENOUS ONCE
Status: COMPLETED | OUTPATIENT
Start: 2025-05-05 | End: 2025-05-05

## 2025-05-05 RX ADMIN — SODIUM CHLORIDE 1000 ML: 0.9 INJECTION, SOLUTION INTRAVENOUS at 17:13

## 2025-05-05 RX ADMIN — ONDANSETRON 4 MG: 2 INJECTION, SOLUTION INTRAMUSCULAR; INTRAVENOUS at 14:52

## 2025-05-05 RX ADMIN — DIPHENHYDRAMINE HYDROCHLORIDE 25 MG: 50 INJECTION INTRAMUSCULAR; INTRAVENOUS at 19:35

## 2025-05-05 RX ADMIN — GABAPENTIN 100 MG: 100 CAPSULE ORAL at 23:01

## 2025-05-05 RX ADMIN — SODIUM CHLORIDE: 0.9 INJECTION, SOLUTION INTRAVENOUS at 22:54

## 2025-05-05 RX ADMIN — SODIUM CHLORIDE, SODIUM LACTATE, POTASSIUM CHLORIDE, AND CALCIUM CHLORIDE 1000 ML: .6; .31; .03; .02 INJECTION, SOLUTION INTRAVENOUS at 14:53

## 2025-05-05 RX ADMIN — IOPAMIDOL 75 ML: 755 INJECTION, SOLUTION INTRAVENOUS at 16:54

## 2025-05-05 RX ADMIN — SODIUM ZIRCONIUM CYCLOSILICATE 10 G: 10 POWDER, FOR SUSPENSION ORAL at 22:54

## 2025-05-05 RX ADMIN — CALCIUM GLUCONATE 1000 MG: 98 INJECTION INTRAVENOUS at 17:09

## 2025-05-05 RX ADMIN — APIXABAN 2.5 MG: 2.5 TABLET, FILM COATED ORAL at 23:01

## 2025-05-05 ASSESSMENT — PAIN - FUNCTIONAL ASSESSMENT: PAIN_FUNCTIONAL_ASSESSMENT: NONE - DENIES PAIN

## 2025-05-05 ASSESSMENT — PAIN SCALES - GENERAL: PAINLEVEL_OUTOF10: 0

## 2025-05-05 NOTE — CARE COORDINATION
Internal Medicine On-call Care Coordination Note    I was called by the ED physician because they recommended admission for this patient and we cover their PCP.  The history as I understand it after discussion with the ED physician is as follows:    Presents with nausea, vomiting, diarrhea over the past week  CT shows evidence of gastritic  ADÁN with creatinine 2.5, K 5.8  Started on IVF, given calcium gluconate  Decision made for admission    I placed admission orders.  Including:    Repeat K  Continue IVF  PRN antiemetics    Dr. Goodwin or his coverage will see the patient tomorrow for H&P.    Electronically signed by Ezio Burns PA-C on 5/5/2025 at 6:50 PM

## 2025-05-05 NOTE — ED PROVIDER NOTES
Department of Emergency Medicine     Written by: Jeronimo Estrella MD  Patient Name: Gui Banks  Admit Date: 2025  1:25 PM  MRN: [id number]                   : 1945    HPI     Chief Complaint   Patient presents with    Vomiting     N/V/A worsening over last week.  Daughter told EMS she was ortho positive at home.      Diarrhea       Gui Banks is a 80 y.o. female that presents to the ED due to concerns for nausea vomiting diarrhea over the last week.  Patient did have nausea and vomiting starting a week ago, did just improve over the last couple days however over the past 2 to 3 days it is worsened again.  She has not been tolerating most of her PEG tube feeds or oral feeds.  Orthostatic vitals were checked by the daughter today, it was 120 while laying down, 77 systolic while sitting up and 63 systolic while standing up.  She was lightheaded.  She is chronically malnourished.  She does have a PEG tube placed years ago.  Also has history of oral cancer, had 1 round of chemotherapy about 3 years ago.  No fevers no chills no sweats.  Usually wears 2 L oxygen at night.  The patient does have abdominal pain when vomiting however no pain otherwise.  She has been on Bactrim for 2 weeks for left second digit bunion surgery.    Review of systems   Pertinent positives and negatives mentioned in the HPI/MDM.      Physical   Physical Exam  Constitutional:       General: She is not in acute distress.     Appearance: Normal appearance.   HENT:      Mouth/Throat:      Mouth: Mucous membranes are dry.   Eyes:      Extraocular Movements: Extraocular movements intact.      Pupils: Pupils are equal, round, and reactive to light.   Cardiovascular:      Rate and Rhythm: Normal rate and regular rhythm.      Pulses: Normal pulses.      Comments: Cap refill ~5 seconds  Pulmonary:      Effort: Pulmonary effort is normal. No respiratory distress.      Comments: On 2 L nasal cannula oxygen  Abdominal:      Palpations: Abdomen

## 2025-05-05 NOTE — ED NOTES
Radiology Procedure Waiver   Name: Gui Banks  : 1945  MRN: [id number]    Date:  25    Time: 4:11 PM EDT    Benefits of immediately proceeding with Radiology exam(s) without pre-testing outweigh the risks or are not indicated as specified below and therefore the following is/are being waived:    [] Pregnancy test   [] Patients LMP on-time and regular.   [] Patient had Tubal Ligation or has other Contraception Device.   [] Patient  is Menopausal or Premenarcheal.    [] Patient had Full or Partial Hysterectomy.    [] Protocol for Iodine allergy    [] MRI Questionnaire     [x] BUN/Creatinine   [] Patient age w/no hx of renal dysfunction.   [] Patient on Dialysis.   [] Recent Normal Labs.  Electronically signed by Jeronimo Estrella MD on 25 at 4:11 PM EDT

## 2025-05-06 LAB
ALBUMIN SERPL-MCNC: 2.9 G/DL (ref 3.5–5.2)
ALP SERPL-CCNC: 155 U/L (ref 35–104)
ALT SERPL-CCNC: 109 U/L (ref 0–32)
ANION GAP SERPL CALCULATED.3IONS-SCNC: 9 MMOL/L (ref 7–16)
AST SERPL-CCNC: 94 U/L (ref 0–31)
BASOPHILS # BLD: 0 K/UL (ref 0–0.2)
BASOPHILS NFR BLD: 0 % (ref 0–2)
BILIRUB SERPL-MCNC: 0.2 MG/DL (ref 0–1.2)
BUN SERPL-MCNC: 28 MG/DL (ref 6–23)
CALCIUM SERPL-MCNC: 8.2 MG/DL (ref 8.6–10.2)
CHLORIDE SERPL-SCNC: 105 MMOL/L (ref 98–107)
CO2 SERPL-SCNC: 18 MMOL/L (ref 22–29)
CREAT SERPL-MCNC: 2.2 MG/DL (ref 0.5–1)
EOSINOPHIL # BLD: 0.19 K/UL (ref 0.05–0.5)
EOSINOPHILS RELATIVE PERCENT: 6 % (ref 0–6)
ERYTHROCYTE [DISTWIDTH] IN BLOOD BY AUTOMATED COUNT: 13.7 % (ref 11.5–15)
GFR, ESTIMATED: 22 ML/MIN/1.73M2
GLUCOSE SERPL-MCNC: 80 MG/DL (ref 74–99)
HCT VFR BLD AUTO: 31.7 % (ref 34–48)
HGB BLD-MCNC: 10 G/DL (ref 11.5–15.5)
LYMPHOCYTES NFR BLD: 0.29 K/UL (ref 1.5–4)
LYMPHOCYTES RELATIVE PERCENT: 10 % (ref 20–42)
MCH RBC QN AUTO: 30.1 PG (ref 26–35)
MCHC RBC AUTO-ENTMCNC: 31.5 G/DL (ref 32–34.5)
MCV RBC AUTO: 95.5 FL (ref 80–99.9)
MONOCYTES NFR BLD: 0.08 K/UL (ref 0.1–0.95)
MONOCYTES NFR BLD: 3 % (ref 2–12)
MYELOCYTES ABSOLUTE COUNT: 0.03 K/UL
MYELOCYTES: 1 %
NEUTROPHILS NFR BLD: 80 % (ref 43–80)
NEUTS SEG NFR BLD: 2.41 K/UL (ref 1.8–7.3)
PLATELET # BLD AUTO: 168 K/UL (ref 130–450)
PMV BLD AUTO: 10.6 FL (ref 7–12)
POTASSIUM SERPL-SCNC: 5.3 MMOL/L (ref 3.5–5)
PROT SERPL-MCNC: 5.3 G/DL (ref 6.4–8.3)
RBC # BLD AUTO: 3.32 M/UL (ref 3.5–5.5)
RBC # BLD: ABNORMAL 10*6/UL
RBC # BLD: ABNORMAL 10*6/UL
SODIUM SERPL-SCNC: 132 MMOL/L (ref 132–146)
WBC OTHER # BLD: 3 K/UL (ref 4.5–11.5)

## 2025-05-06 PROCEDURE — 6370000000 HC RX 637 (ALT 250 FOR IP)

## 2025-05-06 PROCEDURE — 80053 COMPREHEN METABOLIC PANEL: CPT

## 2025-05-06 PROCEDURE — 6360000002 HC RX W HCPCS

## 2025-05-06 PROCEDURE — 2500000003 HC RX 250 WO HCPCS: Performed by: PHYSICIAN ASSISTANT

## 2025-05-06 PROCEDURE — 2580000003 HC RX 258: Performed by: NURSE PRACTITIONER

## 2025-05-06 PROCEDURE — 6370000000 HC RX 637 (ALT 250 FOR IP): Performed by: HOSPITALIST

## 2025-05-06 PROCEDURE — 2500000003 HC RX 250 WO HCPCS

## 2025-05-06 PROCEDURE — P9047 ALBUMIN (HUMAN), 25%, 50ML: HCPCS

## 2025-05-06 PROCEDURE — 2580000003 HC RX 258: Performed by: PHYSICIAN ASSISTANT

## 2025-05-06 PROCEDURE — 97161 PT EVAL LOW COMPLEX 20 MIN: CPT

## 2025-05-06 PROCEDURE — 51798 US URINE CAPACITY MEASURE: CPT

## 2025-05-06 PROCEDURE — 2700000000 HC OXYGEN THERAPY PER DAY

## 2025-05-06 PROCEDURE — 6370000000 HC RX 637 (ALT 250 FOR IP): Performed by: PHYSICIAN ASSISTANT

## 2025-05-06 PROCEDURE — 97165 OT EVAL LOW COMPLEX 30 MIN: CPT

## 2025-05-06 PROCEDURE — 85025 COMPLETE CBC W/AUTO DIFF WBC: CPT

## 2025-05-06 PROCEDURE — 36415 COLL VENOUS BLD VENIPUNCTURE: CPT

## 2025-05-06 PROCEDURE — 2060000000 HC ICU INTERMEDIATE R&B

## 2025-05-06 PROCEDURE — 2580000003 HC RX 258

## 2025-05-06 RX ORDER — MIDODRINE HYDROCHLORIDE 5 MG/1
5 TABLET ORAL
Status: DISCONTINUED | OUTPATIENT
Start: 2025-05-06 | End: 2025-05-08 | Stop reason: HOSPADM

## 2025-05-06 RX ORDER — ALBUMIN (HUMAN) 12.5 G/50ML
25 SOLUTION INTRAVENOUS ONCE
Status: COMPLETED | OUTPATIENT
Start: 2025-05-06 | End: 2025-05-06

## 2025-05-06 RX ORDER — 0.9 % SODIUM CHLORIDE 0.9 %
500 INTRAVENOUS SOLUTION INTRAVENOUS ONCE
Status: COMPLETED | OUTPATIENT
Start: 2025-05-06 | End: 2025-05-06

## 2025-05-06 RX ORDER — DIPHENHYDRAMINE HCL 25 MG
25 TABLET ORAL EVERY 6 HOURS PRN
Status: DISCONTINUED | OUTPATIENT
Start: 2025-05-06 | End: 2025-05-08 | Stop reason: HOSPADM

## 2025-05-06 RX ADMIN — MIDODRINE HYDROCHLORIDE 5 MG: 5 TABLET ORAL at 15:42

## 2025-05-06 RX ADMIN — SODIUM CHLORIDE 500 ML: 0.9 INJECTION, SOLUTION INTRAVENOUS at 02:31

## 2025-05-06 RX ADMIN — ESCITALOPRAM OXALATE 20 MG: 10 TABLET ORAL at 09:00

## 2025-05-06 RX ADMIN — SODIUM CHLORIDE: 0.9 INJECTION, SOLUTION INTRAVENOUS at 05:10

## 2025-05-06 RX ADMIN — SODIUM ZIRCONIUM CYCLOSILICATE 10 G: 10 POWDER, FOR SUSPENSION ORAL at 10:36

## 2025-05-06 RX ADMIN — APIXABAN 2.5 MG: 2.5 TABLET, FILM COATED ORAL at 22:03

## 2025-05-06 RX ADMIN — GABAPENTIN 100 MG: 100 CAPSULE ORAL at 09:00

## 2025-05-06 RX ADMIN — DIPHENHYDRAMINE HCL 25 MG: 25 TABLET ORAL at 22:03

## 2025-05-06 RX ADMIN — ALBUMIN (HUMAN) 25 G: 0.25 INJECTION, SOLUTION INTRAVENOUS at 14:13

## 2025-05-06 RX ADMIN — SODIUM BICARBONATE: 84 INJECTION, SOLUTION INTRAVENOUS at 11:46

## 2025-05-06 RX ADMIN — APIXABAN 2.5 MG: 2.5 TABLET, FILM COATED ORAL at 09:00

## 2025-05-06 RX ADMIN — GABAPENTIN 100 MG: 100 CAPSULE ORAL at 22:03

## 2025-05-06 RX ADMIN — SODIUM CHLORIDE, PRESERVATIVE FREE 10 ML: 5 INJECTION INTRAVENOUS at 22:03

## 2025-05-06 RX ADMIN — DIPHENHYDRAMINE HCL 25 MG: 25 TABLET ORAL at 10:36

## 2025-05-06 ASSESSMENT — PAIN SCALES - GENERAL: PAINLEVEL_OUTOF10: 0

## 2025-05-06 NOTE — PROGRESS NOTES
Physical Therapy  Facility/Department: [address] INTERMEDIATE  Physical Therapy Initial Assessment    Name: Gui Banks  : 1945  MRN: [id number]  Date of Service: 2025      Patient Diagnosis(es): The primary encounter diagnosis was Nausea vomiting and diarrhea. Diagnoses of Acute kidney injury, Hyperkalemia, and Transaminitis were also pertinent to this visit.  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  Past Surgical History:  has a past surgical history that includes Diagnostic Cardiac Cath Lab Procedure; Hysterectomy; Carpal tunnel release; Cardiac defibrillator placement (Left, 2007); Jejunostomy; other surgical history; other surgical history (2016); other surgical history (2009); Abdomen surgery (10/05/2016); Incisional hernia repair (2017); Endoscopy, colon, diagnostic (2018); hernia repair; Cardiac defibrillator placement (2018); other surgical history (Left); Mandible surgery; Leg Surgery (Left, 2022); and CT PTC NEW ACCESS (2024).      Requires PT Follow-Up: Yes    Evaluating Therapist: Tiffanie Frye PT     Referring Provider:      Ezio Burns PA-C       PT order : PT eval and treat     Room #: 648  DIAGNOSIS: The primary encounter diagnosis was Nausea vomiting and diarrhea. Diagnoses of Acute kidney injury, Hyperkalemia, and Transaminitis were also

## 2025-05-06 NOTE — CONSULTS
Daily  Allergies:  Patient has no known allergies.    Social History:    TOBACCO:   reports that she quit smoking about 18 years ago. Her smoking use included cigarettes. She started smoking about 64 years ago. She has a 92 pack-year smoking history. She has never been exposed to tobacco smoke. She has never used smokeless tobacco.  ETOH:   reports current alcohol use.    Family History:       Problem Relation Age of Onset    Alzheimer's Disease Mother     Heart Disease Father     Cancer Father     Alzheimer's Disease Father     Cancer Brother      REVIEW OF SYSTEMS:    CONSTITUTIONAL:  negative for  fevers and chills  EYES:  negative  HEENT:  negative  RESPIRATORY:  negative  CARDIOVASCULAR:  negative  GASTROINTESTINAL:  positive for nausea, vomiting, and diarrhea  GENITOURINARY:  negative  INTEGUMENT/BREAST:  negative  HEMATOLOGIC/LYMPHATIC:  negative  ALLERGIC/IMMUNOLOGIC:  negative  ENDOCRINE:  negative  MUSCULOSKELETAL:  negative  NEUROLOGICAL:  negative    PHYSICAL EXAM:      Vitals:    VITALS:  BP (!) 91/39   Pulse 80   Temp 97.5 °F (36.4 °C) (Oral)   Resp 18   Ht 1.575 m (5' 2\")   Wt 53.5 kg (118 lb)   LMP  (LMP Unknown)   SpO2 100%   BMI 21.58 kg/m²   24HR INTAKE/OUTPUT:  No intake or output data in the 24 hours ending 05/06/25 1237    Constitutional: Patient is awake, alert, in no distress  HEENT: Pupils are equal reactive, mucous membranes are dry  Respiratory: Lungs are clear  Cardiovascular/Edema: Heart sounds are regular  Gastrointestinal: Abdomen is soft  Neurologic: Nonfocal  Skin: No lesions  Other: No edema    DATA:    CBC:   Lab Results   Component Value Date/Time    WBC 3.0 05/06/2025 04:42 AM    RBC 3.32 05/06/2025 04:42 AM    HGB 10.0 05/06/2025 04:42 AM    HCT 31.7 05/06/2025 04:42 AM    MCV 95.5 05/06/2025 04:42 AM    MCH 30.1 05/06/2025 04:42 AM    MCHC 31.5 05/06/2025 04:42 AM    RDW 13.7 05/06/2025 04:42 AM     05/06/2025 04:42 AM    MPV 10.6 05/06/2025 04:42 AM     CMP:     Lab Results   Component Value Date/Time     05/06/2025 04:42 AM    K 5.3 05/06/2025 04:42 AM    K 4.5 11/04/2022 10:41 AM     05/06/2025 04:42 AM    CO2 18 05/06/2025 04:42 AM    BUN 28 05/06/2025 04:42 AM    CREATININE 2.2 05/06/2025 04:42 AM    GFRAA 45 05/03/2017 05:23 AM    LABGLOM 22 05/06/2025 04:42 AM    LABGLOM 50 10/11/2023 04:19 PM    GLUCOSE 80 05/06/2025 04:42 AM    CALCIUM 8.2 05/06/2025 04:42 AM    BILITOT 0.2 05/06/2025 04:42 AM    ALKPHOS 155 05/06/2025 04:42 AM    AST 94 05/06/2025 04:42 AM     05/06/2025 04:42 AM     Magnesium:    Lab Results   Component Value Date/Time    MG 1.7 07/04/2024 02:30 AM     Phosphorus:    Lab Results   Component Value Date/Time    PHOS 4.0 05/03/2017 05:23 AM     Radiology Review:      CT scan abdomen, 5/5/2025    IMPRESSION:  1. Gastric rugae show slight enhancement and there is fluid in the stomach and duodenum. Mild gastritis not excluded. No evidence of perforating ulcer or perigastric inflammation.  2. No evidence of intestinal obstruction.  3. Mild left colon diverticulosis without diverticulitis.  4. Gas fluid levels in the transverse colon. Left colon is decompressed. This can be seen in a diarrheal process related to ileus.  5. Degenerative changes in the spine.    IMPRESSION/RECOMMENDATIONS:      Briefly, Ms. 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  Initial laboratory data showed creatinine of 2.5 mg/dL, potassium 5.8, reason for this consultation.        ADÁN stage II, volume

## 2025-05-06 NOTE — PROGRESS NOTES
P Quality Flow/Interdisciplinary Rounds Progress Note        Quality Flow Rounds held on May 6, 2025    Disciplines Attending:  Bedside Nurse, , , and Nursing Unit Leadership    Gui Banks was admitted on 5/5/2025  1:25 PM    Anticipated Discharge Date:       Disposition:    Cheko Score:  Cheko Scale Score: 20    [facility] RISK OF UNPLANNED READMISSION 2.0             17.8 Total Score        Discussed patient goal for the day, patient clinical progression, and barriers to discharge.  The following Goal(s) of the Day/Commitment(s) have been identified:   monitor labs, iv fluids, nephro eval, PEG TF bolus + diet,       Jaqueline Vlales, SNEHA  May 6, 2025

## 2025-05-06 NOTE — CARE COORDINATION
Social Work / Discharge Planning :SW met with patient and explained role as discharge planner/ transition of care. Patient resides alone in a one story home with 3 steps with railings on both sides. Patient has a walker, walk in shower with shower seat. Patient wears O2 for night time through [facility] at 2 liters. Patient has pegtube, tube feed is supplied by [employer].Pharmacy is [facility].  Dr. Benson is PCP.  of [facility]. OT am/pac 19/24. Await PT> Patient verified plan at discharge is HOME. SW discussed HHC and patient politely declined . Pankaj are very involved and hr daughter is an RN. Patient admitted with hyperkalemia. Nephrology consulted. AWait treatment plan and recommendations. SW to follow. Electronically signed by CLARIBEL Ferrari on 5/6/25 at 1:32 PM EDT

## 2025-05-06 NOTE — PLAN OF CARE
Problem: Chronic Conditions and Co-morbidities  Goal: Patient's chronic conditions and co-morbidity symptoms are monitored and maintained or improved  5/6/2025 1138 by Jose Wilcox, RN  Outcome: Progressing  5/5/2025 2238 by Harriet Izaguirre, RN  Outcome: Progressing     Problem: Pain  Goal: Verbalizes/displays adequate comfort level or baseline comfort level  Outcome: Progressing     Problem: Safety - Adult  Goal: Free from fall injury  Outcome: Progressing

## 2025-05-06 NOTE — CONSULTS
Comprehensive Nutrition Assessment    Type and Reason for Visit:  Initial, Consult, Positive nutrition screen (TF Ordering and Management)    Nutrition Recommendations/Plan:   Continue current TF, as tolerated. Will order Continuous rate, as pt has had N/V/D  PTA and may tolerate smaller volumes at this time. Also, change to Renal TF d/t hyperkalemia (CKD)    5/6 TF ORDER: Renal TF(Nepro) to goal rate 35 ml/hr with 10 ml/hr water flushes  This will provide: 840 ml/d, 1512 mario, 68 g pro, 849 ml total free water  This regimen will meet ~100% energy and protein needs      Continue inpatient monitoring POC/GOC     Malnutrition Assessment:  Malnutrition Status:  At risk for malnutrition (05/06/25 1221)    Context:  Acute Illness     Findings of the 6 clinical characteristics of malnutrition:  Energy Intake:  50% or less of estimated energy requirements for 5 or more days  Weight Loss:  Unable to assess     Body Fat Loss:  Unable to assess     Muscle Mass Loss:  Unable to assess    Fluid Accumulation:  No fluid accumulation     Strength:  Not Performed    Nutrition Assessment:    Pt admit 2/2 ADÁN s/p N/V/D PTA. Pt was not tolerating any PO or TF. 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 PMHx also s/p bunionectomy 4/4/25, COPD, CHF, CKD, malnutrition. Currently hyperkalemic since admit. Will order Renal TF to decrease risk of persistent hyperkalemia and continue to monitor tolerance.    Nutrition Related Findings:    Distended abd +BS, PEG clamped, I/O not available, no edema, K+ 5.3 Wound Type: None       Current Nutrition Intake & Therapies:    Average Meal Intake: Unable to assess  Average Supplements Intake: None Ordered  ADULT DIET; Regular    Anthropometric Measures:  Height: 157.5 cm (5' 2\")  Ideal Body Weight (IBW): 110 lbs (50 kg)    Admission Body Weight: 53.5 kg (118 lb) (5/5)  Current Body Weight: 53.5 kg (118 lb), 107.3 % IBW. Weight Source: Not specified (5/5)  Current BMI  (kg/m2): 21.6  Usual Body Weight: 59.1 kg (130 lb 5.7 oz) (2/20/2025 per [facility])     % Weight Change (Calculated): -9.5                    BMI Categories: Underweight (BMI less than 22) age over 65    Estimated Daily Nutrient Needs:  Energy Requirements Based On: Formula ([place] Jeor)  Weight Used for Energy Requirements: Current  Energy (kcal/day):   Weight Used for Protein Requirements: Current  Protein (g/day): 60-70 (1.1-1.3 g/kg)  Method Used for Fluid Requirements: Defer to provider      Nutrition Diagnosis:   Inadequate oral intake related to nausea/vomiting/diarrhea as evidenced by poor intake prior to admission, nausea, vomiting, diarrhea, s/p surgery    Nutrition Interventions:   Food and/or Nutrient Delivery: Continue Current Diet, Start Tube Feeding  Nutrition Education/Counseling: No recommendation at this time  Coordination of Nutrition Care: Continue to monitor while inpatient       Goals:  Goals: Meet at least 75% of estimated needs, by next RD assessment  Type of Goal: New goal       Nutrition Monitoring and Evaluation:   Behavioral-Environmental Outcomes: None Identified  Food/Nutrient Intake Outcomes: Food and Nutrient Intake, Enteral Nutrition Intake/Tolerance  Physical Signs/Symptoms Outcomes: Biochemical Data, GI Status, Fluid Status or Edema, Nutrition Focused Physical Findings, Skin, Weight    Discharge Planning:    Enteral Nutrition     Lorraine Douglas RD, CNSC, LD  Contact: x [phone]

## 2025-05-06 NOTE — H&P
Internal Medicine History & Physical     Name: Gui Banks  : 1945  Chief Complaint: Vomiting (N/V/A worsening over last week.  Daughter told EMS she was ortho positive at home.  ) and Diarrhea  Primary Care Physician: Dmitry Benson MD  Admission date: 2025  Date of service: 2025     History of Present Illness  Gui is a 80 y.o. year old female.  Patient came in with intractable nausea and vomiting over the last 10 days.  States that she was dizzy with getting up at home.  She typically receives 3 cans of tube feeding throughout the day.  She then gets 3 syringes of water.  Additionally, she is able to eat and drink some.  She denies any fevers or chills.  Denies any recent travel, trauma, falls.  No new foods.  No other sick contacts that she is aware of.  She states that she was on an antibiotic for a toe surgery to repair a hammertoe.  She was placed on it for a week and then it was extended 10 more days.  She was to continue it until today but it was discontinued on  at the direction of her daughter.  Patient denies any other dysuria, hematuria, chest pain, shortness of breath, trouble chewing or swallowing.  States she could not keep anything down over the 10-day period.  Patient then developed a rash over the last several days as well while the diarrhea was going on.    Nothing was making her better.  Continue to worsen at home.  Came in for further treatment and evaluation.  Starting to feel better.  Diarrhea is slowing down.  Rash is still present and itchy all over.  No other new sheets, bug bites, injuries, traumas, lotions, other medications other than the antibiotics that were ordered.      ED course:   Initial blood work and imaging studies performed. Admission recommended by ED physician. Case was discussed with ED provider. Meds in ED consisted of the following:  Medications   sodium chloride flush 0.9 % injection 10 mL (10 mLs IntraVENous Not Given 25 0901)   sodium chloride

## 2025-05-06 NOTE — ACP (ADVANCE CARE PLANNING)
Advance Care Planning   Healthcare Decision Maker:    Merari Thibodeaux Child [phone]       Click here to complete Healthcare Decision Makers including selection of the Healthcare Decision Maker Relationship (ie \"Primary\").  Today we documented Decision Maker(s) consistent with Legal Next of Kin hierarchy.       Merari Thibodeaux Child [phone]

## 2025-05-06 NOTE — PROGRESS NOTES
Occupational Therapy    OCCUPATIONAL THERAPY INITIAL EVALUATION    [facility]   [address], [place]         Date:2025                                                  Patient Name: Gui Banks    MRN: [id number]    : 1945    Room: [address] [place] [address]      Evaluating OT: Madeline Griffith OTR/L   [id number]      Referring Provider:Ezio Burns PA-C     Specific Provider Orders/Date:OT eval and treat 2025      Diagnosis:  Hyperkalemia [E87.5]  Transaminitis [R74.01]  Acute kidney injury [N17.9]  Nausea vomiting and diarrhea [R11.2, R19.7]     Pertinent Medical History: oral CA  recent bunion surgery, CHF, COPD,necrosis,       Precautions:  Fall Risk, L post op shoe      Assessment of current deficits    [x] Functional mobility  [x]ADLs  [x] Strength               [x]Cognition    [x] Functional transfers   [x] IADLs         [x] Safety Awareness   [x]Endurance    [x] Fine Coordination              [x] Balance      [] Vision/perception   [x]Sensation     []Gross Motor Coordination  [] ROM  [] Delirium                   [] Motor Control     OT PLAN OF CARE   OT POC based on physician orders, patient diagnosis and results of clinical assessment    Frequency/Duration  days/wk for 2 - 4weeks PRN   Specific OT Treatment Interventions to include:   ADL retraining/adapted techniques and AE recommendations to increase functional independence within precautions                    Energy conservation techniques to improve tolerance for selfcare routine   Functional transfer/mobility training/DME recommendations for increased independence, safety and fall prevention         Patient/family education to increase safety and functional independence             Environmental modifications for safe mobility and completion of ADLs                             Therapeutic activity to improve functional performance during ADLs.

## 2025-05-07 LAB
ALBUMIN SERPL-MCNC: 3.2 G/DL (ref 3.5–5.2)
ALP SERPL-CCNC: 246 U/L (ref 35–104)
ALT SERPL-CCNC: 177 U/L (ref 0–32)
ANION GAP SERPL CALCULATED.3IONS-SCNC: 9 MMOL/L (ref 7–16)
AST SERPL-CCNC: 134 U/L (ref 0–31)
BASOPHILS # BLD: 0 K/UL (ref 0–0.2)
BASOPHILS NFR BLD: 0 % (ref 0–2)
BILIRUB SERPL-MCNC: 0.8 MG/DL (ref 0–1.2)
BUN SERPL-MCNC: 21 MG/DL (ref 6–23)
CALCIUM SERPL-MCNC: 8.3 MG/DL (ref 8.6–10.2)
CHLORIDE SERPL-SCNC: 100 MMOL/L (ref 98–107)
CO2 SERPL-SCNC: 31 MMOL/L (ref 22–29)
CREAT SERPL-MCNC: 1.6 MG/DL (ref 0.5–1)
EOSINOPHIL # BLD: 0.28 K/UL (ref 0.05–0.5)
EOSINOPHILS RELATIVE PERCENT: 11 % (ref 0–6)
ERYTHROCYTE [DISTWIDTH] IN BLOOD BY AUTOMATED COUNT: 13.5 % (ref 11.5–15)
GFR, ESTIMATED: 33 ML/MIN/1.73M2
GLUCOSE SERPL-MCNC: 102 MG/DL (ref 74–99)
HCT VFR BLD AUTO: 31.2 % (ref 34–48)
HGB BLD-MCNC: 9.9 G/DL (ref 11.5–15.5)
LYMPHOCYTES NFR BLD: 0.26 K/UL (ref 1.5–4)
LYMPHOCYTES RELATIVE PERCENT: 10 % (ref 20–42)
MCH RBC QN AUTO: 29.7 PG (ref 26–35)
MCHC RBC AUTO-ENTMCNC: 31.7 G/DL (ref 32–34.5)
MCV RBC AUTO: 93.7 FL (ref 80–99.9)
MONOCYTES NFR BLD: 0.23 K/UL (ref 0.1–0.95)
MONOCYTES NFR BLD: 9 % (ref 2–12)
MYELOCYTES ABSOLUTE COUNT: 0.02 K/UL
MYELOCYTES: 1 %
NEUTROPHILS NFR BLD: 70 % (ref 43–80)
NEUTS SEG NFR BLD: 1.81 K/UL (ref 1.8–7.3)
PLATELET # BLD AUTO: 171 K/UL (ref 130–450)
PMV BLD AUTO: 11 FL (ref 7–12)
POTASSIUM SERPL-SCNC: 4.1 MMOL/L (ref 3.5–5)
PROT SERPL-MCNC: 5 G/DL (ref 6.4–8.3)
RBC # BLD AUTO: 3.33 M/UL (ref 3.5–5.5)
RBC # BLD: NORMAL 10*6/UL
SODIUM SERPL-SCNC: 140 MMOL/L (ref 132–146)
WBC OTHER # BLD: 2.6 K/UL (ref 4.5–11.5)

## 2025-05-07 PROCEDURE — 2060000000 HC ICU INTERMEDIATE R&B

## 2025-05-07 PROCEDURE — 6360000002 HC RX W HCPCS: Performed by: INTERNAL MEDICINE

## 2025-05-07 PROCEDURE — 2580000003 HC RX 258

## 2025-05-07 PROCEDURE — 2500000003 HC RX 250 WO HCPCS: Performed by: PHYSICIAN ASSISTANT

## 2025-05-07 PROCEDURE — 51798 US URINE CAPACITY MEASURE: CPT

## 2025-05-07 PROCEDURE — 80053 COMPREHEN METABOLIC PANEL: CPT

## 2025-05-07 PROCEDURE — 85025 COMPLETE CBC W/AUTO DIFF WBC: CPT

## 2025-05-07 PROCEDURE — P9047 ALBUMIN (HUMAN), 25%, 50ML: HCPCS | Performed by: INTERNAL MEDICINE

## 2025-05-07 PROCEDURE — 6370000000 HC RX 637 (ALT 250 FOR IP): Performed by: HOSPITALIST

## 2025-05-07 PROCEDURE — 6370000000 HC RX 637 (ALT 250 FOR IP): Performed by: PHYSICIAN ASSISTANT

## 2025-05-07 PROCEDURE — 2700000000 HC OXYGEN THERAPY PER DAY

## 2025-05-07 PROCEDURE — 6370000000 HC RX 637 (ALT 250 FOR IP)

## 2025-05-07 RX ORDER — SODIUM CHLORIDE 9 MG/ML
INJECTION, SOLUTION INTRAVENOUS CONTINUOUS
Status: DISCONTINUED | OUTPATIENT
Start: 2025-05-07 | End: 2025-05-08

## 2025-05-07 RX ORDER — ALBUMIN (HUMAN) 12.5 G/50ML
25 SOLUTION INTRAVENOUS ONCE
Status: COMPLETED | OUTPATIENT
Start: 2025-05-07 | End: 2025-05-07

## 2025-05-07 RX ADMIN — GABAPENTIN 100 MG: 100 CAPSULE ORAL at 20:23

## 2025-05-07 RX ADMIN — GABAPENTIN 100 MG: 100 CAPSULE ORAL at 08:09

## 2025-05-07 RX ADMIN — SODIUM CHLORIDE, PRESERVATIVE FREE 10 ML: 5 INJECTION INTRAVENOUS at 20:24

## 2025-05-07 RX ADMIN — MIDODRINE HYDROCHLORIDE 5 MG: 5 TABLET ORAL at 16:35

## 2025-05-07 RX ADMIN — DIPHENHYDRAMINE HCL 25 MG: 25 TABLET ORAL at 09:39

## 2025-05-07 RX ADMIN — SODIUM CHLORIDE, PRESERVATIVE FREE 10 ML: 5 INJECTION INTRAVENOUS at 08:10

## 2025-05-07 RX ADMIN — MIDODRINE HYDROCHLORIDE 5 MG: 5 TABLET ORAL at 11:52

## 2025-05-07 RX ADMIN — APIXABAN 2.5 MG: 2.5 TABLET, FILM COATED ORAL at 08:09

## 2025-05-07 RX ADMIN — MIDODRINE HYDROCHLORIDE 5 MG: 5 TABLET ORAL at 08:09

## 2025-05-07 RX ADMIN — APIXABAN 2.5 MG: 2.5 TABLET, FILM COATED ORAL at 20:23

## 2025-05-07 RX ADMIN — ESCITALOPRAM OXALATE 20 MG: 10 TABLET ORAL at 08:09

## 2025-05-07 RX ADMIN — SODIUM CHLORIDE: 0.9 INJECTION, SOLUTION INTRAVENOUS at 11:52

## 2025-05-07 RX ADMIN — DIPHENHYDRAMINE HCL 25 MG: 25 TABLET ORAL at 20:23

## 2025-05-07 RX ADMIN — ALBUMIN (HUMAN) 25 G: 0.25 INJECTION, SOLUTION INTRAVENOUS at 13:34

## 2025-05-07 ASSESSMENT — PAIN SCALES - GENERAL: PAINLEVEL_OUTOF10: 0

## 2025-05-07 NOTE — PROGRESS NOTES
Internal Medicine Progress Note    Patient's name: Gui Banks  : 1945  Chief complaints (on day of admission): Vomiting (N/V/A worsening over last week.  Daughter told EMS she was ortho positive at home.  ) and Diarrhea  Admission date: 2025  Date of service: 2025   Room: [address] INTERMEDIATE  Primary care physician: Dmitry Benson MD  Reason for visit: Follow-up for dehydration    Subjective  Gui was seen and examined. She is feeling better. No further diarrhea today. Tolerating TF. No n/v. Feeling better today. States when doing bolus feeds, she was frequently full and having diarrhea after each can of TF. Rash is improving. Less itchy today.      Review of Systems  There are no new complaints of chest pain, shortness of breath, abdominal pain, nausea, vomiting, diarrhea, constipation.    Hospital Medications  Current Facility-Administered Medications   Medication Dose Route Frequency Provider Last Rate Last Admin    0.9 % sodium chloride infusion   IntraVENous Continuous Bejarano, AILEEN Tai  mL/hr at 25 1152 New Bag at 25 1152    albumin human 25% IV solution 25 g  25 g IntraVENous Once Brendan Silva MD        diphenhydrAMINE (BENADRYL) tablet 25 mg  25 mg Oral Q6H PRN Rob Izaguirre MD   25 mg at 25 0939    midodrine (PROAMATINE) tablet 5 mg  5 mg Oral TID  Bejarano, AILEEN Tai - CNP   5 mg at 25 1152    sodium chloride flush 0.9 % injection 10 mL  10 mL IntraVENous 2 times per day Ezio Burns PA-C   10 mL at 25 0810    sodium chloride flush 0.9 % injection 10 mL  10 mL IntraVENous PRN Ezio Burns PA-C        0.9 % sodium chloride infusion   IntraVENous PRN Ezio Burns PA-C        ondansetron (ZOFRAN-ODT) disintegrating tablet 4 mg  4 mg Oral Q8H PRN Ezio Burns PA-C        Or    ondansetron (ZOFRAN) injection 4 mg  4 mg IntraVENous Q6H PRN Ezio Burns PA-C        senna (SENOKOT) tablet 8.6 mg  1 tablet Oral Daily PRN Grant,

## 2025-05-07 NOTE — PROGRESS NOTES
[facility] Quality Flow/Interdisciplinary Rounds Progress Note        Quality Flow Rounds held on May 7, 2025    Disciplines Attending:  Bedside Nurse, , , and Nursing Unit Leadership    Gui Banks was admitted on 5/5/2025  1:25 PM    Anticipated Discharge Date:       Disposition:    Cheko Score:  Cheko Scale Score: 19    [facility] RISK OF UNPLANNED READMISSION 2.0             17.6 Total Score        Discussed patient goal for the day, patient clinical progression, and barriers to discharge.  The following Goal(s) of the Day/Commitment(s) have been identified:   + midodrine, off abx- PRN benadryl, moniotor labs, bicarb gtt       Jaqueline Valles RN  May 7, 2025

## 2025-05-07 NOTE — PROGRESS NOTES
Spiritual Health History and Assessment/Progress Note  [facility]    Initial Encounter, Attempted Encounter,  ,  ,      Name: Gui Banks MRN: [id number]    Age: 80 y.o.     Sex: female   Language: English   Latter-day: Non-Mandaen   Acute kidney injury     Date: 5/7/2025                           Spiritual Assessment began in [facility] INTERMEDIATE        Referral/Consult From: Rounding   Encounter Overview/Reason: Initial Encounter, Attempted Encounter  Service Provided For: Patient    Brooke, Belief, Meaning:   Patient unable to assess at this time  Family/Friends No family/friends present      Importance and Influence:  Patient unable to assess at this time  Family/Friends No family/friends present    Community:  Patient feels well-supported. Support system includes: Children  Family/Friends No family/friends present    Assessment and Plan of Care:     Patient Interventions include: Other: Patient asleep and appears peaceful, did  niot disturb. Prayer offered for the patient and a prayer card was left in the room.  Family/Friends Interventions include: No family/friends present    Patient Plan of Care: Spiritual Care available upon further referral  Family/Friends Plan of Care: No family/friends present    Electronically signed by Chaplain Heriberto on 5/7/2025 at 2:01 PM

## 2025-05-07 NOTE — PROGRESS NOTES
Department of Internal Medicine  Nephrology Attending Consult Note    Events reviewed.    SUBJECTIVE: We are following Gui Banks for ADÁN and hyperkalemia. Patient reports new onset rash.      PHYSICAL EXAM:      Vitals:    VITALS:  BP (!) 101/50   Pulse 86   Temp 98.1 °F (36.7 °C) (Oral)   Resp 16   Ht 1.575 m (5' 2\")   Wt 53.5 kg (118 lb)   LMP  (LMP Unknown)   SpO2 98%   BMI 21.58 kg/m²   24HR INTAKE/OUTPUT:    Intake/Output Summary (Last 24 hours) at 5/7/2025 1124  Last data filed at 5/7/2025 0633  Gross per 24 hour   Intake 30 ml   Output 600 ml   Net -570 ml     Scheduled Meds:   albumin human 25%  25 g IntraVENous Once    midodrine  5 mg Oral TID WC    sodium chloride flush  10 mL IntraVENous 2 times per day    apixaban  2.5 mg Oral BID    escitalopram  20 mg Oral Daily    gabapentin  100 mg Oral BID    metoprolol succinate  12.5 mg Oral Daily     Continuous Infusions:   sodium chloride 100 mL/hr at 05/07/25 1152    sodium chloride       PRN Meds:.diphenhydrAMINE, sodium chloride flush, sodium chloride, ondansetron **OR** ondansetron, senna, acetaminophen **OR** acetaminophen     Constitutional: Patient is awake, alert, in no distress  HEENT: Pupils are equal reactive, mucous membranes are dry  Respiratory: Lungs are clear  Cardiovascular/Edema: Heart sounds are regular  Gastrointestinal: Abdomen is soft  Neurologic: Nonfocal  Skin: No lesions  Other: No edema    DATA:    CBC:   Lab Results   Component Value Date/Time    WBC 2.6 05/07/2025 09:35 AM    RBC 3.33 05/07/2025 09:35 AM    HGB 9.9 05/07/2025 09:35 AM    HCT 31.2 05/07/2025 09:35 AM    MCV 93.7 05/07/2025 09:35 AM    MCH 29.7 05/07/2025 09:35 AM    MCHC 31.7 05/07/2025 09:35 AM    RDW 13.5 05/07/2025 09:35 AM     05/07/2025 09:35 AM    MPV 11.0 05/07/2025 09:35 AM     CMP:    Lab Results   Component Value Date/Time     05/07/2025 09:35 AM    K 4.1 05/07/2025 09:35 AM    K 4.5 11/04/2022 10:41 AM     05/07/2025 09:35 AM     resolved    IMPRESSION/RECOMMENDATIONS:      ADÁN stage II, volume responsive ADÁN 2/2 GI losses-vomiting and diarrhea.  Creatinine level improved to 1.6 mg/dL, continue IV fluids    CKD stage IIIb with mild proteinuria, baseline creatinine 1.28 mg/dL, probably due to nephrosclerosis    HFrEF 30%, to obtain proBNP levels  Normocytic anemia, to obtain iron studies, B12, folate  -----------------------------------------------  PAF, on apixaban, metoprolol  Status post ICD placement  Hyperlipidemia  History of oral squamous cell carcinoma, status post mandibular resection  Status post PEG placement  Skin rash, medication induced probably Bactrim?  Nutrition: TF 35 mL/h with free water flushes 10 mL/h    Plan:    Change IV fluids to NS at 50 mL/h  Continue to monitor renal function  Continue to monitor bicarbonate level  Obtain proBNP tomorrow  Anemia iron studies, B12 and folate    Electronically signed by AILEEN Perkins - CNP on 5/7/2025 at 1:23 PM     I saw and evaluated the patient, performing the key elements of the service. I discussed the findings, assessment and plan with NP and agree with her findings and plans as documented in her note.        Brendan Silva MD

## 2025-05-07 NOTE — PLAN OF CARE
Problem: Chronic Conditions and Co-morbidities  Goal: Patient's chronic conditions and co-morbidity symptoms are monitored and maintained or improved  5/7/2025 1018 by Rebecca Campoverde, RN  Outcome: Progressing  Flowsheets (Taken 5/7/2025 0800)  Care Plan - Patient's Chronic Conditions and Co-Morbidity Symptoms are Monitored and Maintained or Improved: Monitor and assess patient's chronic conditions and comorbid symptoms for stability, deterioration, or improvement  5/6/2025 2241 by Harriet Izaguirre, RN  Outcome: Progressing     Problem: Pain  Goal: Verbalizes/displays adequate comfort level or baseline comfort level  Outcome: Progressing  Flowsheets (Taken 5/7/2025 0800)  Verbalizes/displays adequate comfort level or baseline comfort level: Encourage patient to monitor pain and request assistance     Problem: Safety - Adult  Goal: Free from fall injury  Outcome: Progressing  Flowsheets (Taken 5/7/2025 0800)  Free From Fall Injury: Instruct family/caregiver on patient safety

## 2025-05-08 VITALS
HEIGHT: 62 IN | OXYGEN SATURATION: 96 % | SYSTOLIC BLOOD PRESSURE: 115 MMHG | DIASTOLIC BLOOD PRESSURE: 60 MMHG | WEIGHT: 118 LBS | RESPIRATION RATE: 20 BRPM | TEMPERATURE: 98.1 F | HEART RATE: 72 BPM | BODY MASS INDEX: 21.71 KG/M2

## 2025-05-08 LAB
ALBUMIN SERPL-MCNC: 3.2 G/DL (ref 3.5–5.2)
ALP SERPL-CCNC: 253 U/L (ref 35–104)
ALT SERPL-CCNC: 154 U/L (ref 0–32)
ANION GAP SERPL CALCULATED.3IONS-SCNC: 14 MMOL/L (ref 7–16)
AST SERPL-CCNC: 92 U/L (ref 0–31)
ATYPICAL LYMPHOCYTE ABSOLUTE COUNT: 0.23 K/UL (ref 0–0.46)
ATYPICAL LYMPHOCYTES: 6 % (ref 0–4)
BASOPHILS # BLD: 0.03 K/UL (ref 0–0.2)
BASOPHILS NFR BLD: 1 % (ref 0–2)
BILIRUB SERPL-MCNC: 1.6 MG/DL (ref 0–1.2)
BNP SERPL-MCNC: 2580 PG/ML (ref 0–450)
BUN SERPL-MCNC: 16 MG/DL (ref 6–23)
CALCIUM SERPL-MCNC: 8.5 MG/DL (ref 8.6–10.2)
CHLORIDE SERPL-SCNC: 102 MMOL/L (ref 98–107)
CO2 SERPL-SCNC: 27 MMOL/L (ref 22–29)
CREAT SERPL-MCNC: 1.3 MG/DL (ref 0.5–1)
EOSINOPHIL # BLD: 0.33 K/UL (ref 0.05–0.5)
EOSINOPHILS RELATIVE PERCENT: 9 % (ref 0–6)
ERYTHROCYTE [DISTWIDTH] IN BLOOD BY AUTOMATED COUNT: 13.5 % (ref 11.5–15)
FERRITIN SERPL-MCNC: 214 NG/ML
FOLATE SERPL-MCNC: 13.4 NG/ML (ref 4.6–34.8)
GFR, ESTIMATED: 42 ML/MIN/1.73M2
GLUCOSE SERPL-MCNC: 115 MG/DL (ref 74–99)
HCT VFR BLD AUTO: 31.1 % (ref 34–48)
HGB BLD-MCNC: 9.7 G/DL (ref 11.5–15.5)
IRON SATN MFR SERPL: 45 % (ref 15–50)
IRON SERPL-MCNC: 72 UG/DL (ref 37–145)
LYMPHOCYTES NFR BLD: 0.26 K/UL (ref 1.5–4)
LYMPHOCYTES RELATIVE PERCENT: 7 % (ref 20–42)
MCH RBC QN AUTO: 29 PG (ref 26–35)
MCHC RBC AUTO-ENTMCNC: 31.2 G/DL (ref 32–34.5)
MCV RBC AUTO: 92.8 FL (ref 80–99.9)
MONOCYTES NFR BLD: 0.26 K/UL (ref 0.1–0.95)
MONOCYTES NFR BLD: 7 % (ref 2–12)
NEUTROPHILS NFR BLD: 71 % (ref 43–80)
NEUTS SEG NFR BLD: 2.69 K/UL (ref 1.8–7.3)
PLATELET # BLD AUTO: 176 K/UL (ref 130–450)
PMV BLD AUTO: 10.8 FL (ref 7–12)
POTASSIUM SERPL-SCNC: 3.9 MMOL/L (ref 3.5–5)
PROT SERPL-MCNC: 5.2 G/DL (ref 6.4–8.3)
RBC # BLD AUTO: 3.35 M/UL (ref 3.5–5.5)
RBC # BLD: ABNORMAL 10*6/UL
RBC # BLD: ABNORMAL 10*6/UL
SODIUM SERPL-SCNC: 143 MMOL/L (ref 132–146)
TIBC SERPL-MCNC: 159 UG/DL (ref 250–450)
VIT B12 SERPL-MCNC: >2000 PG/ML (ref 232–1245)
WBC OTHER # BLD: 3.8 K/UL (ref 4.5–11.5)

## 2025-05-08 PROCEDURE — 82746 ASSAY OF FOLIC ACID SERUM: CPT

## 2025-05-08 PROCEDURE — 6370000000 HC RX 637 (ALT 250 FOR IP): Performed by: PHYSICIAN ASSISTANT

## 2025-05-08 PROCEDURE — 83880 ASSAY OF NATRIURETIC PEPTIDE: CPT

## 2025-05-08 PROCEDURE — 83540 ASSAY OF IRON: CPT

## 2025-05-08 PROCEDURE — 36415 COLL VENOUS BLD VENIPUNCTURE: CPT

## 2025-05-08 PROCEDURE — 82728 ASSAY OF FERRITIN: CPT

## 2025-05-08 PROCEDURE — 2500000003 HC RX 250 WO HCPCS: Performed by: PHYSICIAN ASSISTANT

## 2025-05-08 PROCEDURE — 83550 IRON BINDING TEST: CPT

## 2025-05-08 PROCEDURE — 6370000000 HC RX 637 (ALT 250 FOR IP)

## 2025-05-08 PROCEDURE — 82607 VITAMIN B-12: CPT

## 2025-05-08 PROCEDURE — 80053 COMPREHEN METABOLIC PANEL: CPT

## 2025-05-08 PROCEDURE — 6370000000 HC RX 637 (ALT 250 FOR IP): Performed by: HOSPITALIST

## 2025-05-08 PROCEDURE — 85025 COMPLETE CBC W/AUTO DIFF WBC: CPT

## 2025-05-08 RX ORDER — MIDODRINE HYDROCHLORIDE 5 MG/1
5 TABLET ORAL
Qty: 90 TABLET | Refills: 3 | Status: SHIPPED | OUTPATIENT
Start: 2025-05-08

## 2025-05-08 RX ORDER — ESCITALOPRAM OXALATE 20 MG/1
20 TABLET ORAL DAILY
Qty: 30 TABLET | Refills: 3 | Status: SHIPPED
Start: 2025-05-09

## 2025-05-08 RX ADMIN — DIPHENHYDRAMINE HCL 25 MG: 25 TABLET ORAL at 10:25

## 2025-05-08 RX ADMIN — METOPROLOL SUCCINATE 12.5 MG: 25 TABLET, EXTENDED RELEASE ORAL at 09:01

## 2025-05-08 RX ADMIN — GABAPENTIN 100 MG: 100 CAPSULE ORAL at 09:01

## 2025-05-08 RX ADMIN — SODIUM CHLORIDE, PRESERVATIVE FREE 10 ML: 5 INJECTION INTRAVENOUS at 09:02

## 2025-05-08 RX ADMIN — MIDODRINE HYDROCHLORIDE 5 MG: 5 TABLET ORAL at 09:01

## 2025-05-08 RX ADMIN — APIXABAN 2.5 MG: 2.5 TABLET, FILM COATED ORAL at 09:02

## 2025-05-08 RX ADMIN — ESCITALOPRAM OXALATE 20 MG: 10 TABLET ORAL at 09:02

## 2025-05-08 RX ADMIN — MIDODRINE HYDROCHLORIDE 5 MG: 5 TABLET ORAL at 12:09

## 2025-05-08 NOTE — PROGRESS NOTES
P Quality Flow/Interdisciplinary Rounds Progress Note        Quality Flow Rounds held on May 8, 2025    Disciplines Attending:  Bedside Nurse, , , and Nursing Unit Leadership    Gui Banks was admitted on 5/5/2025  1:25 PM    Anticipated Discharge Date:       Disposition:    Cheko Score:  Cheko Scale Score: 20    [facility] RISK OF UNPLANNED READMISSION 2.0             17 Total Score        Discussed patient goal for the day, patient clinical progression, and barriers to discharge.  The following Goal(s) of the Day/Commitment(s) have been identified:   discharge planning, bicarb gtt, + orthos (midodrine)      Joey Iglesias, SNEHA  May 8, 2025

## 2025-05-08 NOTE — PROGRESS NOTES
LABGLOM 33 05/07/2025 09:35 AM    LABGLOM 50 10/11/2023 04:19 PM    GLUCOSE 102 05/07/2025 09:35 AM    CALCIUM 8.3 05/07/2025 09:35 AM    BILITOT 0.8 05/07/2025 09:35 AM    ALKPHOS 246 05/07/2025 09:35 AM     05/07/2025 09:35 AM     05/07/2025 09:35 AM     Magnesium:    Lab Results   Component Value Date/Time    MG 1.7 07/04/2024 02:30 AM     Phosphorus:    Lab Results   Component Value Date/Time    PHOS 4.0 05/03/2017 05:23 AM     Radiology Review:      CT scan abdomen, 5/5/2025    IMPRESSION:  1. Gastric rugae show slight enhancement and there is fluid in the stomach and duodenum. Mild gastritis not excluded. No evidence of perforating ulcer or perigastric inflammation.  2. No evidence of intestinal obstruction.  3. Mild left colon diverticulosis without diverticulitis.  4. Gas fluid levels in the transverse colon. Left colon is decompressed. This can be seen in a diarrheal process related to ileus.  5. Degenerative changes in the spine.    BRIEF SUMMARY OF INITIAL CONSULT:    Briefly, Ms. 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  Initial laboratory data showed creatinine of 2.5 mg/dL, potassium 5.8, reason for this consultation.      Problem resolved:    Hyperchloremic metabolic acidosis, 2/2 diarrhea, bicarbonate improved to 31, change IV fluids  Hyperkalemia, 2/2 decreased GFR, potassium 4.1, resolved    IMPRESSION/RECOMMENDATIONS:      ADÁN stage II, volume responsive ADÁN 2/2 GI losses-vomiting and diarrhea.  Creatinine level improved to

## 2025-05-08 NOTE — PROGRESS NOTES
Physician Progress Note      PATIENT:               RONNIE MAGANA  CSN #:                  [id number]  :                       1945  ADMIT DATE:       2025 1:25 PM  DISCH DATE:  RESPONDING  PROVIDER #:        Rob Izaguirre MD          QUERY TEXT:    Based on your medical judgment, please clarify these findings and document if   any of the following are being evaluated and/or treated:    The clinical indicators include:  This is 83yr old female present with intractable nausea and vomiting,    -HTN, CHF    -PAF, on apixaban, metoprolol noted in nephrology consult note on  by   Brendan Silva MD    -apixaban (ELIQUIS) tablet 2.5 mg (From Epic MAR)    Thank you,  Sridevi [facility] CDS.  Options provided:  -- Secondary hypercoagulable state in a patient with atrial fibrillation  -- Other - I will add my own diagnosis  -- Disagree - Not applicable / Not valid  -- Disagree - Clinically unable to determine / Unknown  -- Refer to Clinical Documentation Reviewer    PROVIDER RESPONSE TEXT:    This patient has secondary hypercoagulable state in a patient with atrial   fibrillation.    Query created by: Sridevi Graves on 2025 3:19 AM      Electronically signed by:  Rob Izaguirre MD 2025 2:06 PM

## 2025-05-08 NOTE — PROGRESS NOTES
CLINICAL PHARMACY NOTE: MEDS TO BEDS    Total # of Prescriptions Filled: 1   The following medications were delivered to the patient:  Midodrine 5 mg     Additional Documentation:

## 2025-05-08 NOTE — CARE COORDINATION
Social Work / Discharge Planning : SW followed up with patient and re-approached with HHC. Patient PCP is affiliated with [facility] . Patient has a hx with Tara Keen and will be preference. . Patient agreeable. Referral called to Alex. Will need HHC opders and Tara Keen will need called when ready for home. SW to follow. Electronically signed by CLARIBEL Ferrari on 5/8/25 at 1:50 PM EDT

## 2025-05-08 NOTE — PLAN OF CARE
Problem: Chronic Conditions and Co-morbidities  Goal: Patient's chronic conditions and co-morbidity symptoms are monitored and maintained or improved  5/8/2025 1015 by Rebecca Campoverde, RN  Outcome: Progressing  5/7/2025 2131 by Harriet Izaguirre RN  Outcome: Progressing     Problem: Pain  Goal: Verbalizes/displays adequate comfort level or baseline comfort level  Outcome: Progressing  Flowsheets (Taken 5/8/2025 0845)  Verbalizes/displays adequate comfort level or baseline comfort level: Encourage patient to monitor pain and request assistance     Problem: Safety - Adult  Goal: Free from fall injury  Outcome: Progressing  Flowsheets (Taken 5/8/2025 0845)  Free From Fall Injury: Instruct family/caregiver on patient safety

## 2025-05-08 NOTE — PROGRESS NOTES
Occupational Therapy  OT BEDSIDE TREATMENT NOTE      Date:2025  Patient Name: Gui Banks  MRN: [id number]  : 1945  Room: [address] [place] [address]-A     Pt laying in the bed. States she was up earlier.  Declined activity.        Farida THOMPSON/MAYNOR [id number]

## 2025-05-16 ENCOUNTER — HOSPITAL ENCOUNTER (OUTPATIENT)
Age: 80
Discharge: HOME | End: 2025-05-16
Payer: MEDICARE

## 2025-05-16 LAB
ANION GAP SERPL CALCULATED.3IONS-SCNC: 12 MMOL/L (ref 7–16)
BUN SERPL-MCNC: 18 MG/DL (ref 6–23)
CALCIUM SERPL-MCNC: 9.3 MG/DL (ref 8.6–10.2)
CHLORIDE SERPL-SCNC: 105 MMOL/L (ref 98–107)
CO2 SERPL-SCNC: 22 MMOL/L (ref 22–29)
CREAT SERPL-MCNC: 1 MG/DL (ref 0.5–1)
GFR, ESTIMATED: 55 ML/MIN/1.73M2
GLUCOSE SERPL-MCNC: 110 MG/DL (ref 74–99)
POTASSIUM SERPL-SCNC: 4.8 MMOL/L (ref 3.5–5)
SODIUM SERPL-SCNC: 139 MMOL/L (ref 132–146)

## 2025-05-16 PROCEDURE — 80048 BASIC METABOLIC PNL TOTAL CA: CPT

## 2025-05-16 PROCEDURE — 36415 COLL VENOUS BLD VENIPUNCTURE: CPT

## 2025-06-11 ENCOUNTER — OFFICE VISIT (OUTPATIENT)
Dept: NON INVASIVE DIAGNOSTICS | Age: 80
End: 2025-06-11
Payer: MEDICARE

## 2025-06-11 VITALS
DIASTOLIC BLOOD PRESSURE: 58 MMHG | WEIGHT: 124.8 LBS | SYSTOLIC BLOOD PRESSURE: 122 MMHG | TEMPERATURE: 97.3 F | RESPIRATION RATE: 16 BRPM | BODY MASS INDEX: 24.5 KG/M2 | HEIGHT: 60 IN | HEART RATE: 87 BPM | OXYGEN SATURATION: 97 %

## 2025-06-11 DIAGNOSIS — Z79.01 ANTICOAGULATED BY ANTICOAGULATION TREATMENT: ICD-10-CM

## 2025-06-11 DIAGNOSIS — Z87.19 HISTORY OF ISCHEMIC COLITIS: ICD-10-CM

## 2025-06-11 DIAGNOSIS — Z95.810 BIVENTRICULAR IMPLANTABLE CARDIOVERTER-DEFIBRILLATOR IN SITU: ICD-10-CM

## 2025-06-11 DIAGNOSIS — I48.91 ATRIAL FIBRILLATION, UNSPECIFIED TYPE (HCC): Primary | ICD-10-CM

## 2025-06-11 DIAGNOSIS — I44.7 BUNDLE BRANCH BLOCK, LEFT: ICD-10-CM

## 2025-06-11 PROCEDURE — 1123F ACP DISCUSS/DSCN MKR DOCD: CPT | Performed by: NURSE PRACTITIONER

## 2025-06-11 PROCEDURE — 99214 OFFICE O/P EST MOD 30 MIN: CPT | Performed by: NURSE PRACTITIONER

## 2025-06-11 PROCEDURE — G8420 CALC BMI NORM PARAMETERS: HCPCS | Performed by: NURSE PRACTITIONER

## 2025-06-11 PROCEDURE — 1090F PRES/ABSN URINE INCON ASSESS: CPT | Performed by: NURSE PRACTITIONER

## 2025-06-11 PROCEDURE — 93000 ELECTROCARDIOGRAM COMPLETE: CPT | Performed by: INTERNAL MEDICINE

## 2025-06-11 PROCEDURE — G8427 DOCREV CUR MEDS BY ELIG CLIN: HCPCS | Performed by: NURSE PRACTITIONER

## 2025-06-11 PROCEDURE — 1036F TOBACCO NON-USER: CPT | Performed by: NURSE PRACTITIONER

## 2025-06-11 PROCEDURE — G8400 PT W/DXA NO RESULTS DOC: HCPCS | Performed by: NURSE PRACTITIONER

## 2025-06-11 PROCEDURE — 1159F MED LIST DOCD IN RCRD: CPT | Performed by: NURSE PRACTITIONER

## 2025-06-11 NOTE — PROGRESS NOTES
S/p RV lead explantation with implantation of a new Guidant RV ICD lead, as well as a Bi-V ICD upgrade on 1/7/09  - ICD generator change 4/17/18.  - Normal device function.  - BiV pacing 99 to 100%     2. Paroxysmal atrial fibrillation  - [id number]: 4  - No history of DC-cardioversion or AAD therapy or ablation.  - On Coreg for rate control.  - On Eliquis for stroke risk reduction.  - AF burden <1%.  - Presents in NSR.     3. Nonischemic cardiomyopathy  - History of LV EF of approximately 30% diagnosed in December 2006.  - NYHA Class II, ACC/AHA stage C  - Euvolemic and compensated.  - Echo 3/16/07: LVEF 25-30%..    - 12/26/06: Severe LVH with an ejection fraction of 20%.  Normal coronary anatomy  - TTE May 2016/2017: LVEF 55-60%.  - On GDM including Coreg. No ACE/ARB/ARNI secondary to prior intolerance  - Follows with Dr. Coyle     4. LBBB     5. Hyperlipidemia     Recommendations:     1. Normal CRT-D function.   2. Continue Eliquis 5 mg BID  3. Continue Toprol XL 25 mg extended release  4. Continue follow up with Cardiology for GDMT adjustment.  5. Remote transmission every month since battery needs changed in about 3 mo.   6. Will follow up after generator change      Re-education on importance of well controlled HTN (goal BP < 130/80), adequate weight control (goal BMI of < 27), physical activity consisting of moderate cardiopulmonary exercise up to a goal of 250 min/wk, smoking/tobacco abstinence and limited ETOH intake.       I have spent a total of 31 minutes with the patient and the family reviewing the above stated recommendations. And a total of >50% of that time involved face-to-face time providing counseling and or coordination of care with the other providers.      Thank you for allowing me to participate in your patient's care.  Please call me if there are any questions or concerns.        AILEEN Cuellar - CNP  Cardiac Electrophysiology  [facility]  [facility]

## 2025-08-22 ENCOUNTER — TELEPHONE (OUTPATIENT)
Dept: NON INVASIVE DIAGNOSTICS | Age: 80
End: 2025-08-22

## 2025-08-26 DIAGNOSIS — R94.31 ABNORMAL EKG: Primary | ICD-10-CM
